# Patient Record
Sex: MALE | Race: WHITE | Employment: UNEMPLOYED | URBAN - METROPOLITAN AREA
[De-identification: names, ages, dates, MRNs, and addresses within clinical notes are randomized per-mention and may not be internally consistent; named-entity substitution may affect disease eponyms.]

---

## 2017-01-11 ENCOUNTER — ANESTHESIA EVENT (OUTPATIENT)
Dept: ENDOSCOPY | Age: 62
End: 2017-01-11
Payer: MEDICARE

## 2017-01-11 ENCOUNTER — HOSPITAL ENCOUNTER (OUTPATIENT)
Age: 62
Setting detail: OUTPATIENT SURGERY
Discharge: HOME | End: 2017-01-11
Attending: INTERNAL MEDICINE | Admitting: INTERNAL MEDICINE
Payer: MEDICARE

## 2017-01-11 ENCOUNTER — ANESTHESIA (OUTPATIENT)
Dept: ENDOSCOPY | Age: 62
End: 2017-01-11
Payer: MEDICARE

## 2017-01-11 VITALS
OXYGEN SATURATION: 97 % | RESPIRATION RATE: 17 BRPM | WEIGHT: 143.56 LBS | HEART RATE: 74 BPM | HEIGHT: 71 IN | BODY MASS INDEX: 20.1 KG/M2 | DIASTOLIC BLOOD PRESSURE: 80 MMHG | TEMPERATURE: 97.6 F | SYSTOLIC BLOOD PRESSURE: 121 MMHG

## 2017-01-11 PROCEDURE — 76060000032 HC ANESTHESIA 0.5 TO 1 HR: Performed by: INTERNAL MEDICINE

## 2017-01-11 PROCEDURE — 88305 TISSUE EXAM BY PATHOLOGIST: CPT | Performed by: INTERNAL MEDICINE

## 2017-01-11 PROCEDURE — 77030019988 HC FCPS ENDOSC DISP BSC -B: Performed by: INTERNAL MEDICINE

## 2017-01-11 PROCEDURE — 74011250636 HC RX REV CODE- 250/636

## 2017-01-11 PROCEDURE — 74011250636 HC RX REV CODE- 250/636: Performed by: INTERNAL MEDICINE

## 2017-01-11 PROCEDURE — 74011000250 HC RX REV CODE- 250

## 2017-01-11 PROCEDURE — 77030013992 HC SNR POLYP ENDOSC BSC -B: Performed by: INTERNAL MEDICINE

## 2017-01-11 PROCEDURE — 76040000007: Performed by: INTERNAL MEDICINE

## 2017-01-11 RX ORDER — SODIUM CHLORIDE 0.9 % (FLUSH) 0.9 %
5-10 SYRINGE (ML) INJECTION AS NEEDED
Status: DISCONTINUED | OUTPATIENT
Start: 2017-01-11 | End: 2017-01-11 | Stop reason: HOSPADM

## 2017-01-11 RX ORDER — DEXTROMETHORPHAN/PSEUDOEPHED 2.5-7.5/.8
1.2 DROPS ORAL
Status: DISCONTINUED | OUTPATIENT
Start: 2017-01-11 | End: 2017-01-11 | Stop reason: HOSPADM

## 2017-01-11 RX ORDER — LIDOCAINE HYDROCHLORIDE 20 MG/ML
INJECTION, SOLUTION EPIDURAL; INFILTRATION; INTRACAUDAL; PERINEURAL AS NEEDED
Status: DISCONTINUED | OUTPATIENT
Start: 2017-01-11 | End: 2017-01-11 | Stop reason: HOSPADM

## 2017-01-11 RX ORDER — SODIUM CHLORIDE 0.9 % (FLUSH) 0.9 %
5-10 SYRINGE (ML) INJECTION EVERY 8 HOURS
Status: DISCONTINUED | OUTPATIENT
Start: 2017-01-11 | End: 2017-01-11 | Stop reason: HOSPADM

## 2017-01-11 RX ORDER — EPINEPHRINE 0.1 MG/ML
1 INJECTION INTRACARDIAC; INTRAVENOUS
Status: DISCONTINUED | OUTPATIENT
Start: 2017-01-11 | End: 2017-01-11 | Stop reason: HOSPADM

## 2017-01-11 RX ORDER — PROPOFOL 10 MG/ML
INJECTION, EMULSION INTRAVENOUS AS NEEDED
Status: DISCONTINUED | OUTPATIENT
Start: 2017-01-11 | End: 2017-01-11 | Stop reason: HOSPADM

## 2017-01-11 RX ORDER — FLUMAZENIL 0.1 MG/ML
0.2 INJECTION INTRAVENOUS
Status: DISCONTINUED | OUTPATIENT
Start: 2017-01-11 | End: 2017-01-11 | Stop reason: HOSPADM

## 2017-01-11 RX ORDER — MIDAZOLAM HYDROCHLORIDE 1 MG/ML
.25-5 INJECTION, SOLUTION INTRAMUSCULAR; INTRAVENOUS
Status: DISCONTINUED | OUTPATIENT
Start: 2017-01-11 | End: 2017-01-11 | Stop reason: HOSPADM

## 2017-01-11 RX ORDER — NALOXONE HYDROCHLORIDE 0.4 MG/ML
0.4 INJECTION, SOLUTION INTRAMUSCULAR; INTRAVENOUS; SUBCUTANEOUS
Status: DISCONTINUED | OUTPATIENT
Start: 2017-01-11 | End: 2017-01-11 | Stop reason: HOSPADM

## 2017-01-11 RX ORDER — SODIUM CHLORIDE 9 MG/ML
100 INJECTION, SOLUTION INTRAVENOUS CONTINUOUS
Status: DISCONTINUED | OUTPATIENT
Start: 2017-01-11 | End: 2017-01-11 | Stop reason: HOSPADM

## 2017-01-11 RX ORDER — GLYCOPYRROLATE 0.2 MG/ML
INJECTION INTRAMUSCULAR; INTRAVENOUS AS NEEDED
Status: DISCONTINUED | OUTPATIENT
Start: 2017-01-11 | End: 2017-01-11 | Stop reason: HOSPADM

## 2017-01-11 RX ORDER — ATROPINE SULFATE 0.1 MG/ML
0.5 INJECTION INTRAVENOUS
Status: DISCONTINUED | OUTPATIENT
Start: 2017-01-11 | End: 2017-01-11 | Stop reason: HOSPADM

## 2017-01-11 RX ADMIN — PROPOFOL 50 MG: 10 INJECTION, EMULSION INTRAVENOUS at 09:02

## 2017-01-11 RX ADMIN — GLYCOPYRROLATE 0.1 MG: 0.2 INJECTION INTRAMUSCULAR; INTRAVENOUS at 08:38

## 2017-01-11 RX ADMIN — SODIUM CHLORIDE 100 ML/HR: 900 INJECTION, SOLUTION INTRAVENOUS at 08:15

## 2017-01-11 RX ADMIN — LIDOCAINE HYDROCHLORIDE 50 MG: 20 INJECTION, SOLUTION EPIDURAL; INFILTRATION; INTRACAUDAL; PERINEURAL at 08:38

## 2017-01-11 RX ADMIN — PROPOFOL 30 MG: 10 INJECTION, EMULSION INTRAVENOUS at 08:50

## 2017-01-11 RX ADMIN — PROPOFOL 50 MG: 10 INJECTION, EMULSION INTRAVENOUS at 08:38

## 2017-01-11 RX ADMIN — PROPOFOL 20 MG: 10 INJECTION, EMULSION INTRAVENOUS at 09:14

## 2017-01-11 RX ADMIN — PROPOFOL 20 MG: 10 INJECTION, EMULSION INTRAVENOUS at 08:47

## 2017-01-11 RX ADMIN — PROPOFOL 50 MG: 10 INJECTION, EMULSION INTRAVENOUS at 08:56

## 2017-01-11 RX ADMIN — SODIUM CHLORIDE 100 ML/HR: 900 INJECTION, SOLUTION INTRAVENOUS at 09:39

## 2017-01-11 RX ADMIN — PROPOFOL 20 MG: 10 INJECTION, EMULSION INTRAVENOUS at 09:08

## 2017-01-11 RX ADMIN — PROPOFOL 50 MG: 10 INJECTION, EMULSION INTRAVENOUS at 08:42

## 2017-01-11 NOTE — IP AVS SNAPSHOT
[address] [facility] 
[phone] Patient: Nidhi Alegria MRN: [id number] You are allergic to the following Allergen Reactions Aleve (Naproxen Sodium) Anaphylaxis Hives, passed out, had seizure Sulfa (Sulfonamide Antibiotics) Hives Recent Documentation Height Weight BMI Smoking Status 1.803 m 65.1 kg 20.02 kg/m2 Current Every Day Smoker Emergency Contacts Name Discharge Info Relation Home Work Mobile Ashwini Males  Spouse [3] [phone] About your hospitalization You were admitted on:  2017 You last received care in the:  [facility] You were discharged on:  2017 Unit phone number:  [phone] Why you were hospitalized Your primary diagnosis was:  Not on File Providers Seen During Your Hospitalizations Provider Role Specialty Primary office phone Kylee Brizuela MD Attending Provider Gastroenterology [phone] Your Primary Care Physician (PCP) Primary Care Physician Office Phone Office Fax [facility] [phone] Follow-up Information Follow up With Details Comments Contact Info Loc Sinclair MD   [address] [place] [address] 
[phone] Current Discharge Medication List  
  
CONTINUE these medications which have NOT CHANGED Dose & Instructions Dispensing Information Comments Morning Noon Evening Bedtime  
 albuterol 90 mcg/actuation inhaler Commonly known as:  Guillermo Saliva Your next dose is: Today, Tomorrow Other:  _________ Dose:  2 Puff Take 2 Puffs by inhalation every six (6) hours as needed. Refills:  0  
     
   
   
   
  
 ANORO ELLIPTA 62.5-25 mcg/actuation inhaler Generic drug:  umeclidinium-vilanterol Your next dose is: Today, Tomorrow Other:  _________ Dose:  1 Puff Take 1 Puff by inhalation daily. Refills:  0  
     
   
   
   
  
 aspirin 81 mg tablet Your next dose is: Today, Tomorrow Other:  _________ Dose:  81 mg Take 81 mg by mouth daily. Refills:  0  
     
   
   
   
  
 diazePAM 10 mg tablet Commonly known as:  VALIUM Your next dose is: Today, Tomorrow Other:  _________ Dose:  10 mg Take 10 mg by mouth nightly. Refills:  0 HYDROcodone-acetaminophen 5-325 mg per tablet Commonly known as:  Mario Songster Your next dose is: Today, Tomorrow Other:  _________ Dose:  1 Tab Take 1 Tab by mouth daily as needed. Indications: PAIN Refills:  0  
     
   
   
   
  
 omeprazole 20 mg capsule Commonly known as:  PRILOSEC Your next dose is: Today, Tomorrow Other:  _________ Dose:  20 mg Take 20 mg by mouth daily. Refills:  0 Discharge Instructions Michael Waggoner MD 
[facility], [address] [place] [address] [address] [facility], [address] [place] [address] 
[phone] 
[email or web address] 
 
Renny Pacheco [id number] 
1955 EGD DISCHARGE INSTRUCTIONS Discomfort: 
Sore throat- throat lozenges or warm salt water gargle 
redness at IV site- apply warm compress to area; if redness or soreness persist- contact your physician Gaseous discomfort- walking, belching will help relieve any discomfort You may not operate a vehicle for 12 hours You may not engage in an occupation involving machinery or appliances for rest of today You may not drink alcoholic beverages for at least 12 hours Avoid making any critical decisions for at least 24 hour DIET You may have anything by mouth. You may eat and drink immediately.  
You may resume your regular diet  however -  remember your colon is empty and a heavy meal will produce gas. Avoid these foods:  vegetables, fried / greasy foods, carbonated drinks ACTIVITY You may resume your normal daily activities Spend the remainder of the day resting -  avoid any strenuous activity. CALL M.D. ANY SIGN OF Increasing pain, nausea, vomiting Abdominal distension (swelling) New increased bleeding (oral or rectal) Fever (chills) Pain in chest area Bloody discharge from nose or mouth Shortness of breath Follow-up Instructions: 
 Call Dr. Clayton Khan for any questions or problems. Telephone # [phone] Dr. Shaina Fulton office will notify you of the biopsy results available  Within 7 to 10 days. We will call you or send a letter Continue same medications. ENDOSCOPY FINDINGS: 
 Your endoscopy showed some inflammation at the surgical anastomosis, biopsied. DISCHARGE SUMMARY from Nurse The following personal items collected during your admission are returned to you:  
Dental Appliance: Dental Appliances: None Vision: Visual Aid: Glasses, With patient Hearing Aid:   
Jewelry:   
Clothing:   
Other Valuables:   
Valuables sent to safe:    
 
 
COLON DISCHARGE INSTRUCTIONS Discomfort: 
Redness at IV site- apply warm compress to area; if redness or soreness persist- contact your physician There may be a slight amount of blood passed from the rectum Gaseous discomfort- walking, belching will help relieve any discomfort You may not operate a vehicle for 12 hours You may not engage in an occupation involving machinery or appliances for rest of today You may not drink alcoholic beverages for at least 12 hours Avoid making any critical decisions for at least 24 hour DIET: 
 Regular diet.  however -  remember your colon is empty and a heavy meal will produce gas. Avoid these foods:  vegetables, fried / greasy foods, carbonated drinks for today ACTIVITY: 
 You may resume your normal daily activities it is recommended that you spend the remainder of the day resting -  avoid any strenuous activity. CALL M.D. ANY SIGN OF: Increasing pain, nausea, vomiting Abdominal distension (swelling) New increased bleeding (oral or rectal) Fever (chills) Pain in chest area Bloody discharge from nose or mouth Shortness of breath COLONOSCOPY FINDINGS: 
Your colonoscopy showed: multiple polyps which we removed. Follow-up Instructions: 
 Call Dr. Maria T Valencia if any questions or problems. Telephone # [phone] Dr. Alfredito Hahn office will notify you of the biopsy results within 7 to 10 days. Should have a repeat colonoscopy in 3 years. [facility] Activation Thank you for requesting access to [facility]. Please follow the instructions below to securely access and download your online medical record. [facility] allows you to send messages to your doctor, view your test results, renew your prescriptions, schedule appointments, and more. How Do I Sign Up? 1. In your internet browser, go to [email or web address] 
2. Click on the First Time User? Click Here link in the Sign In box. You will be redirect to the New Member Sign Up page. 3. Enter your [facility] Access Code exactly as it appears below. You will not need to use this code after youve completed the sign-up process. If you do not sign up before the expiration date, you must request a new code. [facility] Access Code: [email or web address] Expires: 2017  7:36 AM (This is the date your [facility] access code will ) 4. Enter the last four digits of your Social Security Number (xxxx) and Date of Birth (mm/dd/yyyy) as indicated and click Submit. You will be taken to the next sign-up page. 5. Create a [facility] ID. This will be your [facility] login ID and cannot be changed, so think of one that is secure and easy to remember. 6. Create a [facility] password. You can change your password at any time. 7. Enter your Password Reset Question and Answer. This can be used at a later time if you forget your password. 8. Enter your e-mail address. You will receive e-mail notification when new information is available in [address] 9. Click Sign Up. You can now view and download portions of your medical record. 10. Click the Download Summary menu link to download a portable copy of your medical information. Additional Information If you have questions, please visit the Frequently Asked Questions section of the [employer] website at [email or web address] Remember, [facility] is NOT to be used for urgent needs. For medical emergencies, dial 911. Discharge Orders None Introducing [facility] SERVICES! Rabia Mcfadden introduces [employer] patient portal. Now you can access parts of your medical record, email your doctor's office, and request medication refills online. 1. In your internet browser, go to [email or web address] 2. Click on the First Time User? Click Here link in the Sign In box. You will see the New Member Sign Up page. 3. Enter your [facility] Access Code exactly as it appears below. You will not need to use this code after youve completed the sign-up process. If you do not sign up before the expiration date, you must request a new code. · [facility] Access Code: [email or web address] Expires: 4/11/2017  7:36 AM 
 
4. Enter the last four digits of your Social Security Number (xxxx) and Date of Birth (mm/dd/yyyy) as indicated and click Submit. You will be taken to the next sign-up page. 5. Create a [facility] ID. This will be your [facility] login ID and cannot be changed, so think of one that is secure and easy to remember. 6. Create a [employer] password. You can change your password at any time. 7. Enter your Password Reset Question and Answer. This can be used at a later time if you forget your password. 8. Enter your e-mail address. You will receive e-mail notification when new information is available in [address] 9. Click Sign Up. You can now view and download portions of your medical record. 10. Click the Download Summary menu link to download a portable copy of your medical information. If you have questions, please visit the Frequently Asked Questions section of the [employer] website. Remember, [employer] is NOT to be used for urgent needs. For medical emergencies, dial 911. Now available from your iPhone and Android! General Information Please provide this summary of care documentation to your next provider. Patient Signature:  ____________________________________________________________ Date:  ____________________________________________________________  
  
Mallika Hoskins Provider Signature:  ____________________________________________________________ Date:  ____________________________________________________________

## 2017-01-11 NOTE — DISCHARGE INSTRUCTIONS
Tawanna Thomas MD  [facility], [address]  [place], [address] [place]  [phone]  [email or web address]    Kalia Woodward  [id number]  1955    EGD DISCHARGE INSTRUCTIONS    Discomfort:  Sore throat- throat lozenges or warm salt water gargle  redness at IV site- apply warm compress to area; if redness or soreness persist- contact your physician  Gaseous discomfort- walking, belching will help relieve any discomfort  You may not operate a vehicle for 12 hours  You may not engage in an occupation involving machinery or appliances for rest of today  You may not drink alcoholic beverages for at least 12 hours  Avoid making any critical decisions for at least 24 hour  DIET  You may have anything by mouth. You may eat and drink immediately. You may resume your regular diet - however -  remember your colon is empty and a heavy meal will produce gas. Avoid these foods:  vegetables, fried / greasy foods, carbonated drinks      ACTIVITY  You may resume your normal daily activities   Spend the remainder of the day resting -  avoid any strenuous activity. CALL M.D. ANY SIGN OF   Increasing pain, nausea, vomiting  Abdominal distension (swelling)  New increased bleeding (oral or rectal)  Fever (chills)  Pain in chest area  Bloody discharge from nose or mouth  Shortness of breath    Follow-up Instructions:   Call Dr. Tawanna Thomas for any questions or problems. Telephone # [phone]  Dr. Hill Bernal office will notify you of the biopsy results available  Within 7 to 10 days. We will call you or send a letter   Continue same medications. ENDOSCOPY FINDINGS:   Your endoscopy showed some inflammation at the surgical anastomosis, biopsied.       DISCHARGE SUMMARY from Nurse    The following personal items collected during your admission are returned to you:   Dental Appliance: Dental Appliances: None  Vision: Visual Aid: Glasses, With patient  Hearing Aid:    Jewelry:    Clothing:    Other Valuables:    Valuables sent to safe:         COLON DISCHARGE INSTRUCTIONS  Discomfort:  Redness at IV site- apply warm compress to area; if redness or soreness persist- contact your physician  There may be a slight amount of blood passed from the rectum  Gaseous discomfort- walking, belching will help relieve any discomfort  You may not operate a vehicle for 12 hours  You may not engage in an occupation involving machinery or appliances for rest of today  You may not drink alcoholic beverages for at least 12 hours  Avoid making any critical decisions for at least 24 hour  DIET:   Regular diet. - however -  remember your colon is empty and a heavy meal will produce gas. Avoid these foods:  vegetables, fried / greasy foods, carbonated drinks for today      ACTIVITY:  You may resume your normal daily activities it is recommended that you spend the remainder of the day resting -  avoid any strenuous activity. CALL M.D. ANY SIGN OF:   Increasing pain, nausea, vomiting  Abdominal distension (swelling)  New increased bleeding (oral or rectal)  Fever (chills)  Pain in chest area  Bloody discharge from nose or mouth  Shortness of breath     COLONOSCOPY FINDINGS:  Your colonoscopy showed: multiple polyps which we removed. Follow-up Instructions:   Call Dr. Clayton Khan if any questions or problems. Telephone # [phone]  Dr. Shaina Fulton office will notify you of the biopsy results within 7 to 10 days. Should have a repeat colonoscopy in 3 years. [employer] Activation    Thank you for requesting access to [employer]. Please follow the instructions below to securely access and download your online medical record. [employer] allows you to send messages to your doctor, view your test results, renew your prescriptions, schedule appointments, and more. How Do I Sign Up? 1. In your internet browser, go to [email or web address]  2.  Click on the First Time User? Click Here link in the Sign In box. You will be redirect to the New Member Sign Up page. 3. Enter your [facility] Access Code exactly as it appears below. You will not need to use this code after youve completed the sign-up process. If you do not sign up before the expiration date, you must request a new code. [facility] Access Code: [email or web address]  Expires: 2017  7:36 AM (This is the date your [facility] access code will )    4. Enter the last four digits of your Social Security Number (xxxx) and Date of Birth (mm/dd/yyyy) as indicated and click Submit. You will be taken to the next sign-up page. 5. Create a [facility] ID. This will be your [facility] login ID and cannot be changed, so think of one that is secure and easy to remember. 6. Create a [facility] password. You can change your password at any time. 7. Enter your Password Reset Question and Answer. This can be used at a later time if you forget your password. 8. Enter your e-mail address. You will receive e-mail notification when new information is available in [address] 9. Click Sign Up. You can now view and download portions of your medical record. 10. Click the Download Summary menu link to download a portable copy of your medical information. Additional Information    If you have questions, please visit the Frequently Asked Questions section of the [facility] website at [email or web address] Remember, [facility] is NOT to be used for urgent needs. For medical emergencies, dial 911.

## 2017-01-11 NOTE — PROCEDURES
[facility]                  Colonoscopy Operative Report    1/11/2017      Nidhi Alegria  [id number]  1955    Procedure Type:   Colonoscopy --screening     Indications:    Personal history of colon polyps (screening only)     Pre-operative Diagnosis: see indication above    Post-operative Diagnosis:  See findings below    :  Rika Singletary MD    Referring Provider: Loc Sinclair MD      Sedation:  MAC anesthesia Propofol    Pre-Procedural Exam:      Airway: clear,  No airway problems anticipated  Heart: RRR, without gallops or rubs  Lungs: clear bilaterally without wheezes, crackles, or rhonchi  Abdomen: soft, nontender, nondistended, bowel sounds present  Mental Status: awake, alert and oriented to person, place and time     Procedure Details:  After informed consent was obtained with all risks and benefits of procedure explained and preoperative exam completed, the patient was taken to the endoscopy suite and placed in the left lateral decubitus position. Upon sequential sedation as per above, a digital rectal exam was performed . The Olympus videocolonoscope  was inserted in the rectum and carefully advanced to the cecum, which was identified by the ileocecal valve and appendiceal orifice. The cecum was identified by the ileocecal valve and appendiceal orifice. The quality of preparation was adequate. The colonoscope was slowly withdrawn with careful evaluation between folds. Retroflexion in the rectum was completed demonstrating internal hemorrhoids. Findings:   Rectum: Grade 1 internal hemorrhoid(s); Sigmoid: normal  Descending Colon: 5 polyps from 6 to 9 mm, hot snared  Transverse Colon: 8 polyps from 5 to 10 mm, hot snared  Ascending Colon: normal  Cecum: normal  Terminal Ileum: not intubated      Specimen Removed:  1. transverse colonic polyps  2. descending colon polyps    Complications: None. EBL:  None.     Impression:    hemorrhoids internal, Moderate in size  multiple colonic polyps removed    Recommendations: --Await pathology. , -Repeat colonoscopy in 3 years. High fiber diet. Resume normal medication(s). Discharge Disposition:  Home in the company of a  when able to ambulate. Robert Casey MD    1/11/2017     JOZEF Hurt MD  [facility], [address] [place] [address] [address] [place] [address]  [phone]  [email or web address]

## 2017-01-11 NOTE — PROCEDURES
[facility]                   Endoscopic Gastroduodenoscopy Procedure Note      1/11/2017  Elzbieta Pert  1955  [id number]    Procedure: Endoscopic Gastroduodenoscopy with biopsy    Indication:  Dyphagia/odynophagia, s/p resection of distal esophagus for carcinoma, Epstein's     Pre-operative Diagnosis: see indication above    Post-operative Diagnosis: see findings below    : JOZEF Olson MD    Referring Provider:  Halie Phan MD      Anesthesia/Sedation:  MAC anesthesia Propofol    Airway assessment: No airway problems anticipated    Pre-Procedural Exam:      Airway: clear, no airway problems anticipated  Heart: RRR, without gallops or rubs  Lungs: clear bilaterally without wheezes, crackles, or rhonchi  Abdomen: soft, nontender, nondistended, bowel sounds present  Mental Status: awake, alert and oriented to person, place and time       Procedure Details     After infomed consent was obtained for the procedure, with all risks and benefits of procedure explained the patient was taken to the endoscopy suite and placed in the left lateral decubitus position. Following sequential administration of sedation as per above, the endoscope was inserted into the mouth and advanced under direct vision to second portion of the duodenum. A careful inspection was made as the gastroscope was withdrawn, including a retroflexed view of the proximal stomach; findings and interventions are described below. Findings:   Esophagus:  Focal esophagitis at the esophagogastric surgical anastomosis which was biopsied. No stricture seen. Stomach: normal . Pyloroplasty  Duodenum: normal    Therapies:  biopsy of esophagogastric anastomosis    Specimens:  biopsy of esophagogastric anastomosis           Complications:   None; patient tolerated the procedure well. EBL:  None.             Impression:      -Focal esophagitis at the esophagogastric surgical anastomosis Recommendations:  -Await pathology.     Josseline Boothe MD1/11/2017

## 2017-01-11 NOTE — ANESTHESIA PREPROCEDURE EVALUATION
Anesthetic History   No history of anesthetic complications            Review of Systems / Medical History  Patient summary reviewed, nursing notes reviewed and pertinent labs reviewed    Pulmonary    COPD: moderate    Sleep apnea (resolved with weight loss)  Smoker (40 pk yrs)  Asthma        Neuro/Psych     seizures         Cardiovascular    Hypertension      CHF        Exercise tolerance: >4 METS  Comments: EF normal in 2012   GI/Hepatic/Renal     GERD           Endo/Other      Hypothyroidism  Arthritis and cancer ( esophagus)     Other Findings              Physical Exam    Airway  Mallampati: II  TM Distance: 4 - 6 cm  Neck ROM: normal range of motion   Mouth opening: Normal     Cardiovascular  Regular rate and rhythm,  S1 and S2 normal,  no murmur, click, rub, or gallop             Dental    Dentition: Poor dentition     Pulmonary  Breath sounds clear to auscultation               Abdominal  GI exam deferred       Other Findings            Anesthetic Plan    ASA: 3  Anesthesia type: MAC          Induction: Intravenous  Anesthetic plan and risks discussed with: Patient

## 2017-01-11 NOTE — ROUTINE PROCESS
Glenn Flores from Dr Brigitte Khan office in to give patient a specimen cup for stool specimen to R/Q c diff.  Patient instructed to call dr. Brigitte Khan office for any questions

## 2017-01-11 NOTE — ROUTINE PROCESS
Kory Uniopolis  1955  [id number]    Situation:  Verbal report received from: Rafael Jean RN  Procedure: Procedure(s):  COLONOSCOPY  ESOPHAGOGASTRODUODENOSCOPY (EGD)  ESOPHAGOGASTRODUODENAL (EGD) BIOPSY  ENDOSCOPIC POLYPECTOMY    Background:    Preoperative diagnosis: DYSPHAGIA,DIARRHEA, HX ESOPHAGEAL CA, HX COLONIC POLYPS, FAM HX COLON CANCER  Postoperative diagnosis: EGD-hx of esophageal CA  COLON-colon polyps    :  Dr. Mao Plater  Assistant(s): Endoscopy Technician-1: Alex Cervantes  Endoscopy RN-1: Elena Jim RN    Specimens:   ID Type Source Tests Collected by Time Destination   1 : esophagogastric anastamosis bx Preservative   Pineda Kc MD 1/11/2017 0845 Pathology   2 : Transverse colon polyps Preservative   Pineda Kc MD 1/11/2017 9973 Pathology   3 : descending colon polyps Preservative Colon, Descending  Pineda Kc MD 1/11/2017 4099 Pathology     H. Pylori  no    Assessment:  Intra-procedure medications     Anesthesia gave intra-procedure sedation and medications, see anesthesia flow sheet     Intravenous fluids: NS@ KVO     Vital signs stable     Abdominal assessment: round and soft     Recommendation:  Discharge patient per MD order  Family or Friend   Permission to share finding with family or friend yes

## 2017-01-11 NOTE — PROGRESS NOTES
Anesthesia reports 290 mg Propofol, 50 mg Lidocaine, 0.1 mg Robinul and 750 ml of Normal Saline were given during procedure. Received report from anesthesia staff on vital signs and status of patient.

## 2017-01-11 NOTE — H&P
Sawyer Golden MD  [facility], [address]  [address]  [phone]  [email or web address]       See office H and P. No interval change. Date of Surgery Update:  Mickey Singer was seen and examined. History and physical has been reviewed. The patient has been examined.  There have been no significant clinical changes since the completion of the originally dated History and Physical.    Signed By: Geoffrey Le MD     January 11, 2017 8:36 AM

## 2017-01-11 NOTE — ANESTHESIA POSTPROCEDURE EVALUATION
Post-Anesthesia Evaluation and Assessment    Patient: Mayra Benavidez MRN: [id number]  SSN: [id number]    YOB: 1955  Age: 63 y.o. Sex: male       Cardiovascular Function/Vital Signs  Visit Vitals    /80    Pulse 74    Temp 36.4 °C (97.6 °F)    Resp 17    Ht 5' 11\" (1.803 m)    Wt 65.1 kg (143 lb 9 oz)    SpO2 97%    BMI 20.02 kg/m2       Patient is status post MAC anesthesia for Procedure(s):  COLONOSCOPY  ESOPHAGOGASTRODUODENOSCOPY (EGD)  ESOPHAGOGASTRODUODENAL (EGD) BIOPSY  ENDOSCOPIC POLYPECTOMY. Nausea/Vomiting: None    Postoperative hydration reviewed and adequate. Pain:  Pain Scale 1: Numeric (0 - 10) (01/11/17 0947)  Pain Intensity 1: 0 (01/11/17 0947)   Managed    Neurological Status: At baseline    Mental Status and Level of Consciousness: Arousable    Pulmonary Status:   O2 Device: Room air (01/11/17 0947)   Adequate oxygenation and airway patent    Complications related to anesthesia: None    Post-anesthesia assessment completed.  No concerns    Signed By: Sydnee Roe MD     January 11, 2017

## 2017-01-13 ENCOUNTER — ANESTHESIA (OUTPATIENT)
Dept: ENDOSCOPY | Age: 62
DRG: 920 | End: 2017-01-13
Payer: MEDICARE

## 2017-01-13 ENCOUNTER — HOSPITAL ENCOUNTER (INPATIENT)
Age: 62
LOS: 1 days | Discharge: HOME | DRG: 920 | End: 2017-01-14
Attending: EMERGENCY MEDICINE | Admitting: INTERNAL MEDICINE
Payer: MEDICARE

## 2017-01-13 ENCOUNTER — ANESTHESIA EVENT (OUTPATIENT)
Dept: ENDOSCOPY | Age: 62
DRG: 920 | End: 2017-01-13
Payer: MEDICARE

## 2017-01-13 DIAGNOSIS — K92.2 LOWER GI BLEED: Primary | ICD-10-CM

## 2017-01-13 PROBLEM — I10 HTN (HYPERTENSION): Status: ACTIVE | Noted: 2017-01-13

## 2017-01-13 PROBLEM — J44.9 COPD (CHRONIC OBSTRUCTIVE PULMONARY DISEASE) (HCC): Status: ACTIVE | Noted: 2017-01-13

## 2017-01-13 LAB
ABO + RH BLD: NORMAL
ALBUMIN SERPL BCP-MCNC: 3.3 G/DL (ref 3.5–5)
ALBUMIN/GLOB SERPL: 1.1 {RATIO} (ref 1.1–2.2)
ALP SERPL-CCNC: 62 U/L (ref 45–117)
ALT SERPL-CCNC: 23 U/L (ref 12–78)
ANION GAP BLD CALC-SCNC: 5 MMOL/L (ref 5–15)
AST SERPL W P-5'-P-CCNC: 15 U/L (ref 15–37)
BASOPHILS # BLD AUTO: 0.1 K/UL (ref 0–0.1)
BASOPHILS # BLD: 1 % (ref 0–1)
BILIRUB SERPL-MCNC: 0.3 MG/DL (ref 0.2–1)
BLOOD GROUP ANTIBODIES SERPL: NORMAL
BUN SERPL-MCNC: 15 MG/DL (ref 6–20)
BUN/CREAT SERPL: 21 (ref 12–20)
CALCIUM SERPL-MCNC: 8.1 MG/DL (ref 8.5–10.1)
CHLORIDE SERPL-SCNC: 106 MMOL/L (ref 97–108)
CO2 SERPL-SCNC: 32 MMOL/L (ref 21–32)
CREAT SERPL-MCNC: 0.73 MG/DL (ref 0.7–1.3)
EOSINOPHIL # BLD: 0.3 K/UL (ref 0–0.4)
EOSINOPHIL NFR BLD: 5 % (ref 0–7)
ERYTHROCYTE [DISTWIDTH] IN BLOOD BY AUTOMATED COUNT: 14.1 % (ref 11.5–14.5)
GLOBULIN SER CALC-MCNC: 2.9 G/DL (ref 2–4)
GLUCOSE SERPL-MCNC: 90 MG/DL (ref 65–100)
HCT VFR BLD AUTO: 35.1 % (ref 36.6–50.3)
HCT VFR BLD AUTO: 38 % (ref 36.6–50.3)
HGB BLD-MCNC: 11.6 G/DL (ref 12.1–17)
HGB BLD-MCNC: 12.7 G/DL (ref 12.1–17)
LYMPHOCYTES # BLD AUTO: 44 % (ref 12–49)
LYMPHOCYTES # BLD: 2.8 K/UL (ref 0.8–3.5)
MCH RBC QN AUTO: 31.4 PG (ref 26–34)
MCHC RBC AUTO-ENTMCNC: 33.4 G/DL (ref 30–36.5)
MCV RBC AUTO: 94.1 FL (ref 80–99)
MONOCYTES # BLD: 0.4 K/UL (ref 0–1)
MONOCYTES NFR BLD AUTO: 7 % (ref 5–13)
NEUTS SEG # BLD: 2.7 K/UL (ref 1.8–8)
NEUTS SEG NFR BLD AUTO: 43 % (ref 32–75)
PLATELET # BLD AUTO: 178 K/UL (ref 150–400)
POTASSIUM SERPL-SCNC: 3.7 MMOL/L (ref 3.5–5.1)
PROT SERPL-MCNC: 6.2 G/DL (ref 6.4–8.2)
RBC # BLD AUTO: 4.04 M/UL (ref 4.1–5.7)
SODIUM SERPL-SCNC: 143 MMOL/L (ref 136–145)
SPECIMEN EXP DATE BLD: NORMAL
WBC # BLD AUTO: 6.3 K/UL (ref 4.1–11.1)

## 2017-01-13 PROCEDURE — 74011250637 HC RX REV CODE- 250/637: Performed by: INTERNAL MEDICINE

## 2017-01-13 PROCEDURE — 86900 BLOOD TYPING SEROLOGIC ABO: CPT | Performed by: EMERGENCY MEDICINE

## 2017-01-13 PROCEDURE — 77010033678 HC OXYGEN DAILY

## 2017-01-13 PROCEDURE — 99284 EMERGENCY DEPT VISIT MOD MDM: CPT

## 2017-01-13 PROCEDURE — C9113 INJ PANTOPRAZOLE SODIUM, VIA: HCPCS | Performed by: INTERNAL MEDICINE

## 2017-01-13 PROCEDURE — 76060000032 HC ANESTHESIA 0.5 TO 1 HR: Performed by: INTERNAL MEDICINE

## 2017-01-13 PROCEDURE — 65270000029 HC RM PRIVATE

## 2017-01-13 PROCEDURE — 74011250636 HC RX REV CODE- 250/636: Performed by: EMERGENCY MEDICINE

## 2017-01-13 PROCEDURE — 74011250636 HC RX REV CODE- 250/636: Performed by: INTERNAL MEDICINE

## 2017-01-13 PROCEDURE — 80053 COMPREHEN METABOLIC PANEL: CPT | Performed by: EMERGENCY MEDICINE

## 2017-01-13 PROCEDURE — 77030014179 HC APPL CLP RESOL BSC -C: Performed by: INTERNAL MEDICINE

## 2017-01-13 PROCEDURE — 0W3P8ZZ CONTROL BLEEDING IN GASTROINTESTINAL TRACT, VIA NATURAL OR ARTIFICIAL OPENING ENDOSCOPIC: ICD-10-PCS | Performed by: INTERNAL MEDICINE

## 2017-01-13 PROCEDURE — 74011250637 HC RX REV CODE- 250/637

## 2017-01-13 PROCEDURE — 74011000250 HC RX REV CODE- 250

## 2017-01-13 PROCEDURE — 74011250636 HC RX REV CODE- 250/636

## 2017-01-13 PROCEDURE — 36415 COLL VENOUS BLD VENIPUNCTURE: CPT | Performed by: EMERGENCY MEDICINE

## 2017-01-13 PROCEDURE — 85018 HEMOGLOBIN: CPT | Performed by: INTERNAL MEDICINE

## 2017-01-13 PROCEDURE — 76040000007: Performed by: INTERNAL MEDICINE

## 2017-01-13 PROCEDURE — 74011000250 HC RX REV CODE- 250: Performed by: INTERNAL MEDICINE

## 2017-01-13 PROCEDURE — 77030010936 HC CLP LIG BSC -C: Performed by: INTERNAL MEDICINE

## 2017-01-13 PROCEDURE — 85025 COMPLETE CBC W/AUTO DIFF WBC: CPT | Performed by: EMERGENCY MEDICINE

## 2017-01-13 RX ORDER — ONDANSETRON 2 MG/ML
4 INJECTION INTRAMUSCULAR; INTRAVENOUS
Status: DISCONTINUED | OUTPATIENT
Start: 2017-01-13 | End: 2017-01-14 | Stop reason: HOSPADM

## 2017-01-13 RX ORDER — IPRATROPIUM BROMIDE AND ALBUTEROL SULFATE 2.5; .5 MG/3ML; MG/3ML
3 SOLUTION RESPIRATORY (INHALATION)
Status: DISCONTINUED | OUTPATIENT
Start: 2017-01-13 | End: 2017-01-14 | Stop reason: HOSPADM

## 2017-01-13 RX ORDER — HYDROCODONE BITARTRATE AND ACETAMINOPHEN 5; 325 MG/1; MG/1
1 TABLET ORAL
Status: DISCONTINUED | OUTPATIENT
Start: 2017-01-13 | End: 2017-01-14 | Stop reason: HOSPADM

## 2017-01-13 RX ORDER — PHENYLEPHRINE HCL IN 0.9% NACL 0.4MG/10ML
SYRINGE (ML) INTRAVENOUS AS NEEDED
Status: DISCONTINUED | OUTPATIENT
Start: 2017-01-13 | End: 2017-01-13 | Stop reason: HOSPADM

## 2017-01-13 RX ORDER — SODIUM CHLORIDE 0.9 % (FLUSH) 0.9 %
5-10 SYRINGE (ML) INJECTION EVERY 8 HOURS
Status: ACTIVE | OUTPATIENT
Start: 2017-01-13 | End: 2017-01-13

## 2017-01-13 RX ORDER — DEXTROMETHORPHAN/PSEUDOEPHED 2.5-7.5/.8
DROPS ORAL
Status: DISPENSED
Start: 2017-01-13 | End: 2017-01-14

## 2017-01-13 RX ORDER — DIAZEPAM 5 MG/1
10 TABLET ORAL
Status: DISCONTINUED | OUTPATIENT
Start: 2017-01-13 | End: 2017-01-14 | Stop reason: HOSPADM

## 2017-01-13 RX ORDER — MORPHINE SULFATE 2 MG/ML
2 INJECTION, SOLUTION INTRAMUSCULAR; INTRAVENOUS
Status: DISCONTINUED | OUTPATIENT
Start: 2017-01-13 | End: 2017-01-14 | Stop reason: HOSPADM

## 2017-01-13 RX ORDER — SODIUM CHLORIDE 0.9 % (FLUSH) 0.9 %
5-10 SYRINGE (ML) INJECTION AS NEEDED
Status: ACTIVE | OUTPATIENT
Start: 2017-01-13 | End: 2017-01-13

## 2017-01-13 RX ORDER — MAGNESIUM CITRATE
296 SOLUTION, ORAL ORAL
Status: COMPLETED | OUTPATIENT
Start: 2017-01-13 | End: 2017-01-13

## 2017-01-13 RX ORDER — SODIUM CHLORIDE 9 MG/ML
1000 INJECTION, SOLUTION INTRAVENOUS ONCE
Status: COMPLETED | OUTPATIENT
Start: 2017-01-13 | End: 2017-01-13

## 2017-01-13 RX ORDER — DEXTROMETHORPHAN/PSEUDOEPHED 2.5-7.5/.8
20 DROPS ORAL AS NEEDED
Status: DISCONTINUED | OUTPATIENT
Start: 2017-01-13 | End: 2017-01-14 | Stop reason: HOSPADM

## 2017-01-13 RX ORDER — ACETAMINOPHEN 325 MG/1
650 TABLET ORAL
Status: DISCONTINUED | OUTPATIENT
Start: 2017-01-13 | End: 2017-01-14 | Stop reason: HOSPADM

## 2017-01-13 RX ORDER — PROPOFOL 10 MG/ML
INJECTION, EMULSION INTRAVENOUS AS NEEDED
Status: DISCONTINUED | OUTPATIENT
Start: 2017-01-13 | End: 2017-01-13 | Stop reason: HOSPADM

## 2017-01-13 RX ORDER — LIDOCAINE HYDROCHLORIDE 20 MG/ML
INJECTION, SOLUTION EPIDURAL; INFILTRATION; INTRACAUDAL; PERINEURAL AS NEEDED
Status: DISCONTINUED | OUTPATIENT
Start: 2017-01-13 | End: 2017-01-13 | Stop reason: HOSPADM

## 2017-01-13 RX ORDER — HYDRALAZINE HYDROCHLORIDE 20 MG/ML
10 INJECTION INTRAMUSCULAR; INTRAVENOUS
Status: DISCONTINUED | OUTPATIENT
Start: 2017-01-13 | End: 2017-01-14 | Stop reason: HOSPADM

## 2017-01-13 RX ORDER — SODIUM CHLORIDE 9 MG/ML
75 INJECTION, SOLUTION INTRAVENOUS CONTINUOUS
Status: DISCONTINUED | OUTPATIENT
Start: 2017-01-13 | End: 2017-01-14

## 2017-01-13 RX ORDER — MAGNESIUM CITRATE
SOLUTION, ORAL ORAL
Status: COMPLETED
Start: 2017-01-13 | End: 2017-01-13

## 2017-01-13 RX ADMIN — PROPOFOL 50 MG: 10 INJECTION, EMULSION INTRAVENOUS at 12:08

## 2017-01-13 RX ADMIN — SODIUM CHLORIDE 40 MG: 9 INJECTION INTRAMUSCULAR; INTRAVENOUS; SUBCUTANEOUS at 20:19

## 2017-01-13 RX ADMIN — SODIUM CHLORIDE 75 ML/HR: 900 INJECTION, SOLUTION INTRAVENOUS at 11:43

## 2017-01-13 RX ADMIN — CITROMA MAGNESIUM CITRATE: 1.75 LIQUID ORAL at 08:45

## 2017-01-13 RX ADMIN — SODIUM CHLORIDE 1000 ML: 900 INJECTION, SOLUTION INTRAVENOUS at 07:29

## 2017-01-13 RX ADMIN — PROPOFOL 30 MG: 10 INJECTION, EMULSION INTRAVENOUS at 12:21

## 2017-01-13 RX ADMIN — Medication 80 MCG: at 12:18

## 2017-01-13 RX ADMIN — DIAZEPAM 10 MG: 5 TABLET ORAL at 23:53

## 2017-01-13 RX ADMIN — SODIUM CHLORIDE 75 ML/HR: 900 INJECTION, SOLUTION INTRAVENOUS at 07:57

## 2017-01-13 RX ADMIN — Medication 80 MCG: at 12:11

## 2017-01-13 RX ADMIN — PROPOFOL 150 MG: 10 INJECTION, EMULSION INTRAVENOUS at 12:04

## 2017-01-13 RX ADMIN — MAGESIUM CITRATE 296 ML: 1.75 LIQUID ORAL at 08:41

## 2017-01-13 RX ADMIN — SODIUM CHLORIDE 40 MG: 9 INJECTION INTRAMUSCULAR; INTRAVENOUS; SUBCUTANEOUS at 09:00

## 2017-01-13 RX ADMIN — LIDOCAINE HYDROCHLORIDE 40 MG: 20 INJECTION, SOLUTION EPIDURAL; INFILTRATION; INTRACAUDAL; PERINEURAL at 12:04

## 2017-01-13 RX ADMIN — Medication 80 MCG: at 12:15

## 2017-01-13 RX ADMIN — SIMETHICONE 20 MG: 20 SUSPENSION/ DROPS ORAL at 12:15

## 2017-01-13 NOTE — ANESTHESIA PREPROCEDURE EVALUATION
Anesthetic History   No history of anesthetic complications            Review of Systems / Medical History  Patient summary reviewed, nursing notes reviewed and pertinent labs reviewed    Pulmonary    COPD      Smoker (40 pk yrs)  Asthma   Pertinent negatives: No sleep apnea (resolved post weight loss)     Neuro/Psych     seizures    Psychiatric history     Cardiovascular    Hypertension              Exercise tolerance: <4 METS     GI/Hepatic/Renal  Within defined limits              Endo/Other      Hypothyroidism  Arthritis and cancer (esophagus)     Other Findings   Comments: Post polypectomy bleed    Chronic back pain         Physical Exam    Airway  Mallampati: II  TM Distance: 4 - 6 cm  Neck ROM: normal range of motion   Mouth opening: Normal     Cardiovascular  Regular rate and rhythm,  S1 and S2 normal,  no murmur, click, rub, or gallop             Dental  No notable dental hx       Pulmonary  Breath sounds clear to auscultation               Abdominal  GI exam deferred       Other Findings            Anesthetic Plan    ASA: 3  Anesthesia type: MAC                Pt with some hypotension, caution with sedation

## 2017-01-13 NOTE — IP AVS SNAPSHOT
Current Discharge Medication List  
  
Take these medications at their scheduled times Dose & Instructions Dispensing Information Comments Morning Noon Evening Bedtime ANORO ELLIPTA 62.5-25 mcg/actuation inhaler Generic drug:  umeclidinium-vilanterol Your next dose is: Today, Tomorrow Other:  ____________ Dose:  1 Puff Take 1 Puff by inhalation daily. Refills:  0  
     
   
   
   
  
 aspirin 81 mg tablet Your next dose is: Today, Tomorrow Other:  ____________ Dose:  81 mg Take 81 mg by mouth daily. Refills:  0  
     
   
   
   
  
 diazePAM 10 mg tablet Commonly known as:  VALIUM Your next dose is: Today, Tomorrow Other:  ____________ Dose:  10 mg Take 10 mg by mouth nightly. Refills:  0  
     
   
   
   
  
 omeprazole 20 mg capsule Commonly known as:  PRILOSEC Your next dose is: Today, Tomorrow Other:  ____________ Dose:  20 mg Take 20 mg by mouth daily. Refills:  0 Take these medications as needed Dose & Instructions Dispensing Information Comments Morning Noon Evening Bedtime  
 albuterol 90 mcg/actuation inhaler Commonly known as:  Marine Bachelor Your next dose is: Today, Tomorrow Other:  ____________ Dose:  2 Puff Take 2 Puffs by inhalation every six (6) hours as needed. Refills:  0 HYDROcodone-acetaminophen 5-325 mg per tablet Commonly known as:  Wayna Glaze Your next dose is: Today, Tomorrow Other:  ____________ Dose:  1 Tab Take 1 Tab by mouth daily as needed. Indications: PAIN Refills:  0

## 2017-01-13 NOTE — CONSULTS
GASTROENTEROLOGY CONSULTATION NOTE                            JOZEF Neely MD  [facility], [address], [address]  [phone]  [email or web address]        NAME:  Elpidio Davis   :   1955   MRN:   [id number]   PCP: Paco Bell MD  Date/Time:  2017 9:18 AM    Referring Physician: Skyla Magallon MD    Consult Date: 2017 9:18 AM    Reason for consult: rectal bleeding                   Assessment:   1. LGI bleed, acute---post colonoscopic polypectomy 2 days ago  2. S/p colonoscopic polypectomy 2 days ago---removed 13 polyps  3. Hx of esophageal ca, resected       Active Problems:    Esophageal cancer ([address] (2012)      GI bleed (2017)      COPD (chronic obstructive pulmonary disease) ([address] (2017)      HTN (hypertension) (2017)        Plan:   Being admitted to hospital for observation by Dr. Nadia Flores! Will give mag citrate stat  Colonoscopy today at 12 noon to look for bleeding polypectomy site         History of Present Illness:  Patient is a 63 y.o. who is seen in consultation at the request of Dr. Porsha Crawford for LGI bleed that began around 11 pm last night. Has passed multiple dark red, burgundy stools with clots since then. Underwent colonoscopy 2 days ago with removal of 13 polyps from the transverse and descending colon. Path showed all benign adenomatous polyps. He resumed taking low dose aspirin, but no other blood thinners. Has some crampy abd pain before passing blood. No fever, chills, nausea, vomiting.       PMH:  Past Medical History   Diagnosis Date    Arthritis      osteoarthritis    Asthma     Cancer ([address]      esophagus    Cancer of esophagus ([address] 2012     adenocarcinoma; Dr. Layla Dudley Chronic pain      lower back, has crushed vertebrae    COPD     Essential hypertension     GERD (gastroesophageal reflux disease)     Hypertension      not on medication    Nonischemic cardiomyopathy ([facility]      2003 EF 35-50%; negative cath, 2/2012 EF 55%; Dr. Dank Bowden Other ill-defined conditions(799.89)      Epstein's esophagus    Other ill-defined conditions(799.89)      esophageal nodule - pre-cancerous    Other ill-defined conditions(799.89)      pneumonia multiple times    Psychiatric disorder      depression    Seizures ([facility]      caused with allergic reaction from Aleve    Thyroid disease     Unspecified sleep apnea      c-pap-doesnt wear now/apnea resolved after surgery/lost weight and no sleep apnea       PSH:  Past Surgical History   Procedure Laterality Date    Hx cholecystectomy      Pr egd transoral biopsy single/multiple  12/30/2011          Pr colsc flx w/rmvl of tumor polyp lesion snare tq  12/30/2011     colon x 3     Hx heart catheterization      Hx gi  4/2/12     ESOPHAGOGASTRECTOMY    Pr egd balloon dilation esophagus <30 mm diam  6/20/2012          Pr egd balloon dilation esophagus <30 mm diam  7/5/2012          Pr egd balloon dilation esophagus <30 mm diam  7/24/2012          Pr egd balloon dilation esophagus <30 mm diam  8/28/2012          Pr egd transoral biopsy single/multiple  8/28/2012          Pr egd balloon dilation esophagus <30 mm diam  9/24/2012          Pr egd transoral biopsy single/multiple  11/6/2012          Pr egd balloon dilation esophagus <30 mm diam  11/6/2012          Pr egd transoral biopsy single/multiple  3/4/2013          Pr egd balloon dilation esophagus <30 mm diam  3/4/2013          Pr upper gi endoscopy,stent placement  4/23/2013          Pr egd balloon dilation esophagus <30 mm diam  4/26/2013          Pr egd transoral biopsy single/multiple  4/30/2013          Pr egd balloon dilation esophagus <30 mm diam  7/31/2013          Pr egd balloon dilation esophagus <30 mm diam  9/25/2013          Upper gi endoscopy,biopsy  5/29/2015          Upper gi endoscopy,ball dil,30mm  5/29/2015          Hx other surgical       surgery to remove nodule and barrets esophagus    Upper gi endoscopy,biopsy  1/11/2017          Colorectal scrn; hi risk ind  1/11/2017          Colonoscopy,remv lesn,snare  1/11/2017          Colonoscopy N/A 1/11/2017     COLONOSCOPY performed by Ling Foster MD at [facility] ENDOSCOPY       Allergies: Allergies   Allergen Reactions    Aleve [Naproxen Sodium] Anaphylaxis     Hives, passed out, had seizure    Sulfa (Sulfonamide Antibiotics) Hives         Hospital Medications:  Current Facility-Administered Medications   Medication Dose Route Frequency    diazePAM (VALIUM) tablet 10 mg  10 mg Oral QHS    HYDROcodone-acetaminophen (NORCO) 5-325 mg per tablet 1 Tab  1 Tab Oral Q4H PRN    . PHARMACY TO SUBSTITUTE PER PROTOCOL    Per Protocol    0.9% sodium chloride infusion  75 mL/hr IntraVENous CONTINUOUS    pantoprazole (PROTONIX) 40 mg in sodium chloride 0.9 % 10 mL injection  40 mg IntraVENous Q12H    acetaminophen (TYLENOL) tablet 650 mg  650 mg Oral Q4H PRN    ondansetron (ZOFRAN) injection 4 mg  4 mg IntraVENous Q6H PRN    morphine injection 2 mg  2 mg IntraVENous Q4H PRN    hydrALAZINE (APRESOLINE) 20 mg/mL injection 10 mg  10 mg IntraVENous Q6H PRN    albuterol-ipratropium (DUO-NEB) 2.5 MG-0.5 MG/3 ML  3 mL Nebulization Q6H PRN    magnesium citrate solution         Current Outpatient Prescriptions   Medication Sig    umeclidinium-vilanterol (ANORO ELLIPTA) 62.5-25 mcg/actuation inhaler Take 1 Puff by inhalation daily.  omeprazole (PRILOSEC) 20 mg capsule Take 20 mg by mouth daily.  aspirin 81 mg tablet Take 81 mg by mouth daily.  HYDROcodone-acetaminophen (NORCO) 5-325 mg per tablet Take 1 Tab by mouth daily as needed. Indications: PAIN    albuterol (PROVENTIL, VENTOLIN) 90 mcg/Actuation inhaler Take 2 Puffs by inhalation every six (6) hours as needed.  diazepam (VALIUM) 10 mg tablet Take 10 mg by mouth nightly.        Home Medications:  Prior to Admission Medications   Prescriptions Last Dose Informant Patient Reported? Taking? HYDROcodone-acetaminophen (NORCO) 5-325 mg per tablet Unknown at Unknown time  Yes No   Sig: Take 1 Tab by mouth daily as needed. Indications: PAIN   albuterol (PROVENTIL, VENTOLIN) 90 mcg/Actuation inhaler Unknown at Unknown time  Yes No   Sig: Take 2 Puffs by inhalation every six (6) hours as needed. aspirin 81 mg tablet Unknown at Unknown time  Yes No   Sig: Take 81 mg by mouth daily. diazepam (VALIUM) 10 mg tablet Unknown at Unknown time  Yes No   Sig: Take 10 mg by mouth nightly. omeprazole (PRILOSEC) 20 mg capsule Unknown at Unknown time  Yes No   Sig: Take 20 mg by mouth daily. umeclidinium-vilanterol (ANORO ELLIPTA) 62.5-25 mcg/actuation inhaler Unknown at Unknown time  Yes No   Sig: Take 1 Puff by inhalation daily.       Facility-Administered Medications: None       Social History:  Social History   Substance Use Topics    Smoking status: Current Every Day Smoker     Packs/day: 1.00     Years: 40.00     Types: Cigarettes    Smokeless tobacco: Never Used      Comment: cigarettes 2-3 daily and using e cig    Alcohol use No       Family History:  Family History   Problem Relation Age of Onset    COPD Mother     Asthma Mother     Colon Cancer Mother     Other Mother      food allergies/Steffany's disease/Epstein's esophagus    Heart Attack Mother     Heart Disease Mother     Cancer Mother      colon    Other Father      abestos/food allergies/degernative disc disease    Colon Cancer Maternal Grandmother          Objective:   Patient Vitals for the past 8 hrs:   BP SpO2   01/13/17 0700 94/61 92 %   01/13/17 0500 94/59 90 %   01/13/17 0415 104/60 93 %   01/13/17 0400 113/80 93 %   01/13/17 0345 101/62 93 %   01/13/17 0330 112/68 -             EXAM:     NEURO-a&o   HEENT-wnl   LUNGS-clear    COR-regular rate and rhythym     ABD-soft , no tenderness, no rebound, good bs     EXT-no edema     Data Review     Recent Labs      01/13/17   0327   WBC 6. 3   HGB  12.7   HCT  38.0   PLT  178     Recent Labs      01/13/17   0327   NA  143   K  3.7   CL  106   CO2  32   BUN  15   CREA  0.73   GLU  90   CA  8.1*     Recent Labs      01/13/17   0327   SGOT  15   AP  62   TP  6.2*   ALB  3.3*   GLOB  2.9     No results for input(s): INR, PTP, APTT in the last 72 hours.     No lab exists for component: INREXT     IMAGING RESULTS:   []      I have personally reviewed the actual   []    CXR  []    CT  []     [address] discussed with:    [x]    Patient   []    Family   [x]    Nursing   [x]    Attending    Desirae Velasco., MD

## 2017-01-13 NOTE — ROUTINE PROCESS
TRANSFER - OUT REPORT:    Verbal report given to JAYLENE Perez(name) on Rebeca Blind  being transferred to Room 3240 (unit) for routine progression of care       Report consisted of patients Situation, Background, Assessment and   Recommendations(SBAR). Information from the following report(s) SBAR, Kardex, [facility] and Recent Results was reviewed with the receiving nurse. Lines:   Peripheral IV 01/13/17 Left Arm (Active)   Site Assessment Clean, dry, & intact 1/13/2017  9:45 AM   Phlebitis Assessment 0 1/13/2017  9:45 AM   Infiltration Assessment 0 1/13/2017  9:45 AM   Dressing Status Clean, dry, & intact 1/13/2017  9:45 AM   Dressing Type Tape;Transparent 1/13/2017  9:45 AM   Hub Color/Line Status Pink; Infusing 1/13/2017  9:45 AM

## 2017-01-13 NOTE — ROUTINE PROCESS
Rodrick Abreu  1955  [id number]    Situation:  Verbal report received from: Lien MARIEE  Procedure: Procedure(s):  COLONOSCOPY er 26  RESOLUTION CLIP    Background:    Preoperative diagnosis: post polyp bleed  Postoperative diagnosis: post polyectomy bleeding    :  Dr. Tristan Buerger  Assistant(s): Endoscopy Technician-1: Jacklyn Castleman  Endoscopy RN-1: Sanaz Munguia RN    Specimens: * No specimens in log *  H. Pylori  no    Assessment:  Intra-procedure medications       Anesthesia gave intra-procedure sedation and medications, see anesthesia flow sheet yes    Intravenous fluids: NS@ KVO     Vital signs stable       Abdominal assessment: round and soft       Recommendation:  Discharge patient per MD order  .   Return to floor yes  Family or Friend  Wife  Ivania So  is at home, and Dr. Tristan Buerger spoke with her  Permission to share finding with family or friend yes

## 2017-01-13 NOTE — PROGRESS NOTES
Bedside and Verbal shift change report given to Mortimer Barton, RN (oncoming nurse) by Gloria Benson RN (offgoing nurse). Report included the following information SBAR, Kardex, ED Summary, OR Summary, Procedure Summary, Intake/Output, MAR, Accordion, Recent Results and Med Rec Status.

## 2017-01-13 NOTE — ED PROVIDER NOTES
HPI Comments: Clara Schuster is a 63 y.o. male with PMhx significant for COPD, GERD, and HTN who presents ambulatory to the ED with cc of acute rectal bleeding tonight. Pt states that he recently underwent a colonoscopy under Dr. Isha Elise on 1/11/17 at which time 15 polpys were removed. He states that yesterday night he began experiencing abdominal cramping for which he attempted to pass a bowel movement. He states that he initially thought he had diarrhea, however, he states that he realized he was actually passing bright red blood with small clots, noting he passed very little stool. He states that afterwards he experienced 3 additional episodes of rectal bleeding. He called the on call GI for Dr. Meng Miles office (Dr. Concepción Hicks) who referred him to the ED for evaluation. He is otherwise without complaint and denies any lightheadedness, dizziness, SOB, nausea, vomiting. Pt specifically denies need for pain medication at time of exam.     PCP: Norleen Mortimer, MD  GI: Dr. Isha Elsie    There are no other complaints, changes or physical findings at this time. The history is provided by the patient. No  was used.         Past Medical History:   Diagnosis Date    Arthritis      osteoarthritis    Asthma     Cancer ([address]      esophagus    Cancer of esophagus ([address] 4/2012     adenocarcinoma; Dr. Kelle Galindo Chronic pain      lower back, has crushed vertebrae    COPD     Essential hypertension     GERD (gastroesophageal reflux disease)     Hypertension      not on medication    Nonischemic cardiomyopathy ([address]      2003 EF 35-50%; negative cath, 2/2012 EF 55%; Dr. Param Holder Other ill-defined conditions(799.89)      Epstein's esophagus    Other ill-defined conditions(799.89)      esophageal nodule - pre-cancerous    Other ill-defined conditions(799.89)      pneumonia multiple times    Psychiatric disorder      depression    Seizures ([address]      caused with allergic reaction from Aleve    Thyroid disease     Unspecified sleep apnea      c-pap-doesnt wear now/apnea resolved after surgery/lost weight and no sleep apnea       Past Surgical History:   Procedure Laterality Date    Hx cholecystectomy      Pr egd transoral biopsy single/multiple  12/30/2011          Pr colsc flx w/rmvl of tumor polyp lesion snare tq  12/30/2011     colon x 3     Hx heart catheterization      Hx gi  4/2/12     ESOPHAGOGASTRECTOMY    Pr egd balloon dilation esophagus <30 mm diam  6/20/2012          Pr egd balloon dilation esophagus <30 mm diam  7/5/2012          Pr egd balloon dilation esophagus <30 mm diam  7/24/2012          Pr egd balloon dilation esophagus <30 mm diam  8/28/2012          Pr egd transoral biopsy single/multiple  8/28/2012          Pr egd balloon dilation esophagus <30 mm diam  9/24/2012          Pr egd transoral biopsy single/multiple  11/6/2012          Pr egd balloon dilation esophagus <30 mm diam  11/6/2012          Pr egd transoral biopsy single/multiple  3/4/2013          Pr egd balloon dilation esophagus <30 mm diam  3/4/2013          Pr upper gi endoscopy,stent placement  4/23/2013          Pr egd balloon dilation esophagus <30 mm diam  4/26/2013          Pr egd transoral biopsy single/multiple  4/30/2013          Pr egd balloon dilation esophagus <30 mm diam  7/31/2013          Pr egd balloon dilation esophagus <30 mm diam  9/25/2013          Upper gi endoscopy,biopsy  5/29/2015          Upper gi endoscopy,ball dil,30mm  5/29/2015          Hx other surgical       surgery to remove nodule and barrets esophagus    Upper gi endoscopy,biopsy  1/11/2017          Colorectal scrn; hi risk ind  1/11/2017          Colonoscopy,remv lesn,snare  1/11/2017          Colonoscopy N/A 1/11/2017     COLONOSCOPY performed by Mary Beth Rivera MD at [facility] ENDOSCOPY         Family History:   Problem Relation Age of Onset    COPD Mother     Asthma Mother     Colon Cancer Mother     Other Mother      food allergies/Steffany's disease/Epstein's esophagus    Heart Attack Mother     Heart Disease Mother     Cancer Mother      colon    Other Father      abestos/food allergies/degernative disc disease    Colon Cancer Maternal Grandmother        Social History     Social History    Marital status:      Spouse name: N/A    Number of children: N/A    Years of education: N/A     Occupational History    Not on file. Social History Main Topics    Smoking status: Current Every Day Smoker     Packs/day: 1.00     Years: 40.00     Types: Cigarettes    Smokeless tobacco: Never Used      Comment: cigarettes 2-3 daily and using e cig    Alcohol use No    Drug use: No    Sexual activity: Not on file     Other Topics Concern    Not on file     Social History Narrative         ALLERGIES: Aleve [naproxen sodium] and Sulfa (sulfonamide antibiotics)    Review of Systems   Constitutional: Negative for chills and fever. HENT: Negative. Negative for congestion, rhinorrhea, sneezing and sore throat. Eyes: Negative. Negative for redness and visual disturbance. Respiratory: Negative. Negative for cough, shortness of breath and wheezing. Cardiovascular: Negative. Negative for chest pain and leg swelling. Gastrointestinal: Positive for abdominal pain, anal bleeding and blood in stool. Negative for diarrhea, nausea and vomiting. Genitourinary: Negative. Negative for difficulty urinating, discharge and frequency. Musculoskeletal: Negative. Negative for arthralgias, back pain, myalgias and neck stiffness. Skin: Negative. Negative for color change and rash. Neurological: Negative. Negative for dizziness, syncope, weakness, light-headedness, numbness and headaches. Hematological: Negative for adenopathy. Psychiatric/Behavioral: Negative. All other systems reviewed and are negative.       Patient Vitals for the past 12 hrs:   Temp Pulse Resp BP SpO2 01/13/17 0700 - - - 94/61 92 %   01/13/17 0500 - - - 94/59 90 %   01/13/17 0415 - - - 104/60 93 %   01/13/17 0400 - - - 113/80 93 %   01/13/17 0345 - - - 101/62 93 %   01/13/17 0330 - - - 112/68 -   01/13/17 0108 97.6 °F (36.4 °C) 69 18 139/75 94 %            Physical Exam   Constitutional: He is oriented to person, place, and time. HENT:   Head: Atraumatic. Eyes: EOM are normal.   Cardiovascular: Normal rate, regular rhythm, normal heart sounds and intact distal pulses. Exam reveals no gallop and no friction rub. No murmur heard. Pulmonary/Chest: Effort normal and breath sounds normal. No respiratory distress. He has no wheezes. He has no rales. He exhibits no tenderness. Abdominal: Soft. Bowel sounds are normal. He exhibits no distension and no mass. There is no tenderness. There is no rebound and no guarding. Genitourinary:   Genitourinary Comments: Pt had an episode of dark, bloody stool during ED visit; as such, rectal and guaiac exam deferred. Musculoskeletal: Normal range of motion. He exhibits no edema or tenderness. Neurological: He is alert and oriented to person, place, and time. Psychiatric: He has a normal mood and affect. Nursing note and vitals reviewed. MDM  Number of Diagnoses or Management Options  Lower GI bleed:   Diagnosis management comments: Pt s/p colonoscopy 2 days ago now presenting with acute GI bleed with concern for acute blood loss anemia. Will consult with GI as needed.         Amount and/or Complexity of Data Reviewed  Clinical lab tests: reviewed and ordered  Review and summarize past medical records: yes  Discuss the patient with other providers: yes (GI, hospitalist)    Patient Progress  Patient progress: stable    CRITICAL CARE NOTE :    4:25 AM    IMPENDING DETERIORATION -Cardiovascular    ASSOCIATED RISK FACTORS - Hypotension, bleeding    MANAGEMENT- Bedside Assessment and Supervision of Care    INTERPRETATION -  ECG and Blood Pressure    INTERVENTIONS - hemodynamic mngmt and vascular control    CASE REVIEW - Hospitalist, Medical Sub-Specialist and Nursing    TREATMENT RESPONSE -Stable    PERFORMED BY - Self        NOTES   :      I have spent >105 minutes of critical care time involved in lab review, consultations with specialist, family decision- making, bedside attention and documentation. During this entire length of time I was immediately available to the patient . Daniel Yanez MD    Procedures    PROGRESS NOTE:  3:06 AM  Discussed plan with pt; will obtain labwork and consult with GI as needed. PROGRESS NOTE:  5:02 AM  Pt passed a bowel movement in the ED; stool is dark and tarry in appearance. No BRB visible. CONSULT NOTE:  6:44 AM  Daniel Yanez MD spoke with Dr. Kimmy Banegas,  Specialty: GI  Discussed pt's hx, disposition, and available diagnostic and imaging results. Reviewed care plans. Consultant will have Dr. Rodriguez Ahr calls us back, noting Dr. Rodriguez Ahr should be on the way to the hospital at this time; pt will likely require another colonoscopy. CONSULT NOTE:  7:07 AM  Daniel Yanez MD spoke with Dr. Rodriguez Ahr,  Specialty: GI  Discussed pt's hx, disposition, and available diagnostic and imaging results. Reviewed care plans. Consultant recommends hospitalist admission for observation as he is not sure when he will be able to scope the pt. CONSULT NOTE:   7:10 AM  Daniel Yanez MD spoke with Dr. Froylan Covington,   Specialty: Hospitalist  Discussed pt's hx, disposition, and available diagnostic and imaging results. Reviewed care plans. Consultant will evaluate pt for admission.     LABORATORY TESTS:  Recent Results (from the past 12 hour(s))   CBC WITH AUTOMATED DIFF    Collection Time: 01/13/17  3:27 AM   Result Value Ref Range    WBC 6.3 4.1 - 11.1 K/uL    RBC 4.04 (L) 4.10 - 5.70 M/uL    HGB 12.7 12.1 - 17.0 g/dL    HCT 38.0 36.6 - 50.3 %    MCV 94.1 80.0 - 99.0 FL    MCH 31.4 26.0 - 34.0 PG    MCHC 33.4 30.0 - 36.5 g/dL RDW 14.1 11.5 - 14.5 %    PLATELET 989 304 - 032 K/uL    NEUTROPHILS 43 32 - 75 %    LYMPHOCYTES 44 12 - 49 %    MONOCYTES 7 5 - 13 %    EOSINOPHILS 5 0 - 7 %    BASOPHILS 1 0 - 1 %    ABS. NEUTROPHILS 2.7 1.8 - 8.0 K/UL    ABS. LYMPHOCYTES 2.8 0.8 - 3.5 K/UL    ABS. MONOCYTES 0.4 0.0 - 1.0 K/UL    ABS. EOSINOPHILS 0.3 0.0 - 0.4 K/UL    ABS. BASOPHILS 0.1 0.0 - 0.1 K/UL   METABOLIC PANEL, COMPREHENSIVE    Collection Time: 01/13/17  3:27 AM   Result Value Ref Range    Sodium 143 136 - 145 mmol/L    Potassium 3.7 3.5 - 5.1 mmol/L    Chloride 106 97 - 108 mmol/L    CO2 32 21 - 32 mmol/L    Anion gap 5 5 - 15 mmol/L    Glucose 90 65 - 100 mg/dL    BUN 15 6 - 20 MG/DL    Creatinine 0.73 0.70 - 1.30 MG/DL    BUN/Creatinine ratio 21 (H) 12 - 20      GFR est AA >60 >60 ml/min/1.73m2    GFR est non-AA >60 >60 ml/min/1.73m2    Calcium 8.1 (L) 8.5 - 10.1 MG/DL    Bilirubin, total 0.3 0.2 - 1.0 MG/DL    ALT 23 12 - 78 U/L    AST 15 15 - 37 U/L    Alk. phosphatase 62 45 - 117 U/L    Protein, total 6.2 (L) 6.4 - 8.2 g/dL    Albumin 3.3 (L) 3.5 - 5.0 g/dL    Globulin 2.9 2.0 - 4.0 g/dL    A-G Ratio 1.1 1.1 - 2.2     TYPE & SCREEN    Collection Time: 01/13/17  3:27 AM   Result Value Ref Range    Crossmatch Expiration 01/16/2017     ABO/Rh(D) A NEGATIVE     Antibody screen NEG        IMPRESSION:  1. Lower GI bleed        PLAN:  1. Admit to Hospitalist    Admit Note:  7:10 AM  Patient is being admitted to the hospital by Dr. Daniel Aviles. The results of their tests and reasons for their admission have been discussed with them and/or available family. They convey agreement and understanding for the need to be admitted and for their admission diagnosis. Consultation has been made with the inpatient physician specialist for hospitalization. Attestation:   This note is prepared by Emeka Kwon, acting as Scribe for Clark Wilkerson MD.    Clark Wilkerson MD: The scribe's documentation has been prepared under my direction and personally reviewed by me in its entirety. I confirm that the note above accurately reflects all work, treatment, procedures, and medical decision making performed by me.

## 2017-01-13 NOTE — PROCEDURES
[facility]                  Colonoscopy Operative Report    1/13/2017      Erich Chaidez  [id number]  1955    Procedure Type:   Colonoscopy with control of bleeding     Indications:  LGI Bleed following colon polypectomy     Pre-operative Diagnosis: see indication above    Post-operative Diagnosis:  See findings below    :  Anatoliy Bailon MD    Referring Provider: Marilyn Nava MD      Sedation:  MAC anesthesia Propofol    Pre-Procedural Exam:      Airway: clear,  No airway problems anticipated  Heart: RRR, without gallops or rubs  Lungs: clear bilaterally without wheezes, crackles, or rhonchi  Abdomen: soft, nontender, nondistended, bowel sounds present  Mental Status: awake, alert and oriented to person, place and time     Procedure Details:  After informed consent was obtained with all risks and benefits of procedure explained and preoperative exam completed, the patient was taken to the endoscopy suite and placed in the left lateral decubitus position. Upon sequential sedation as per above, a digital rectal exam was performed . The Olympus videocolonoscope  was inserted in the rectum and carefully advanced to the cecum, which was identified by the ileocecal valve and appendiceal orifice. The cecum was identified by the ileocecal valve and appendiceal orifice. The quality of preparation was unprepped. Findings:   Rectum: blood, old, throughout entire colon  Sigmoid: old blood  Descending Colon: polypectomy site found with fresh clot. Clipped with 2 clips. Transverse Colon: three polypectomy sites clipped, but none had clots  Ascending Colon: old blood  Cecum: old blood      Specimen Removed:  none    Complications: None. EBL:  None. Impression:    LGI bleed from colon polypectomy site in descending---clipped    Recommendations: --Follow clinical symptoms and laboratory studies for evidence of rebleeding. , -clear liquids.  Serial H and H Robert Casey MD    1/13/2017     JOZEF Hurt MD  [facility], [address]  [phone]  [email or web address]

## 2017-01-13 NOTE — ED NOTES
Pt resting comfortably on the stretcher in a position of comfort.  Pt in no acute distress at this time.  Call bell within reach.  Side rails x 2.  Cardiac monitor x 3.  Stretcher locked in the lowest position. Will continue to monitor.

## 2017-01-13 NOTE — ANESTHESIA POSTPROCEDURE EVALUATION
Post-Anesthesia Evaluation and Assessment    Patient: Kvng Flynn MRN: [id number]  SSN: [id number]    YOB: 1955  Age: 63 y.o. Sex: male       Cardiovascular Function/Vital Signs  Visit Vitals    BP 94/52    Pulse 61    Temp 36.8 °C (98.2 °F)    Resp 16    Ht 5' 11\" (1.803 m)    Wt 65.8 kg (145 lb 1 oz)    SpO2 99%    BMI 20.23 kg/m2       Patient is status post MAC anesthesia for Procedure(s):  COLONOSCOPY er 26  RESOLUTION CLIP. Nausea/Vomiting: None    Postoperative hydration reviewed and adequate. Pain:  Pain Scale 1: Visual (01/13/17 1259)  Pain Intensity 1: 0 (01/13/17 1259)   Managed    Neurological Status: At baseline    Mental Status and Level of Consciousness: Arousable    Pulmonary Status:   O2 Device: CO2 nasal cannula (01/13/17 1259)   Adequate oxygenation and airway patent    Complications related to anesthesia: None    Post-anesthesia assessment completed.  No concerns    Signed By: Melody Perkins MD     January 13, 2017

## 2017-01-13 NOTE — IP AVS SNAPSHOT
[address] [facility] 
[phone] Patient: Rush Suazo MRN: [id number] CHK:[id number] You are allergic to the following Allergen Reactions Aleve (Naproxen Sodium) Anaphylaxis Hives, passed out, had seizure Sulfa (Sulfonamide Antibiotics) Hives Recent Documentation Height Weight BMI Smoking Status 1.803 m 65.8 kg 20.23 kg/m2 Current Every Day Smoker Emergency Contacts Name Discharge Info Relation Home Work [facility] [place] DISCHARGE CAREGIVER [3] Spouse [3] [phone] About your hospitalization You were admitted on:  January 13, 2017 You last received care in the:  [facility] ORTHOPEDICS You were discharged on:  January 14, 2017 Unit phone number:  [phone] Why you were hospitalized Your primary diagnosis was:  Not on File Your diagnoses also included:  Gi Bleed, Esophageal Cancer (Hcc), Copd (Chronic Obstructive Pulmonary Disease) (Hcc), Htn (Hypertension) Providers Seen During Your Hospitalizations Provider Role Specialty Primary office phone Kris Martínez MD Attending Provider Emergency Medicine [phone] Jaclyn Hillman MD Attending Provider Internal Medicine [phone] Your Primary Care Physician (PCP) Primary Care Physician Office Phone Office Fax [facility] [phone] Follow-up Information Follow up With Details Comments Contact Info Shaylee Lemus MD In 2 weeks for followup Hb check [address] [place] [address] 
[phone] Current Discharge Medication List  
  
CONTINUE these medications which have NOT CHANGED Dose & Instructions Dispensing Information Comments Morning Noon Evening Bedtime  
 albuterol 90 mcg/actuation inhaler Commonly known as:  Dung Covarrubias  
   
 Your next dose is: Today, Tomorrow Other:  _________ Dose:  2 Puff Take 2 Puffs by inhalation every six (6) hours as needed. Refills:  0  
     
   
   
   
  
 ANORO ELLIPTA 62.5-25 mcg/actuation inhaler Generic drug:  umeclidinium-vilanterol Your next dose is: Today, Tomorrow Other:  _________ Dose:  1 Puff Take 1 Puff by inhalation daily. Refills:  0  
     
   
   
   
  
 aspirin 81 mg tablet Your next dose is: Today, Tomorrow Other:  _________ Dose:  81 mg Take 81 mg by mouth daily. Refills:  0  
     
   
   
   
  
 diazePAM 10 mg tablet Commonly known as:  VALIUM Your next dose is: Today, Tomorrow Other:  _________ Dose:  10 mg Take 10 mg by mouth nightly. Refills:  0 HYDROcodone-acetaminophen 5-325 mg per tablet Commonly known as:  Vernon Pantoja Your next dose is: Today, Tomorrow Other:  _________ Dose:  1 Tab Take 1 Tab by mouth daily as needed. Indications: PAIN Refills:  0  
     
   
   
   
  
 omeprazole 20 mg capsule Commonly known as:  PRILOSEC Your next dose is: Today, Tomorrow Other:  _________ Dose:  20 mg Take 20 mg by mouth daily. Refills:  0 Discharge Instructions HOSPITALIST DISCHARGE INSTRUCTIONS 
 
NAME: Isabel Johnson :  1955 MRN:  [id number] Date/Time:  2017 2:28 PM 
 
ADMIT DATE: 2017 DISCHARGE DATE: 2017 DISCHARGE DIAGNOSIS: 
Acute lower GI Bleeding POA- now resolved s/p hemostatic clip during colonoscopy this admission Due to Polypectomy site bleeding POA - now resolved Hypotension POA - stable & close to baseline COPD 
H/O Esophageal Cancer Active Problems: 
  Esophageal cancer ([facility] (2012) GI bleed (2017) COPD (chronic obstructive pulmonary disease) ([facility] (2017) HTN (hypertension) (1/13/2017) MEDICATIONS: 
As per medication reconciliation  list 
· It is important that you take the medication exactly as they are prescribed. · Keep your medication in the bottles provided by the pharmacist and keep a list of the medication names, dosages, and times to be taken in your wallet. · Do not take other medications without consulting your doctor. Pain Management: per above medications What to do at [facility] Recommended diet:  Regular Diet Recommended activity: Activity as tolerated If you have questions regarding the hospital related prescriptions or hospital related issues please call Danilo Suarez at . If you experience any of the following symptoms then please call your primary care physician or return to the emergency room if you cannot get hold of your doctor: 
Fever, chills, nausea, vomiting, diarrhea, change in mentation, falling, bleeding, shortness of breath, Follow Up: 
Dr. Linnea Andino MD  you are to call and set up an appointment to see them in 7-10 days for repeat CBC for Hb check as discussed Dr Mikhail Jason (GI) as needed if bleeding recurs Information obtained by : 
I understand that if any problems occur once I am at home I am to contact my physician. I understand and acknowledge receipt of the instructions indicated above. Physician's or R.N.'s Signature                                                                  Date/Time Patient or Representative Signature                                                          Date/Time Discharge Orders None MyChart Announcement We are excited to announce that we are making your provider's discharge notes available to you in iDiDiD. You will see these notes when they are completed and signed by the physician that discharged you from your recent hospital stay. If you have any questions or concerns about any information you see in iDiDiD, please call the Health Information Department where you were seen or reach out to your Primary Care Provider for more information about your plan of care. Introducing [facility]! Burt Robert introduces iDiDiD patient portal. Now you can access parts of your medical record, email your doctor's office, and request medication refills online. 1. In your internet browser, go to [email or web address] 2. Click on the First Time User? Click Here link in the Sign In box. You will see the New Member Sign Up page. 3. Enter your iDiDiD Access Code exactly as it appears below. You will not need to use this code after youve completed the sign-up process. If you do not sign up before the expiration date, you must request a new code. · iDiDiD Access Code: [email or web address] Expires: 4/11/2017  7:36 AM 
 
4. Enter the last four digits of your Social Security Number (xxxx) and Date of Birth (mm/dd/yyyy) as indicated and click Submit. You will be taken to the next sign-up page. 5. Create a iDiDiD ID. This will be your iDiDiD login ID and cannot be changed, so think of one that is secure and easy to remember. 6. Create a iDiDiD password. You can change your password at any time. 7. Enter your Password Reset Question and Answer. This can be used at a later time if you forget your password. 8. Enter your e-mail address. You will receive e-mail notification when new information is available in [address] 9. Click Sign Up. You can now view and download portions of your medical record.  
10. Click the Download Summary menu link to download a portable copy of your medical information. If you have questions, please visit the Frequently Asked Questions section of the Oneflare[employer] website. Remember, MyChart is NOT to be used for urgent needs. For medical emergencies, dial 911. Now available from your iPhone and Android! General Information Please provide this summary of care documentation to your next provider. Patient Signature:  ____________________________________________________________ Date:  ____________________________________________________________  
  
Camila  Provider Signature:  ____________________________________________________________ Date:  ____________________________________________________________

## 2017-01-13 NOTE — PROGRESS NOTES
TRANSFER - IN REPORT:    Verbal report received from Britt(name) on Praveen Hare  being received from er 26(unit) for ordered procedure      Report consisted of patients Situation, Background, Assessment and   Recommendations(SBAR). Information from the following report(s) SBAR, MAR and Recent Results was reviewed with the receiving nurse. Opportunity for questions and clarification was provided. Assessment completed upon patients arrival to unit and care assumed.

## 2017-01-13 NOTE — PERIOP NOTES
TRANSFER - OUT REPORT:    Verbal report given to [facility] RN(name) on Supriya Castillo  being transferred to [facility](unit) for routine post - op       Report consisted of patients Situation, Background, Assessment and   Recommendations(SBAR). Information from the following report(s) SBAR was reviewed with the receiving nurse. Lines:   Peripheral IV 01/13/17 Left Arm (Active)   Site Assessment Clean, dry, & intact 1/13/2017  1:15 PM   Phlebitis Assessment 0 1/13/2017  1:15 PM   Infiltration Assessment 0 1/13/2017  1:15 PM   Dressing Status Clean, dry, & intact 1/13/2017  1:15 PM   Dressing Type Transparent 1/13/2017  1:15 PM   Hub Color/Line Status Infusing 1/13/2017  1:15 PM        Opportunity for questions and clarification was provided.       Patient transported with:

## 2017-01-13 NOTE — ED NOTES
Pharmacist advises this hospital does not stock Anoro Ellipta inhaler and requested patient have someone bring his inhaler in from home, patient advised someone will bring the inhaler later today.

## 2017-01-13 NOTE — H&P
Hospitalist Admission Note    NAME: Shahzad Nick   :  1955   MRN:  [id number]     Date/Time:  2017 7:58 AM    Patient PCP: Estrella Ivy MD  ________________________________________________________________________    My assessment of this patient's clinical condition and my plan of care is as follows. Assessment / Plan:  Acute GI Bleeding: most likely lower in nature after removal of 13 polyps from transverse and descending colon (Tubular Adenoma), start PPI, D/w Dr. Primitivo Eason for Scope later today, monitor [facility], transfuse PRN. Hypotension: c/w IVF and monitor. COPD: C/W Anoro Ellipta, use bronchodilators prn.  H/O Esophageal Cancer: EGD shows esophagitis on 17  Code Status: Full Code  Surrogate Decision Maker: wife Jennie Jolly [phone]  DVT Prophylaxis: SCD  GI Prophylaxis: on PPI  Baseline: Independent full ADL        Subjective:   CHIEF COMPLAINT: \"I've been passing blood per rectum\"    HISTORY OF PRESENT ILLNESS:     Nell Strange is a 63 y.o.  male  with PMhx significant for COPD, GERD, and HTN who presents ambulatory to the ED with cc of acute rectal bleeding tonight. Pt states that he recently underwent a colonoscopy under Dr. Waddell Party on 17 at which time 15 polpys were removed. He states that yesterday night he began experiencing abdominal cramping for which he attempted to pass a bowel movement. He states that he initially thought he had diarrhea, however, he states that he realized he was actually passing bright red blood with small clots, noting he passed very little stool. He states that afterwards he experienced 3 additional episodes of rectal bleeding. He called the on call GI for Dr. Norman Line office (Dr. Naga Head) who referred him to the ED for evaluation. He is otherwise without complaint and denies any lightheadedness, dizziness, SOB, nausea, vomiting.   At this time patient lying in bed c/o some abdominal tenderness, and is passing red blood per rectum, some dark stools as well, denies chest pain, no SOB, no fever, no cough, no N/V, no diarrhea, no urinary symptoms, no other associated symptoms. We were asked to admit for work up and evaluation of the above problems.      Past Medical History   Diagnosis Date    Arthritis      osteoarthritis    Asthma     Cancer ([facility]      esophagus    Cancer of esophagus ([facility] 4/2012     adenocarcinoma; Dr. Honorio Moran Chronic pain      lower back, has crushed vertebrae    COPD     Essential hypertension     GERD (gastroesophageal reflux disease)     Hypertension      not on medication    Nonischemic cardiomyopathy ([facility]      2003 EF 35-50%; negative cath, 2/2012 EF 55%; Dr. Lucien Weldon Other ill-defined conditions(799.89)      Epstein's esophagus    Other ill-defined conditions(799.89)      esophageal nodule - pre-cancerous    Other ill-defined conditions(799.89)      pneumonia multiple times    Psychiatric disorder      depression    Seizures ([facility]      caused with allergic reaction from Aleve    Thyroid disease     Unspecified sleep apnea      c-pap-doesnt wear now/apnea resolved after surgery/lost weight and no sleep apnea        Past Surgical History   Procedure Laterality Date    Hx cholecystectomy      Pr egd transoral biopsy single/multiple  12/30/2011          Pr colsc flx w/rmvl of tumor polyp lesion snare tq  12/30/2011     colon x 3     Hx heart catheterization      Hx gi  4/2/12     ESOPHAGOGASTRECTOMY    Pr egd balloon dilation esophagus <30 mm diam  6/20/2012          Pr egd balloon dilation esophagus <30 mm diam  7/5/2012          Pr egd balloon dilation esophagus <30 mm diam  7/24/2012          Pr egd balloon dilation esophagus <30 mm diam  8/28/2012          Pr egd transoral biopsy single/multiple  8/28/2012          Pr egd balloon dilation esophagus <30 mm diam  9/24/2012          Pr egd transoral biopsy single/multiple  11/6/2012          Pr egd balloon dilation esophagus <30 mm diam  11/6/2012          Pr egd transoral biopsy single/multiple  3/4/2013          Pr egd balloon dilation esophagus <30 mm diam  3/4/2013          Pr upper gi endoscopy,stent placement  4/23/2013          Pr egd balloon dilation esophagus <30 mm diam  4/26/2013          Pr egd transoral biopsy single/multiple  4/30/2013          Pr egd balloon dilation esophagus <30 mm diam  7/31/2013          Pr egd balloon dilation esophagus <30 mm diam  9/25/2013          Upper gi endoscopy,biopsy  5/29/2015          Upper gi endoscopy,ball dil,30mm  5/29/2015          Hx other surgical       surgery to remove nodule and barrets esophagus    Upper gi endoscopy,biopsy  1/11/2017          Colorectal scrn; hi risk ind  1/11/2017          Colonoscopy,remv lesn,snare  1/11/2017          Colonoscopy N/A 1/11/2017     COLONOSCOPY performed by Ling Foster MD at \[facility]\"" ENDOSCOPY       Social History   Substance Use Topics    Smoking status: Current Every Day Smoker     Packs/day: 1.00     Years: 40.00     Types: Cigarettes    Smokeless tobacco: Never Used      Comment: cigarettes 2-3 daily and using e cig    Alcohol use No        Family History   Problem Relation Age of Onset    COPD Mother     Asthma Mother     Colon Cancer Mother     Other Mother      food allergies/Steffany's disease/Epstein's esophagus    Heart Attack Mother     Heart Disease Mother     Cancer Mother      colon    Other Father      abestos/food allergies/degernative disc disease    Colon Cancer Maternal Grandmother      Allergies   Allergen Reactions    Aleve [Naproxen Sodium] Anaphylaxis     Hives, passed out, had seizure    Sulfa (Sulfonamide Antibiotics) Hives        Prior to Admission medications    Medication Sig Start Date End Date Taking? Authorizing Provider   umeclidinium-vilanterol (ANORO ELLIPTA) 62.5-25 mcg/actuation inhaler Take 1 Puff by inhalation daily.     Historical Provider   omeprazole (PRILOSEC) 20 mg capsule Take 20 mg by mouth daily. Historical Provider   aspirin 81 mg tablet Take 81 mg by mouth daily. Historical Provider   HYDROcodone-acetaminophen (NORCO) 5-325 mg per tablet Take 1 Tab by mouth daily as needed. Indications: PAIN    Historical Provider   albuterol (PROVENTIL, VENTOLIN) 90 mcg/Actuation inhaler Take 2 Puffs by inhalation every six (6) hours as needed. Historical Provider   diazepam (VALIUM) 10 mg tablet Take 10 mg by mouth nightly. Historical Provider       REVIEW OF SYSTEMS:     I am not able to complete the review of systems because:    The patient is intubated and sedated    The patient has altered mental status due to his acute medical problems    The patient has baseline aphasia from prior stroke(s)    The patient has baseline dementia and is not reliable historian    The patient is in acute medical distress and unable to provide information           Total of 12 systems reviewed as follows:       POSITIVE= underlined text  Negative = text not underlined  General:  fever, chills, sweats, generalized weakness, weight loss/gain,      loss of appetite   Eyes:    blurred vision, eye pain, loss of vision, double vision  ENT:    rhinorrhea, pharyngitis   Respiratory:   cough, sputum production, SOB, SEXTON, wheezing, pleuritic pain   Cardiology:   chest pain, palpitations, orthopnea, PND, edema, syncope   Gastrointestinal:  abdominal pain , N/V, diarrhea, dysphagia, constipation, bleeding   Genitourinary:  frequency, urgency, dysuria, hematuria, incontinence   Muskuloskeletal :  arthralgia, myalgia, back pain  Hematology:  easy bruising, nose or gum bleeding, lymphadenopathy   Dermatological: rash, ulceration, pruritis, color change / jaundice  Endocrine:   hot flashes or polydipsia   Neurological:  headache, dizziness, confusion, focal weakness, paresthesia,     Speech difficulties, memory loss, gait difficulty  Psychological: Feelings of anxiety, depression, agitation    Objective:   VITALS:    Visit Vitals    BP 94/61    Pulse 69    Temp 97.6 °F (36.4 °C)    Resp 18    Ht 5' 11\" (1.803 m)    Wt 65.8 kg (145 lb 1 oz)    SpO2 92%    BMI 20.23 kg/m2       PHYSICAL EXAM:    General:    Alert, cooperative, no distress, appears stated age. HEENT: Atraumatic, anicteric sclerae, pink conjunctivae     No oral ulcers, mucosa moist, throat clear, dentition poor  Neck:  Supple, symmetrical,  thyroid: non tender  Lungs:   Coarse BS, rhonchi  Chest wall:  No tenderness  No Accessory muscle use. Heart:   Regular  rhythm,  No  murmur   No edema  Abdomen:   Soft, non-tender. Not distended. Bowel sounds normal  Extremities: No cyanosis. No clubbing      Capillary refill normal,  Radial pulse 2+  Skin:     Not pale. Not Jaundiced  No rashes   Psych:  Good insight. Not depressed. Not anxious or agitated. Neurologic: EOMs intact. No facial asymmetry. No aphasia or slurred speech. Symmetrical strength, Sensation grossly intact.  Alert and oriented X 4.     _______________________________________________________________________  Care Plan discussed with:    Comments   Patient y    Family      RN y    Care Manager                    Consultant:  shayy ALTMAN   _______________________________________________________________________  Expected  Disposition:   Home with Family y   HH/PT/OT/RN    SNF/LTC    ESSENCE    ________________________________________________________________________  TOTAL TIME:  61 Minutes    Critical Care Provided     Minutes non procedure based      Comments    y Reviewed previous records   >50% of visit spent in counseling and coordination of care  Discussion with patient and/or family and questions answered       ________________________________________________________________________  Signed: Dorinda Israel MD    Procedures: see electronic medical records for all procedures/Xrays and details which were not copied into this note but were reviewed prior to creation of Plan. LAB DATA REVIEWED:    Recent Results (from the past 24 hour(s))   CBC WITH AUTOMATED DIFF    Collection Time: 01/13/17  3:27 AM   Result Value Ref Range    WBC 6.3 4.1 - 11.1 K/uL    RBC 4.04 (L) 4.10 - 5.70 M/uL    HGB 12.7 12.1 - 17.0 g/dL    HCT 38.0 36.6 - 50.3 %    MCV 94.1 80.0 - 99.0 FL    MCH 31.4 26.0 - 34.0 PG    MCHC 33.4 30.0 - 36.5 g/dL    RDW 14.1 11.5 - 14.5 %    PLATELET 298 061 - 910 K/uL    NEUTROPHILS 43 32 - 75 %    LYMPHOCYTES 44 12 - 49 %    MONOCYTES 7 5 - 13 %    EOSINOPHILS 5 0 - 7 %    BASOPHILS 1 0 - 1 %    ABS. NEUTROPHILS 2.7 1.8 - 8.0 K/UL    ABS. LYMPHOCYTES 2.8 0.8 - 3.5 K/UL    ABS. MONOCYTES 0.4 0.0 - 1.0 K/UL    ABS. EOSINOPHILS 0.3 0.0 - 0.4 K/UL    ABS. BASOPHILS 0.1 0.0 - 0.1 K/UL   METABOLIC PANEL, COMPREHENSIVE    Collection Time: 01/13/17  3:27 AM   Result Value Ref Range    Sodium 143 136 - 145 mmol/L    Potassium 3.7 3.5 - 5.1 mmol/L    Chloride 106 97 - 108 mmol/L    CO2 32 21 - 32 mmol/L    Anion gap 5 5 - 15 mmol/L    Glucose 90 65 - 100 mg/dL    BUN 15 6 - 20 MG/DL    Creatinine 0.73 0.70 - 1.30 MG/DL    BUN/Creatinine ratio 21 (H) 12 - 20      GFR est AA >60 >60 ml/min/1.73m2    GFR est non-AA >60 >60 ml/min/1.73m2    Calcium 8.1 (L) 8.5 - 10.1 MG/DL    Bilirubin, total 0.3 0.2 - 1.0 MG/DL    ALT 23 12 - 78 U/L    AST 15 15 - 37 U/L    Alk.  phosphatase 62 45 - 117 U/L    Protein, total 6.2 (L) 6.4 - 8.2 g/dL    Albumin 3.3 (L) 3.5 - 5.0 g/dL    Globulin 2.9 2.0 - 4.0 g/dL    A-G Ratio 1.1 1.1 - 2.2     TYPE & SCREEN    Collection Time: 01/13/17  3:27 AM   Result Value Ref Range    Crossmatch Expiration 01/16/2017     ABO/Rh(D) A NEGATIVE     Antibody screen NEG

## 2017-01-13 NOTE — PERIOP NOTES
Anesthesia reports 230mg Propofol, 40mg Lidocaine 240 mcg Neosynephrine 1100mL NS given during procedure. Received report from anesthesia staff on vital signs and status of patient.

## 2017-01-13 NOTE — ED NOTES
Patient is here with complaints  of rectal bleeding tonight. Pt states that he recently underwent a colonoscopy under Dr. Daylin Ac on 1/11/16 at which time 15 polpys were removed.

## 2017-01-14 VITALS
TEMPERATURE: 97.7 F | SYSTOLIC BLOOD PRESSURE: 91 MMHG | BODY MASS INDEX: 20.31 KG/M2 | WEIGHT: 145.06 LBS | HEART RATE: 65 BPM | DIASTOLIC BLOOD PRESSURE: 55 MMHG | HEIGHT: 71 IN | OXYGEN SATURATION: 94 % | RESPIRATION RATE: 18 BRPM

## 2017-01-14 LAB
ALBUMIN SERPL BCP-MCNC: 2.7 G/DL (ref 3.5–5)
ALBUMIN/GLOB SERPL: 1.1 {RATIO} (ref 1.1–2.2)
ALP SERPL-CCNC: 56 U/L (ref 45–117)
ALT SERPL-CCNC: 23 U/L (ref 12–78)
ANION GAP BLD CALC-SCNC: 6 MMOL/L (ref 5–15)
AST SERPL W P-5'-P-CCNC: 19 U/L (ref 15–37)
BASOPHILS # BLD AUTO: 0 K/UL (ref 0–0.1)
BASOPHILS # BLD: 1 % (ref 0–1)
BILIRUB SERPL-MCNC: 0.6 MG/DL (ref 0.2–1)
BUN SERPL-MCNC: 8 MG/DL (ref 6–20)
BUN/CREAT SERPL: 11 (ref 12–20)
CALCIUM SERPL-MCNC: 7.5 MG/DL (ref 8.5–10.1)
CHLORIDE SERPL-SCNC: 108 MMOL/L (ref 97–108)
CO2 SERPL-SCNC: 30 MMOL/L (ref 21–32)
CREAT SERPL-MCNC: 0.74 MG/DL (ref 0.7–1.3)
EOSINOPHIL # BLD: 0.4 K/UL (ref 0–0.4)
EOSINOPHIL NFR BLD: 6 % (ref 0–7)
ERYTHROCYTE [DISTWIDTH] IN BLOOD BY AUTOMATED COUNT: 13.9 % (ref 11.5–14.5)
GLOBULIN SER CALC-MCNC: 2.4 G/DL (ref 2–4)
GLUCOSE SERPL-MCNC: 83 MG/DL (ref 65–100)
HCT VFR BLD AUTO: 30.9 % (ref 36.6–50.3)
HCT VFR BLD AUTO: 31.6 % (ref 36.6–50.3)
HCT VFR BLD AUTO: 32.2 % (ref 36.6–50.3)
HGB BLD-MCNC: 10.2 G/DL (ref 12.1–17)
HGB BLD-MCNC: 10.4 G/DL (ref 12.1–17)
HGB BLD-MCNC: 10.8 G/DL (ref 12.1–17)
LYMPHOCYTES # BLD AUTO: 34 % (ref 12–49)
LYMPHOCYTES # BLD: 2.2 K/UL (ref 0.8–3.5)
MAGNESIUM SERPL-MCNC: 2.4 MG/DL (ref 1.6–2.4)
MCH RBC QN AUTO: 30.9 PG (ref 26–34)
MCHC RBC AUTO-ENTMCNC: 33 G/DL (ref 30–36.5)
MCV RBC AUTO: 93.6 FL (ref 80–99)
MONOCYTES # BLD: 0.4 K/UL (ref 0–1)
MONOCYTES NFR BLD AUTO: 6 % (ref 5–13)
NEUTS SEG # BLD: 3.4 K/UL (ref 1.8–8)
NEUTS SEG NFR BLD AUTO: 53 % (ref 32–75)
PLATELET # BLD AUTO: 149 K/UL (ref 150–400)
POTASSIUM SERPL-SCNC: 3.9 MMOL/L (ref 3.5–5.1)
PROT SERPL-MCNC: 5.1 G/DL (ref 6.4–8.2)
RBC # BLD AUTO: 3.3 M/UL (ref 4.1–5.7)
SODIUM SERPL-SCNC: 144 MMOL/L (ref 136–145)
WBC # BLD AUTO: 6.3 K/UL (ref 4.1–11.1)

## 2017-01-14 PROCEDURE — 80053 COMPREHEN METABOLIC PANEL: CPT | Performed by: INTERNAL MEDICINE

## 2017-01-14 PROCEDURE — 36415 COLL VENOUS BLD VENIPUNCTURE: CPT | Performed by: INTERNAL MEDICINE

## 2017-01-14 PROCEDURE — 85018 HEMOGLOBIN: CPT | Performed by: INTERNAL MEDICINE

## 2017-01-14 PROCEDURE — 83735 ASSAY OF MAGNESIUM: CPT | Performed by: INTERNAL MEDICINE

## 2017-01-14 PROCEDURE — 85025 COMPLETE CBC W/AUTO DIFF WBC: CPT | Performed by: INTERNAL MEDICINE

## 2017-01-14 RX ORDER — PANTOPRAZOLE SODIUM 40 MG/1
40 TABLET, DELAYED RELEASE ORAL
Status: DISCONTINUED | OUTPATIENT
Start: 2017-01-15 | End: 2017-01-14 | Stop reason: HOSPADM

## 2017-01-14 NOTE — PROGRESS NOTES
Paged hospitalist to report hemoglobin results of 10.2. Dropped from 11.8. Dr Cassia Olivares called back and re-ordered H&H Q-8hrs.

## 2017-01-14 NOTE — DISCHARGE SUMMARY
Hospitalist Discharge Summary     Patient ID:  Elpidio Davis  [id number]  63 y.o.  1955    PCP on record: Paco Bell MD    Admit date: 1/13/2017  Discharge date and time: 1/14/2017      DISCHARGE DIAGNOSIS:    Acute lower GI Bleeding POA- now resolved s/p hemostatic clip during colonoscopy this admission  Due to Polypectomy site bleeding POA - now resolved  Hypotension POA - stable & close to baseline  COPD  H/O Esophageal Cancer        CONSULTATIONS:  IP CONSULT TO GASTROENTEROLOGY    Excerpted HPI from H&P of Chen Rojas MD:  Constance.Laser y.o.  male  with PMhx significant for COPD, GERD, and HTN who presents ambulatory to the ED with cc of acute rectal bleeding tonight. Pt states that he recently underwent a colonoscopy under Dr. Orly Tom on 1/11/17 at which time 15 polpys were removed. He states that yesterday night he began experiencing abdominal cramping for which he attempted to pass a bowel movement. He states that he initially thought he had diarrhea, however, he states that he realized he was actually passing bright red blood with small clots, noting he passed very little stool. He states that afterwards he experienced 3 additional episodes of rectal bleeding. He called the on call GI for Dr. Samantha Weaver office (Dr. Mahnaz Chambers) who referred him to the ED for evaluation. He is otherwise without complaint and denies any lightheadedness, dizziness, SOB, nausea, vomiting. At this time patient lying in bed c/o some abdominal tenderness, and is passing red blood per rectum, some dark stools as well, denies chest pain, no SOB, no fever, no cough, no N/V, no diarrhea, no urinary symptoms, no other associated symptoms. We were asked to admit for work up and evaluation of the above problems. \"    ______________________________________________________________________  DISCHARGE SUMMARY/HOSPITAL COURSE:  for full details see H&P, daily progress notes, labs, consult notes.      Acute GI Bleeding: most likely lower in nature after removal of 13 polyps from transverse and descending colon (Tubular Adenoma), start PPI, D/w Dr. Chintan Thomas for Scope later today, monitor [facility], transfuse PRN. Hypotension: c/w IVF and monitor. COPD: C/W Anoro Ellipta, use bronchodilators prn.  H/O Esophageal Cancer: EGD shows esophagitis on 01/11/17  Code Status: Full Code  Surrogate Decision Maker: wife Jinnie Shone [phone]        _______________________________________________________________________  Patient seen and examined by me on discharge day. Pertinent Findings:  Gen:    Not in distress  Chest: Clear lungs  CVS:   Regular rhythm. No edema  Abd:  Soft, not distended, not tender  Neuro:  Alert, oriented x3  _______________________________________________________________________  DISCHARGE MEDICATIONS:   Current Discharge Medication List      CONTINUE these medications which have NOT CHANGED    Details   umeclidinium-vilanterol (ANORO ELLIPTA) 62.5-25 mcg/actuation inhaler Take 1 Puff by inhalation daily. omeprazole (PRILOSEC) 20 mg capsule Take 20 mg by mouth daily. aspirin 81 mg tablet Take 81 mg by mouth daily. HYDROcodone-acetaminophen (NORCO) 5-325 mg per tablet Take 1 Tab by mouth daily as needed. Indications: PAIN      albuterol (PROVENTIL, VENTOLIN) 90 mcg/Actuation inhaler Take 2 Puffs by inhalation every six (6) hours as needed. diazepam (VALIUM) 10 mg tablet Take 10 mg by mouth nightly. My Recommended Diet, Activity, Wound Care, and follow-up labs are listed in the patient's Discharge Insturctions which I have personally completed and reviewed.     _______________________________________________________________________  DISPOSITION:    Home with Family: x   Home with HH/PT/OT/RN:    SNF/LTC:    ESSENCE:    OTHER:        Condition at Discharge:  Stable  _______________________________________________________________________  Follow up with:   PCP : Halie Phan, MD  Follow-up Information     Follow up With Details Comments Jayjay Cueva MD   [address] [place] [address]  [phone]                Total time in minutes spent coordinating this discharge (includes going over instructions, follow-up, prescriptions, and preparing report for sign off to her PCP) :  35 minutes    Signed:  Karissa Mayo MD

## 2017-01-14 NOTE — PROGRESS NOTES
Discharge instructions given to patient. Patient A&Ox4. No prescriptions noted. Follow up appointment with PCP already scheduled. Hard copy of discharge instructions signed and placed on chart.

## 2017-01-14 NOTE — DISCHARGE INSTRUCTIONS
HOSPITALIST DISCHARGE INSTRUCTIONS    NAME: Tomas Choudhary   :  1955   MRN:  [id number]     Date/Time:  2017 2:28 PM    ADMIT DATE: 2017     DISCHARGE DATE: 2017     DISCHARGE DIAGNOSIS:  Acute lower GI Bleeding POA- now resolved s/p hemostatic clip during colonoscopy this admission  Due to Polypectomy site bleeding POA - now resolved  Hypotension POA - stable & close to baseline  COPD  H/O Esophageal Cancer    Active Problems:    Esophageal cancer ([facility] (2012)      GI bleed (2017)      COPD (chronic obstructive pulmonary disease) ([facility] (2017)      HTN (hypertension) (2017)         MEDICATIONS:  As per medication reconciliation  list  · It is important that you take the medication exactly as they are prescribed. · Keep your medication in the bottles provided by the pharmacist and keep a list of the medication names, dosages, and times to be taken in your wallet. · Do not take other medications without consulting your doctor. Pain Management: per above medications    What to do at Home    Recommended diet:  Regular Diet    Recommended activity: Activity as tolerated    If you have questions regarding the hospital related prescriptions or hospital related issues please call [facility] leo Maldonado at [phone]. If you experience any of the following symptoms then please call your primary care physician or return to the emergency room if you cannot get hold of your doctor:  Fever, chills, nausea, vomiting, diarrhea, change in mentation, falling, bleeding, shortness of breath,     Follow Up:  Dr. Clint Glover MD  you are to call and set up an appointment to see them in 7-10 days for repeat CBC for Hb check as discussed  Dr Liane Lorenzo (GI) as needed if bleeding recurs    Information obtained by :  I understand that if any problems occur once I am at home I am to contact my physician.     I understand and acknowledge receipt of the instructions indicated above.                                                                                                                                            Physician's or R.N.'s Signature                                                                  Date/Time                                                                                                                                              Patient or Representative Signature                                                          Date/Time

## 2017-01-14 NOTE — PROGRESS NOTES
Bedside and Verbal shift change report given to Efrem Kim RN (oncoming nurse) by Karime Cervantes RN (offgoing nurse). Report included the following information SBAR, ED Summary, MAR and Recent Results.

## 2017-01-14 NOTE — PROGRESS NOTES
Татьяна Maier M.D.  [phone]               GASTROENTEROLOGY PROGRESS NOTE        NAME: Kirk Whitaker   :  1955   MRN:  [id number]       Subjective:   Pt reports no additional bleeding and no pain. Colonoscopy yesterday successful for identifying bleeding polypectomy site - treated with mechanical clips for successful hemostasis. Remains on clear liquids. Wants to go home. Objective:   VITALS:   Last 24hrs VS reviewed. Most recent are:  Visit Vitals    BP (!) 89/51 (BP Patient Position: Sitting)    Pulse 76    Temp 97.9 °F (36.6 °C)    Resp 18    Ht 5' 11\" (1.803 m)    Wt 65.8 kg (145 lb 1 oz)    SpO2 94%    BMI 20.23 kg/m2       Intake/Output Summary (Last 24 hours) at 17 0956  Last data filed at 17 1351   Gross per 24 hour   Intake             1200 ml   Output              200 ml   Net             1000 ml       PHYSICAL EXAM:  General: Alert, in no acute distress    HEENT: Anicteric conjunctiva. Lungs:            CTA Bilaterally  Heart:  Normal S1, S2    Abdomen: Soft, Non distended, Non tender. Normoactive bowel sounds, no HSM,   no rebound/guarding  MSK:   Normal muscle tone  Skin:   Warm to touch  Extremities: Negative bilateral pedal edema   Psych:   Good insight. Not anxious nor agitated. Lab Data Reviewed:   Recent Labs      17   0001   17   032   WBC  6.3   --    --   6.3   HGB  10.2*  10.8*   < >  12.7   HCT  30.9*  32.2*   < >  38.0   PLT  149*   --    --   178    < > = values in this interval not displayed.      Recent Labs      1739  17   NA  144  143   K  3.9  3.7   CL  108  106   CO2  30  32   BUN  8  15   CREA  0.74  0.73   GLU  83  90   CA  7.5*  8.1*   MG  2.4   --      Recent Labs      17   0327   SGOT  19  15   AP  56  62   TP  5.1*  6.2*   ALB  2.7*  3.3*   GLOB  2.4  2.9     No results for input(s): INR, PTP, APTT in the last 72 hours.    No lab exists for component: INREXT   No results for input(s): FE, TIBC, PSAT, FERR in the last 72 hours. No results for input(s): CPK, CKMB in the last 72 hours. No lab exists for component: TASHA    See Electronic Medical Record for all procedure/radiology reports and details which were not copied into this note but were reviewed prior to the creation of the Plan. Assessment:   1. Post polypectomy bleeding  2. Post colonoscopy with successful hemostasis. 3.  Clinical with out active bleeding  4.   Hemoglobin trended down - likely due to IVF and hemo-dilution     Patient Active Problem List   Diagnosis Code    Epstein's esophagus K22.70    Esophageal cancer ([facility] C15.9    Dysphagia R13.10    Esophageal obstruction K22.2    Weight loss R63.4    GI bleed K92.2    COPD (chronic obstructive pulmonary disease) ([facility] J44.9    HTN (hypertension) I10       Plan:   · Will check Hemoglobin again this afternoon  · If labs stable ok to discharge home from a GI standpoint  · Advance diet as tolerated       Signed by: Mei Atkins MD         1/14/2017  9:56 AM

## 2018-04-06 ENCOUNTER — HOSPITAL ENCOUNTER (OUTPATIENT)
Dept: GENERAL RADIOLOGY | Age: 63
Discharge: HOME | End: 2018-04-06
Payer: MEDICARE

## 2018-04-06 DIAGNOSIS — J44.1 COPD EXACERBATION (HCC): ICD-10-CM

## 2018-04-06 DIAGNOSIS — Z23 ENCOUNTER FOR IMMUNIZATION: ICD-10-CM

## 2018-04-06 PROCEDURE — 71046 X-RAY EXAM CHEST 2 VIEWS: CPT

## 2018-05-06 ENCOUNTER — APPOINTMENT (OUTPATIENT)
Dept: GENERAL RADIOLOGY | Age: 63
DRG: 871 | End: 2018-05-06
Attending: PHYSICIAN ASSISTANT
Payer: MEDICARE

## 2018-05-06 ENCOUNTER — HOSPITAL ENCOUNTER (INPATIENT)
Age: 63
LOS: 5 days | Discharge: HOME | DRG: 871 | End: 2018-05-11
Attending: EMERGENCY MEDICINE | Admitting: INTERNAL MEDICINE
Payer: MEDICARE

## 2018-05-06 DIAGNOSIS — F41.9 ANXIETY: ICD-10-CM

## 2018-05-06 DIAGNOSIS — J18.9 COMMUNITY ACQUIRED PNEUMONIA, UNSPECIFIED LATERALITY: Primary | ICD-10-CM

## 2018-05-06 DIAGNOSIS — J96.01 ACUTE RESPIRATORY FAILURE WITH HYPOXIA (HCC): ICD-10-CM

## 2018-05-06 PROBLEM — J96.90 RESPIRATORY FAILURE (HCC): Status: ACTIVE | Noted: 2018-05-06

## 2018-05-06 LAB
ALBUMIN SERPL-MCNC: 3.2 G/DL (ref 3.5–5)
ALBUMIN/GLOB SERPL: 0.8 {RATIO} (ref 1.1–2.2)
ALP SERPL-CCNC: 91 U/L (ref 45–117)
ALT SERPL-CCNC: 21 U/L (ref 12–78)
ANION GAP SERPL CALC-SCNC: 4 MMOL/L (ref 5–15)
AST SERPL-CCNC: 16 U/L (ref 15–37)
BASOPHILS # BLD: 0 K/UL (ref 0–0.1)
BASOPHILS NFR BLD: 0 % (ref 0–1)
BILIRUB SERPL-MCNC: 0.4 MG/DL (ref 0.2–1)
BNP SERPL-MCNC: 170 PG/ML (ref 0–125)
BUN SERPL-MCNC: 14 MG/DL (ref 6–20)
BUN/CREAT SERPL: 15 (ref 12–20)
CALCIUM SERPL-MCNC: 8.5 MG/DL (ref 8.5–10.1)
CHLORIDE SERPL-SCNC: 98 MMOL/L (ref 97–108)
CK SERPL-CCNC: 105 U/L (ref 39–308)
CO2 SERPL-SCNC: 34 MMOL/L (ref 21–32)
CREAT SERPL-MCNC: 0.93 MG/DL (ref 0.7–1.3)
DIFFERENTIAL METHOD BLD: NORMAL
EOSINOPHIL # BLD: 0.2 K/UL (ref 0–0.4)
EOSINOPHIL NFR BLD: 2 % (ref 0–7)
ERYTHROCYTE [DISTWIDTH] IN BLOOD BY AUTOMATED COUNT: 14.5 % (ref 11.5–14.5)
GLOBULIN SER CALC-MCNC: 4.2 G/DL (ref 2–4)
GLUCOSE SERPL-MCNC: 156 MG/DL (ref 65–100)
HCT VFR BLD AUTO: 40 % (ref 36.6–50.3)
HGB BLD-MCNC: 13.1 G/DL (ref 12.1–17)
IMM GRANULOCYTES # BLD: 0 K/UL (ref 0–0.04)
IMM GRANULOCYTES NFR BLD AUTO: 0 % (ref 0–0.5)
LACTATE SERPL-SCNC: 2.3 MMOL/L (ref 0.4–2)
LYMPHOCYTES # BLD: 1 K/UL (ref 0.8–3.5)
LYMPHOCYTES NFR BLD: 13 % (ref 12–49)
MCH RBC QN AUTO: 30.2 PG (ref 26–34)
MCHC RBC AUTO-ENTMCNC: 32.8 G/DL (ref 30–36.5)
MCV RBC AUTO: 92.2 FL (ref 80–99)
MONOCYTES # BLD: 0.5 K/UL (ref 0–1)
MONOCYTES NFR BLD: 6 % (ref 5–13)
NEUTS BAND NFR BLD MANUAL: 13 %
NEUTS SEG # BLD: 6.2 K/UL (ref 1.8–8)
NEUTS SEG NFR BLD: 66 % (ref 32–75)
NRBC # BLD: 0 K/UL (ref 0–0.01)
NRBC BLD-RTO: 0 PER 100 WBC
PLATELET # BLD AUTO: 285 K/UL (ref 150–400)
PMV BLD AUTO: 9.3 FL (ref 8.9–12.9)
POTASSIUM SERPL-SCNC: 3.7 MMOL/L (ref 3.5–5.1)
PROT SERPL-MCNC: 7.4 G/DL (ref 6.4–8.2)
RBC # BLD AUTO: 4.34 M/UL (ref 4.1–5.7)
RBC MORPH BLD: NORMAL
SODIUM SERPL-SCNC: 136 MMOL/L (ref 136–145)
TROPONIN I SERPL-MCNC: <0.04 NG/ML
WBC # BLD AUTO: 7.9 K/UL (ref 4.1–11.1)

## 2018-05-06 PROCEDURE — 65660000000 HC RM CCU STEPDOWN

## 2018-05-06 PROCEDURE — 96367 TX/PROPH/DG ADDL SEQ IV INF: CPT

## 2018-05-06 PROCEDURE — 94761 N-INVAS EAR/PLS OXIMETRY MLT: CPT

## 2018-05-06 PROCEDURE — 74011250636 HC RX REV CODE- 250/636: Performed by: EMERGENCY MEDICINE

## 2018-05-06 PROCEDURE — 77030029684 HC NEB SM VOL KT MONA -A

## 2018-05-06 PROCEDURE — 74011000258 HC RX REV CODE- 258: Performed by: EMERGENCY MEDICINE

## 2018-05-06 PROCEDURE — 36415 COLL VENOUS BLD VENIPUNCTURE: CPT

## 2018-05-06 PROCEDURE — 83880 ASSAY OF NATRIURETIC PEPTIDE: CPT

## 2018-05-06 PROCEDURE — 83605 ASSAY OF LACTIC ACID: CPT

## 2018-05-06 PROCEDURE — 74011250637 HC RX REV CODE- 250/637: Performed by: EMERGENCY MEDICINE

## 2018-05-06 PROCEDURE — 93005 ELECTROCARDIOGRAM TRACING: CPT

## 2018-05-06 PROCEDURE — 96365 THER/PROPH/DIAG IV INF INIT: CPT

## 2018-05-06 PROCEDURE — 96375 TX/PRO/DX INJ NEW DRUG ADDON: CPT

## 2018-05-06 PROCEDURE — 74011000250 HC RX REV CODE- 250: Performed by: EMERGENCY MEDICINE

## 2018-05-06 PROCEDURE — 94640 AIRWAY INHALATION TREATMENT: CPT

## 2018-05-06 PROCEDURE — 74011000250 HC RX REV CODE- 250: Performed by: INTERNAL MEDICINE

## 2018-05-06 PROCEDURE — 80053 COMPREHEN METABOLIC PANEL: CPT

## 2018-05-06 PROCEDURE — 99284 EMERGENCY DEPT VISIT MOD MDM: CPT

## 2018-05-06 PROCEDURE — 82550 ASSAY OF CK (CPK): CPT

## 2018-05-06 PROCEDURE — 74011250636 HC RX REV CODE- 250/636: Performed by: INTERNAL MEDICINE

## 2018-05-06 PROCEDURE — 85025 COMPLETE CBC W/AUTO DIFF WBC: CPT

## 2018-05-06 PROCEDURE — 84484 ASSAY OF TROPONIN QUANT: CPT

## 2018-05-06 PROCEDURE — 71046 X-RAY EXAM CHEST 2 VIEWS: CPT

## 2018-05-06 PROCEDURE — 87040 BLOOD CULTURE FOR BACTERIA: CPT

## 2018-05-06 PROCEDURE — 74011250637 HC RX REV CODE- 250/637: Performed by: INTERNAL MEDICINE

## 2018-05-06 RX ORDER — IPRATROPIUM BROMIDE AND ALBUTEROL SULFATE 2.5; .5 MG/3ML; MG/3ML
3 SOLUTION RESPIRATORY (INHALATION) ONCE
Status: COMPLETED | OUTPATIENT
Start: 2018-05-06 | End: 2018-05-06

## 2018-05-06 RX ORDER — IPRATROPIUM BROMIDE AND ALBUTEROL SULFATE 2.5; .5 MG/3ML; MG/3ML
3 SOLUTION RESPIRATORY (INHALATION)
Status: DISCONTINUED | OUTPATIENT
Start: 2018-05-06 | End: 2018-05-11 | Stop reason: HOSPADM

## 2018-05-06 RX ORDER — IBUPROFEN 200 MG
1 TABLET ORAL DAILY
Status: DISCONTINUED | OUTPATIENT
Start: 2018-05-06 | End: 2018-05-11 | Stop reason: HOSPADM

## 2018-05-06 RX ORDER — SODIUM CHLORIDE 0.9 % (FLUSH) 0.9 %
5-10 SYRINGE (ML) INJECTION EVERY 8 HOURS
Status: DISCONTINUED | OUTPATIENT
Start: 2018-05-06 | End: 2018-05-11 | Stop reason: HOSPADM

## 2018-05-06 RX ORDER — IBUPROFEN 200 MG
1 TABLET ORAL DAILY
Status: DISCONTINUED | OUTPATIENT
Start: 2018-05-07 | End: 2018-05-06

## 2018-05-06 RX ORDER — HYDRALAZINE HYDROCHLORIDE 20 MG/ML
10 INJECTION INTRAMUSCULAR; INTRAVENOUS
Status: DISCONTINUED | OUTPATIENT
Start: 2018-05-06 | End: 2018-05-11 | Stop reason: HOSPADM

## 2018-05-06 RX ORDER — SODIUM CHLORIDE 0.9 % (FLUSH) 0.9 %
5-10 SYRINGE (ML) INJECTION AS NEEDED
Status: DISCONTINUED | OUTPATIENT
Start: 2018-05-06 | End: 2018-05-11 | Stop reason: HOSPADM

## 2018-05-06 RX ORDER — HYDROCODONE BITARTRATE AND ACETAMINOPHEN 5; 325 MG/1; MG/1
2 TABLET ORAL
Status: COMPLETED | OUTPATIENT
Start: 2018-05-06 | End: 2018-05-06

## 2018-05-06 RX ORDER — ONDANSETRON 2 MG/ML
4 INJECTION INTRAMUSCULAR; INTRAVENOUS
Status: DISCONTINUED | OUTPATIENT
Start: 2018-05-06 | End: 2018-05-11 | Stop reason: HOSPADM

## 2018-05-06 RX ORDER — ENOXAPARIN SODIUM 100 MG/ML
40 INJECTION SUBCUTANEOUS EVERY 24 HOURS
Status: DISCONTINUED | OUTPATIENT
Start: 2018-05-06 | End: 2018-05-10

## 2018-05-06 RX ORDER — SODIUM CHLORIDE 9 MG/ML
126 INJECTION, SOLUTION INTRAVENOUS CONTINUOUS
Status: DISPENSED | OUTPATIENT
Start: 2018-05-06 | End: 2018-05-07

## 2018-05-06 RX ORDER — BUDESONIDE AND FORMOTEROL FUMARATE DIHYDRATE 160; 4.5 UG/1; UG/1
2 AEROSOL RESPIRATORY (INHALATION) 2 TIMES DAILY
COMMUNITY

## 2018-05-06 RX ORDER — GUAIFENESIN 600 MG/1
600 TABLET, EXTENDED RELEASE ORAL EVERY 12 HOURS
Status: DISCONTINUED | OUTPATIENT
Start: 2018-05-06 | End: 2018-05-11 | Stop reason: HOSPADM

## 2018-05-06 RX ORDER — ACETAMINOPHEN 325 MG/1
650 TABLET ORAL
Status: DISCONTINUED | OUTPATIENT
Start: 2018-05-06 | End: 2018-05-11 | Stop reason: HOSPADM

## 2018-05-06 RX ORDER — IPRATROPIUM BROMIDE AND ALBUTEROL SULFATE 2.5; .5 MG/3ML; MG/3ML
3 SOLUTION RESPIRATORY (INHALATION)
Status: DISCONTINUED | OUTPATIENT
Start: 2018-05-06 | End: 2018-05-08

## 2018-05-06 RX ORDER — SODIUM CHLORIDE 9 MG/ML
75 INJECTION, SOLUTION INTRAVENOUS CONTINUOUS
Status: DISCONTINUED | OUTPATIENT
Start: 2018-05-06 | End: 2018-05-06

## 2018-05-06 RX ADMIN — CEFTRIAXONE SODIUM 2 G: 2 INJECTION, POWDER, FOR SOLUTION INTRAMUSCULAR; INTRAVENOUS at 19:53

## 2018-05-06 RX ADMIN — HYDROCODONE BITARTRATE AND ACETAMINOPHEN 2 TABLET: 5; 325 TABLET ORAL at 19:50

## 2018-05-06 RX ADMIN — AZITHROMYCIN MONOHYDRATE 500 MG: 500 INJECTION, POWDER, LYOPHILIZED, FOR SOLUTION INTRAVENOUS at 20:46

## 2018-05-06 RX ADMIN — IPRATROPIUM BROMIDE AND ALBUTEROL SULFATE 3 ML: .5; 3 SOLUTION RESPIRATORY (INHALATION) at 18:56

## 2018-05-06 RX ADMIN — Medication 10 ML: at 19:01

## 2018-05-06 RX ADMIN — IPRATROPIUM BROMIDE AND ALBUTEROL SULFATE 3 ML: .5; 3 SOLUTION RESPIRATORY (INHALATION) at 23:32

## 2018-05-06 RX ADMIN — ENOXAPARIN SODIUM 40 MG: 40 INJECTION SUBCUTANEOUS at 23:31

## 2018-05-06 RX ADMIN — GUAIFENESIN 600 MG: 600 TABLET, EXTENDED RELEASE ORAL at 23:32

## 2018-05-06 RX ADMIN — METHYLPREDNISOLONE SODIUM SUCCINATE 125 MG: 125 INJECTION, POWDER, FOR SOLUTION INTRAMUSCULAR; INTRAVENOUS at 18:56

## 2018-05-06 RX ADMIN — METHYLPREDNISOLONE SODIUM SUCCINATE 40 MG: 40 INJECTION, POWDER, FOR SOLUTION INTRAMUSCULAR; INTRAVENOUS at 23:32

## 2018-05-06 NOTE — ED PROVIDER NOTES
EMERGENCY DEPARTMENT HISTORY AND PHYSICAL EXAM      Date: 5/6/2018  Patient Name: Ana High    PROVIDER IN TRIAGE NOTE:  5:43 PM  Allegra Pereira PA-C has evaluated the patient as the Provider in Triage (PIT). They have reviewed the vital signs and the triage nurse assessment. They have talked with the patient and any available family and advised that the appropriate studies have been ordered to initiate the work up based on the clinical presentation during the assessment. The pt has been advised that they will be accommodated in the Main ED as soon as possible. The pt has been requested to contact the triage nurse or PIT immediately if they experiences any changes in their condition during this brief waiting period. History of Presenting Illness     Chief Complaint   Patient presents with    Shortness of Breath     x 3 weeks after treatment for Flu. Continued cough as well with yelow sputum. History Provided By: Patient    HPI: Ana High, 63 y.o. male with PMHx significant for Asthma, COPD, Arthritis, thyroid disease, cancer, presents ambulatory to the ED with cc of new onset SOB x 3 weeks. Pt reports his symptoms started just prior to being diagnosed flu positive. After treatment of his flu the pt still reports SOB and a continued cough productive of green sputum. He was seen by his PCP 2 days ago for the same complaints. While in his office pt was noted to be hypoxic. Pt was placed on oxygen and ambulated. His O2 sats were around 90% and he was advised to monitor his O2 and to come into the ED if her felt more SOB. Today pt did feel worse and his O2 sat in the ED was 81% on room air. His O2 sat improved to 93% on 4 liters O2 nasal canula. He does have a hx of COPd but does not use home oxygen at baseline. Pt denies any abdominal pain, nausea, vomiting, diarrhea, fever, chills.     Social Hx: + Tobacco (1 ppd), - EtOH (-), + illicit drug use (marijuana)    There are no other complaints, changes, or physical findings at this time. PCP: Leandro Sorto MD    Current Facility-Administered Medications   Medication Dose Route Frequency Provider Last Rate Last Dose    sodium chloride (NS) flush 5-10 mL  5-10 mL IntraVENous PRN Teodora Leger PA-C   10 mL at 05/06/18 1901    cefTRIAXone (ROCEPHIN) 2 g in 0.9% sodium chloride (MBP/ADV) 50 mL  2 g IntraVENous Q24H Sultana Page  mL/hr at 05/06/18 1953 2 g at 05/06/18 1953    azithromycin (ZITHROMAX) 500 mg in 0.9% sodium chloride (MBP/ADV) 250 mL  500 mg IntraVENous Q24H Sultana Page  mL/hr at 05/06/18 2046 500 mg at 05/06/18 2046    guaiFENesin ER (MUCINEX) tablet 600 mg  600 mg Oral Q12H Malena Durham MD        albuterol-ipratropium (DUO-NEB) 2.5 MG-0.5 MG/3 ML  3 mL Nebulization Q4H PRN Malena Durham MD        albuterol-ipratropium (DUO-NEB) 2.5 MG-0.5 MG/3 ML  3 mL Nebulization Q4H RT Malena Durham MD        methylPREDNISolone (PF) (SOLU-MEDROL) injection 40 mg  40 mg IntraVENous Q12H Malena Durham MD        0.9% sodium chloride infusion  75 mL/hr IntraVENous CONTINUOUS Malena Durham MD        sodium chloride (NS) flush 5-10 mL  5-10 mL IntraVENous Q8H Malena Durham MD        sodium chloride (NS) flush 5-10 mL  5-10 mL IntraVENous PRN Malena Durham MD        acetaminophen (TYLENOL) tablet 650 mg  650 mg Oral Q4H PRN Malena Durham MD        ondansetron [facility]) injection 4 mg  4 mg IntraVENous Q4H PRN Malena Durham MD        enoxaparin (LOVENOX) injection 40 mg  40 mg SubCUTAneous Q24H Malena Durham MD         Current Outpatient Prescriptions   Medication Sig Dispense Refill    tiotropium (SPIRIVA WITH HANDIHALER) 18 mcg inhalation capsule Take 1 Cap by inhalation daily.  budesonide-formoterol (SYMBICORT) 160-4.5 mcg/actuation HFAA Take 2 Puffs by inhalation two (2) times a day.  omeprazole (PRILOSEC) 20 mg capsule Take 20 mg by mouth daily.  aspirin 81 mg tablet Take 81 mg by mouth daily.         albuterol (PROVENTIL, VENTOLIN) 90 mcg/Actuation inhaler Take 2 Puffs by inhalation every six (6) hours as needed.  diazepam (VALIUM) 10 mg tablet Take 10 mg by mouth nightly.  umeclidinium-vilanterol (ANORO ELLIPTA) 62.5-25 mcg/actuation inhaler Take 1 Puff by inhalation daily.  HYDROcodone-acetaminophen (NORCO) 5-325 mg per tablet Take 1 Tab by mouth daily as needed.  Indications: PAIN         Past History     Past Medical History:  Past Medical History:   Diagnosis Date    Arthritis     osteoarthritis    Asthma     Cancer [facility]     esophagus    [facility] 4/2012    adenocarcinoma; Dr. Carrera Fresh Chronic pain     lower back, has crushed vertebrae    COPD     Essential hypertension     GERD (gastroesophageal reflux disease)     Hypertension     not on medication    Nonischemic cardiomyopathy ([facility]     2003 EF 35-50%; negative cath, 2/2012 EF 55%; Dr. Allyson Cheatham Other ill-defined conditions(799.89)     Epstein's esophagus    Other ill-defined conditions(799.89)     esophageal nodule - pre-cancerous    Other ill-defined conditions(799.89)     pneumonia multiple times    Psychiatric disorder     depression    Seizures ([facility]     caused with allergic reaction from Aleve    Thyroid disease     Unspecified sleep apnea     c-pap-doesnt wear now/apnea resolved after surgery/lost weight and no sleep apnea       Past Surgical History:  Past Surgical History:   Procedure Laterality Date    COLONOSCOPY N/A 1/11/2017    COLONOSCOPY performed by Riley Kan [facility] END[facility]COPY    COLONOSCOPY N/A 1/13/2017    COLONOSCOPY er 26 performed by Riley Kan MD at [facility] ENDOSCOPY    COLONOSCOPY,FLEX,W/CONTROL, BLEEDING  1/13/2017         COLONOSCOPY,REMV LESN,SNARE  1/11/2017         COLORECTAL SCRN; HI RISK IND  1/11/2017         HX CHOLECYSTECTOMY      HX GI  4/2/12    ESOPHAGOGASTRECTOMY    HX HEART CATHETERIZATION      HX OTHER SURGICAL      surgery to remove nodule and barrets esophagus    CA COLSC FLX W/RMVL OF TUMOR POLYP LESION SNARE TQ  12/30/2011    colon x 3     CA EGD BALLOON DILATION ESOPHAGUS <30 MM DIAM  6/20/2012         CA EGD BALLOON DILATION ESOPHAGUS <30 MM DIAM  7/5/2012         CA EGD BALLOON DILATION ESOPHAGUS <30 MM DIAM  7/24/2012         CA EGD BALLOON DILATION ESOPHAGUS <30 MM DIAM  8/28/2012         CA EGD BALLOON DILATION ESOPHAGUS <30 MM DIAM  9/24/2012         CA EGD BALLOON DILATION ESOPHAGUS <30 MM DIAM  11/6/2012         CA EGD BALLOON DILATION ESOPHAGUS <30 MM DIAM  3/4/2013         CA EGD BALLOON DILATION ESOPHAGUS <30 MM DIAM  4/26/2013         CA EGD BALLOON DILATION ESOPHAGUS <30 MM DIAM  7/31/2013         CA EGD BALLOON DILATION ESOPHAGUS <30 MM DIAM  9/25/2013         CA EGD TRANSORAL BIOPSY SINGLE/MULTIPLE  12/30/2011         CA EGD TRANSORAL BIOPSY SINGLE/MULTIPLE  8/28/2012         CA EGD TRANSORAL BIOPSY SINGLE/MULTIPLE  11/6/2012         CA EGD TRANSORAL BIOPSY SINGLE/MULTIPLE  3/4/2013         CA EGD TRANSORAL BIOPSY SINGLE/MULTIPLE  4/30/2013         UPPER GI ENDOSCOPY,BALL DIL,30MM  5/29/2015         UPPER GI ENDOSCOPY,BIOPSY  5/29/2015         UPPER GI ENDOSCOPY,BIOPSY  1/11/2017         UPPER GI ENDOSCOPY,STENT PLACEMENT  4/23/2013            Family History:  Family History   Problem Relation Age of Onset    COPD Mother    [facility] Asthma Mother     Colon Cancer Mother     Other Mother      food allergies/Steffany's disease/Epstein's esophagus    Heart Attack Mother     Heart Disease Mother     Cancer Mother      colon    Other Father      abestos/food allergies/degernative disc disease    Colon Cancer Maternal Grandmother        Social History:  Social History   Substance Use Topics    Smoking status: Current Every Day Smoker     Packs/day: 1.00     Years: 40.00     Types: Cigarettes    Smokeless tobacco: Never Used      Comment: cigarettes 2-3 daily and using e cig    Alcohol use No Allergies: Allergies   Allergen Reactions    Aleve [Naproxen Sodium] Anaphylaxis     Hives, passed out, had seizure    Sulfa (Sulfonamide Antibiotics) Hives         Review of Systems   Review of Systems   Constitutional: Negative for activity change, chills and fever. HENT: Negative for congestion and sore throat. Eyes: Negative for pain and redness. Respiratory: Positive for cough and shortness of breath. Negative for chest tightness. Cardiovascular: Positive for chest pain. Negative for palpitations. Gastrointestinal: Negative for abdominal pain, diarrhea, nausea and vomiting. Genitourinary: Negative for dysuria, frequency and urgency. Musculoskeletal: Negative for back pain and neck pain. Skin: Negative for rash. Neurological: Negative for syncope, light-headedness and headaches. Psychiatric/Behavioral: Negative for confusion. All other systems reviewed and are negative. Physical Exam   Physical Exam   Constitutional: He is oriented to person, place, and time. He appears well-developed and well-nourished. No distress. HENT:   Head: Normocephalic. Nose: Nose normal.   Mouth/Throat: Oropharynx is clear and moist. No oropharyngeal exudate. Eyes: Conjunctivae are normal. Pupils are equal, round, and reactive to light. No scleral icterus. Neck: Normal range of motion. Neck supple. No JVD present. No tracheal deviation present. No thyromegaly present. Cardiovascular: Regular rhythm and intact distal pulses. Tachycardia present. Exam reveals no gallop and no friction rub. No murmur heard. Pulmonary/Chest: Effort normal. No stridor. No respiratory distress. He has wheezes (bilateral expiratory). He has no rales. Good air movement. Abdominal: Soft. Bowel sounds are normal. He exhibits no distension. There is no tenderness. There is no rebound and no guarding. Musculoskeletal: Normal range of motion. He exhibits no edema.    Lymphadenopathy:     He has no cervical adenopathy. Neurological: He is alert and oriented to person, place, and time. No cranial nerve deficit. He exhibits normal muscle tone. Coordination normal.   Skin: Skin is warm and dry. No rash noted. He is not diaphoretic. No erythema. Psychiatric: He has a normal mood and affect. His behavior is normal.   Nursing note and vitals reviewed. Diagnostic Study Results     Labs -     Recent Results (from the past 12 hour(s))   EKG, 12 LEAD, INITIAL    Collection Time: 05/06/18  5:53 PM   Result Value Ref Range    Ventricular Rate 106 BPM    Atrial Rate 106 BPM    P-R Interval 126 ms    QRS Duration 120 ms    Q-T Interval 344 ms    QTC Calculation (Bezet) 456 ms    Calculated P Axis 70 degrees    Calculated R Axis -75 degrees    Calculated T Axis 36 degrees    Diagnosis       Sinus tachycardia  Left axis deviation  Right bundle branch block  Abnormal ECG  When compared with ECG of 25-APR-2013 16:46,  Vent. rate has increased BY  55 BPM  QRS axis shifted left     CBC WITH AUTOMATED DIFF    Collection Time: 05/06/18  6:00 PM   Result Value Ref Range    WBC 7.9 4.1 - 11.1 K/uL    RBC 4.34 4.10 - 5.70 M/uL    HGB 13.1 12.1 - 17.0 g/dL    HCT 40.0 36.6 - 50.3 %    MCV 92.2 80.0 - 99.0 FL    MCH 30.2 26.0 - 34.0 PG    MCHC 32.8 30.0 - 36.5 g/dL    RDW 14.5 11.5 - 14.5 %    PLATELET 758 257 - 323 K/uL    MPV 9.3 8.9 - 12.9 FL    NRBC 0.0 0  WBC    ABSOLUTE NRBC 0.00 0.00 - 0.01 K/uL    NEUTROPHILS 66 32 - 75 %    BAND NEUTROPHILS 13 %    LYMPHOCYTES 13 12 - 49 %    MONOCYTES 6 5 - 13 %    EOSINOPHILS 2 0 - 7 %    BASOPHILS 0 0 - 1 %    IMMATURE GRANULOCYTES 0 0.0 - 0.5 %    ABS. NEUTROPHILS 6.2 1.8 - 8.0 K/UL    ABS. LYMPHOCYTES 1.0 0.8 - 3.5 K/UL    ABS. MONOCYTES 0.5 0.0 - 1.0 K/UL    ABS. EOSINOPHILS 0.2 0.0 - 0.4 K/UL    ABS. BASOPHILS 0.0 0.0 - 0.1 K/UL    ABS. IMM.  GRANS. 0.0 0.00 - 0.04 K/UL    DF MANUAL      RBC COMMENTS SCHISTOCYTES  PRESENT       METABOLIC PANEL, COMPREHENSIVE    Collection Time: 05/06/18  6:00 PM   Result Value Ref Range    Sodium 136 136 - 145 mmol/L    Potassium 3.7 3.5 - 5.1 mmol/L    Chloride 98 97 - 108 mmol/L    CO2 34 (H) 21 - 32 mmol/L    Anion gap 4 (L) 5 - 15 mmol/L    Glucose 156 (H) 65 - 100 mg/dL    BUN 14 6 - 20 MG/DL    Creatinine 0.93 0.70 - 1.30 MG/DL    BUN/Creatinine ratio 15 12 - 20      GFR est AA >60 >60 ml/min/1.73m2    GFR est non-AA >60 >60 ml/min/1.73m2    Calcium 8.5 8.5 - 10.1 MG/DL    Bilirubin, total 0.4 0.2 - 1.0 MG/DL    ALT (SGPT) 21 12 - 78 U/L    AST (SGOT) 16 15 - 37 U/L    Alk. phosphatase 91 45 - 117 U/L    Protein, total 7.4 6.4 - 8.2 g/dL    Albumin 3.2 (L) 3.5 - 5.0 g/dL    Globulin 4.2 (H) 2.0 - 4.0 g/dL    A-G Ratio 0.8 (L) 1.1 - 2.2     NT-PRO BNP    Collection Time: 05/06/18  6:00 PM   Result Value Ref Range    NT pro- (H) 0 - 125 PG/ML   TROPONIN I    Collection Time: 05/06/18  6:00 PM   Result Value Ref Range    Troponin-I, Qt. <0.04 <0.05 ng/mL   CK W/ REFLX CKMB    Collection Time: 05/06/18  6:00 PM   Result Value Ref Range     39 - 308 U/L   LACTIC ACID    Collection Time: 05/06/18  6:00 PM   Result Value Ref Range    Lactic acid 2.3 (HH) 0.4 - 2.0 MMOL/L       Radiologic Studies -   CXR Results  (Last 48 hours)               05/06/18 1831  XR CHEST PA LAT Final result    Impression:  IMPRESSION: There are changes of emphysema. There is also scattered vagueareas   of airspace disease in the perihilar regions right greater than left and lung   bases suspicious for pneumonia               Narrative:  EXAM:  XR CHEST PA LAT       INDICATION:   SOB, cough       COMPARISON: 4/6/2018. FINDINGS: PA and lateral radiographs of the chest demonstrate hyperexpanded   lungs with scattered areas of vague airspace disease in the perihilar regions   and lung bases. Eura Ruben Heart size is normal..  Degenerative changes with compression   deformities mid lower thoracic spine is unchanged. .                    Medical Decision Making   I am the first provider for this patient. I reviewed the vital signs, available nursing notes, past medical history, past surgical history, family history and social history. Vital Signs-Reviewed the patient's vital signs. Patient Vitals for the past 12 hrs:   Temp Pulse Resp BP SpO2   05/06/18 1915 - - - 126/73 95 %   05/06/18 1744 100.3 °F (37.9 °C) (!) 112 18 136/67 (!) 81 %     Records Reviewed: Nursing Notes, Old Medical Records, Previous Radiology Studies and Previous Laboratory Studies    Provider Notes (Medical Decision Making):   DDx: PNA, COPD, ACS. ED EKG interpretation: 17:53  Rhythm: sinus tachycardia; and regular . Rate (approx.): 106; Axis: left axis deviation; [place] Interval: normal; QRS interval: RBBB; ST/T wave: non-specific changes; This EKG was interpreted by Blaise Metz MD,ED Provider. ED Course:   Initial assessment performed. The patients presenting problems have been discussed, and they are in agreement with the care plan formulated and outlined with them. I have encouraged them to ask questions as they arise throughout their visit. CONSULT NOTE:   8:03 PM  Blaise Metz MD spoke with Dr. Natalee Barrett,   Specialty: Hospitalist  Discussed pt's hx, disposition, and available diagnostic and imaging results. Reviewed care plans. Consultant will evaluate pt for admission. Written by Carina Scruggs ED Scribe, as dictated by Blaise Metz MD.    Critical Care Time:   None. Disposition:  ADMIT NOTE:  8:03 PM  Patient is being admitted to the hospital by Dr. Natalee Barrett. The results of their tests and reasons for their admission have been discussed with them and/or available family. They convey agreement and understanding for the need to be admitted and for their admission diagnosis. Consultation has been made with the inpatient physician specialist for hospitalization.       Will admit to hospitalist for CAP with new O2 requirement; Blaise Metz MD      PLAN: Admit the pt to the hospital. Diagnosis     Clinical Impression:   1. Community acquired pneumonia, unspecified laterality    2. Acute respiratory failure with hypoxia (HCC)        Attestations:    ADMIT NOTE:  8:47 PM  Patient is being admitted to the hospital by Dr. Mary Beth Hagen. The results of their tests and reasons for their admission have been discussed with them and/or available family. They convey agreement and understanding for the need to be admitted and for their admission diagnosis. Consultation has been made with the inpatient physician specialist for hospitalization.

## 2018-05-06 NOTE — IP AVS SNAPSHOT
[address] [facility] 
[phone] Patient: Zoraida Baca MRN: [id number] EVF:[id number] About your hospitalization You were admitted on:  May 6, 2018 You last received care in the:  [facility] GENERAL SURGERY You were discharged on:  May 11, 2018 Why you were hospitalized Your primary diagnosis was:  Not on File Your diagnoses also included:  Respiratory Failure (Hcc) Follow-up Information Follow up With Details Comments Contact Info Conrad Perez MD Go on 5/14/2018 Hospital follow-up scheduled at 3:15pm (If you have questions or need to reschedule please call [address] [facility] 
[phone] Any Irene MD Go on 5/29/2018 Hospital follow-up scheduled at 8:30am  (If you have questions or need to reschedule please call [phone] [address] [facility] [address] [facility] [address] [place] [address] 
[phone] [address] [place] [address]  If you do not hear from them within 24-48 hours, please contact them directly [address] [place] [address] Rd. 
1st Floor [place] [address] 
[phone] Discharge Orders None A check loyd indicates which time of day the medication should be taken. My Medications START taking these medications Instructions Each Dose to Equal  
 Morning Noon Evening Bedtime  
 cefdinir 300 mg capsule Commonly known as:  OMNICEF Your last dose was: Your next dose is: Take 1 Cap by mouth two (2) times a day for 2 days. 300 mg  
    
   
   
   
  
 furosemide 20 mg tablet Commonly known as:  LASIX Your last dose was: Your next dose is: Take 1 Tab by mouth daily as needed. For swelling  
 20 mg  
    
   
   
   
  
 guaiFENesin  mg ER tablet Commonly known as:  Manny & Manny Your last dose was: Your next dose is: Take 1 Tab by mouth every twelve (12) hours for 10 days. 600 mg  
    
   
   
   
  
 L. acidoph & paracasei- S therm- Bifido 8 billion cell Cap cap Commonly known as:  FAINA-Q/RISAQUAD Your last dose was: Your next dose is: Take 1 Cap by mouth daily for 14 days. An over the counter probiotic may be substituted in place of this Rx.  
 1 Cap  
    
   
   
   
  
 montelukast 10 mg tablet Commonly known as:  SINGULAIR Your last dose was: Your next dose is: Take 1 Tab by mouth nightly. 10 mg  
    
   
   
   
  
 nicotine 21 mg/24 hr  
Commonly known as:  Mele Line Your last dose was: Your next dose is:    
   
   
 1 Patch by TransDERmal route daily for 30 days. 1 Patch  
    
   
   
   
  
 predniSONE 10 mg tablet Commonly known as:  Kendall Carvalho Your last dose was: Your next dose is: Take 6 Tabs by mouth daily (with breakfast). Do this for 1 day, then decrease to 40 mg every day x3d, then decrease to 20 mg every day x3d, then decrease to 10 mg daily x3d, then stop 60 mg  
    
   
   
   
  
 umeclidinium-vilanterol 62.5-25 mcg/actuation inhaler Commonly known as:  Abner Valencia Your last dose was: Your next dose is: Take 1 Puff by inhalation daily. 1 Puff CHANGE how you take these medications Instructions Each Dose to Equal  
 Morning Noon Evening Bedtime  
 diazePAM 5 mg tablet Commonly known as:  VALIUM What changed:   
- medication strength 
- how much to take - when to take this 
- reasons to take this Your last dose was: Your next dose is: Take 1 Tab by mouth nightly as needed for Anxiety for up to 7 days. Max Daily Amount: 5 mg.  
 5 mg CONTINUE taking these medications Instructions Each Dose to Equal  
 Morning Noon Evening Bedtime albuterol 90 mcg/actuation inhaler Commonly known as:  Candmaribeth Schwalbe Your last dose was: Your next dose is: Take 2 Puffs by inhalation every six (6) hours as needed. 2 Puff  
    
   
   
   
  
 aspirin 81 mg tablet Your last dose was: Your next dose is: Take 81 mg by mouth daily. 81 mg  
    
   
   
   
  
 omeprazole 20 mg capsule Commonly known as:  PRILOSEC Your last dose was: Your next dose is: Take 20 mg by mouth daily. 20 mg  
    
   
   
   
  
 SYMBICORT 160-4.5 mcg/actuation Hfaa Generic drug:  budesonide-formoterol Your last dose was: Your next dose is: Take 2 Puffs by inhalation two (2) times a day. 2 Puff STOP taking these medications HYDROcodone-acetaminophen 5-325 mg per tablet Commonly known as:  NORCO  
   
  
 SPIRIVA WITH HANDIHALER 18 mcg inhalation capsule Generic drug:  tiotropium Where to Get Your Medications Information on where to get these meds will be given to you by the nurse or doctor. ! Ask your nurse or doctor about these medications  
  cefdinir 300 mg capsule  
 diazePAM 5 mg tablet  
 furosemide 20 mg tablet  
 guaiFENesin  mg ER tablet L. acidoph & paracasei- S therm- Bifido 8 billion cell Cap cap  
 montelukast 10 mg tablet  
 nicotine 21 mg/24 hr  
 predniSONE 10 mg tablet  
 umeclidinium-vilanterol 62.5-25 mcg/actuation inhaler Discharge Instructions HOSPITALIST DISCHARGE INSTRUCTIONS 
 
NAME: Lissette Trujillo :  1955 MRN:  [id number] Date/Time:  2018 7:48 AM 
 
ADMIT DATE: 2018 DISCHARGE DATE: 2018 DISCHARGE DIAGNOSIS: 
Acute hypoxic respiratory failure Pneumonia Acute COPD exacerbation Tobacco Abuse: 
Elevated Lactic acid: resolved Hx nonischemic cardiomyopathy Anxiety Hx of esophageal ca 
  MEDICATIONS: 
 As per medication reconciliation  list 
· It is important that you take the medication exactly as they are prescribed. · Keep your medication in the bottles provided by the pharmacist and keep a list of the medication names, dosages, and times to be taken in your wallet. · Do not take other medications without consulting your doctor. Pain Management: per above medications What to do at [facility] Recommended diet:  Regular Diet Recommended activity: Activity as tolerated If you experience any of the following symptoms then please call your primary care physician or return to the emergency room if you cannot get hold of your doctor: 
Fever, chills, nausea, vomiting, diarrhea, change in mentation, falling, bleeding, shortness of breath or any other concerning symptoms. Follow Up: With Dr. Sarah Holcomb in 2 weeks With your PCP, Dr. Isidro Singh MD within 1 week for hospital follow-up Information obtained by : 
I understand that if any problems occur once I am at home I am to contact my physician. I understand and acknowledge receipt of the instructions indicated above. Physician's or R.N.'s Signature                                                                  Date/Time Patient or Representative Signature                                                          Date/Time [place] Announcement We are excited to announce that we are making your provider's discharge notes available to you in [employer]. You will see these notes when they are completed and signed by the physician that discharged you from your recent hospital stay.   If you have any questions or concerns about any information you see in [employer], please call the Health Information Department where you were seen or reach out to your Primary Care Provider for more information about your plan of care. Introducing [address] introduces [employer] patient portal. Now you can access parts of your medical record, email your doctor's office, and request medication refills online. 1. In your internet browser, go to [email or web address] 2. Click on the First Time User? Click Here link in the Sign In box. You will see the New Member Sign Up page. 3. Enter your [employer] Access Code exactly as it appears below. You will not need to use this code after youve completed the sign-up process. If you do not sign up before the expiration date, you must request a new code. · [employer] Access Code: [email or web address] Expires: 7/5/2018 11:57 AM 
 
4. Enter the last four digits of your Social Security Number (xxxx) and Date of Birth (mm/dd/yyyy) as indicated and click Submit. You will be taken to the next sign-up page. 5. Create a [employer] ID. This will be your [employer] login ID and cannot be changed, so think of one that is secure and easy to remember. 6. Create a [employer] password. You can change your password at any time. 7. Enter your Password Reset Question and Answer. This can be used at a later time if you forget your password. 8. Enter your e-mail address. You will receive e-mail notification when new information is available in [address] 9. Click Sign Up. You can now view and download portions of your medical record. 10. Click the Download Summary menu link to download a portable copy of your medical information. If you have questions, please visit the Frequently Asked Questions section of the [employer] website. Remember, [employer] is NOT to be used for urgent needs. For medical emergencies, dial 911. Now available from your iPhone and Android! Introducing [employer] As a [address] [place] [address] patient, I wanted to make you aware of our electronic visit tool called [facility] [employer]. [facility] [address] 24/7 allows you to connect within minutes with a medical provider 24 hours a day, seven days a week via a mobile device or tablet or logging into a secure website from your computer. You can access [employer] from anywhere in the [place]. A virtual visit might be right for you when you have a simple condition and feel like you just dont want to get out of bed, or cant get away from work for an appointment, when your regular [address] [place] [address] provider is not available (evenings, weekends or holidays), or when youre out of town and need minor care. Electronic visits cost only $49 and if the [address] [place] [address] 24/7 provider determines a prescription is needed to treat your condition, one can be electronically transmitted to a nearby pharmacy*. Please take a moment to enroll today if you have not already done so. The enrollment process is free and takes just a few minutes. To enroll, please download the [employer] 24/7 lauren to your tablet or phone, or visit [email or web address] to enroll on your computer. And, as an [address] [place] [address] patient with a [employer] account, the results of your visits will be scanned into your electronic medical record and your primary care provider will be able to view the scanned results. We urge you to continue to see your regular [address] [place] [address] provider for your ongoing medical care. And while your primary care provider may not be the one available when you seek a [employer] virtual visit, the peace of mind you get from getting a real diagnosis real time can be priceless. For more information on [employer], view our Frequently Asked Questions (FAQs) at [email or web address]. Sincerely, 
 
Janny Bolaños MD 
Chief Medical Officer [address] *:  certain medications cannot be prescribed via [employer] Unresulted Labs-Please follow up with your PCP about these lab tests Order Current Status CULTURE, BLOOD Preliminary result CULTURE, BLOOD Preliminary result Providers Seen During Your Hospitalization Provider Specialty Primary office phone Madeline Monroy MD Emergency Medicine [phone] Christofer Bingham MD Emergency Medicine [phone] Kirk Weber MD Hospitalist [phone] Cynthia Rivera MD Internal Medicine [phone] Your Primary Care Physician (PCP) Primary Care Physician Office Phone Office Fax Ernesto Pantoja [phone] You are allergic to the following Allergen Reactions Aleve (Naproxen Sodium) Anaphylaxis Hives, passed out, had seizure Sulfa (Sulfonamide Antibiotics) Hives Recent Documentation Height Weight BMI Smoking Status 1.803 m 61.8 kg 19 kg/m2 Current Every Day Smoker Emergency Contacts Name Discharge Info Relation Home Work Mobile [place] [facility] DISCHARGE CAREGIVER [3] Spouse [3] [phone] Patient Belongings The following personal items are in your possession at time of discharge: 
  Dental Appliances: None  Visual Aid: Glasses, With patient, At bedside      Home Medications: None   Jewelry: None  Clothing: At bedside    Other Valuables: Cell Phone Please provide this summary of care documentation to your next provider. Signatures-by signing, you are acknowledging that this After Visit Summary has been reviewed with you and you have received a copy. Patient Signature:  ____________________________________________________________ Date:  ____________________________________________________________  
  
Cornelio Snipe Provider Signature:  ____________________________________________________________ Date:  ____________________________________________________________

## 2018-05-06 NOTE — ED NOTES
Bedside and Verbal shift change received from [facility]. Report included the following information: SBAR, ED Summary, MAR and Recent Results. Patient resting with nebulizer running.  Patient complaining of headache, will notify MD.

## 2018-05-06 NOTE — ED TRIAGE NOTES
Pt was diagnosed with the flu 3-4 weeks prior and never got better. Pt went to PCP office on Friday and was advised to go home and come to ER if it got worse. Increased weakness today and decreased pulse ox.

## 2018-05-07 LAB
ANION GAP SERPL CALC-SCNC: 5 MMOL/L (ref 5–15)
APPEARANCE UR: CLEAR
ATRIAL RATE: 106 BPM
BACTERIA URNS QL MICRO: NEGATIVE /HPF
BASOPHILS # BLD: 0 K/UL (ref 0–0.1)
BASOPHILS NFR BLD: 0 % (ref 0–1)
BILIRUB UR QL: NEGATIVE
BUN SERPL-MCNC: 15 MG/DL (ref 6–20)
BUN/CREAT SERPL: 21 (ref 12–20)
CALCIUM SERPL-MCNC: 7.9 MG/DL (ref 8.5–10.1)
CALCULATED P AXIS, ECG09: 70 DEGREES
CALCULATED R AXIS, ECG10: -75 DEGREES
CALCULATED T AXIS, ECG11: 36 DEGREES
CHLORIDE SERPL-SCNC: 105 MMOL/L (ref 97–108)
CO2 SERPL-SCNC: 28 MMOL/L (ref 21–32)
COLOR UR: ABNORMAL
CREAT SERPL-MCNC: 0.73 MG/DL (ref 0.7–1.3)
DIAGNOSIS, 93000: NORMAL
DIFFERENTIAL METHOD BLD: ABNORMAL
EOSINOPHIL # BLD: 0 K/UL (ref 0–0.4)
EOSINOPHIL NFR BLD: 0 % (ref 0–7)
EPITH CASTS URNS QL MICRO: ABNORMAL /LPF
ERYTHROCYTE [DISTWIDTH] IN BLOOD BY AUTOMATED COUNT: 14.5 % (ref 11.5–14.5)
GLUCOSE SERPL-MCNC: 151 MG/DL (ref 65–100)
GLUCOSE UR STRIP.AUTO-MCNC: 250 MG/DL
HCT VFR BLD AUTO: 35.5 % (ref 36.6–50.3)
HGB BLD-MCNC: 11.4 G/DL (ref 12.1–17)
HGB UR QL STRIP: NEGATIVE
HYALINE CASTS URNS QL MICRO: ABNORMAL /LPF (ref 0–5)
IMM GRANULOCYTES # BLD: 0 K/UL (ref 0–0.04)
IMM GRANULOCYTES NFR BLD AUTO: 0 % (ref 0–0.5)
KETONES UR QL STRIP.AUTO: NEGATIVE MG/DL
LACTATE SERPL-SCNC: 1.9 MMOL/L (ref 0.4–2)
LACTATE SERPL-SCNC: 2.3 MMOL/L (ref 0.4–2)
LEUKOCYTE ESTERASE UR QL STRIP.AUTO: NEGATIVE
LYMPHOCYTES # BLD: 0.5 K/UL (ref 0.8–3.5)
LYMPHOCYTES NFR BLD: 9 % (ref 12–49)
MCH RBC QN AUTO: 29.8 PG (ref 26–34)
MCHC RBC AUTO-ENTMCNC: 32.1 G/DL (ref 30–36.5)
MCV RBC AUTO: 92.7 FL (ref 80–99)
MONOCYTES # BLD: 0.3 K/UL (ref 0–1)
MONOCYTES NFR BLD: 6 % (ref 5–13)
NEUTS BAND NFR BLD MANUAL: 3 %
NEUTS SEG # BLD: 4.4 K/UL (ref 1.8–8)
NEUTS SEG NFR BLD: 82 % (ref 32–75)
NITRITE UR QL STRIP.AUTO: NEGATIVE
NRBC # BLD: 0 K/UL (ref 0–0.01)
NRBC BLD-RTO: 0 PER 100 WBC
P-R INTERVAL, ECG05: 126 MS
PH UR STRIP: 5.5 [PH] (ref 5–8)
PLATELET # BLD AUTO: 254 K/UL (ref 150–400)
PMV BLD AUTO: 9.1 FL (ref 8.9–12.9)
POTASSIUM SERPL-SCNC: 4.1 MMOL/L (ref 3.5–5.1)
PROT UR STRIP-MCNC: ABNORMAL MG/DL
Q-T INTERVAL, ECG07: 344 MS
QRS DURATION, ECG06: 120 MS
QTC CALCULATION (BEZET), ECG08: 456 MS
RBC # BLD AUTO: 3.83 M/UL (ref 4.1–5.7)
RBC #/AREA URNS HPF: ABNORMAL /HPF (ref 0–5)
RBC MORPH BLD: ABNORMAL
SODIUM SERPL-SCNC: 138 MMOL/L (ref 136–145)
SP GR UR REFRACTOMETRY: 1.02 (ref 1–1.03)
UA: UC IF INDICATED,UAUC: ABNORMAL
UROBILINOGEN UR QL STRIP.AUTO: 1 EU/DL (ref 0.2–1)
VENTRICULAR RATE, ECG03: 106 BPM
WBC # BLD AUTO: 5.2 K/UL (ref 4.1–11.1)
WBC URNS QL MICRO: ABNORMAL /HPF (ref 0–4)

## 2018-05-07 PROCEDURE — 36415 COLL VENOUS BLD VENIPUNCTURE: CPT | Performed by: INTERNAL MEDICINE

## 2018-05-07 PROCEDURE — 81001 URINALYSIS AUTO W/SCOPE: CPT

## 2018-05-07 PROCEDURE — 74011250637 HC RX REV CODE- 250/637: Performed by: INTERNAL MEDICINE

## 2018-05-07 PROCEDURE — 77030029684 HC NEB SM VOL KT MONA -A

## 2018-05-07 PROCEDURE — 74011250636 HC RX REV CODE- 250/636: Performed by: INTERNAL MEDICINE

## 2018-05-07 PROCEDURE — 83605 ASSAY OF LACTIC ACID: CPT | Performed by: INTERNAL MEDICINE

## 2018-05-07 PROCEDURE — 65660000000 HC RM CCU STEPDOWN

## 2018-05-07 PROCEDURE — 77030009834 HC MSK O2 TRACH VYRM -A

## 2018-05-07 PROCEDURE — 74011000258 HC RX REV CODE- 258: Performed by: EMERGENCY MEDICINE

## 2018-05-07 PROCEDURE — 87070 CULTURE OTHR SPECIMN AEROBIC: CPT | Performed by: INTERNAL MEDICINE

## 2018-05-07 PROCEDURE — 74011000250 HC RX REV CODE- 250: Performed by: INTERNAL MEDICINE

## 2018-05-07 PROCEDURE — 85025 COMPLETE CBC W/AUTO DIFF WBC: CPT | Performed by: INTERNAL MEDICINE

## 2018-05-07 PROCEDURE — 80048 BASIC METABOLIC PNL TOTAL CA: CPT | Performed by: INTERNAL MEDICINE

## 2018-05-07 PROCEDURE — 74011250636 HC RX REV CODE- 250/636: Performed by: EMERGENCY MEDICINE

## 2018-05-07 PROCEDURE — 94640 AIRWAY INHALATION TREATMENT: CPT

## 2018-05-07 RX ORDER — PANTOPRAZOLE SODIUM 40 MG/1
40 TABLET, DELAYED RELEASE ORAL
Status: DISCONTINUED | OUTPATIENT
Start: 2018-05-07 | End: 2018-05-11 | Stop reason: HOSPADM

## 2018-05-07 RX ORDER — BUDESONIDE AND FORMOTEROL FUMARATE DIHYDRATE 160; 4.5 UG/1; UG/1
2 AEROSOL RESPIRATORY (INHALATION) 2 TIMES DAILY
Status: DISCONTINUED | OUTPATIENT
Start: 2018-05-07 | End: 2018-05-11 | Stop reason: HOSPADM

## 2018-05-07 RX ORDER — GUAIFENESIN 100 MG/5ML
81 LIQUID (ML) ORAL DAILY
Status: DISCONTINUED | OUTPATIENT
Start: 2018-05-07 | End: 2018-05-11 | Stop reason: HOSPADM

## 2018-05-07 RX ORDER — FUROSEMIDE 20 MG/1
20 TABLET ORAL DAILY
Status: DISCONTINUED | OUTPATIENT
Start: 2018-05-07 | End: 2018-05-11 | Stop reason: HOSPADM

## 2018-05-07 RX ORDER — HYDROCODONE BITARTRATE AND ACETAMINOPHEN 5; 325 MG/1; MG/1
1 TABLET ORAL
Status: DISCONTINUED | OUTPATIENT
Start: 2018-05-07 | End: 2018-05-11 | Stop reason: HOSPADM

## 2018-05-07 RX ORDER — DIAZEPAM 5 MG/1
2.5 TABLET ORAL
Status: DISCONTINUED | OUTPATIENT
Start: 2018-05-07 | End: 2018-05-11 | Stop reason: HOSPADM

## 2018-05-07 RX ORDER — FLUTICASONE FUROATE AND VILANTEROL 100; 25 UG/1; UG/1
1 POWDER RESPIRATORY (INHALATION) DAILY
Status: DISCONTINUED | OUTPATIENT
Start: 2018-05-07 | End: 2018-05-07 | Stop reason: ALTCHOICE

## 2018-05-07 RX ADMIN — METHYLPREDNISOLONE SODIUM SUCCINATE 40 MG: 40 INJECTION, POWDER, FOR SOLUTION INTRAMUSCULAR; INTRAVENOUS at 20:43

## 2018-05-07 RX ADMIN — IPRATROPIUM BROMIDE AND ALBUTEROL SULFATE 3 ML: .5; 3 SOLUTION RESPIRATORY (INHALATION) at 03:38

## 2018-05-07 RX ADMIN — Medication 10 ML: at 13:43

## 2018-05-07 RX ADMIN — CEFTRIAXONE SODIUM 2 G: 2 INJECTION, POWDER, FOR SOLUTION INTRAMUSCULAR; INTRAVENOUS at 19:57

## 2018-05-07 RX ADMIN — METHYLPREDNISOLONE SODIUM SUCCINATE 40 MG: 40 INJECTION, POWDER, FOR SOLUTION INTRAMUSCULAR; INTRAVENOUS at 08:38

## 2018-05-07 RX ADMIN — BUDESONIDE AND FORMOTEROL FUMARATE DIHYDRATE 2 PUFF: 160; 4.5 AEROSOL RESPIRATORY (INHALATION) at 20:43

## 2018-05-07 RX ADMIN — FLUTICASONE FUROATE AND VILANTEROL TRIFENATATE 1 PUFF: 100; 25 POWDER RESPIRATORY (INHALATION) at 08:40

## 2018-05-07 RX ADMIN — SODIUM CHLORIDE 126 ML/HR: 900 INJECTION, SOLUTION INTRAVENOUS at 07:17

## 2018-05-07 RX ADMIN — IPRATROPIUM BROMIDE AND ALBUTEROL SULFATE 3 ML: .5; 3 SOLUTION RESPIRATORY (INHALATION) at 11:44

## 2018-05-07 RX ADMIN — ENOXAPARIN SODIUM 40 MG: 40 INJECTION SUBCUTANEOUS at 20:04

## 2018-05-07 RX ADMIN — IPRATROPIUM BROMIDE AND ALBUTEROL SULFATE 3 ML: .5; 3 SOLUTION RESPIRATORY (INHALATION) at 07:24

## 2018-05-07 RX ADMIN — SODIUM CHLORIDE 1000 ML: 900 INJECTION, SOLUTION INTRAVENOUS at 00:50

## 2018-05-07 RX ADMIN — FUROSEMIDE 20 MG: 20 TABLET ORAL at 16:20

## 2018-05-07 RX ADMIN — IPRATROPIUM BROMIDE AND ALBUTEROL SULFATE 3 ML: .5; 3 SOLUTION RESPIRATORY (INHALATION) at 15:30

## 2018-05-07 RX ADMIN — GUAIFENESIN 600 MG: 600 TABLET, EXTENDED RELEASE ORAL at 20:06

## 2018-05-07 RX ADMIN — PANTOPRAZOLE SODIUM 40 MG: 40 TABLET, DELAYED RELEASE ORAL at 08:38

## 2018-05-07 RX ADMIN — GUAIFENESIN 600 MG: 600 TABLET, EXTENDED RELEASE ORAL at 08:38

## 2018-05-07 RX ADMIN — Medication 10 ML: at 20:04

## 2018-05-07 RX ADMIN — ASPIRIN 81 MG 81 MG: 81 TABLET ORAL at 08:38

## 2018-05-07 RX ADMIN — AZITHROMYCIN MONOHYDRATE 500 MG: 500 INJECTION, POWDER, LYOPHILIZED, FOR SOLUTION INTRAVENOUS at 20:43

## 2018-05-07 RX ADMIN — SODIUM CHLORIDE 500 ML: 900 INJECTION, SOLUTION INTRAVENOUS at 01:58

## 2018-05-07 RX ADMIN — SODIUM CHLORIDE 1000 ML: 900 INJECTION, SOLUTION INTRAVENOUS at 03:15

## 2018-05-07 RX ADMIN — SODIUM CHLORIDE 126 ML/HR: 900 INJECTION, SOLUTION INTRAVENOUS at 02:01

## 2018-05-07 RX ADMIN — IPRATROPIUM BROMIDE AND ALBUTEROL SULFATE 3 ML: .5; 3 SOLUTION RESPIRATORY (INHALATION) at 22:27

## 2018-05-07 NOTE — ED NOTES
Patient unable to tolerate nasal cannula due to previous nasal injury. Placed patient on 3L/min NC, patient's O2 did not rise above 89%. Patient placed on 6L/min with aerosol mask and sats increased to low-mid 90s.

## 2018-05-07 NOTE — PROGRESS NOTES
Nutrition Assessment:    RECOMMENDATIONS:   Continue Cardiac diet  RD to add snacks and Magic Cup BID   Consider adding probiotic per pt request     DIETITIANS INTERVENTIONS/PLAN:   Continue diet as tolerated  Add PO supplements and snacks  Monitor appetite/PO intake    ASSESSMENT:     Meets Criteria for Acute Malnutrition   [x] Severe Malnutrition, as evidenced by:   [] Moderate muscle wasting, loss of subcutaneous fat   [x] Nutritional intake of <50% of recommended intake for >5 days   [x] Weight loss of >1-2% in 1 week, >5% in 1 month, >7.5% in 3 months, or >10% in 6 months   [] Moderate-severe edema   []Moderate Malnutrition, as evidenced by:   [] Mild muscle wasting, loss of subcutaneous fat   [] Nutritional intake <75% of recommended intake for >1 week   [] Weight loss of 1-2% in 1 week, 5% in 1 month, 7.5% in 3 months, or 10% in 6 months   [] Mild edema      Pt admitted with respiratory failure 2' COPD exacerbation. PMH: COPD, esophageal CA, HTN, GERD. Chart reviewed for MST. Pt reports poor appetite 1.5 months and 11lb wt loss. He states his appetite is better now that he is on steroids. He experiences diarrhea and what sounds like dumping syndrome with rich PO supplements like Ensure and Boost.  Pt open to trying Magic Cup instead. He states he tends to get hypoglycemic not long after having high calorie foods, will add snacks to help combat this and let him know the colleen also has alyssa crackers and juice should be need them (just let nursing know). Pt asking about probiotics as well, stating they helped him last time he had diarrhea on antibiotics. SUBJECTIVE/OBJECTIVE:   \"Ensure gives me diarrhea\"   Diet Order: Cardiac  % Eaten:  Patient Vitals for the past 72 hrs:   % Diet Eaten   05/07/18 0826 90 %     Pertinent Medications:zithromax, rocephin, lasix, solumedrol, protonix.     Chemistries:  Lab Results   Component Value Date/Time    Sodium 138 05/07/2018 06:16 AM    Potassium 4.1 05/07/2018 06:16 AM    Chloride 105 05/07/2018 06:16 AM    CO2 28 05/07/2018 06:16 AM    Anion gap 5 05/07/2018 06:16 AM    Glucose 151 (H) 05/07/2018 06:16 AM    BUN 15 05/07/2018 06:16 AM    Creatinine 0.73 05/07/2018 06:16 AM    BUN/Creatinine ratio 21 (H) 05/07/2018 06:16 AM    GFR est AA >60 05/07/2018 06:16 AM    GFR est non-AA >60 05/07/2018 06:16 AM    Calcium 7.9 (L) 05/07/2018 06:16 AM    AST (SGOT) 16 05/06/2018 06:00 PM    Alk. phosphatase 91 05/06/2018 06:00 PM    Protein, total 7.4 05/06/2018 06:00 PM    Albumin 3.2 (L) 05/06/2018 06:00 PM    Globulin 4.2 (H) 05/06/2018 06:00 PM    A-G Ratio 0.8 (L) 05/06/2018 06:00 PM    ALT (SGPT) 21 05/06/2018 06:00 PM      Anthropometrics: Height: 5' 11\" (180.3 cm) Weight: 59.7 kg (131 lb 9.8 oz)  [x]bed scale/standing (5/6)   []stated   []unknown    IBW (%IBW):   ( ) UBW (%UBW): 64.4 kg (142 lb) (92.69 %)    BMI: Body mass index is 18.36 kg/(m^2). This BMI is indicative of:  [x]Underweight   []Normal   []Overweight   [] Obesity   [] Extreme Obesity (BMI>40)  Estimated Nutrition Needs (Based on): 1858 Kcals/day (MSJ 1429 x 1.3) , 72 g (1.2gPro/kg) Protein  Carbohydrate: At Least 130 g/day  Fluids: 1900 mL/day    Last BM: PTA   []Active     []Hyperactive  []Hypoactive       [] Absent   BS  Skin:    [x] Intact   [] Incision  [] Breakdown   [] DTI   [] Tears/Excoriation/Abrasion  []Edema [] Other:    Wt Readings from Last 30 Encounters:   05/07/18 59.7 kg (131 lb 9.8 oz)   01/13/17 65.8 kg (145 lb 1 oz)   01/11/17 65.1 kg (143 lb 9 oz)   05/29/15 63.6 kg (140 lb 5 oz)   09/25/13 63 kg (139 lb)   07/31/13 64.5 kg (142 lb 4 oz)   04/30/13 65.4 kg (144 lb 4 oz)   04/26/13 65.7 kg (144 lb 14.4 oz)   04/23/13 68.6 kg (151 lb 5 oz)   03/04/13 72.2 kg (159 lb 4 oz)   11/06/12 84.1 kg (185 lb 8 oz)   09/24/12 89.1 kg (196 lb 8 oz)   08/28/12 87.6 kg (193 lb 2 oz)   07/24/12 86.2 kg (190 lb)   07/05/12 91.4 kg (201 lb 7 oz)   06/20/12 90.3 kg (199 lb 1 oz)   05/30/12 98.9 kg (218 lb)   05/07/12 101.2 kg (223 lb)   04/10/12 116.6 kg (257 lb 1.6 oz)   03/23/12 115.2 kg (254 lb 1 oz)   02/10/12 117.9 kg (260 lb)   02/08/12 117.9 kg (260 lb)   01/23/12 117.9 kg (260 lb)   12/30/11 121.3 kg (267 lb 5 oz)      NUTRITION DIAGNOSES:   Problem:  Inadequate protein-energy intake      Etiology: related to poor appetite 2' illness     Signs/Symptoms: as evidenced by recent wt loss of 11lb. NUTRITION INTERVENTIONS:  Meals/Snacks: General/healthful diet   Supplements: Commercial supplement              GOAL:   Pt will consume >75% of meals/supplements in 2-4 days.      Cultural, Alevism, or Ethnic Dietary Needs: None     LEARNING NEEDS (Diet, Food/Nutrient-Drug Interaction):    [x] None Identified   [] Identified and Education Provided/Documented   [] Identified and Pt declined/was not appropriate      [x] Interdisciplinary Care Plan Reviewed/Documented    [x] Participated in Discharge Planning: High calorie, high protein diet    [] Interdisciplinary Rounds     NUTRITION RISK:    [] High              [x] Moderate           []  Low  []  Minimal/Uncompromised    PT SEEN FOR:    []  MD Consult: []Calorie Count      []Diabetic Diet Education        []Diet Education     []Electrolyte Management     []General Nutrition Management and Supplements     []Management of Tube Feeding     []TPN Recommendations    [x]  RN Referral:  [x]MST score >=2     []Enteral/Parenteral Nutrition PTA     []Pregnant: Gestational DM or Multigestation   []  Low BMI  []  Re-Screen   []  LOS   []  NPO/clears x 5 days   []  New TF/TPN    Yefri Araya RD, [address]    Pager [phone]  Weekend Pager [phone]

## 2018-05-07 NOTE — PROGRESS NOTES
ADULT PROTOCOL: JET AEROSOL ASSESSMENT    Patient  Giselle Thomas     63 y.o.   male     5/7/2018  5:18 AM    Breath Sounds Pre Procedure:                                 Left Breath Sounds: Diminished    Breath Sounds Post Procedure: Right Breath Sounds: Expiratory wheezing                                 Left Breath Sounds: Expiratory wheezing    Heart Rate: Pre procedure Pulse: 80           Post procedure Pulse: 67    Resp Rate: Pre procedure Respirations: 20           Post procedure Respirations: 20      Cough: Pre procedure Cough: Non-productive, Congested             Suctioned: NO            Oxygen: O2 Device: Ventimask   FiO2 (%) 31 VM     Changed: NO    SpO2: Pre procedure SpO2: 93 %   with oxygen              Post procedure SpO2: 93 %  with oxygen    Nebulizer Therapy: Current medications Aerosolized Medications: DuoNeb      Changed: NO    Smoking Historyx[de-identified] Current Smoker    Problem List:   Patient Active Problem List   Diagnosis Code    Epstein's esophagus K22.70    Esophageal cancer ([facility] C15.9    Dysphagia R13.10    Esophageal obstruction K22.2    Weight loss R63.4    GI bleed K92.2    COPD (chronic obstructive pulmonary disease) (HCC) J44.9    HTN (hypertension) I10    [facility] J96.90       Respiratory Therapist: Rodrigo Calderon, RT

## 2018-05-07 NOTE — PROGRESS NOTES
Patient arrived to unit from ED at 0259. Patient reports feeling \"much better\" than he did when first going to ED. Patient on ventimask on 6L with FiO2 of 24 with an oxygen saturation of 93%. All other vitals stable. Oriented patient to room and unit. Call bell within reach. Will continue with admission documentation.      Victor Manuel Leal RN

## 2018-05-07 NOTE — PROGRESS NOTES
General Surgery End of Shift Nursing Note    Bedside shift change report given to Nicolas Frey (oncoming nurse) by Jose Luis Carmona RN (offgoing nurse). Report included the following information SBAR, ED Summary, Intake/Output, Recent Results and Cardiac Rhythm NSR. Shift worked:   7p-7A   Summary of shift:    Patient admitted to ED for possible sepsis and respiratory distress- placed on venti mask and oxygen saturations maintained in the low to mid 90s. Patient given a total of 2.5 L fluid bolus   Issues for physician to address:   New admission     Number times ambulated in hallway past shift: 0    Number of times OOB to chair past shift: 0-admitted at 0300    Pain Management:  Current medication: Norco if needed but does not typically use pain medications  Patient states pain is manageable on current pain medication: YES    GI:    Current diet:  DIET CARDIAC Regular    Tolerating current diet: YES  Passing flatus: YES  Last Bowel Movement: yesterday at home      Respiratory:    Incentive Spirometer at bedside: YES  Patient instructed on use: YES    Patient Safety:    Falls Score: 1  Bed Alarm On? No  Sitter?  No    Gustavo Vasquez

## 2018-05-07 NOTE — ED NOTES
Spoke with Dr. Alejandro Carlos concerning patient's BP. Patient's BP responded well after 1 liter IV fluids and, after further investigation of chart, realized patient had not received IV fluids initially as part of sepsis protocol. Collaboration with Hospitalist resulted in order for lactic redraw and another 500ml bolus. Thoracic Consult  Assessment/Plan:    Hiatal hernia with GERD without esophagitis  Ms Jn Alcocer has a large symptomatic hiatal hernia  We discussed options that included further management with proton pump inhibitors as well as a surgical approach  I described for her a laparoscopic paraesophageal hernia repair with, fundoplication, and possible esophageal lengthening procedure  I emphasized that surgical repair would be done to improve her quality of life as I think it would be low risk that this hernia would become strangulated  I, personally, described the risks and benefits associated with the procedure and she would like to proceed  She will undergo routine preoperative testing and pick a date with our surgical scheduler  Diagnoses and all orders for this visit:    Hiatal hernia with GERD without esophagitis  -     Ambulatory referral to Thoracic Surgery  -     Type and screen; Future  -     Basic metabolic panel; Future  -     APTT; Future  -     CBC and Platelet; Future  -     Protime-INR; Future  -     EKG 12 lead; Future  -     Case request operating room: REPAIR HERNIA PARAESOPHAGEAL  LAPAROSCOPIC, ESOPHAGOGASTRODUODENOSCOPY (EGD); Standing  -     Case request operating room: REPAIR HERNIA PARAESOPHAGEAL  LAPAROSCOPIC, ESOPHAGOGASTRODUODENOSCOPY (EGD)    Other orders  -     Diet NPO; Sips with meds; Standing  -     Height and weight upon arrival; Standing  -     Void on call to OR; Standing  -     Insert peripheral IV; Standing  -     Place sequential compression device; Standing  -     Shower/scrub; Standing  -     sodium chloride 0 9 % infusion;  Infuse 50 mL/hr into a venous catheter continuous   -     ceFAZolin (ANCEF) IVPB (premix) 2,000 mg; Infuse 2,000 mg into a venous catheter once   -     metroNIDAZOLE (FLAGYL) IVPB (premix) 500 mg; Infuse 100 mL (500 mg total) into a venous catheter once           Thoracic History   Diagnosis: Hiatal hernia   Procedures/Surgeries:    Pathology:    Adjuvant Therapy:       Subjective:    Patient ID: Eulogio Blair is a 78 y o  female  Ms Saeed Kelley is a 61-year-old referred by Dr Sarah Velázquez for evaluation for a hiatal hernia repair  She has had a long-standing hiatal hernia and gastroesophageal reflux disease  In July of 2017 she was anticoagulated with aspirin and experienced an upper GI bleed associated with Jim's ulcers  She has had no further bleeding since stopping the aspirin  She has some dysphagia in the cervical region when eating bread but does not feel like food gets stuck further down in her chest   She does have frequent regurgitant symptoms with initially brackish ascitic fluid but now that she is on proton pump inhibitors it is less ascitic fluid  She has undergone a barium swallow in October of 2017 confirming a large hiatal hernia with a large portion of her stomach in her chest   She appeared to have normal esophageal motility and no reflux was observed on the swallow  She also underwent esophageal manometry in January of 2018 and this demonstrated normal esophageal contractility and bolus transit  Her LES relaxed normally and her DCI was 1006  She has had no major upper abdominal surgery other than a laparoscopic cholecystectomy  She denies weight loss, hematemesis, cough or admissions for pneumonia  She does have irritable bowel syndrome that is constipation predominant  This is controlled with medication          The following portions of the patient's history were reviewed and updated as appropriate: allergies, current medications, past family history, past medical history, past social history, past surgical history and problem list     Past Medical History:   Diagnosis Date    Anemia     Hx secondary to bleeding ulcer    Asthma     seasonal    Disease of thyroid gland     GERD (gastroesophageal reflux disease)     Goiter, nodular     History of transfusion     x1 after bleeding ulcer    Hyperlipidemia     Hypertension     Irritable bowel syndrome     Kidney stone     RA (rheumatoid arthritis) ([facility] [address] )     Seasonal allergies       Past Surgical History:   Procedure Laterality Date    CARDIAC CATHETERIZATION      x2 secondary to exertional chest pain, due to lung issues, possible mild COPD    CHOLECYSTECTOMY  2015    lap    ESOPHAGOGASTRODUODENOSCOPY N/A 1/23/2018    Procedure: ESOPHAGOGASTRODUODENOSCOPY (EGD); Surgeon: Ramona Carroll MD;  Location: [facility]; Service: Gastroenterology    HYSTERECTOMY      partial    SKIN BIOPSY Right     lump benign    THYROIDECTOMY, PARTIAL      TONSILLECTOMY        Family History   Problem Relation Age of Onset    Alzheimer's disease Mother     Cancer Mother      skin     Thyroid disease Mother     Cancer Father      prostate    Stroke Father     Hypertension Father     Thyroid disease Sister     Hyperlipidemia Brother     No Known Problems Son     No Known Problems Son       Social History     Social History    Marital status: /Civil Union     Spouse name: N/A    Number of children: N/A    Years of education: N/A     Occupational History    Not on file  Social History Main Topics    Smoking status: Never Smoker    Smokeless tobacco: Never Used    Alcohol use No    Drug use: No    Sexual activity: Not on file     Other Topics Concern    Not on file     Social History Narrative    No narrative on file      Review of Systems   Constitutional: Negative for activity change, appetite change, chills, fever and unexpected weight change  HENT: Negative for voice change  Respiratory: Negative for cough, shortness of breath and wheezing  Cardiovascular: Positive for chest pain (Associated with eating) and leg swelling (Stable)  Negative for palpitations  Gastrointestinal: Positive for abdominal distention and constipation (Better recently)  Negative for abdominal pain, diarrhea, nausea and vomiting  Musculoskeletal: Negative for arthralgias and myalgias  Skin: Negative for rash  Neurological: Negative for dizziness, seizures, weakness, numbness and headaches  Hematological: Negative for adenopathy  Psychiatric/Behavioral: Negative for confusion  Objective:   Physical Exam   Constitutional: She is oriented to person, place, and time  She appears well-developed and well-nourished  HENT:   Head: Normocephalic and atraumatic  Mouth/Throat: No oropharyngeal exudate  Eyes: Conjunctivae and EOM are normal  Pupils are equal, round, and reactive to light  No scleral icterus  Neck: Normal range of motion  Neck supple  No tracheal deviation present  No thyromegaly present  Cardiovascular: Normal rate, regular rhythm and normal heart sounds  Pulmonary/Chest: Effort normal and breath sounds normal  No respiratory distress  She has no wheezes  Abdominal: Soft  She exhibits no distension  There is no tenderness  Musculoskeletal: Normal range of motion  Lymphadenopathy:     She has no cervical adenopathy  Neurological: She is alert and oriented to person, place, and time  Skin: Skin is warm and dry  Psychiatric: She has a normal mood and affect  Her behavior is normal  Judgment and thought content normal    Vitals reviewed  BP (!) 173/94 (BP Location: Right arm, Patient Position: Sitting, Cuff Size: Standard)   Pulse 58   Temp 97 5 °F (36 4 °C) (Tympanic)   Ht 5' 5" (1 651 m)   Wt 93 4 kg (205 lb 12 8 oz)   SpO2 99%   BMI 34 25 kg/m²      Upper GI on October 22, 2017 is personally reviewed  FINDINGS:    There  is  a  large  hiatal  hernia  present  with  a  good  portion  of  the  stomach  lying  within  the  chest     Esophageal  motility  is  normal  and  emptying  of  contrast  from  the  esophagus  is  prompt   There  is  no  mucosal  mass,  ulceration  or  fold  thickening  identified       Surgical  clips  on  the  right  side  of  the  neck  related  to  right  thyroidectomy      The  stomach  is  unremarkable  in  size     The  gastric  mucosa  is  normal     No  penetrating  ulcers  or  masses  Contrast  empties  promptly  into  the  duodenum     The  duodenum  is  normal  in  caliber     The  ligament  of  Treitz/duodenojejunal  junction  lies  in  a  normal  position  Gastroesophageal  reflux  was  not  observed         Large  hiatal  hernia  as  described  above             IMPRESSION:    Large  hiatal  hernia  with  a  good  portion  of  the  upper  stomach  lying  within  the  lower  chest     Otherwise  unremarkable

## 2018-05-07 NOTE — ED NOTES
TRANSFER - OUT REPORT:    Verbal report given to [facility] JAYLENE OZUNA (name) on Lucia Childs  being transferred to Gen Surg (unit) for routine progression of care       Report consisted of patients Situation, Background, Assessment and   Recommendations(SBAR). Information from the following report(s) SBAR, Kardex, ED Summary, MAR, Accordion, Recent Results, Med Rec Status and Cardiac Rhythm NSR was reviewed with the receiving nurse. Lines:   Peripheral IV 05/06/18 Right Arm (Active)   Site Assessment Clean, dry, & intact 5/6/2018  6:25 PM   Phlebitis Assessment 0 5/6/2018  6:25 PM   Dressing Status Clean, dry, & intact 5/6/2018  6:25 PM   Hub Color/Line Status Pink 5/6/2018  6:25 PM        Opportunity for questions and clarification was provided.       Patient transported with:   O2 @ 6 liters

## 2018-05-07 NOTE — PROGRESS NOTES
Bedside and Verbal shift change report given to Rajwinder Paris (oncoming nurse) by Margret (offgoing nurse). Report included the following information SBAR, Kardex, Intake/Output, MAR and Recent Results.

## 2018-05-07 NOTE — PROGRESS NOTES
Pt is a 64 y/o  male who was admitted with a diagnosis of Respiratory Failure. CM met with pt re: discharge planning. CM introduced self, role. Pt verbalized understanding. Pt verified demographic information on chart. Pt's PCP is Dr. Cash Fontanez who the pt last saw on Friday, 5/4. Pt lives with his wife and stepdaughter in a 1 story home with 3 steps to enter. Pt is independent in ADL/IADL needs. Pt has not had prior home health or SNF experienced prior to admission. Pt does drive, but has supportive family to assist with transportation as needed, to include discharge. Pt does not have access to DME in the home. Pt stated he has no further questions or needs at this time. CM anticipates d/c in 4 days based on GLOS. CM to continue to remain available for support, discharge planning as needed. Care Management Interventions  PCP Verified by CM: Yes  Last Visit to PCP: 05/04/18  Mode of Transport at Discharge: Other (see comment) (Pt wife to transport at discharge)  Transition of Care Consult (CM Consult): Discharge Planning (Pt is independent in ADL/IADL needs at baseline. Pt has not utilized HH or SNF)  Discharge Durable Medical Equipment: No (Pt does not have DME in the home)  Physical Therapy Consult: No  Occupational Therapy Consult: No  Speech Therapy Consult: No  Current Support Network: Lives with Spouse, Own Home (Pt lives with wife and step daughter in a 1 story home with 3 steps to enter)  Confirm Follow Up Transport: Self (Pt does drive, but has supportive family to assist with transportation as needed)  Plan discussed with Pt/Family/Caregiver: Yes  Discharge Location  Discharge Placement:  (TBD)    Reason for Admission:   Respiratory Failure                  RRAT Score:     14             Do you (patient/family) have any concerns for transition/discharge?      None identified, pt has supportive family at home              Plan for utilizing home health:     TBD, possible HH PT/OT    Likelihood of readmission?    Moderate            Transition of Care Plan:    Continue to follow with PCP                      ULYSSES Gardner  [address]  [phone]

## 2018-05-07 NOTE — PROGRESS NOTES
Hospitalist Addendum/Cross Cover  -called to see patient with drop in bp. Noted to have been admitted for pneumonia.  Review reveals no IVF given in ED.   -normal mentation in ED  -on VM only due to nasal trauma as a boy and cannot tolerate nasal canula (suspect this is making SOFA score higher)      Documenting Sepsis +/- Septic Shock (on admission values used)    TERMS OF SEPSIS  YES NO     Documented Source of suspected or possible infection y   <--l                     <---l       l                          l            l                          l   Temperature >38.3 (100.9)OR <36 (96.8)  n <--l      l                          l   Heart Rate >90 y       l       l      l-----SEPSIS       l       (Source + 2 SIRS)   l   Respiratory Rate >20  n      l----SIRS Criteria       l                          l   White Blood Count >12,000 or <4,000  n      l     (TWO required)      l                          l   Bandemia >10% y  <--l <--l                           l                                       l------SEVERE SEPSIS                                 (Sepsis + end organ Dysfunction)   Systolic Blood Pressure <31 OR decrease in systolic blood pressure >37 from normal y    <--l       l                                 l                                  l                                  l   Mean Arterial Pressure <65  n      l                                 l   Creatinine >2 OR urine output <0.5 ml/kg/hr for 2 hours  n      l----End Organ                Damage          (defined by any ONE of           these present)                                 l                                  l                                  l   Bilirubin >2mg/dl  n      l                                 l   Platelets <462,683  n      l                                 l   INR > 1.5 OR aPTT > 60  n      l                                 l   Lactate >2 (severe sepsis) y  <--l                            <--l          Lactate >2mmol/L + pressors + SOFA score > = 2. (sepsis must be present OR have hypotension in the first 60min after completion of 30cc/kg IVF bolus)  n <----------------------------- Septic Shock       SOFA SCORE: 4    Severe sepsis, POA with hypotension due to pneumonia causing AHRF  -will increase IVF to 126cc/h  -give additional IVF given drop in bp to 19'P systolic, seems to be fluid sensitive so no need for pressors at this time  -SOFA score is elevated >2  -repeat lactic pending  -cap refill normal at this time  -if lactic >2 will repeat IVF bolus  Jose D Hernández MD      ADDENDUM  -repeat lactic 2.3 - will give additional bolus of fluid and continue rate of IVF at 126cc/h  Jose D Hernández MD

## 2018-05-07 NOTE — PROGRESS NOTES
Critical platelet result of 25 received from Ac in lab. MD was NOT notified due to a platelet parameter of 10 due to his MDS. Will monitor for s/s of bleeding.      Nerissa Hairston RN

## 2018-05-07 NOTE — INTERDISCIPLINARY ROUNDS
.Interdisciplinary team rounds were held 5/7/2018 with the following team members:Care Management, Nursing and Pharmacy and the patient. Plan of care discussed. See clinical pathway and/or care plan for interventions and desired outcomes.

## 2018-05-07 NOTE — PROGRESS NOTES
Pt removed venti mask to go to bathroom, O2 was 77% upon return. Placed back on Venti mask and returned to 90% quickly.

## 2018-05-07 NOTE — H&P
Hospitalist Admission Note    NAME: Tami Rdz   :  1955   MRN:  [id number]     Date/Time:  2018 10:06 PM    Patient PCP: Choco Jane MD  ______________________________________________________________________  Given the patient's current clinical presentation, I have a high level of concern for decompensation if discharged from the emergency department. Complex decision making was performed, which includes reviewing the patient's available past medical records, laboratory results, and x-ray films. My assessment of this patient's clinical condition and my plan of care is as follows. Assessment / Plan:  Acute hypoxic respiratory failure  Pneumonia  Acute COPD exacerbation  Lactic acidosis  -CXR showed changes of emphysema, airspace disease in perihilar regions R>L and lung bases  -obtain Bcx  -start rocephin/azithromycin, IV steroids, mucinex  -nebs, advair  -start IVF, trend lactate  -O2 suppl, wean as tolerated (pt does not use home O2)    Tobacco use  -cont' nicotine patch    Hx nonischemic cardiomyopathy  -cont' ASA  -follows with Dr Farzana Driver' home valium prn    Hx of esophageal ca    Code Status: Full  Surrogate Decision Maker: pt's wife  DVT Prophylaxis: lovenox  GI Prophylaxis: not indicated  Baseline: independent. Not on home O2      Subjective:   CHIEF COMPLAINT: sob    HISTORY OF PRESENT ILLNESS:     Jensen Reyes is a 63 y.o.  male with PMHx significant for hx of esophageal ca, nonischemic cardiomyopathy, COPD, tobacco use, present to the ED c/o worsening of SOB for over 3 weeks. Pt reports recently treated for the flu 1 month ago and symptoms lingers with worsening of in the last 3 days. Pt was seen by his PCP 2 days ago and was told to return to the ER if symptoms does not improve with medications prescribed. On arrival to the ER, pt was hypoxic with SpO2 81% on RA, improved on 4LNC.     Vitals: T100.3, P112, /67, BP 81% on RA  Labs: trop 0.04, probnp 170, lactate 2.4  CXR showed changes of emphysema, airspace disease in perihilar regions R>L and lung bases    We were asked to admit for work up and evaluation of the above problems.      Past Medical History:   Diagnosis Date    Arthritis     osteoarthritis    Asthma     [facility]     esophagus    Cancer of esophagus ([facility] 4/2012    adenocarcinoma; Dr. Alferd Felty Chronic pain     lower back, has crushed vertebrae    COPD     Essential hypertension     GERD (gastroesophageal reflux disease)     Hypertension     not on medication    Nonischemic cardiomyopathy ([facility]     2003 EF 35-50%; negative cath, 2/2012 EF 55%; Dr. Lida Kramer Other ill-defined conditions(799.89)     Epstein's esophagus    Other ill-defined conditions(799.89)     esophageal nodule - pre-cancerous    Other ill-defined conditions(799.89)     pneumonia multiple times    Psychiatric disorder     depression    Seizures ([facility]     caused with allergic reaction from Aleve    Thyroid disease     Unspecified sleep apnea     c-pap-doesnt wear now/apnea resolved after surgery/lost weight and no sleep apnea        Past Surgical History:   Procedure Laterality Date    COLONOSCOPY N/A 1/11/2017    COLONOSCOPY performed by T[facility] Gregory [facility] at [facility] ENDOSCOPY    COLONOSCOPY N/A 1/13/2017    COLONOSCOPY er 26 performed by Tania Gregory MD at [address] [place] [address], BLEEDING  1/13/2017         COLONOSCOPY,REMV LESN,SNARE  1/11/2017         COLORECTAL SCRN; HI RISK IND  1/11/2017         HX CHOLECYSTECTOMY      HX GI  4/2/12    ESOPHAGOGASTRECTOMY    HX HEART CATHETERIZATION      HX OTHER SURGICAL      surgery to remove nodule and barrets esophagus    IL COLSC FLX W/RMVL OF TUMOR POLYP LESION SNARE TQ  12/30/2011    colon x 3     IL EGD BALLOON DILATION ESOPHAGUS <30 MM DIAM  6/20/2012         IL EGD BALLOON DILATION ESOPHAGUS <30 MM DIAM  7/5/2012  AR EGD BALLOON DILATION ESOPHAGUS <30 MM DIAM  7/24/2012         AR EGD BALLOON DILATION ESOPHAGUS <30 MM DIAM  8/28/2012         AR EGD BALLOON DILATION ESOPHAGUS <30 MM DIAM  9/24/2012         AR EGD BALLOON DILATION ESOPHAGUS <30 MM DIAM  11/6/2012         AR EGD BALLOON DILATION ESOPHAGUS <30 MM DIAM  3/4/2013         AR EGD BALLOON DILATION ESOPHAGUS <30 MM DIAM  4/26/2013         AR EGD BALLOON DILATION ESOPHAGUS <30 MM DIAM  7/31/2013         AR EGD BALLOON DILATION ESOPHAGUS <30 MM DIAM  9/25/2013         AR EGD TRANSORAL BIOPSY SINGLE/MULTIPLE  12/30/2011         AR EGD TRANSORAL BIOPSY SINGLE/MULTIPLE  8/28/2012         AR EGD TRANSORAL BIOPSY SINGLE/MULTIPLE  11/6/2012         AR EGD TRANSORAL BIOPSY SINGLE/MULTIPLE  3/4/2013         AR EGD TRANSORAL BIOPSY SINGLE/MULTIPLE  4/30/2013         UPPER GI ENDOSCOPY,BALL DIL,30MM  5/29/2015         UPPER GI ENDOSCOPY,BIOPSY  5/29/2015         UPPER GI ENDOSCOPY,BIOPSY  1/11/2017         UPPER GI ENDOSCOPY,STENT PLACEMENT  4/23/2013            Social History   Substance Use Topics    Smoking status: Current Every Day Smoker     Packs/day: 1.00     Years: 40.00     Types: Cigarettes    Smokeless tobacco: Never Used      Comment: cigarettes 2-3 daily and using e cig    Alcohol use No        Family History   Problem Relation Age of Onset    COPD Mother    [place] Asthma Mother     Colon Cancer Mother     Other Mother      food allergies/Steffany's disease/Epstein's esophagus    Heart Attack Mother     Heart Disease Mother     Cancer Mother      colon    Other Father      abestos/food allergies/degernative disc disease    Colon Cancer Maternal Grandmother      Allergies   Allergen Reactions    Aleve [Naproxen Sodium] Anaphylaxis     Hives, passed out, had seizure    Sulfa (Sulfonamide Antibiotics) Hives        Prior to Admission medications    Medication Sig Start Date End Date Taking?  Authorizing Provider   tiotropium [facility] WITH HANDIHALER) 18 mcg inhalation capsule Take 1 Cap by inhalation daily. Yes Risa Evans MD   budesonide-formoterol (SYMBICORT) 160-4.5 mcg/actuation HFAA Take 2 Puffs by inhalation two (2) times a day. Yes Risa Evans MD   omeprazole (PRILOSEC) 20 mg capsule Take 20 mg by mouth daily. Yes Historical Provider   aspirin 81 mg tablet Take 81 mg by mouth daily. Yes Historical Provider   albuterol (PROVENTIL, VENTOLIN) 90 mcg/Actuation inhaler Take 2 Puffs by inhalation every six (6) hours as needed. Yes Historical Provider   diazepam (VALIUM) 10 mg tablet Take 10 mg by mouth nightly. Yes Historical Provider   umeclidinium-vilanterol (ANORO ELLIPTA) 62.5-25 mcg/actuation inhaler Take 1 Puff by inhalation daily. Historical Provider   HYDROcodone-acetaminophen (NORCO) 5-325 mg per tablet Take 1 Tab by mouth daily as needed. Indications: PAIN    Historical Provider       REVIEW OF SYSTEMS:     I am not able to complete the review of systems because:    The patient is intubated and sedated    The patient has altered mental status due to his acute medical problems    The patient has baseline aphasia from prior stroke(s)    The patient has baseline dementia and is not reliable historian    The patient is in acute medical distress and unable to provide information           Total of 12 systems reviewed as follows:       POSITIVE= BOLD text  Negative = text not BOLD  General:  fever, chills, sweats, generalized weakness, weight loss/gain,      loss of appetite   Eyes:    blurred vision, eye pain, loss of vision, double vision  ENT:    rhinorrhea, pharyngitis   Respiratory:   cough, sputum production, SOB, SEXTON, wheezing, pleuritic pain   Cardiology:   chest pain, palpitations, orthopnea, PND, edema, syncope   Gastrointestinal:  abdominal pain , N/V, diarrhea, dysphagia, constipation, bleeding   Genitourinary:  frequency, urgency, dysuria, hematuria, incontinence   Muskuloskeletal :  arthralgia, myalgia, back pain  Hematology:  easy bruising, nose or gum bleeding, lymphadenopathy   Dermatological: rash, ulceration, pruritis, color change / jaundice  Endocrine:   hot flashes or polydipsia   Neurological:  headache, dizziness, confusion, focal weakness, paresthesia,     Speech difficulties, memory loss, gait difficulty  Psychological: Feelings of anxiety, depression, agitation    Objective:   VITALS:    Visit Vitals    /73    Pulse (!) 112    Temp 100.3 °F (37.9 °C)    Resp 18    Ht 5' 11\" (1.803 m)    Wt 59.7 kg (131 lb 9.8 oz)    SpO2 95%    BMI 18.36 kg/m2       PHYSICAL EXAM:    General:    Alert, cooperative, no distress, appears stated age. HEENT: Atraumatic, anicteric sclerae, pink conjunctivae     No oral ulcers, mucosa moist, throat clear  Neck:  Supple, symmetrical,  thyroid: non tender  Lungs:   CTA b/l.  mild wheezing. No rales. Chest wall:  No tenderness. No accessory muscle use. Heart:   Tachycardic, No  Murmur. No edema  Abdomen:   Soft, NT. ND  BS+  Extremities: No cyanosis. No clubbing,      Skin turgor normal, Radial dial pulse 2+  Skin:     Not pale. Not Jaundiced  No rashes   Psych:  Not depressed. Not anxious or agitated. Neurologic: No facial asymmetry. No aphasia or slurred speech. Symmetrical strength, Sensation grossly intact.  AAOx4.     _______________________________________________________________________  Care Plan discussed with:    Comments   Patient x    Family      RN x    Care Manager                    Consultant:  jennifer ED physician   _______________________________________________________________________  Expected  Disposition:   Home with Family    HH/PT/OT/RN x   SNF/LTC    ESSENCE    ________________________________________________________________________  TOTAL TIME:  72 Minutes    Critical Care Provided     Minutes non procedure based      Comments    x Reviewed previous records   >50% of visit spent in counseling and coordination of care x Discussion with patient and/or family and questions answered       ________________________________________________________________________  Signed: Myke Wilson MD    Procedures: see electronic medical records for all procedures/Xrays and details which were not copied into this note but were reviewed prior to creation of Plan. LAB DATA REVIEWED:    Recent Results (from the past 24 hour(s))   EKG, 12 LEAD, INITIAL    Collection Time: 05/06/18  5:53 PM   Result Value Ref Range    Ventricular Rate 106 BPM    Atrial Rate 106 BPM    P-R Interval 126 ms    QRS Duration 120 ms    Q-T Interval 344 ms    QTC Calculation (Bezet) 456 ms    Calculated P Axis 70 degrees    Calculated R Axis -75 degrees    Calculated T Axis 36 degrees    Diagnosis       Sinus tachycardia  Left axis deviation  Right bundle branch block  Abnormal ECG  When compared with ECG of 25-APR-2013 16:46,  Vent. rate has increased BY  55 BPM  QRS axis shifted left     CBC WITH AUTOMATED DIFF    Collection Time: 05/06/18  6:00 PM   Result Value Ref Range    WBC 7.9 4.1 - 11.1 K/uL    RBC 4.34 4.10 - 5.70 M/uL    HGB 13.1 12.1 - 17.0 g/dL    HCT 40.0 36.6 - 50.3 %    MCV 92.2 80.0 - 99.0 FL    MCH 30.2 26.0 - 34.0 PG    MCHC 32.8 30.0 - 36.5 g/dL    RDW 14.5 11.5 - 14.5 %    PLATELET 338 580 - 334 K/uL    MPV 9.3 8.9 - 12.9 FL    NRBC 0.0 0  WBC    ABSOLUTE NRBC 0.00 0.00 - 0.01 K/uL    NEUTROPHILS 66 32 - 75 %    BAND NEUTROPHILS 13 %    LYMPHOCYTES 13 12 - 49 %    MONOCYTES 6 5 - 13 %    EOSINOPHILS 2 0 - 7 %    BASOPHILS 0 0 - 1 %    IMMATURE GRANULOCYTES 0 0.0 - 0.5 %    ABS. NEUTROPHILS 6.2 1.8 - 8.0 K/UL    ABS. LYMPHOCYTES 1.0 0.8 - 3.5 K/UL    ABS. MONOCYTES 0.5 0.0 - 1.0 K/UL    ABS. EOSINOPHILS 0.2 0.0 - 0.4 K/UL    ABS. BASOPHILS 0.0 0.0 - 0.1 K/UL    ABS. IMM.  GRANS. 0.0 0.00 - 0.04 K/UL    DF MANUAL      RBC COMMENTS SCHISTOCYTES  PRESENT       METABOLIC PANEL, COMPREHENSIVE    Collection Time: 05/06/18  6:00 PM   Result Value Ref Range    Sodium 136 136 - 145 mmol/L    Potassium 3.7 3.5 - 5.1 mmol/L    Chloride 98 97 - 108 mmol/L    CO2 34 (H) 21 - 32 mmol/L    Anion gap 4 (L) 5 - 15 mmol/L    Glucose 156 (H) 65 - 100 mg/dL    BUN 14 6 - 20 MG/DL    Creatinine 0.93 0.70 - 1.30 MG/DL    BUN/Creatinine ratio 15 12 - 20      GFR est AA >60 >60 ml/min/1.73m2    GFR est non-AA >60 >60 ml/min/1.73m2    Calcium 8.5 8.5 - 10.1 MG/DL    Bilirubin, total 0.4 0.2 - 1.0 MG/DL    ALT (SGPT) 21 12 - 78 U/L    AST (SGOT) 16 15 - 37 U/L    Alk.  phosphatase 91 45 - 117 U/L    Protein, total 7.4 6.4 - 8.2 g/dL    Albumin 3.2 (L) 3.5 - 5.0 g/dL    Globulin 4.2 (H) 2.0 - 4.0 g/dL    A-G Ratio 0.8 (L) 1.1 - 2.2     NT-PRO BNP    Collection Time: 05/06/18  6:00 PM   Result Value Ref Range    NT pro- (H) 0 - 125 PG/ML   TROPONIN I    Collection Time: 05/06/18  6:00 PM   Result Value Ref Range    Troponin-I, Qt. <0.04 <0.05 ng/mL   CK W/ REFLX CKMB    Collection Time: 05/06/18  6:00 PM   Result Value Ref Range     39 - 308 U/L   LACTIC ACID    Collection Time: 05/06/18  6:00 PM   Result Value Ref Range    Lactic acid 2.3 (HH) 0.4 - 2.0 MMOL/L

## 2018-05-07 NOTE — PROGRESS NOTES
Hospitalist Progress Note    NAME: Mary Anne Morning   :  1955   MRN:  [id number]       Assessment / Plan:  Acute hypoxic respiratory failure: not on Home O2, c/w supplemental O2, wean down as tolerated. On ventimask due to be a mouth breather  Pneumonia: c/w CTX/Z-max, bronchodilators, mucolytics. Acute COPD exacerbation: as above, with steroids as  Well. Can use Spiriva and Symbicort  Lactic acidosis: so far resolved, monitor. Tobacco use cont' nicotine patch  Hx nonischemic cardiomyopathy cont' ASA follows with Dr Barry Hutchinson cont' home valium prn  Hx of esophageal ca: so far stable. Malnutrition: counseled, BMI 18.3  Surrogate Decision Maker: pt's wife  Baseline: independent. Not on home O2  Code status: Full  Prophylaxis: Lovenox  Recommended Disposition:  PT, OT, RN     Subjective:     Chief Complaint / Reason for Physician Visit  \"I feel better now\". Discussed with RN events overnight. Review of Systems:  Symptom Y/N Comments  Symptom Y/N Comments   Fever/Chills    Chest Pain     Poor Appetite    Edema     Cough    Abdominal Pain     Sputum    Joint Pain     SOB/SEXTON y   Pruritis/Rash     Nausea/vomit    Tolerating PT/OT     Diarrhea    Tolerating Diet y    Constipation    Other       Could NOT obtain due to:      Objective:     VITALS:   Last 24hrs VS reviewed since prior progress note.  Most recent are:  Patient Vitals for the past 24 hrs:   Temp Pulse Resp BP SpO2   18 1145 - - - - 94 %   18 1115 97.7 °F (36.5 °C) 74 18 103/64 95 %   18 0832 98 °F (36.7 °C) 92 20 114/68 90 %   18 0725 - - - - 90 %   18 0630 - - - - 95 %   18 0435 - - - - 94 %   18 0333 - - - - 93 %   18 0324 - - - - 92 %   18 0259 98 °F (36.7 °C) 74 18 128/79 92 %   18 0230 - - - 98/65 93 %   18 0115 - - - 99/61 96 %   18 0111 - - - 100/62 93 %   18 0015 - - - (!) 88/61 91 %   18 0000 - - - 93/59 91 %   18 1915 - - - 126/73 95 % 05/06/18 1744 100.3 °F (37.9 °C) (!) 112 18 136/67 (!) 81 %       Intake/Output Summary (Last 24 hours) at 05/07/18 1427  Last data filed at 05/07/18 0826   Gross per 24 hour   Intake           2068.8 ml   Output                0 ml   Net           2068.8 ml        PHYSICAL EXAM:  General: WD, WN. Alert, cooperative, no acute distress    EENT:  EOMI. Anicteric sclerae. MMM  Resp:  Coarse BS, mild crackles in bases  CV:  Regular  rhythm,  No edema  GI:  Soft, Non distended, Non tender.  +Bowel sounds  Neurologic:  Alert and oriented X 3, normal speech,   Psych:   Good insight. Not anxious nor agitated  Skin:  No rashes. No jaundice    Reviewed most current lab test results and cultures  YES  Reviewed most current radiology test results   YES  Review and summation of old records today    NO  Reviewed patient's current orders and MAR    YES  PMH/SH reviewed - no change compared to H&P  ________________________________________________________________________  Care Plan discussed with:    Comments   Patient y    Family      RN y    Care Manager     Consultant                        Multidiciplinary team rounds were held today with , nursing, pharmacist and clinical coordinator. Patient's plan of care was discussed; medications were reviewed and discharge planning was addressed. ________________________________________________________________________  Total NON critical care TIME:  35   Minutes    Total CRITICAL CARE TIME Spent:   Minutes non procedure based      Comments   >50% of visit spent in counseling and coordination of care y    ________________________________________________________________________  Pop Harden MD     Procedures: see electronic medical records for all procedures/Xrays and details which were not copied into this note but were reviewed prior to creation of Plan. LABS:  I reviewed today's most current labs and imaging studies.   Pertinent labs include:  Recent Labs 05/07/18   0616  05/06/18   1800   WBC  5.2  7.9   HGB  11.4*  13.1   HCT  35.5*  40.0   PLT  254  285     Recent Labs      05/07/18   0616  05/06/18   1800   NA  138  136   K  4.1  3.7   CL  105  98   CO2  28  34*   GLU  151*  156*   BUN  15  14   CREA  0.73  0.93   CA  7.9*  8.5   ALB   --   3.2*   TBILI   --   0.4   SGOT   --   16   ALT   --   21       Signed: Isidro Reynolds MD

## 2018-05-08 ENCOUNTER — APPOINTMENT (OUTPATIENT)
Dept: CT IMAGING | Age: 63
DRG: 871 | End: 2018-05-08
Attending: INTERNAL MEDICINE
Payer: MEDICARE

## 2018-05-08 LAB
ALBUMIN SERPL-MCNC: 2.6 G/DL (ref 3.5–5)
ALBUMIN/GLOB SERPL: 0.7 {RATIO} (ref 1.1–2.2)
ALP SERPL-CCNC: 77 U/L (ref 45–117)
ALT SERPL-CCNC: 29 U/L (ref 12–78)
ANION GAP SERPL CALC-SCNC: 6 MMOL/L (ref 5–15)
AST SERPL-CCNC: 20 U/L (ref 15–37)
BASOPHILS # BLD: 0 K/UL (ref 0–0.1)
BASOPHILS NFR BLD: 0 % (ref 0–1)
BILIRUB SERPL-MCNC: 0.3 MG/DL (ref 0.2–1)
BUN SERPL-MCNC: 22 MG/DL (ref 6–20)
BUN/CREAT SERPL: 27 (ref 12–20)
CALCIUM SERPL-MCNC: 8.3 MG/DL (ref 8.5–10.1)
CHLORIDE SERPL-SCNC: 104 MMOL/L (ref 97–108)
CO2 SERPL-SCNC: 29 MMOL/L (ref 21–32)
CREAT SERPL-MCNC: 0.81 MG/DL (ref 0.7–1.3)
DIFFERENTIAL METHOD BLD: ABNORMAL
EOSINOPHIL # BLD: 0 K/UL (ref 0–0.4)
EOSINOPHIL NFR BLD: 0 % (ref 0–7)
ERYTHROCYTE [DISTWIDTH] IN BLOOD BY AUTOMATED COUNT: 14.6 % (ref 11.5–14.5)
GLOBULIN SER CALC-MCNC: 4 G/DL (ref 2–4)
GLUCOSE SERPL-MCNC: 191 MG/DL (ref 65–100)
HCT VFR BLD AUTO: 35.1 % (ref 36.6–50.3)
HGB BLD-MCNC: 11.3 G/DL (ref 12.1–17)
IMM GRANULOCYTES # BLD: 0.1 K/UL (ref 0–0.04)
IMM GRANULOCYTES NFR BLD AUTO: 1 % (ref 0–0.5)
LYMPHOCYTES # BLD: 0.9 K/UL (ref 0.8–3.5)
LYMPHOCYTES NFR BLD: 11 % (ref 12–49)
MAGNESIUM SERPL-MCNC: 2.2 MG/DL (ref 1.6–2.4)
MCH RBC QN AUTO: 29.7 PG (ref 26–34)
MCHC RBC AUTO-ENTMCNC: 32.2 G/DL (ref 30–36.5)
MCV RBC AUTO: 92.4 FL (ref 80–99)
MONOCYTES # BLD: 0.3 K/UL (ref 0–1)
MONOCYTES NFR BLD: 4 % (ref 5–13)
NEUTS SEG # BLD: 6.9 K/UL (ref 1.8–8)
NEUTS SEG NFR BLD: 84 % (ref 32–75)
NRBC # BLD: 0 K/UL (ref 0–0.01)
NRBC BLD-RTO: 0 PER 100 WBC
PLATELET # BLD AUTO: 295 K/UL (ref 150–400)
PMV BLD AUTO: 9.1 FL (ref 8.9–12.9)
POTASSIUM SERPL-SCNC: 4 MMOL/L (ref 3.5–5.1)
PROT SERPL-MCNC: 6.6 G/DL (ref 6.4–8.2)
RBC # BLD AUTO: 3.8 M/UL (ref 4.1–5.7)
SODIUM SERPL-SCNC: 139 MMOL/L (ref 136–145)
WBC # BLD AUTO: 8.1 K/UL (ref 4.1–11.1)

## 2018-05-08 PROCEDURE — 74011250636 HC RX REV CODE- 250/636: Performed by: INTERNAL MEDICINE

## 2018-05-08 PROCEDURE — 74011000250 HC RX REV CODE- 250: Performed by: INTERNAL MEDICINE

## 2018-05-08 PROCEDURE — 74011636320 HC RX REV CODE- 636/320: Performed by: INTERNAL MEDICINE

## 2018-05-08 PROCEDURE — 71275 CT ANGIOGRAPHY CHEST: CPT

## 2018-05-08 PROCEDURE — 80053 COMPREHEN METABOLIC PANEL: CPT | Performed by: INTERNAL MEDICINE

## 2018-05-08 PROCEDURE — 74011250637 HC RX REV CODE- 250/637: Performed by: INTERNAL MEDICINE

## 2018-05-08 PROCEDURE — 83735 ASSAY OF MAGNESIUM: CPT | Performed by: INTERNAL MEDICINE

## 2018-05-08 PROCEDURE — 74011000258 HC RX REV CODE- 258: Performed by: EMERGENCY MEDICINE

## 2018-05-08 PROCEDURE — 65660000000 HC RM CCU STEPDOWN

## 2018-05-08 PROCEDURE — 85025 COMPLETE CBC W/AUTO DIFF WBC: CPT | Performed by: INTERNAL MEDICINE

## 2018-05-08 PROCEDURE — 74011250636 HC RX REV CODE- 250/636: Performed by: EMERGENCY MEDICINE

## 2018-05-08 PROCEDURE — 94640 AIRWAY INHALATION TREATMENT: CPT

## 2018-05-08 PROCEDURE — 36415 COLL VENOUS BLD VENIPUNCTURE: CPT | Performed by: INTERNAL MEDICINE

## 2018-05-08 RX ORDER — IPRATROPIUM BROMIDE AND ALBUTEROL SULFATE 2.5; .5 MG/3ML; MG/3ML
3 SOLUTION RESPIRATORY (INHALATION) 3 TIMES DAILY
Status: DISCONTINUED | OUTPATIENT
Start: 2018-05-08 | End: 2018-05-09

## 2018-05-08 RX ORDER — SODIUM CHLORIDE 0.9 % (FLUSH) 0.9 %
10 SYRINGE (ML) INJECTION
Status: COMPLETED | OUTPATIENT
Start: 2018-05-08 | End: 2018-05-08

## 2018-05-08 RX ORDER — SORBITOL SOLUTION 70 %
30 SOLUTION, ORAL MISCELLANEOUS
Status: DISCONTINUED | OUTPATIENT
Start: 2018-05-08 | End: 2018-05-11 | Stop reason: HOSPADM

## 2018-05-08 RX ORDER — SODIUM CHLORIDE 9 MG/ML
50 INJECTION, SOLUTION INTRAVENOUS
Status: COMPLETED | OUTPATIENT
Start: 2018-05-08 | End: 2018-05-08

## 2018-05-08 RX ADMIN — IPRATROPIUM BROMIDE AND ALBUTEROL SULFATE 3 ML: .5; 3 SOLUTION RESPIRATORY (INHALATION) at 03:29

## 2018-05-08 RX ADMIN — IPRATROPIUM BROMIDE AND ALBUTEROL SULFATE 3 ML: .5; 3 SOLUTION RESPIRATORY (INHALATION) at 15:11

## 2018-05-08 RX ADMIN — PANTOPRAZOLE SODIUM 40 MG: 40 TABLET, DELAYED RELEASE ORAL at 06:44

## 2018-05-08 RX ADMIN — ASPIRIN 81 MG 81 MG: 81 TABLET ORAL at 08:41

## 2018-05-08 RX ADMIN — IOPAMIDOL 80 ML: 755 INJECTION, SOLUTION INTRAVENOUS at 14:41

## 2018-05-08 RX ADMIN — SORBITOL SOLUTION (BULK) 30 ML: 70 SOLUTION at 18:23

## 2018-05-08 RX ADMIN — GUAIFENESIN 600 MG: 600 TABLET, EXTENDED RELEASE ORAL at 08:41

## 2018-05-08 RX ADMIN — METHYLPREDNISOLONE SODIUM SUCCINATE 40 MG: 40 INJECTION, POWDER, FOR SOLUTION INTRAMUSCULAR; INTRAVENOUS at 20:45

## 2018-05-08 RX ADMIN — ENOXAPARIN SODIUM 40 MG: 40 INJECTION SUBCUTANEOUS at 20:45

## 2018-05-08 RX ADMIN — BUDESONIDE AND FORMOTEROL FUMARATE DIHYDRATE 2 PUFF: 160; 4.5 AEROSOL RESPIRATORY (INHALATION) at 06:43

## 2018-05-08 RX ADMIN — CEFTRIAXONE SODIUM 2 G: 2 INJECTION, POWDER, FOR SOLUTION INTRAMUSCULAR; INTRAVENOUS at 18:23

## 2018-05-08 RX ADMIN — GUAIFENESIN 600 MG: 600 TABLET, EXTENDED RELEASE ORAL at 20:45

## 2018-05-08 RX ADMIN — Medication 10 ML: at 14:41

## 2018-05-08 RX ADMIN — IPRATROPIUM BROMIDE AND ALBUTEROL SULFATE 3 ML: .5; 3 SOLUTION RESPIRATORY (INHALATION) at 20:06

## 2018-05-08 RX ADMIN — Medication 10 ML: at 22:40

## 2018-05-08 RX ADMIN — BUDESONIDE AND FORMOTEROL FUMARATE DIHYDRATE 2 PUFF: 160; 4.5 AEROSOL RESPIRATORY (INHALATION) at 20:46

## 2018-05-08 RX ADMIN — IPRATROPIUM BROMIDE AND ALBUTEROL SULFATE 3 ML: .5; 3 SOLUTION RESPIRATORY (INHALATION) at 08:30

## 2018-05-08 RX ADMIN — FUROSEMIDE 20 MG: 20 TABLET ORAL at 08:41

## 2018-05-08 RX ADMIN — Medication 10 ML: at 06:44

## 2018-05-08 RX ADMIN — AZITHROMYCIN MONOHYDRATE 500 MG: 500 INJECTION, POWDER, LYOPHILIZED, FOR SOLUTION INTRAVENOUS at 18:28

## 2018-05-08 RX ADMIN — METHYLPREDNISOLONE SODIUM SUCCINATE 40 MG: 40 INJECTION, POWDER, FOR SOLUTION INTRAMUSCULAR; INTRAVENOUS at 08:42

## 2018-05-08 RX ADMIN — SODIUM CHLORIDE 50 ML/HR: 900 INJECTION, SOLUTION INTRAVENOUS at 14:00

## 2018-05-08 NOTE — PROGRESS NOTES
Interdisciplinary Rounds were completed on this patient. Rounds included nursing, clinical care leader, pharmacy, and case management. Patient was doing well without problems. Patient had the following concerns: none. Goals for the day will include: continue RX as is and mobilize, possible need for oxygen at home.

## 2018-05-08 NOTE — PROGRESS NOTES
ADULT PROTOCOL: JET AEROSOL ASSESSMENT    Patient  Lucia Childs     63 y.o.   male     5/8/2018  9:48 AM    Breath Sounds Pre Procedure: Right Breath Sounds: Expiratory wheezing                               Left Breath Sounds: Expiratory wheezing    Breath Sounds Post Procedure: Right Breath Sounds: Diminished                                 Left Breath Sounds: Diminished    Breathing pattern: Pre procedure Breathing Pattern: Regular          Post procedure Breathing Pattern: Regular    Heart Rate: Pre procedure Pulse: 81           Post procedure Pulse: 67    Resp Rate: Pre procedure Respirations: 18           Post procedure Respirations: 20      Cough: Pre procedure Cough: Barking               Post procedure Cough: Congested      Oxygen: 28% Ventimask  Changed: Yes 3L/NC    SpO2: Pre procedure SpO2: 94 %                 Post procedure SpO2: 93 %     Nebulizer Therapy: Current medications Aerosolized Medications: DuoNeb      Changed: Yes Q6 PRN      Problem List:   Patient Active Problem List   Diagnosis Code    Epstein's esophagus K22.70    Esophageal cancer (HCC) C15.9    Dysphagia R13.10    Esophageal obstruction K22.2    Weight loss R63.4    GI bleed K92.2    COPD (chronic obstructive pulmonary disease) (HCC) J44.9    HTN (hypertension) I10    Respiratory failure ([facility] J96.90       Respiratory Therapist: Lori Benedict, RT

## 2018-05-08 NOTE — ROUTINE PROCESS
General Surgery End of Shift Nursing Note    Bedside shift change report given to [address] [place] (oncoming nurse) by Leslye Bradford RN (offgoing nurse). Report included the following information SBAR. Shift worked:   7a-7p   Summary of shift:    Ct scan today   Issues for physician to address:   none     Number times ambulated in hallway past shift: 1    Number of times OOB to chair past shift: 2    Pain Management:  Current medication:   Patient states pain is manageable on current pain medication:     GI:    Current diet:  DIET CARDIAC Regular  DIET SNACKS AM Snack, PM Snack, HS Snack  DIET NUTRITIONAL SUPPLEMENTS No; Lunch, Dinner; Magic Cups    Tolerating current diet: YES  Passing flatus: YES  Last Bowel Movement:    Appearance:     Respiratory:    Incentive Spirometer at bedside: YES  Patient instructed on use: YES    Patient Safety:    Falls Score: 1  Bed Alarm On? No  Sitter?  No    Charlette Sherman RN

## 2018-05-08 NOTE — PROGRESS NOTES
Hospitalist Progress Note    NAME: Rozina Song   :  1955   MRN:  [id number]       Assessment / Plan:  Acute hypoxic respiratory failure: not on Home O2  Pneumonia:  Acute COPD exacerbation:   -continue Solumedrol 40 mg IV every 12 hours and scheduled Duonebs  -continue Azithromycin and Rocephin  -continue home Spiriva and Symbicort  -patient only with mild fever (100.3) and no Leukocytosis and also had recent travel prior to this admission     Tobacco Abuse:  -continue nicotine patch, cessation encouraged    Elevated Lactic acid: resolved    Hx nonischemic cardiomyopathy   -continue home ASA; follows with Dr Kaylyn Mora: continue home valium prn    Hx of esophageal ca: CT Chest as above    Malnutrition: BMI 18.3    Prn sorbitol ordered    Surrogate Decision Maker: pt's wife  Baseline: independent. Not on home O2  Code status: Full  Prophylaxis: Lovenox  Recommended Disposition:  PT, OT, RN     Subjective:     Chief Complaint / Reason for Physician Visit: follow-up respiratory failure  Patient seen for follow-up  Wife and son at bedside  GERD controlled, occasional trouble swallowing with globus sensation but no signs/symptoms/concerns for aspiration    Review of Systems:  Symptom Y/N Comments  Symptom Y/N Comments   Fever/Chills    Chest Pain     Poor Appetite    Edema n    Cough y   Abdominal Pain     Sputum y   Joint Pain     SOB/SEXTON y   Pruritis/Rash     Nausea/vomit    Tolerating PT/OT     Diarrhea    Tolerating Diet y    Constipation y   Other       Could NOT obtain due to:      Objective:     VITALS:   Last 24hrs VS reviewed since prior progress note.  Most recent are:  Patient Vitals for the past 24 hrs:   Temp Pulse Resp BP SpO2   18 1152 - 82 18 112/70 92 %   18 0831 - - - - 94 %   18 0756 97.9 °F (36.6 °C) 78 18 99/60 93 %   18 0345 97.4 °F (36.3 °C) 79 18 101/62 94 %   18 2334 98 °F (36.7 °C) 86 18 109/73 91 %   18 1952 97.8 °F (36.6 °C) 94 18 114/65 95 %   05/07/18 1628 - - - - 92 %   05/07/18 1610 - - - - 93 %   05/07/18 1549 97.9 °F (36.6 °C) 94 18 121/67 90 %   05/07/18 1530 - - - - 92 %       Intake/Output Summary (Last 24 hours) at 05/08/18 1255  Last data filed at 05/08/18 0646   Gross per 24 hour   Intake              600 ml   Output              725 ml   Net             -125 ml        PHYSICAL EXAM:  General: WD, WN. Alert, cooperative, no acute distress    EENT:  EOMI. Anicteric sclerae. MMM  Resp:  Diffuse wheezing, poor air movement at bases  CV:  Regular  rhythm,  No edema  GI:  Soft, Non distended, Non tender.  +Bowel sounds  Neurologic:  Alert and oriented X 3, normal speech,   Psych:   Good insight. Not anxious nor agitated  Skin:  No rashes. No jaundice    Reviewed most current lab test results and cultures  YES  Reviewed most current radiology test results   YES  Review and summation of old records today    NO  Reviewed patient's current orders and MAR    YES  PMH/SH reviewed - no change compared to H&P  ________________________________________________________________________  Care Plan discussed with:    Comments   Patient y    Family  y    RN y    Care Manager     Consultant                        Multidiciplinary team rounds were held today with , nursing, pharmacist and clinical coordinator. Patient's plan of care was discussed; medications were reviewed and discharge planning was addressed. ________________________________________________________________________  Total NON critical care TIME:  35   Minutes    Total CRITICAL CARE TIME Spent:   Minutes non procedure based      Comments   >50% of visit spent in counseling and coordination of care x    ________________________________________________________________________  Cynthia Rivera MD     Procedures: see electronic medical records for all procedures/Xrays and details which were not copied into this note but were reviewed prior to creation of Plan. LABS:  I reviewed today's most current labs and imaging studies.   Pertinent labs include:  Recent Labs      05/08/18 0221 05/07/18   0616  05/06/18   1800   WBC  8.1  5.2  7.9   HGB  11.3*  11.4*  13.1   HCT  35.1*  35.5*  40.0   PLT  295  254  285     Recent Labs      05/08/18 0221 05/07/18   0616  05/06/18   1800   NA  139  138  136   K  4.0  4.1  3.7   CL  104  105  98   CO2  29  28  34*   GLU  191*  151*  156*   BUN  22*  15  14   CREA  0.81  0.73  0.93   CA  8.3*  7.9*  8.5   MG  2.2   --    --    ALB  2.6*   --   3.2*   TBILI  0.3   --   0.4   SGOT  20   --   16   ALT  29   --   21       Signed: Dianah Simmonds, MD

## 2018-05-09 LAB
ANION GAP SERPL CALC-SCNC: 3 MMOL/L (ref 5–15)
BACTERIA SPEC CULT: ABNORMAL
BACTERIA SPEC CULT: ABNORMAL
BUN SERPL-MCNC: 22 MG/DL (ref 6–20)
BUN/CREAT SERPL: 33 (ref 12–20)
CALCIUM SERPL-MCNC: 8 MG/DL (ref 8.5–10.1)
CHLORIDE SERPL-SCNC: 103 MMOL/L (ref 97–108)
CO2 SERPL-SCNC: 33 MMOL/L (ref 21–32)
CREAT SERPL-MCNC: 0.67 MG/DL (ref 0.7–1.3)
ERYTHROCYTE [DISTWIDTH] IN BLOOD BY AUTOMATED COUNT: 14.6 % (ref 11.5–14.5)
GLUCOSE SERPL-MCNC: 134 MG/DL (ref 65–100)
GRAM STN SPEC: ABNORMAL
HCT VFR BLD AUTO: 34.4 % (ref 36.6–50.3)
HGB BLD-MCNC: 11.3 G/DL (ref 12.1–17)
MCH RBC QN AUTO: 29.8 PG (ref 26–34)
MCHC RBC AUTO-ENTMCNC: 32.8 G/DL (ref 30–36.5)
MCV RBC AUTO: 90.8 FL (ref 80–99)
NRBC # BLD: 0 K/UL (ref 0–0.01)
NRBC BLD-RTO: 0 PER 100 WBC
PLATELET # BLD AUTO: 321 K/UL (ref 150–400)
PMV BLD AUTO: 9.1 FL (ref 8.9–12.9)
POTASSIUM SERPL-SCNC: 4.1 MMOL/L (ref 3.5–5.1)
RBC # BLD AUTO: 3.79 M/UL (ref 4.1–5.7)
SERVICE CMNT-IMP: ABNORMAL
SODIUM SERPL-SCNC: 139 MMOL/L (ref 136–145)
WBC # BLD AUTO: 7.4 K/UL (ref 4.1–11.1)

## 2018-05-09 PROCEDURE — 74011250637 HC RX REV CODE- 250/637: Performed by: INTERNAL MEDICINE

## 2018-05-09 PROCEDURE — 74011000250 HC RX REV CODE- 250: Performed by: INTERNAL MEDICINE

## 2018-05-09 PROCEDURE — 85027 COMPLETE CBC AUTOMATED: CPT | Performed by: INTERNAL MEDICINE

## 2018-05-09 PROCEDURE — 80048 BASIC METABOLIC PNL TOTAL CA: CPT | Performed by: INTERNAL MEDICINE

## 2018-05-09 PROCEDURE — 36415 COLL VENOUS BLD VENIPUNCTURE: CPT | Performed by: INTERNAL MEDICINE

## 2018-05-09 PROCEDURE — 74011000258 HC RX REV CODE- 258: Performed by: EMERGENCY MEDICINE

## 2018-05-09 PROCEDURE — 74011250636 HC RX REV CODE- 250/636: Performed by: EMERGENCY MEDICINE

## 2018-05-09 PROCEDURE — 92610 EVALUATE SWALLOWING FUNCTION: CPT

## 2018-05-09 PROCEDURE — 65660000000 HC RM CCU STEPDOWN

## 2018-05-09 PROCEDURE — 94640 AIRWAY INHALATION TREATMENT: CPT

## 2018-05-09 PROCEDURE — 74011250636 HC RX REV CODE- 250/636: Performed by: INTERNAL MEDICINE

## 2018-05-09 RX ORDER — IPRATROPIUM BROMIDE AND ALBUTEROL SULFATE 2.5; .5 MG/3ML; MG/3ML
3 SOLUTION RESPIRATORY (INHALATION)
Status: DISCONTINUED | OUTPATIENT
Start: 2018-05-09 | End: 2018-05-10

## 2018-05-09 RX ORDER — MONTELUKAST SODIUM 10 MG/1
10 TABLET ORAL
Status: DISCONTINUED | OUTPATIENT
Start: 2018-05-09 | End: 2018-05-11 | Stop reason: HOSPADM

## 2018-05-09 RX ADMIN — Medication 5 ML: at 06:00

## 2018-05-09 RX ADMIN — IPRATROPIUM BROMIDE AND ALBUTEROL SULFATE 3 ML: .5; 3 SOLUTION RESPIRATORY (INHALATION) at 06:20

## 2018-05-09 RX ADMIN — BUDESONIDE AND FORMOTEROL FUMARATE DIHYDRATE 2 PUFF: 160; 4.5 AEROSOL RESPIRATORY (INHALATION) at 07:00

## 2018-05-09 RX ADMIN — Medication 10 ML: at 21:37

## 2018-05-09 RX ADMIN — BUDESONIDE AND FORMOTEROL FUMARATE DIHYDRATE 2 PUFF: 160; 4.5 AEROSOL RESPIRATORY (INHALATION) at 21:00

## 2018-05-09 RX ADMIN — Medication 10 ML: at 13:33

## 2018-05-09 RX ADMIN — CEFTRIAXONE SODIUM 2 G: 2 INJECTION, POWDER, FOR SOLUTION INTRAMUSCULAR; INTRAVENOUS at 18:50

## 2018-05-09 RX ADMIN — GUAIFENESIN 600 MG: 600 TABLET, EXTENDED RELEASE ORAL at 08:52

## 2018-05-09 RX ADMIN — PANTOPRAZOLE SODIUM 40 MG: 40 TABLET, DELAYED RELEASE ORAL at 07:02

## 2018-05-09 RX ADMIN — Medication 1 CAPSULE: at 08:52

## 2018-05-09 RX ADMIN — ENOXAPARIN SODIUM 40 MG: 40 INJECTION SUBCUTANEOUS at 21:36

## 2018-05-09 RX ADMIN — MONTELUKAST SODIUM 10 MG: 10 TABLET, FILM COATED ORAL at 21:36

## 2018-05-09 RX ADMIN — FUROSEMIDE 20 MG: 20 TABLET ORAL at 08:52

## 2018-05-09 RX ADMIN — IPRATROPIUM BROMIDE AND ALBUTEROL SULFATE 3 ML: .5; 3 SOLUTION RESPIRATORY (INHALATION) at 19:12

## 2018-05-09 RX ADMIN — DIAZEPAM 2.5 MG: 5 TABLET ORAL at 23:07

## 2018-05-09 RX ADMIN — ASPIRIN 81 MG 81 MG: 81 TABLET ORAL at 08:52

## 2018-05-09 RX ADMIN — METHYLPREDNISOLONE SODIUM SUCCINATE 40 MG: 40 INJECTION, POWDER, FOR SOLUTION INTRAMUSCULAR; INTRAVENOUS at 08:52

## 2018-05-09 RX ADMIN — GUAIFENESIN 600 MG: 600 TABLET, EXTENDED RELEASE ORAL at 21:36

## 2018-05-09 RX ADMIN — AZITHROMYCIN MONOHYDRATE 500 MG: 500 INJECTION, POWDER, LYOPHILIZED, FOR SOLUTION INTRAVENOUS at 22:33

## 2018-05-09 RX ADMIN — IPRATROPIUM BROMIDE AND ALBUTEROL SULFATE 3 ML: .5; 3 SOLUTION RESPIRATORY (INHALATION) at 23:00

## 2018-05-09 RX ADMIN — IPRATROPIUM BROMIDE AND ALBUTEROL SULFATE 3 ML: .5; 3 SOLUTION RESPIRATORY (INHALATION) at 13:37

## 2018-05-09 NOTE — PROGRESS NOTES
Hospitalist Progress Note    NAME: Laila Keys   :  1955   MRN:  [id number]       Assessment / Plan:  Acute hypoxic respiratory failure: not on Home O2  Pneumonia:  Acute COPD exacerbation:   -continue Solumedrol 40 mg IV every 12 hours and scheduled Duonebs (changed to every 4 hours)  -continue Azithromycin and Rocephin  -continue home Spiriva and Symbicort, start singulair  -CTA Chest ruled out PE but did show bibasilar PNA, doubt aspiration as patient did well with his speech therapy evaluation thus will not pursue MBS at this time  -given patient's COPD/Emphysema I have consulted Pulmonology as patient does not have an outpatient lung specialist and really needs one, consult discussed with Dr. Ayden Prince    Tobacco Abuse:  -continue nicotine patch, cessation encouraged, patient plans for cessation    Elevated Lactic acid: resolved    Hx nonischemic cardiomyopathy   -continue home ASA; follows with Dr Liu Liu: continue home valium prn    Hx of esophageal ca: Malnutrition: BMI 18.3    Constipation resolved with sorbitol    Surrogate Decision Maker: pt's wife  Baseline: independent. Not on home O2  Code status: Full  Prophylaxis: Lovenox  Recommended Disposition:  PT, OT, RN     Subjective:     Chief Complaint / Reason for Physician Visit: follow-up respiratory failure  Patient seen for follow-up  Feeling a little better, notes that he feels he needs Duonebs more ofter    Review of Systems:  Symptom Y/N Comments  Symptom Y/N Comments   Fever/Chills    Chest Pain     Poor Appetite    Edema n    Cough y   Abdominal Pain     Sputum y clearing  Joint Pain     SOB/SEXTON y Minimally immproved  Pruritis/Rash     Nausea/vomit    Tolerating PT/OT     Diarrhea    Tolerating Diet y Denies aspiration   Constipation n   Other       Could NOT obtain due to:      Objective:     VITALS:   Last 24hrs VS reviewed since prior progress note.  Most recent are:  Patient Vitals for the past 24 hrs:   Temp Pulse Resp BP SpO2   05/09/18 1158 - - - - 96 %   05/09/18 1108 97.3 °F (36.3 °C) 64 16 115/69 95 %   05/09/18 0819 97.8 °F (36.6 °C) 77 16 126/85 95 %   05/09/18 0621 - - - - 96 %   05/09/18 0522 98.3 °F (36.8 °C) 62 16 120/67 96 %   05/08/18 2336 97.4 °F (36.3 °C) 88 18 130/72 95 %   05/08/18 2006 - - - - 92 %   05/08/18 1933 98.2 °F (36.8 °C) (!) 102 18 131/76 95 %   05/08/18 1553 97.8 °F (36.6 °C) 82 18 113/63 96 %   05/08/18 1511 - - - - 96 %       Intake/Output Summary (Last 24 hours) at 05/09/18 1317  Last data filed at 05/09/18 0522   Gross per 24 hour   Intake             1960 ml   Output                0 ml   Net             1960 ml        PHYSICAL EXAM:  General: WD, WN. Alert, cooperative, no acute distress    EENT:  EOMI. Anicteric sclerae. MMM  Resp:  Less wheezing, still with poor air movement at bases, mild coarse bibasilar breath sounds  CV:  Regular  rhythm,  No edema  GI:  Soft, Non distended, Non tender.  +Bowel sounds  Neurologic:  Alert and oriented X 3, normal speech,   Psych:   Good insight. Not anxious nor agitated  Skin:  No rashes. No jaundice    Reviewed most current lab test results and cultures  YES  Reviewed most current radiology test results   YES  Review and summation of old records today    NO  Reviewed patient's current orders and MAR    YES  PMH/SH reviewed - no change compared to H&P  ________________________________________________________________________  Care Plan discussed with:    Comments   Patient y    Family  y    RN y    Care Manager     Consultant  y Dr. Jon Pena team rounds were held today with , nursing, pharmacist and clinical coordinator. Patient's plan of care was discussed; medications were reviewed and discharge planning was addressed.      ________________________________________________________________________  Total NON critical care TIME:  25   Minutes    Total CRITICAL CARE TIME Spent:   Minutes non procedure based      Comments   >50% of visit spent in counseling and coordination of care     ________________________________________________________________________  Daljit Faulkner MD     Procedures: see electronic medical records for all procedures/Xrays and details which were not copied into this note but were reviewed prior to creation of Plan. LABS:  I reviewed today's most current labs and imaging studies.   Pertinent labs include:  Recent Labs      05/09/18 0253 05/08/18 0221 05/07/18 0616   WBC  7.4  8.1  5.2   HGB  11.3*  11.3*  11.4*   HCT  34.4*  35.1*  35.5*   PLT  321  295  254     Recent Labs      05/09/18 0253 05/08/18 0221 05/07/18 0616  05/06/18   1800   NA  139  139  138  136   K  4.1  4.0  4.1  3.7   CL  103  104  105  98   CO2  33*  29  28  34*   GLU  134*  191*  151*  156*   BUN  22*  22*  15  14   CREA  0.67*  0.81  0.73  0.93   CA  8.0*  8.3*  7.9*  8.5   MG   --   2.2   --    --    ALB   --   2.6*   --   3.2*   TBILI   --   0.3   --   0.4   SGOT   --   20   --   16   ALT   --   29   --   21       Signed: Daljit Faulkner MD

## 2018-05-09 NOTE — CONSULTS
[facility]  Pulmonary, Critical Care, and Sleep Medicine    Initial Patient Consult    Name: Umang Hernandez MRN: [id number]   : 1955 Hospital: [facility]   Date: 2018        IMPRESSION:   · COPD with acute exacerbation  · Acute Hypoxic Respiratory Failure  · Bibasilar Pneumonia  · Hx of esophageal CA s/p surgery for treatment, Occurred about . S/p esophajectomy and gastric pull through. · Anemia: hgb of 11.3.   · Had recurrent esophageal strictures  · Smoker  · Anxiety on Valium as needed. · Increased lactic acidosis on arrival.   · Nonischemic Cardiomyopathy  · Malnutrition  · GERD  · Possible aspiration events      RECOMMENDATIONS:   · Speech Eval   · Smoking cessation, on nicotine patch. · Will need complete PFTs as an outpt. · Will need to assess for home oxygen needs. Try to wean as much as possible. Subjective: This patient has been seen and evaluated at the request of Dr. Charisse Phipps for above. Patient is a 63 y.o. male   Who presents for evaluation of above. He has hx of esophageal Ca. COPD, Arthritis. Thyroid disease. Has been having shortness of breath for 3 weeks. Was dx with the flu about 3-4 weeks ago. Has a productive cough of thick green sputum. While being evaluated by his PCP was noted to be hypoxic on RA. ON day of admission was feeling worse. IN ER he was noted to have pox of 81% on RA at rest.     The last 3 weeks have very difficult for pt. He was dx with the flu about 4 weeks ago. Then was getting better, suddenly worsened again, was note to have increased sputum production. Noted to have increased fatigue, felt poorly and then started have left lower chest and back pain. This is usually the first sign of pneumonia. He started have poor energy levels and finally could not take it any more so came in for evaluation. He has a cough. Has some GERD. He reports he was treated with steroids for about 10-14 days.  Initially when he go sick he thought is was allergies, but then started feeling more poorly. He had been treated as an outpt with Anoro. He felt like the anoro was doing pretty well. Then most recently went back to Spiriva and Symbicort. Has an albuterol that he uses as needed. He denied any fevers, chills, sweats, aspiration. NO issues with diarrhea. Was a smoker of 1ppd. He uses marijuana, ?.   NO etoh. . Recently lost his mother. He went to [facility] for her  and felt run down from that. He denies any aspiration. Has been on disability for a long while due to a viral cardiomyopathy. Denies any recent weight changes.      Past Medical History:   Diagnosis Date    Arthritis     osteoarthritis    Asthma     Cancer ([facility]     esophagus    Cancer of esophagus ([facility] 2012    adenocarcinoma; Dr. Jagdish Ruiz Chronic pain     lower back, has crushed vertebrae    COPD     Essential hypertension     GERD (gastroesophageal reflux disease)     Hypertension     not on medication    Nonischemic cardiomyopathy ([facility] [facility]      EF 35-50%; negative cath, 2012 EF 55%; Dr. Vinita Morrison Other ill-defined conditions(799.89)     Epstein's esophagus    Other ill-defined conditions(799.89)     esophageal nodule - pre-cancerous    Other ill-defined conditions(799.89)     pneumonia multiple times    Psychiatric disorder     depression    Seizures ([facility] [facility]     caused with allergic reaction from Aleve    Thyroid disease     Unspecified sleep apnea     c-pap-doesnt wear now/apnea resolved after surgery/lost weight and no sleep apnea      Past Surgical History:   Procedure Laterality Date    COLONOSCOPY N/A 2017    COLONOSCOPY performed by [facility]ilka Fischer MD at [facility] ENDOSCOPY    COLONOSCOPY N/A 2017    COLONOSCOPY er 26 performed by Yamilka Fischer MD at [facility], BLEEDING  2017         COLONOSCOPY,YINKA SAMAYOA,SNARE  2017         COLORECTAL SCRN; HI RISK IND  1/11/2017         HX CHOLECYSTECTOMY      HX GI  4/2/12    ESOPHAGOGASTRECTOMY    HX HEART CATHETERIZATION      HX OTHER SURGICAL      surgery to remove nodule and barrets esophagus    KS COLSC FLX W/RMVL OF TUMOR POLYP LESION SNARE TQ  12/30/2011    colon x 3     KS EGD BALLOON DILATION ESOPHAGUS <30 MM DIAM  6/20/2012         KS EGD BALLOON DILATION ESOPHAGUS <30 MM DIAM  7/5/2012         KS EGD BALLOON DILATION ESOPHAGUS <30 MM DIAM  7/24/2012         KS EGD BALLOON DILATION ESOPHAGUS <30 MM DIAM  8/28/2012         KS EGD BALLOON DILATION ESOPHAGUS <30 MM DIAM  9/24/2012         KS EGD BALLOON DILATION ESOPHAGUS <30 MM DIAM  11/6/2012         KS EGD BALLOON DILATION ESOPHAGUS <30 MM DIAM  3/4/2013         KS EGD BALLOON DILATION ESOPHAGUS <30 MM DIAM  4/26/2013         KS EGD BALLOON DILATION ESOPHAGUS <30 MM DIAM  7/31/2013         KS EGD BALLOON DILATION ESOPHAGUS <30 MM DIAM  9/25/2013         KS EGD TRANSORAL BIOPSY SINGLE/MULTIPLE  12/30/2011         KS EGD TRANSORAL BIOPSY SINGLE/MULTIPLE  8/28/2012         KS EGD TRANSORAL BIOPSY SINGLE/MULTIPLE  11/6/2012         KS EGD TRANSORAL BIOPSY SINGLE/MULTIPLE  3/4/2013         KS EGD TRANSORAL BIOPSY SINGLE/MULTIPLE  4/30/2013         UPPER GI ENDOSCOPY,BALL DIL,30MM  5/29/2015         UPPER GI ENDOSCOPY,BIOPSY  5/29/2015         UPPER GI ENDOSCOPY,BIOPSY  1/11/2017         UPPER GI ENDOSCOPY,STENT PLACEMENT  4/23/2013           Prior to Admission medications    Medication Sig Start Date End Date Taking? Authorizing Provider   tiotropium (SPIRIVA WITH HANDIHALER) 18 mcg inhalation capsule Take 1 Cap by inhalation daily. Yes Risa Evans MD   budesonide-formoterol (SYMBICORT) 160-4.5 mcg/actuation HFAA Take 2 Puffs by inhalation two (2) times a day. Yes Risa Evans MD   omeprazole (PRILOSEC) 20 mg capsule Take 20 mg by mouth daily. Yes Historical Provider   aspirin 81 mg tablet Take 81 mg by mouth daily. Yes Historical Provider   albuterol (PROVENTIL, VENTOLIN) 90 mcg/Actuation inhaler Take 2 Puffs by inhalation every six (6) hours as needed. Yes Historical Provider   diazepam (VALIUM) 10 mg tablet Take 10 mg by mouth nightly. Yes Historical Provider   HYDROcodone-acetaminophen (NORCO) 5-325 mg per tablet Take 1 Tab by mouth daily as needed.  Indications: PAIN    Historical Provider     Allergies   Allergen Reactions    Aleve [Naproxen Sodium] Anaphylaxis     Hives, passed out, had seizure    Sulfa (Sulfonamide Antibiotics) Hives      Social History   Substance Use Topics    Smoking status: Current Every Day Smoker     Packs/day: 1.00     Years: 40.00     Types: Cigarettes    Smokeless tobacco: Never Used      Comment: cigarettes 2-3 daily and using e cig    Alcohol use No      Family History   Problem Relation Age of Onset    COPD Mother     Asthma Mother     Colon Cancer Mother     Other Mother      food allergies/Steffany's disease/Epstein's esophagus    Heart Attack Mother     Heart Disease Mother     Cancer Mother      colon    Other Father      abestos/food allergies/degernative disc disease    Colon Cancer Maternal Grandmother         Current Facility-Administered Medications   Medication Dose Route Frequency    albuterol-ipratropium (DUO-NEB) 2.5 MG-0.5 MG/3 ML  3 mL Nebulization Q4H RT    montelukast (SINGULAIR) tablet 10 mg  10 mg Oral QHS    lactobac ac& pc-s.therm-b.anim (FAINA Q/RISAQUAD)  1 Cap Oral DAILY    aspirin chewable tablet 81 mg  81 mg Oral DAILY    pantoprazole (PROTONIX) tablet 40 mg  40 mg Oral ACB    furosemide (LASIX) tablet 20 mg  20 mg Oral DAILY    budesonide-formoterol (SYMBICORT) 160-4.5 mcg/actuation HFA inhaler 2 Puff (Patient Supplied)  2 Puff Inhalation BID    tiotropium (SPIRIVA) inhalation capsule 18 mcg (Patient Supplied)  1 Cap Inhalation DAILY    cefTRIAXone (ROCEPHIN) 2 g in 0.9% sodium chloride (MBP/ADV) 50 mL  2 g IntraVENous Q24H    azithromycin (ZITHROMAX) 500 mg in 0.9% sodium chloride (MBP/ADV) 250 mL  500 mg IntraVENous Q24H    guaiFENesin ER (MUCINEX) tablet 600 mg  600 mg Oral Q12H    sodium chloride (NS) flush 5-10 mL  5-10 mL IntraVENous Q8H    enoxaparin (LOVENOX) injection 40 mg  40 mg SubCUTAneous Q24H    nicotine (NICODERM CQ) 21 mg/24 hr patch 1 Patch  1 Patch TransDERmal DAILY       Review of Systems:  Constitutional: positive for fatigue and malaise  Eyes: negative  Ears, nose, mouth, throat, and face: positive for nasal congestion  Respiratory: positive for cough, sputum, pleurisy/chest pain, pneumonia, wheezing, dyspnea on exertion or chronic bronchitis  Cardiovascular: positive for chest pain, dyspnea, fatigue, paroxysmal nocturnal dyspnea, dyspnea on exertion  Gastrointestinal: positive for dysphagia and reflux symptoms  Genitourinary:negative  Integument/breast: negative  Hematologic/lymphatic: negative  Musculoskeletal:negative  Neurological: negative  Behavioral/Psych: negative  Endocrine: negative  Allergic/Immunologic: negative    Objective:   Vital Signs:    Visit Vitals    /69 (BP 1 Location: Left arm, BP Patient Position: At rest)    Pulse 64    Temp 97.3 °F (36.3 °C)    Resp 16    Ht 5' 11\" (1.803 m)    Wt 59.7 kg (131 lb 9.8 oz)    SpO2 92%    BMI 18.36 kg/m2       O2 Device: Nasal cannula   O2 Flow Rate (L/min): 4 l/min   Temp (24hrs), Av.8 °F (36.6 °C), Min:97.3 °F (36.3 °C), Max:98.3 °F (36.8 °C)       Intake/Output:   Last shift:         Last 3 shifts: 1 -  0700  In: 8967 [P.O.:2140; I.V.:600]  Out: 725 [Urine:725]    Intake/Output Summary (Last 24 hours) at 18 1347  Last data filed at 18 0522   Gross per 24 hour   Intake             1960 ml   Output                0 ml   Net             1960 ml      Physical Exam:   General:  Alert, cooperative, no distress, appears stated age. Sitting up in bed, no distress.     Head:  Normocephalic, without obvious abnormality, atraumatic. Eyes:  Conjunctivae/corneas clear. PERRL, EOMs intact. Nose: Nares normal. Septum midline. Mucosa normal. No drainage or sinus tenderness. Throat: Lips, mucosa, and tongue normal. Teeth and gums normal.   Neck: Supple, symmetrical, trachea midline, no adenopathy, thyroid: no enlargment/tenderness/nodules, no carotid bruit and no JVD. Back:   Symmetric, no curvature. ROM normal.   Lungs:   Clear to auscultation bilaterally. Decreased air movement but clear. Chest wall:  No tenderness or deformity. Heart:  Regular rate and rhythm, S1, S2 normal, no murmur, click, rub or gallop. Abdomen:   Soft, non-tender. Bowel sounds normal. No masses,  No organomegaly. Extremities: Extremities normal, atraumatic, no cyanosis or edema. Pulses: 2+ and symmetric all extremities. Skin: Skin color, texture, turgor normal. No rashes or lesions   Lymph nodes: Cervical, supraclavicular, and axillary nodes normal.   Neurologic: Grossly nonfocal, Psych: NO overt anxiety or depression.       Data review:     Recent Results (from the past 24 hour(s))   CBC W/O DIFF    Collection Time: 05/09/18  2:53 AM   Result Value Ref Range    WBC 7.4 4.1 - 11.1 K/uL    RBC 3.79 (L) 4.10 - 5.70 M/uL    HGB 11.3 (L) 12.1 - 17.0 g/dL    HCT 34.4 (L) 36.6 - 50.3 %    MCV 90.8 80.0 - 99.0 FL    MCH 29.8 26.0 - 34.0 PG    MCHC 32.8 30.0 - 36.5 g/dL    RDW 14.6 (H) 11.5 - 14.5 %    PLATELET 748 290 - 350 K/uL    MPV 9.1 8.9 - 12.9 FL    NRBC 0.0 0  WBC    ABSOLUTE NRBC 0.00 0.00 - 5.09 K/uL   METABOLIC PANEL, BASIC    Collection Time: 05/09/18  2:53 AM   Result Value Ref Range    Sodium 139 136 - 145 mmol/L    Potassium 4.1 3.5 - 5.1 mmol/L    Chloride 103 97 - 108 mmol/L    CO2 33 (H) 21 - 32 mmol/L    Anion gap 3 (L) 5 - 15 mmol/L    Glucose 134 (H) 65 - 100 mg/dL    BUN 22 (H) 6 - 20 MG/DL    Creatinine 0.67 (L) 0.70 - 1.30 MG/DL    BUN/Creatinine ratio 33 (H) 12 - 20      GFR est AA >60 >60 ml/min/1.73m2    GFR est non-AA >60 >60 ml/min/1.73m2    Calcium 8.0 (L) 8.5 - 10.1 MG/DL     EK18: Sinus tachycardia. Left axis deviation   Right bundle branch block         Imaging:  I have personally reviewed the patients radiographs and have reviewed the reports:  18: Ct of chest: IMPRESSION:  1. No evidence for pulmonary emboli. 2. Findings consistent with bilateral lower lobe airways infection with mucus  and debris within the airways and small peripheral opacities and tree-in-bud  opacities. This is likely due to bilateral aspiration. Left lower lobe is more  affected than the right. 3. Patient status post gastric pull-through procedure.   4. There is severe emphysema        Jeny Busby MD

## 2018-05-09 NOTE — ROUTINE PROCESS
Bedside, Verbal and Written shift change report given to Zahira Paniter (oncoming nurse) by Gi Corley RN (offgoing nurse). Report included the following information SBAR, Kardex, MAR and Recent Results.

## 2018-05-09 NOTE — PROGRESS NOTES
General Surgery End of Shift Nursing Note    Bedside shift change report given to Salud Rodriguez RN (oncoming nurse) by Luisa Ferguson RN (offgoing nurse). Report included the following information SBAR, Kardex, Intake/Output, MAR, Recent Results and Cardiac Rhythm NSR. Shift worked:   7p-7a   Summary of shift:    Patient maintained oxygen sat > 92% and decreased to 5L NC   Issues for physician to address:        Number times ambulated in hallway past shift: 3    Number of times OOB to chair past shift: 0    Pain Management:  Current medication: N/A  Patient states pain is manageable on current pain medication: N/A    GI:    Current diet:  DIET CARDIAC Regular  DIET SNACKS AM Snack, PM Snack, HS Snack  DIET NUTRITIONAL SUPPLEMENTS No; Lunch, Dinner; Magic Cups  DIET ONE TIME MESSAGE    Tolerating current diet: YES  Passing flatus: YES  Last Bowel Movement: yesterday      Respiratory:    Incentive Spirometer at bedside: YES    Patient Safety:    Falls Score: 1  Bed Alarm On? No  Sitter?  No    Iraj Tomas
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
## 2018-05-10 LAB
ANION GAP SERPL CALC-SCNC: 2 MMOL/L (ref 5–15)
BUN SERPL-MCNC: 16 MG/DL (ref 6–20)
BUN/CREAT SERPL: 25 (ref 12–20)
CALCIUM SERPL-MCNC: 8.3 MG/DL (ref 8.5–10.1)
CHLORIDE SERPL-SCNC: 102 MMOL/L (ref 97–108)
CO2 SERPL-SCNC: 38 MMOL/L (ref 21–32)
CREAT SERPL-MCNC: 0.65 MG/DL (ref 0.7–1.3)
ERYTHROCYTE [DISTWIDTH] IN BLOOD BY AUTOMATED COUNT: 14.6 % (ref 11.5–14.5)
GLUCOSE SERPL-MCNC: 86 MG/DL (ref 65–100)
HCT VFR BLD AUTO: 37.2 % (ref 36.6–50.3)
HGB BLD-MCNC: 11.9 G/DL (ref 12.1–17)
MCH RBC QN AUTO: 29.2 PG (ref 26–34)
MCHC RBC AUTO-ENTMCNC: 32 G/DL (ref 30–36.5)
MCV RBC AUTO: 91.4 FL (ref 80–99)
NRBC # BLD: 0 K/UL (ref 0–0.01)
NRBC BLD-RTO: 0 PER 100 WBC
PLATELET # BLD AUTO: 352 K/UL (ref 150–400)
PMV BLD AUTO: 9.3 FL (ref 8.9–12.9)
POTASSIUM SERPL-SCNC: 3.7 MMOL/L (ref 3.5–5.1)
RBC # BLD AUTO: 4.07 M/UL (ref 4.1–5.7)
SODIUM SERPL-SCNC: 142 MMOL/L (ref 136–145)
WBC # BLD AUTO: 7.6 K/UL (ref 4.1–11.1)

## 2018-05-10 PROCEDURE — 74011250637 HC RX REV CODE- 250/637: Performed by: INTERNAL MEDICINE

## 2018-05-10 PROCEDURE — 85027 COMPLETE CBC AUTOMATED: CPT | Performed by: INTERNAL MEDICINE

## 2018-05-10 PROCEDURE — 74011250636 HC RX REV CODE- 250/636: Performed by: INTERNAL MEDICINE

## 2018-05-10 PROCEDURE — 74011000258 HC RX REV CODE- 258: Performed by: EMERGENCY MEDICINE

## 2018-05-10 PROCEDURE — 77010033678 HC OXYGEN DAILY

## 2018-05-10 PROCEDURE — 65660000000 HC RM CCU STEPDOWN

## 2018-05-10 PROCEDURE — 94640 AIRWAY INHALATION TREATMENT: CPT

## 2018-05-10 PROCEDURE — 74011000250 HC RX REV CODE- 250: Performed by: INTERNAL MEDICINE

## 2018-05-10 PROCEDURE — 74011250636 HC RX REV CODE- 250/636: Performed by: EMERGENCY MEDICINE

## 2018-05-10 PROCEDURE — 74011636637 HC RX REV CODE- 636/637: Performed by: INTERNAL MEDICINE

## 2018-05-10 PROCEDURE — 80048 BASIC METABOLIC PNL TOTAL CA: CPT | Performed by: INTERNAL MEDICINE

## 2018-05-10 PROCEDURE — 36415 COLL VENOUS BLD VENIPUNCTURE: CPT | Performed by: INTERNAL MEDICINE

## 2018-05-10 RX ORDER — PREDNISONE 20 MG/1
60 TABLET ORAL
Status: DISCONTINUED | OUTPATIENT
Start: 2018-05-10 | End: 2018-05-11 | Stop reason: HOSPADM

## 2018-05-10 RX ORDER — IPRATROPIUM BROMIDE AND ALBUTEROL SULFATE 2.5; .5 MG/3ML; MG/3ML
3 SOLUTION RESPIRATORY (INHALATION)
Status: DISCONTINUED | OUTPATIENT
Start: 2018-05-10 | End: 2018-05-11 | Stop reason: HOSPADM

## 2018-05-10 RX ORDER — ENOXAPARIN SODIUM 100 MG/ML
30 INJECTION SUBCUTANEOUS EVERY 24 HOURS
Status: DISCONTINUED | OUTPATIENT
Start: 2018-05-10 | End: 2018-05-11 | Stop reason: HOSPADM

## 2018-05-10 RX ADMIN — BUDESONIDE AND FORMOTEROL FUMARATE DIHYDRATE 2 PUFF: 160; 4.5 AEROSOL RESPIRATORY (INHALATION) at 21:28

## 2018-05-10 RX ADMIN — FUROSEMIDE 20 MG: 20 TABLET ORAL at 08:45

## 2018-05-10 RX ADMIN — GUAIFENESIN 600 MG: 600 TABLET, EXTENDED RELEASE ORAL at 21:27

## 2018-05-10 RX ADMIN — GUAIFENESIN 600 MG: 600 TABLET, EXTENDED RELEASE ORAL at 08:44

## 2018-05-10 RX ADMIN — ENOXAPARIN SODIUM 30 MG: 30 INJECTION SUBCUTANEOUS at 21:27

## 2018-05-10 RX ADMIN — Medication 10 ML: at 19:42

## 2018-05-10 RX ADMIN — IPRATROPIUM BROMIDE AND ALBUTEROL SULFATE 3 ML: .5; 3 SOLUTION RESPIRATORY (INHALATION) at 03:00

## 2018-05-10 RX ADMIN — AZITHROMYCIN MONOHYDRATE 500 MG: 500 INJECTION, POWDER, LYOPHILIZED, FOR SOLUTION INTRAVENOUS at 20:20

## 2018-05-10 RX ADMIN — Medication 1 CAPSULE: at 08:45

## 2018-05-10 RX ADMIN — IPRATROPIUM BROMIDE AND ALBUTEROL SULFATE 3 ML: .5; 3 SOLUTION RESPIRATORY (INHALATION) at 13:14

## 2018-05-10 RX ADMIN — PREDNISONE 60 MG: 20 TABLET ORAL at 08:44

## 2018-05-10 RX ADMIN — DIAZEPAM 2.5 MG: 5 TABLET ORAL at 22:54

## 2018-05-10 RX ADMIN — IPRATROPIUM BROMIDE AND ALBUTEROL SULFATE 3 ML: .5; 3 SOLUTION RESPIRATORY (INHALATION) at 20:53

## 2018-05-10 RX ADMIN — ASPIRIN 81 MG 81 MG: 81 TABLET ORAL at 08:44

## 2018-05-10 RX ADMIN — BUDESONIDE AND FORMOTEROL FUMARATE DIHYDRATE 2 PUFF: 160; 4.5 AEROSOL RESPIRATORY (INHALATION) at 06:48

## 2018-05-10 RX ADMIN — MONTELUKAST SODIUM 10 MG: 10 TABLET, FILM COATED ORAL at 21:27

## 2018-05-10 RX ADMIN — CEFTRIAXONE SODIUM 2 G: 2 INJECTION, POWDER, FOR SOLUTION INTRAMUSCULAR; INTRAVENOUS at 19:41

## 2018-05-10 RX ADMIN — Medication 10 ML: at 21:27

## 2018-05-10 RX ADMIN — PANTOPRAZOLE SODIUM 40 MG: 40 TABLET, DELAYED RELEASE ORAL at 06:48

## 2018-05-10 RX ADMIN — IPRATROPIUM BROMIDE AND ALBUTEROL SULFATE 3 ML: .5; 3 SOLUTION RESPIRATORY (INHALATION) at 07:20

## 2018-05-10 NOTE — PROGRESS NOTES
7am-7pm    Received report from george MARIEE    Patient was in chair reading    No complaints    Added humidification to O2 Nasal Cannula. Home O2 test done by RT.  95% Nasal Cannula 4 litres  91% sitting room air  85% walking

## 2018-05-10 NOTE — PROGRESS NOTES
ADULT PROTOCOL: JET AEROSOL ASSESSMENT    Patient  Ruchi Cramer     63 y.o.   male     5/10/2018  9:48 AM    Breath Sounds Pre Procedure: Right Breath Sounds: Clear                               Left Breath Sounds: Clear    Breath Sounds Post Procedure: Right Breath Sounds: Clear                                 Left Breath Sounds: Clear    Breathing pattern: Pre procedure Breathing Pattern: Regular          Post procedure Breathing Pattern: Regular    Heart Rate: Pre procedure Pulse: 82           Post procedure Pulse: 80    Resp Rate: Pre procedure Respirations: 16           Post procedure Respirations: 16            Cough: Pre procedure Cough: Non-productive               Post procedure Cough: Non-productive      Oxygen: O2 Device: Nasal cannula   Flow rate (L/min) 3.5 lpm     Changed: NO    SpO2: Pre procedure SpO2: 95 %   with oxygen              Post procedure SpO2: 92 %  with oxygen    Nebulizer Therapy: Current medications Aerosolized Medications: DuoNeb      Changed: YES/ Changed to Q6 and PRN    Smoking History: current smoker    Problem List:   Patient Active Problem List   Diagnosis Code    Epstein's esophagus K22.70    [facility] C15.9    Dysphagia R13.10    Esophageal obstruction K22.2    Weight loss R63.4    GI bleed K92.2    COPD (chronic obstructive pulmonary disease) ([facility] J44.9    HTN (hypertension) I10    Respiratory failure ([facility] J96.90       Respiratory Therapist: RT Gabino

## 2018-05-10 NOTE — PROGRESS NOTES
[facility]  Pulmonary, Critical Care, and Sleep Medicine    Patient Consult    Name: Mundo Dominguez MRN: [id number]   : 1955 Hospital: [address] [facility]   Date: 5/10/2018        IMPRESSION:   · COPD with acute exacerbation seems to be improving. · Acute Hypoxic Respiratory Failure-will need to assess for home oxygen needs. · Bibasilar Pneumonia on appropriate therapy. · Hx of esophageal CA s/p surgery for treatment, Occurred about . S/p esophajectomy and gastric pull through. · Anemia: hgb of 11.3.   · Had recurrent esophageal strictures  · Smoker-we discussed smoking cessation. · Anxiety on Valium as needed. · Increased lactic acidosis on arrival.   · Nonischemic Cardiomyopathy  · Malnutrition  · GERD  · Possible aspiration events  · Discussed with Dr. Arianna Chan. RECOMMENDATIONS:   · Will as PT and OT to assess. · Assess for home oxygen needs   · Smoking cessation, on nicotine patch. · Will need complete PFTs as an outpt. · Will need to assess for home oxygen needs. Try to wean as much as possible. Subjective: Today: Seems to be more comfortable this am. Notes a mainly dry cough. NO chest pain, no back pain, no leg pain. Tolerated eating breakfast pretty well. NO Sinus pain or pressure. He slept ok last pm.         This patient has been seen and evaluated at the request of Dr. Arianna Chan for above. Patient is a 63 y.o. male   Who presents for evaluation of above. He has hx of esophageal Ca. COPD, Arthritis. Thyroid disease. Has been having shortness of breath for 3 weeks. Was dx with the flu about 3-4 weeks ago. Has a productive cough of thick green sputum. While being evaluated by his PCP was noted to be hypoxic on RA. ON day of admission was feeling worse. IN ER he was noted to have pox of 81% on RA at rest.     The last 3 weeks have very difficult for pt. He was dx with the flu about 4 weeks ago.  Then was getting better, suddenly worsened again, was note to have increased sputum production. Noted to have increased fatigue, felt poorly and then started have left lower chest and back pain. This is usually the first sign of pneumonia. He started have poor energy levels and finally could not take it any more so came in for evaluation. He has a cough. Has some GERD. He reports he was treated with steroids for about 10-14 days. Initially when he go sick he thought is was allergies, but then started feeling more poorly. He had been treated as an outpt with Anoro. He felt like the anoro was doing pretty well. Then most recently went back to Spiriva and Symbicort. Has an albuterol that he uses as needed. He denied any fevers, chills, sweats, aspiration. NO issues with diarrhea. Was a smoker of 1ppd. He uses marijuana, ?.   NO etoh. . Recently lost his mother. He went to [facility]  and felt run down from that. He denies any aspiration. Has been on disability for a long while due to a viral cardiomyopathy. Denies any recent weight changes.      Past Medical History:   Diagnosis Date    Arthritis     osteoarthritis    Asthma     Cancer ([facility]     esophagus    Cancer of esophagus ([facility] 2012    adenocarcinoma; Dr. Kim Reece Chronic pain     lower back, has crushed vertebrae    COPD     Essential hypertension     GERD (gastroesophageal reflux disease)     Hypertension     not on medication    Nonischemic cardiomyopathy ([address]      EF 35-50%; negative cath, 2012 EF 55%; Dr. Priya Villafuerte Other ill-defined conditions(799.89)     Epstein's esophagus    Other ill-defined conditions(799.89)     esophageal nodule - pre-cancerous    Other ill-defined conditions(799.89)     pneumonia multiple times    Psychiatric disorder     depression    Seizures ([facility]     caused with allergic reaction from Aleve    Thyroid disease     Unspecified sleep apnea     c-pap-doesnt wear now/apnea resolved after surgery/lost weight and no sleep apnea      Past Surgical History:   Procedure Laterality Date    COLONOSCOPY N/A 1/11/2017    COLONOSCOPY performed by [facility]ullen MD at [facility] ENDOSCOPY    COLONOSCOPY N/A 1/13/2017    COLONOSCOPY er 26 performed by Jessica Mullen MD at [facility] ENDOSCOPY    COLONOSCOPY,FLEX,W/CONTROL, BLEEDING  1/13/2017         COLONOSCOPY,REMV LESN,SNARE  1/11/2017         COLORECTAL SCRN; HI RISK IND  1/11/2017         HX CHOLECYSTECTOMY      HX GI  4/2/12    ESOPHAGOGASTRECTOMY    HX HEART CATHETERIZATION      HX OTHER SURGICAL      surgery to remove nodule and barrets esophagus    GA COLSC FLX W/RMVL OF TUMOR POLYP LESION SNARE TQ  12/30/2011    colon x 3     GA EGD BALLOON DILATION ESOPHAGUS <30 MM DIAM  6/20/2012         GA EGD BALLOON DILATION ESOPHAGUS <30 MM DIAM  7/5/2012         GA EGD BALLOON DILATION ESOPHAGUS <30 MM DIAM  7/24/2012         GA EGD BALLOON DILATION ESOPHAGUS <30 MM DIAM  8/28/2012         GA EGD BALLOON DILATION ESOPHAGUS <30 MM DIAM  9/24/2012         GA EGD BALLOON DILATION ESOPHAGUS <30 MM DIAM  11/6/2012         GA EGD BALLOON DILATION ESOPHAGUS <30 MM DIAM  3/4/2013         GA EGD BALLOON DILATION ESOPHAGUS <30 MM DIAM  4/26/2013         GA EGD BALLOON DILATION ESOPHAGUS <30 MM DIAM  7/31/2013         GA EGD BALLOON DILATION ESOPHAGUS <30 MM DIAM  9/25/2013         GA EGD TRANSORAL BIOPSY SINGLE/MULTIPLE  12/30/2011         GA EGD TRANSORAL BIOPSY SINGLE/MULTIPLE  8/28/2012         GA EGD TRANSORAL BIOPSY SINGLE/MULTIPLE  11/6/2012         GA EGD TRANSORAL BIOPSY SINGLE/MULTIPLE  3/4/2013         GA EGD TRANSORAL BIOPSY SINGLE/MULTIPLE  4/30/2013         UPPER GI ENDOSCOPY,BALL DIL,30MM  5/29/2015         UPPER GI ENDOSCOPY,BIOPSY  5/29/2015         UPPER GI ENDOSCOPY,BIOPSY  1/11/2017         UPPER GI ENDOSCOPY,STENT PLACEMENT  4/23/2013           Prior to Admission medications    Medication Sig Start Date End Date Taking? Authorizing Provider   tiotropium (SPIRIVA WITH HANDIHALER) 18 mcg inhalation capsule Take 1 Cap by inhalation daily. Yes Phys Other, MD   budesonide-formoterol (SYMBICORT) 160-4.5 mcg/actuation HFAA Take 2 Puffs by inhalation two (2) times a day. Yes Phys Other, MD   omeprazole (PRILOSEC) 20 mg capsule Take 20 mg by mouth daily. Yes Historical Provider   aspirin 81 mg tablet Take 81 mg by mouth daily. Yes Historical Provider   albuterol (PROVENTIL, VENTOLIN) 90 mcg/Actuation inhaler Take 2 Puffs by inhalation every six (6) hours as needed. Yes Historical Provider   diazepam (VALIUM) 10 mg tablet Take 10 mg by mouth nightly. Yes Historical Provider   HYDROcodone-acetaminophen (NORCO) 5-325 mg per tablet Take 1 Tab by mouth daily as needed.  Indications: PAIN    Historical Provider     Allergies   Allergen Reactions    Aleve [Naproxen Sodium] Anaphylaxis     Hives, passed out, had seizure    Sulfa (Sulfonamide Antibiotics) Hives      Social History   Substance Use Topics    Smoking status: Current Every Day Smoker     Packs/day: 1.00     Years: 40.00     Types: Cigarettes    Smokeless tobacco: Never Used      Comment: cigarettes 2-3 daily and using e cig    Alcohol use No      Family History   Problem Relation Age of Onset    COPD Mother     Asthma Mother     Colon Cancer Mother     Other Mother      food allergies/Steffany's disease/Epstein's esophagus    Heart Attack Mother     Heart Disease Mother     Cancer Mother      colon    Other Father      abestos/food allergies/degernative disc disease    Colon Cancer Maternal Grandmother         Current Facility-Administered Medications   Medication Dose Route Frequency    predniSONE (DELTASONE) tablet 60 mg  60 mg Oral DAILY WITH BREAKFAST    albuterol-ipratropium (DUO-NEB) 2.5 MG-0.5 MG/3 ML  3 mL Nebulization Q6H RT    montelukast (SINGULAIR) tablet 10 mg  10 mg Oral QHS    lactobac ac& pc-s.therm-b.anim (FAINA Q/RISAQUAD)  1 Cap Oral DAILY    aspirin chewable tablet 81 mg  81 mg Oral DAILY    pantoprazole (PROTONIX) tablet 40 mg  40 mg Oral ACB    furosemide (LASIX) tablet 20 mg  20 mg Oral DAILY    budesonide-formoterol (SYMBICORT) 160-4.5 mcg/actuation HFA inhaler 2 Puff (Patient Supplied)  2 Puff Inhalation BID    tiotropium (SPIRIVA) inhalation capsule 18 mcg (Patient Supplied)  1 Cap Inhalation DAILY    cefTRIAXone (ROCEPHIN) 2 g in 0.9% sodium chloride (MBP/ADV) 50 mL  2 g IntraVENous Q24H    azithromycin (ZITHROMAX) 500 mg in 0.9% sodium chloride (MBP/ADV) 250 mL  500 mg IntraVENous Q24H    guaiFENesin ER (MUCINEX) tablet 600 mg  600 mg Oral Q12H    sodium chloride (NS) flush 5-10 mL  5-10 mL IntraVENous Q8H    enoxaparin (LOVENOX) injection 40 mg  40 mg SubCUTAneous Q24H    nicotine (NICODERM CQ) 21 mg/24 hr patch 1 Patch  1 Patch TransDERmal DAILY       Review of Systems:  Constitutional: positive for fatigue and malaise  Eyes: negative  Ears, nose, mouth, throat, and face: positive for nasal congestion  Respiratory: positive for cough, sputum, pleurisy/chest pain, pneumonia, wheezing, dyspnea on exertion or chronic bronchitis  Cardiovascular: positive for chest pain, dyspnea, fatigue, paroxysmal nocturnal dyspnea, dyspnea on exertion  Gastrointestinal: positive for dysphagia and reflux symptoms  Genitourinary:negative  Integument/breast: negative  Hematologic/lymphatic: negative  Musculoskeletal:negative  Neurological: negative  Behavioral/Psych: negative  Endocrine: negative  Allergic/Immunologic: negative    Objective:   Vital Signs:    Visit Vitals    /84 (BP 1 Location: Right arm, BP Patient Position: At rest)    Pulse 74    Temp 97.4 °F (36.3 °C)    Resp 16    Ht 5' 11\" (1.803 m)    Wt 59.7 kg (131 lb 9.8 oz)    SpO2 94%    BMI 18.36 kg/m2       O2 Device: Nasal cannula   O2 Flow Rate (L/min): 3.5 l/min   Temp (24hrs), Av.6 °F (36.4 °C), Min:97.3 °F (36.3 °C), Max:98 °F (36.7 °C) Intake/Output:   Last shift:         Last 3 shifts: 05/08 1901 - 05/10 0700  In: 1680 [P.O.:1080; I.V.:600]  Out: -     Intake/Output Summary (Last 24 hours) at 05/10/18 1045  Last data filed at 05/09/18 2335   Gross per 24 hour   Intake              300 ml   Output                0 ml   Net              300 ml      Physical Exam:   General:  Alert, cooperative, no distress, appears stated age. Sitting up in chair eating breakfast. ON NC oxygen. , no distress. Head:  Normocephalic, without obvious abnormality, atraumatic. Eyes:  Conjunctivae/corneas clear. PERRL, EOMs intact. Nose: Nares normal. Septum midline. Mucosa normal. No drainage or sinus tenderness. Throat: Lips, mucosa, and tongue normal. Teeth and gums normal.   Neck: Supple, symmetrical, trachea midline, no adenopathy, thyroid: no enlargment/tenderness/nodules, no carotid bruit and no JVD. Back:   Symmetric, no curvature. ROM normal.   Lungs:   Clear to auscultation bilaterally. Decreased air movement but clear. Chest wall:  No tenderness or deformity. Heart:  Regular rate and rhythm, S1, S2 normal, no murmur, click, rub or gallop. Abdomen:   Soft, non-tender. Bowel sounds normal. No masses,  No organomegaly. Extremities: Extremities normal, atraumatic, no cyanosis or edema. Pulses: 2+ and symmetric all extremities. Skin: Skin color, texture, turgor normal. No rashes or lesions   Lymph nodes: Cervical, supraclavicular, and axillary nodes normal.   Neurologic: Grossly nonfocal, Psych: NO overt anxiety or depression.       Data review:     Recent Results (from the past 24 hour(s))   CBC W/O DIFF    Collection Time: 05/10/18  3:16 AM   Result Value Ref Range    WBC 7.6 4.1 - 11.1 K/uL    RBC 4.07 (L) 4.10 - 5.70 M/uL    HGB 11.9 (L) 12.1 - 17.0 g/dL    HCT 37.2 36.6 - 50.3 %    MCV 91.4 80.0 - 99.0 FL    MCH 29.2 26.0 - 34.0 PG    MCHC 32.0 30.0 - 36.5 g/dL    RDW 14.6 (H) 11.5 - 14.5 %    PLATELET 478 022 - 237 K/uL    MPV 9.3 8.9 - 12.9 FL    NRBC 0.0 0  WBC    ABSOLUTE NRBC 0.00 0.00 - 9.31 K/uL   METABOLIC PANEL, BASIC    Collection Time: 05/10/18  3:16 AM   Result Value Ref Range    Sodium 142 136 - 145 mmol/L    Potassium 3.7 3.5 - 5.1 mmol/L    Chloride 102 97 - 108 mmol/L    CO2 38 (H) 21 - 32 mmol/L    Anion gap 2 (L) 5 - 15 mmol/L    Glucose 86 65 - 100 mg/dL    BUN 16 6 - 20 MG/DL    Creatinine 0.65 (L) 0.70 - 1.30 MG/DL    BUN/Creatinine ratio 25 (H) 12 - 20      GFR est AA >60 >60 ml/min/1.73m2    GFR est non-AA >60 >60 ml/min/1.73m2    Calcium 8.3 (L) 8.5 - 10.1 MG/DL     EK18: Sinus tachycardia. Left axis deviation   Right bundle branch block         Imaging:  I have personally reviewed the patients radiographs and have reviewed the reports:  18: Ct of chest: IMPRESSION:  1. No evidence for pulmonary emboli. 2. Findings consistent with bilateral lower lobe airways infection with mucus  and debris within the airways and small peripheral opacities and tree-in-bud  opacities. This is likely due to bilateral aspiration. Left lower lobe is more  affected than the right. 3. Patient status post gastric pull-through procedure.   4. There is severe emphysema        Thao Blanchard MD

## 2018-05-10 NOTE — PROGRESS NOTES
CM sent referral to [address] [place] [address] for O2 for home for patient. Fax for application sent. RAYNE spoke with Helena Han at [facility]. Fax received. Processing with insurance and will deliver in am if insurance approves.      Tunde Lan, MONAN, RN  [phone]

## 2018-05-10 NOTE — PROGRESS NOTES
Problem: Dysphagia (Adult)  Goal: *Acute Goals and Plan of Care (Insert Text)  Speech pathology goals  Initiated 5/9/2018  1. Patient will tolerate regular/thin liquid diet with no overt s/s aspiration within 7 days. MET    Reviewed chart and note low concern for silent aspiration and no need for MBS at this time. Discussed case with RN who reported continued excellent tolerance of regular diet. Further SLP treatment no longer indicated at this time. Will sign off. Please re-consult if further needs arise. Thank you.     Connor Mendoza., CCC-SLP

## 2018-05-10 NOTE — PROGRESS NOTES
Respiratory care note:  1145 sats 95%, HR 72 at rest on 4 lpm NC  NC removed. 1150 Sats 91%, HR 80 sitting at rest on Room Air. 1152 Patient walking a short distance. Sats 85%, HR 90 on Room air. Returned to sitting on side of bed.  NC replaced at 4 lpm.  1155 Sats 95%, HR 68 at rest on NC at 4 lpm.

## 2018-05-11 ENCOUNTER — HOME HEALTH ADMISSION (OUTPATIENT)
Dept: HOME HEALTH SERVICES | Facility: HOME HEALTH | Age: 63
End: 2018-05-11

## 2018-05-11 VITALS
BODY MASS INDEX: 19.07 KG/M2 | HEIGHT: 71 IN | RESPIRATION RATE: 18 BRPM | HEART RATE: 77 BPM | DIASTOLIC BLOOD PRESSURE: 80 MMHG | WEIGHT: 136.24 LBS | OXYGEN SATURATION: 96 % | TEMPERATURE: 98.1 F | SYSTOLIC BLOOD PRESSURE: 121 MMHG

## 2018-05-11 PROCEDURE — 94640 AIRWAY INHALATION TREATMENT: CPT

## 2018-05-11 PROCEDURE — 74011250637 HC RX REV CODE- 250/637: Performed by: INTERNAL MEDICINE

## 2018-05-11 PROCEDURE — 74011000250 HC RX REV CODE- 250: Performed by: INTERNAL MEDICINE

## 2018-05-11 PROCEDURE — 77010033678 HC OXYGEN DAILY

## 2018-05-11 PROCEDURE — 74011636637 HC RX REV CODE- 636/637: Performed by: INTERNAL MEDICINE

## 2018-05-11 RX ORDER — DIAZEPAM 5 MG/1
2.5 TABLET ORAL
Qty: 1 TAB | Refills: 0 | Status: SHIPPED
Start: 2018-05-11 | End: 2018-05-11

## 2018-05-11 RX ORDER — PREDNISONE 10 MG/1
60 TABLET ORAL
Qty: 27 TAB | Refills: 0 | Status: SHIPPED | OUTPATIENT
Start: 2018-05-11

## 2018-05-11 RX ORDER — GUAIFENESIN 600 MG/1
600 TABLET, EXTENDED RELEASE ORAL EVERY 12 HOURS
Qty: 20 TAB | Refills: 0 | Status: SHIPPED | OUTPATIENT
Start: 2018-05-11 | End: 2018-05-21

## 2018-05-11 RX ORDER — DIAZEPAM 5 MG/1
5 TABLET ORAL
Qty: 1 TAB | Refills: 0 | Status: SHIPPED | OUTPATIENT
Start: 2018-05-11 | End: 2018-05-18

## 2018-05-11 RX ORDER — MONTELUKAST SODIUM 10 MG/1
10 TABLET ORAL
Qty: 30 TAB | Refills: 0 | Status: SHIPPED | OUTPATIENT
Start: 2018-05-11

## 2018-05-11 RX ORDER — IBUPROFEN 200 MG
1 TABLET ORAL DAILY
Qty: 30 PATCH | Refills: 0 | Status: SHIPPED | OUTPATIENT
Start: 2018-05-11 | End: 2018-06-10

## 2018-05-11 RX ORDER — FUROSEMIDE 20 MG/1
20 TABLET ORAL
Qty: 30 TAB | Refills: 0 | Status: SHIPPED | OUTPATIENT
Start: 2018-05-11

## 2018-05-11 RX ORDER — CEFDINIR 300 MG/1
300 CAPSULE ORAL 2 TIMES DAILY
Qty: 4 CAP | Refills: 0 | Status: SHIPPED | OUTPATIENT
Start: 2018-05-11 | End: 2018-05-13

## 2018-05-11 RX ADMIN — IPRATROPIUM BROMIDE AND ALBUTEROL SULFATE 3 ML: .5; 3 SOLUTION RESPIRATORY (INHALATION) at 01:33

## 2018-05-11 RX ADMIN — Medication 1 CAPSULE: at 07:54

## 2018-05-11 RX ADMIN — IPRATROPIUM BROMIDE AND ALBUTEROL SULFATE 3 ML: .5; 3 SOLUTION RESPIRATORY (INHALATION) at 07:57

## 2018-05-11 RX ADMIN — BUDESONIDE AND FORMOTEROL FUMARATE DIHYDRATE 2 PUFF: 160; 4.5 AEROSOL RESPIRATORY (INHALATION) at 07:53

## 2018-05-11 RX ADMIN — GUAIFENESIN 600 MG: 600 TABLET, EXTENDED RELEASE ORAL at 07:54

## 2018-05-11 RX ADMIN — ASPIRIN 81 MG 81 MG: 81 TABLET ORAL at 07:55

## 2018-05-11 RX ADMIN — Medication 10 ML: at 05:47

## 2018-05-11 RX ADMIN — PANTOPRAZOLE SODIUM 40 MG: 40 TABLET, DELAYED RELEASE ORAL at 07:54

## 2018-05-11 RX ADMIN — PREDNISONE 60 MG: 20 TABLET ORAL at 08:00

## 2018-05-11 NOTE — PROGRESS NOTES
Occupational Therapy  Chart reviewed. Pt is discharging home today. Per RN, pt is up ad lexie. Scheduled to have to [facility] services. Will sign off.  Jignesh Chen, OT

## 2018-05-11 NOTE — PROGRESS NOTES
[facility]  Pulmonary, Critical Care, and Sleep Medicine    Patient Consult    Name: Lucia Childs MRN: [id number]   : 1955 Hospital: [facility]   Date: 2018        IMPRESSION:   · COPD with acute exacerbation seems to be improving. Agree with discharge today. · Acute Hypoxic Respiratory Failure-Home o2 set up. · Bibasilar Pneumonia on appropriate therapy. · Hx of esophageal CA s/p surgery for treatment, Occurred about . S/p esophajectomy and gastric pull through. · Anemia: hgb of 11.3.   · Had recurrent esophageal strictures  · Smoker-we discussed smoking cessation. · Anxiety on Valium as needed. · Increased lactic acidosis on arrival.   · Nonischemic Cardiomyopathy  · Malnutrition  · GERD  · Possible aspiration events  · Discussed with Dr. Miller Dodge. RECOMMENDATIONS:   · Home oxygen set up. · He may want to try trelegy as an outpt. Will be sent home on Symbicort, Spiriva. He likes anoro. · Smoking cessation, on nicotine patch. · Will need complete PFTs as an outpt. · Will see in office in about 2-3 weeks with me. Subjective: Today:  He is ready to go home. NO chest pain, no back pain, no headaches and no sinus pain or pressure. Tolerating po intake pretty well. NO issues last pm.         This patient has been seen and evaluated at the request of Dr. Miller Dodge for above. Patient is a 63 y.o. male   Who presents for evaluation of above. He has hx of esophageal Ca. COPD, Arthritis. Thyroid disease. Has been having shortness of breath for 3 weeks. Was dx with the flu about 3-4 weeks ago. Has a productive cough of thick green sputum. While being evaluated by his PCP was noted to be hypoxic on RA. ON day of admission was feeling worse. IN ER he was noted to have pox of 81% on RA at rest.     The last 3 weeks have very difficult for pt. He was dx with the flu about 4 weeks ago.  Then was getting better, suddenly worsened again, was note to have increased sputum production. Noted to have increased fatigue, felt poorly and then started have left lower chest and back pain. This is usually the first sign of pneumonia. He started have poor energy levels and finally could not take it any more so came in for evaluation. He has a cough. Has some GERD. He reports he was treated with steroids for about 10-14 days. Initially when he go sick he thought is was allergies, but then started feeling more poorly. He had been treated as an outpt with Anoro. He felt like the anoro was doing pretty well. Then most recently went back to Spiriva and Symbicort. Has an albuterol that he uses as needed. He denied any fevers, chills, sweats, aspiration. NO issues with diarrhea. Was a smoker of 1ppd. He uses marijuana, ?.   NO etoh. . Recently lost his mother. He went to [facility] for her  and felt run down from that. He denies any aspiration. Has been on disability for a long while due to a viral cardiomyopathy. Denies any recent weight changes.      Past Medical History:   Diagnosis Date    Arthritis     osteoarthritis    Asthma     Cancer [facility]     esophagus    Cancer of esophagus ([facility] 2012    adenocarcinoma; Dr. Freddy Flannery Chronic pain     lower back, has crushed vertebrae    COPD     Essential hypertension     GERD (gastroesophageal reflux disease)     Hypertension     not on medication    Nonischemic cardiomyopathy ([facility]      EF 35-50%; negative cath, 2012 EF 55%; Dr. Francisco Javier Parker Other ill-defined conditions(799.89)     Epstein's esophagus    Other ill-defined conditions(799.89)     esophageal nodule - pre-cancerous    Other ill-defined conditions(799.89)     pneumonia multiple times    Psychiatric disorder     depression    Seizures ([facility]     caused with allergic reaction from Aleve    Thyroid disease     Unspecified sleep apnea     c-pap-doesnt wear now/apnea resolved after surgery/lost weight and no sleep apnea      Past Surgical History:   Procedure Laterality Date    COLONOSCOPY N/A 1/11/2017    COLONOSCOPY performed by María Bartlett MD at [facility] ENDOSCOPY    COLONOSCOPY N/A 1/13/2017    COLONOSCOPY er 26 performed by María Bartlett MD at [facility] ENDOSCOPY    COLONOSCOPY,FLEX,W/CONTROL, BLEEDING  1/13/2017         COLONOSCOPY,REMV LESN,SNARE  1/11/2017         COLORECTAL SCRN; HI RISK IND  1/11/2017         HX CHOLECYSTECTOMY      HX GI  4/2/12    ESOPHAGOGASTRECTOMY    HX HEART CATHETERIZATION      HX OTHER SURGICAL      surgery to remove nodule and barrets esophagus    DC COLSC FLX W/RMVL OF TUMOR POLYP LESION SNARE TQ  12/30/2011    colon x 3     DC EGD BALLOON DILATION ESOPHAGUS <30 MM DIAM  6/20/2012         DC EGD BALLOON DILATION ESOPHAGUS <30 MM DIAM  7/5/2012         DC EGD BALLOON DILATION ESOPHAGUS <30 MM DIAM  7/24/2012         DC EGD BALLOON DILATION ESOPHAGUS <30 MM DIAM  8/28/2012         DC EGD BALLOON DILATION ESOPHAGUS <30 MM DIAM  9/24/2012         DC EGD BALLOON DILATION ESOPHAGUS <30 MM DIAM  11/6/2012         DC EGD BALLOON DILATION ESOPHAGUS <30 MM DIAM  3/4/2013         DC EGD BALLOON DILATION ESOPHAGUS <30 MM DIAM  4/26/2013         DC EGD BALLOON DILATION ESOPHAGUS <30 MM DIAM  7/31/2013         DC EGD BALLOON DILATION ESOPHAGUS <30 MM DIAM  9/25/2013         DC EGD TRANSORAL BIOPSY SINGLE/MULTIPLE  12/30/2011         DC EGD TRANSORAL BIOPSY SINGLE/MULTIPLE  8/28/2012         DC EGD TRANSORAL BIOPSY SINGLE/MULTIPLE  11/6/2012         DC EGD TRANSORAL BIOPSY SINGLE/MULTIPLE  3/4/2013         DC EGD TRANSORAL BIOPSY SINGLE/MULTIPLE  4/30/2013         UPPER GI ENDOSCOPY,BALL DIL,30MM  5/29/2015         UPPER GI ENDOSCOPY,BIOPSY  5/29/2015         UPPER GI ENDOSCOPY,BIOPSY  1/11/2017         UPPER GI ENDOSCOPY,STENT PLACEMENT  4/23/2013           Prior to Admission medications    Medication Sig Start Date End Date Taking?  Authorizing Provider   guaiFENesin ER (MUCINEX) 600 mg ER tablet Take 1 Tab by mouth every twelve (12) hours for 10 days. 5/11/18 5/21/18 Yes Leonid Woo MD   LLeonela acidoph & paracasei- S therm- Bifido (AFINA-Q/RISAQUAD) 8 billion cell cap cap Take 1 Cap by mouth daily for 14 days. An over the counter probiotic may be substituted in place of this Rx. 5/11/18 5/25/18 Yes Leonid Woo MD   montelukast (SINGULAIR) 10 mg tablet Take 1 Tab by mouth nightly. 5/11/18  Yes Leonid Woo MD   nicotine (NICODERM CQ) 21 mg/24 hr 1 Patch by TransDERmal route daily for 30 days. 5/11/18 6/10/18 Yes Leonid Woo MD   predniSONE (DELTASONE) 10 mg tablet Take 6 Tabs by mouth daily (with breakfast). Do this for 1 day, then decrease to 40 mg every day x3d, then decrease to 20 mg every day x3d, then decrease to 10 mg daily x3d, then stop 5/11/18  Yes Leonid Woo MD   umeclidinium-vilanterol (ANORO ELLIPTA) 62.5-25 mcg/actuation inhaler Take 1 Puff by inhalation daily. 5/11/18  Yes Leonid Woo MD   cefdinir (OMNICEF) 300 mg capsule Take 1 Cap by mouth two (2) times a day for 2 days. 5/11/18 5/13/18 Yes Leonid Woo MD   diazePAM (VALIUM) 5 mg tablet Take 1 Tab by mouth nightly as needed for Anxiety for up to 7 days. Max Daily Amount: 5 mg. 5/11/18 5/18/18 Yes Leonid Woo MD   furosemide (LASIX) 20 mg tablet Take 1 Tab by mouth daily as needed. For swelling 5/11/18  Yes Leonid Woo MD   tiotropium (SPIRIVA WITH HANDIHALER) 18 mcg inhalation capsule Take 1 Cap by inhalation daily. Yes Risa Evans MD   budesonide-formoterol (SYMBICORT) 160-4.5 mcg/actuation HFAA Take 2 Puffs by inhalation two (2) times a day. Yes Risa Evans MD   omeprazole (PRILOSEC) 20 mg capsule Take 20 mg by mouth daily. Yes Historical Provider   aspirin 81 mg tablet Take 81 mg by mouth daily. Yes Historical Provider   albuterol (PROVENTIL, VENTOLIN) 90 mcg/Actuation inhaler Take 2 Puffs by inhalation every six (6) hours as needed. Yes Historical Provider   HYDROcodone-acetaminophen (NORCO) 5-325 mg per tablet Take 1 Tab by mouth daily as needed.  Indications: PAIN    Historical Provider     Allergies   Allergen Reactions    Aleve [Naproxen Sodium] Anaphylaxis     Hives, passed out, had seizure    Sulfa (Sulfonamide Antibiotics) Hives      Social History   Substance Use Topics    Smoking status: Current Every Day Smoker     Packs/day: 1.00     Years: 40.00     Types: Cigarettes    Smokeless tobacco: Never Used      Comment: cigarettes 2-3 daily and using e cig    Alcohol use No      Family History   Problem Relation Age of Onset    COPD Mother     Asthma Mother     Colon Cancer Mother     Other Mother      food allergies/Steffany's disease/Epstein's esophagus    Heart Attack Mother     Heart Disease Mother     Cancer Mother      colon    Other Father      abestos/food allergies/degernative disc disease    Colon Cancer Maternal Grandmother         Current Facility-Administered Medications   Medication Dose Route Frequency    predniSONE (DELTASONE) tablet 60 mg  60 mg Oral DAILY WITH BREAKFAST    albuterol-ipratropium (DUO-NEB) 2.5 MG-0.5 MG/3 ML  3 mL Nebulization Q6H RT    enoxaparin (LOVENOX) injection 30 mg  30 mg SubCUTAneous Q24H    montelukast (SINGULAIR) tablet 10 mg  10 mg Oral QHS    lactobac ac& pc-s.therm-b.anim (FAINA Q/RISAQUAD)  1 Cap Oral DAILY    aspirin chewable tablet 81 mg  81 mg Oral DAILY    pantoprazole (PROTONIX) tablet 40 mg  40 mg Oral ACB    furosemide (LASIX) tablet 20 mg  20 mg Oral DAILY    budesonide-formoterol (SYMBICORT) 160-4.5 mcg/actuation HFA inhaler 2 Puff (Patient Supplied)  2 Puff Inhalation BID    tiotropium (SPIRIVA) inhalation capsule 18 mcg (Patient Supplied)  1 Cap Inhalation DAILY    cefTRIAXone (ROCEPHIN) 2 g in 0.9% sodium chloride (MBP/ADV) 50 mL  2 g IntraVENous Q24H    azithromycin (ZITHROMAX) 500 mg in 0.9% sodium chloride (MBP/ADV) 250 mL  500 mg IntraVENous Q24H    guaiFENesin ER (MUCINEX) tablet 600 mg  600 mg Oral Q12H    sodium chloride (NS) flush 5-10 mL  5-10 mL IntraVENous Q8H    nicotine (NICODERM CQ) 21 mg/24 hr patch 1 Patch  1 Patch TransDERmal DAILY       Review of Systems: Update on 18:   Constitutional: positive for fatigue and malaise  Eyes: negative  Ears, nose, mouth, throat, and face: positive for nasal congestion  Respiratory: positive for cough, sputum, pleurisy/chest pain, pneumonia, wheezing, dyspnea on exertion or chronic bronchitis  Cardiovascular: positive for chest pain, dyspnea, fatigue, paroxysmal nocturnal dyspnea, dyspnea on exertion  Gastrointestinal: positive for dysphagia and reflux symptoms  Genitourinary:negative  Integument/breast: negative  Hematologic/lymphatic: negative  Musculoskeletal:negative  Neurological: negative  Behavioral/Psych: negative  Endocrine: negative  Allergic/Immunologic: negative    Objective:   Vital Signs:    Visit Vitals    /80 (BP 1 Location: Left leg, BP Patient Position: At rest)    Pulse 77    Temp 98.1 °F (36.7 °C)    Resp 18    Ht 5' 11\" (1.803 m)    Wt 61.8 kg (136 lb 3.9 oz)    SpO2 96%    BMI 19 kg/m2       O2 Device: Nasal cannula   O2 Flow Rate (L/min): 4 l/min   Temp (24hrs), Av.8 °F (36.6 °C), Min:97.3 °F (36.3 °C), Max:98.4 °F (36.9 °C)       Intake/Output:   Last shift:         Last 3 shifts:  1901 -  0700  In: 960 [P.O.:360; I.V.:600]  Out: -     Intake/Output Summary (Last 24 hours) at 18 0837  Last data filed at 18 0000   Gross per 24 hour   Intake              660 ml   Output                0 ml   Net              660 ml      Physical Exam:   General:  Alert, cooperative, no distress, appears stated age. Sitting up in chair eating breakfast. ON NC oxygen. , no distress. Head:  Normocephalic, without obvious abnormality, atraumatic. Eyes:  Conjunctivae/corneas clear. PERRL, EOMs intact. Nose: Nares normal. Septum midline.  Mucosa normal. No drainage or sinus tenderness. Throat: Lips, mucosa, and tongue normal. Teeth and gums normal.   Neck: Supple, symmetrical, trachea midline, no adenopathy, thyroid: no enlargment/tenderness/nodules, no carotid bruit and no JVD. Back:   Symmetric, no curvature. ROM normal.   Lungs:   Clear to auscultation bilaterally. Decreased air movement but clear. Breathing comfortably. Chest wall:  No tenderness or deformity. Heart:  Regular rate and rhythm, S1, S2 normal, no murmur, click, rub or gallop. Abdomen:   Soft, non-tender. Bowel sounds normal. No masses,  No organomegaly. Extremities: Extremities normal, atraumatic, no cyanosis or edema. Pulses: 2+ and symmetric all extremities. Skin: Skin color, texture, turgor normal. No rashes or lesions   Lymph nodes: Cervical, supraclavicular, and axillary nodes normal.   Neurologic: Grossly nonfocal, Psych: NO overt anxiety or depression. Data review:     No results found for this or any previous visit (from the past 24 hour(s)). EK18: Sinus tachycardia. Left axis deviation   Right bundle branch block         Imaging:  I have personally reviewed the patients radiographs and have reviewed the reports:  18: Ct of chest: IMPRESSION:  1. No evidence for pulmonary emboli. 2. Findings consistent with bilateral lower lobe airways infection with mucus  and debris within the airways and small peripheral opacities and tree-in-bud  opacities. This is likely due to bilateral aspiration. Left lower lobe is more  affected than the right. 3. Patient status post gastric pull-through procedure.   4. There is severe emphysema        Keith Llanos MD

## 2018-05-11 NOTE — PROGRESS NOTES
Pt is discharging to home by car with wife, she is on her way to transport him home. CM met with patient to discuss discharge plan. [facility] [address] was here and left him with portable tank for transport home. CM will call [facility] when pt is discharging out of facility and they will arrange to meet family at home to deliver remaining needs and provide education. [facility] will provide Hospital to Home visit and they have been to see pt in his room to discuss. Follow up appts have been made. Patient agreeable to plan. 2 IM reviewed/signed by patient, copy to chart and copy with patient. Care Management Interventions  PCP Verified by CM: Yes  Last Visit to PCP: 05/04/18  Mode of Transport at Discharge: Other (see comment)  Transition of Care Consult (CM Consult): [address]: Yes  Discharge Durable Medical Equipment: Yes (O2 via nc for home provided by [facility] [address])  Physical Therapy Consult: Yes  Occupational Therapy Consult: Yes  Speech Therapy Consult: Yes  Current Support Network: Lives with Spouse, Own Home (Pt lives with wife and step daughter in a 1 story home with 3 steps to enter)  Confirm Follow Up Transport: Family (wife is enroute to transport patient to home by car)  Plan discussed with Pt/Family/Caregiver: Yes  Freedom of Choice Offered: Yes  Discharge Location  Discharge Placement: Home with home health  .     .me  [phone]

## 2018-05-11 NOTE — PROGRESS NOTES
PT consult received and chart reviewed. Noted patient up ad lexie per RN. Consulted with patient who confirmed no concerns with mobility nor stability at this time. He owns a portable pulse oximeter and voices understanding of ability to analyze readings. No further PT needs or concerns; encouraged patient to mobilize at least 3 times daily throughout remainder of admission in order to maintain current strength and functioning with RN notified of such. Will sign off.      Christina Bailon, PT, DPT, [facility]

## 2018-05-11 NOTE — DISCHARGE SUMMARY
Hospitalist Discharge Summary     Patient ID:  Rizwana Ku  [id number]  63 y.o.  1955    PCP on record: Albania Graham MD    Admit date: 5/6/2018  Discharge date and time: 5/11/2018      DISCHARGE DIAGNOSIS:  Acute hypoxic respiratory failure  Pneumonia  Acute COPD exacerbation  Tobacco Abuse:  Elevated Lactic acid: resolved  Hx nonischemic cardiomyopathy   Anxiety  Hx of esophageal ca  CONSULTATIONS:  IP CONSULT TO PULMONOLOGY    Excerpted HPI from H&P of Bettina Ritchie MD:  Renny French is a 63 y.o.  male with PMHx significant for hx of esophageal ca, nonischemic cardiomyopathy, COPD, tobacco use, present to the ED c/o worsening of SOB for over 3 weeks. Pt reports recently treated for the flu 1 month ago and symptoms lingers with worsening of in the last 3 days. Pt was seen by his PCP 2 days ago and was told to return to the ER if symptoms does not improve with medications prescribed. On arrival to the ER, pt was hypoxic with SpO2 81% on RA, improved on 4LNC. Vitals: T100.3, P112, /67, BP 81% on RA  Labs: trop 0.04, probnp 170, lactate 2.4  CXR showed changes of emphysema, airspace disease in perihilar regions R>L and lung bases     We were asked to admit for work up and evaluation of the above problems. \"     ______________________________________________________________________  DISCHARGE SUMMARY/HOSPITAL COURSE:  for full details see H&P, daily progress notes, labs, consult notes.      Hospital course via problem below:  Acute hypoxic respiratory failure: not on Home O2  Pneumonia:  Acute COPD exacerbation  Severe Sepsis POA: resolved (Bands 13%, elevated, tachycardia and LA of 2.3)  -change to Prednisone 60 mg daily, slow taper indicated, discussed with Pulmonary  -Completed Azithromycin in house, Rocephin changed to Cefdinir on discharge  -continue home Anoro (patient already had at home and does not like spriva) continue and Symbicort, continue singulair (new medication)  -CTA Chest ruled out PE but did show bibasilar PNA, doubt aspiration as patient did well with his speech therapy evaluation thus did not pursue MBS   -case discussed with Dr. Farnaz Sullivan today  -Patient required O2 at discharge    Tobacco Abuse:  -continue nicotine patch, cessation encouraged, patient plans for cessation     Elevated Lactic acid: resolved     Hx nonischemic cardiomyopathy   -continue home ASA; follows with Dr Haider Franco  -prn lasix given at discharge  -LVEF unknown thus did not pursue ACEI or BB, even so BP was wnl and would likely not tolerate addition of ACIE/ARB, BB undesirable with patient lung disease     Anxiety: continue home valium prn, patient no longer had any at home, 1 week Rx given to help with difficulty sleeping while on Prednisone     Hx of esophageal ca     Severe Malnutrition: BMI 18.3, albumin also low at 2.6 with last check; appreciate Nutrition recommendations           _______________________________________________________________________  Patient seen and examined by me on discharge day. Pertinent Findings:  Gen:    Not in distress  Chest: Clear lungs without wheezing  CVS:   Regular rhythm  Abd:  ND  Neuro:  Alert  _______________________________________________________________________  DISCHARGE MEDICATIONS:   Current Discharge Medication List      START taking these medications    Details   guaiFENesin ER (MUCINEX) 600 mg ER tablet Take 1 Tab by mouth every twelve (12) hours for 10 days. Qty: 20 Tab, Refills: 0      L. acidoph & paracasei- S therm- Bifido (FAINA-Q/RISAQUAD) 8 billion cell cap cap Take 1 Cap by mouth daily for 14 days. An over the counter probiotic may be substituted in place of this Rx. Qty: 14 Cap, Refills: 0      montelukast (SINGULAIR) 10 mg tablet Take 1 Tab by mouth nightly. Qty: 30 Tab, Refills: 0      nicotine (NICODERM CQ) 21 mg/24 hr 1 Patch by TransDERmal route daily for 30 days.   Qty: 30 Patch, Refills: 0      predniSONE (DELTASONE) 10 mg tablet Take 6 Tabs by mouth daily (with breakfast). Do this for 1 day, then decrease to 40 mg every day x3d, then decrease to 20 mg every day x3d, then decrease to 10 mg daily x3d, then stop  Qty: 27 Tab, Refills: 0      umeclidinium-vilanterol (ANORO ELLIPTA) 62.5-25 mcg/actuation inhaler Take 1 Puff by inhalation daily. Qty: 1 Inhaler, Refills: 0      cefdinir (OMNICEF) 300 mg capsule Take 1 Cap by mouth two (2) times a day for 2 days. Qty: 4 Cap, Refills: 0      furosemide (LASIX) 20 mg tablet Take 1 Tab by mouth daily as needed. For swelling  Qty: 30 Tab, Refills: 0         CONTINUE these medications which have CHANGED    Details   diazePAM (VALIUM) 5 mg tablet Take 1 Tab by mouth nightly as needed for Anxiety for up to 7 days. Max Daily Amount: 5 mg. Qty: 1 Tab, Refills: 0    Associated Diagnoses: Anxiety         CONTINUE these medications which have NOT CHANGED    Details   budesonide-formoterol (SYMBICORT) 160-4.5 mcg/actuation HFAA Take 2 Puffs by inhalation two (2) times a day. omeprazole (PRILOSEC) 20 mg capsule Take 20 mg by mouth daily. aspirin 81 mg tablet Take 81 mg by mouth daily. albuterol (PROVENTIL, VENTOLIN) 90 mcg/Actuation inhaler Take 2 Puffs by inhalation every six (6) hours as needed. STOP taking these medications       tiotropium (SPIRIVA WITH HANDIHALER) 18 mcg inhalation capsule Comments:   Reason for Stopping:         HYDROcodone-acetaminophen (NORCO) 5-325 mg per tablet Comments:   Reason for Stopping:               My Recommended Diet, Activity, Wound Care, and follow-up labs are listed in the patient's Discharge Insturctions which I have personally completed and reviewed.     ______________________________________________________________________    Risk of deterioration: Low    Condition at Discharge:  Stable  ______________________________________________________________________    Disposition  Home with family and home health services  ______________________________________________________________________    Care Plan discussed with:   Patient, RN, Care Manager, Consultant    ______________________________________________________________________    Code Status: Full Code  ______________________________________________________________________      Follow up with:   PCP : Patsi Hammans, MD  Follow-up Information     Follow up With Details Comments Jayjay Cueva, MD   [address] [place] [address]  [place] [address] [address] [place] [address] Dr Sukumar Myers MD In 2 weeks COpD, hospital follow-up [address] [place] [address],[address]  [phone]                Total time in minutes spent coordinating this discharge (includes going over instructions, follow-up, prescriptions, and preparing report for sign off to her PCP) :  35 minutes    Signed:  Gregorio Trent MD

## 2018-05-11 NOTE — PROGRESS NOTES
ADULT PROTOCOL: JET AEROSOL  REASSESSMENT    Patient  Mary Anne Morning     63 y.o.   male     5/11/2018  2:17 AM    Breath Sounds Pre Procedure: Right Breath Sounds: Diminished                               Left Breath Sounds: Diminished    Breath Sounds Post Procedure: Right Breath Sounds: Diminished                                 Left Breath Sounds: Diminished    Breathing pattern: Pre procedure Breathing Pattern: Regular          Post procedure Breathing Pattern: Regular    Heart Rate: Pre procedure Pulse: 67           Post procedure Pulse: 66    Resp Rate: Pre procedure Respirations: 18           Post procedure Respirations: 18    Peak Flow: Pre bronchodilator   n/a          Post bronchodilator   n/a    Incentive Spirometry:   n/a      n/a    Cough: Pre procedure Cough: Non-productive               Post procedure Cough: Non-productive    Suctioned:no    Sputum: Pre procedure Sputum amount:  (n/a)  Sputum color/odor:  (n/a)  Sputum consistency:  (n/a)  Sputum method obtained:  (n/a)                 Post procedure  none    Oxygen: O2 Device: Nasal cannula   Flow rate (L/min) 4     Changed: NO    SpO2: Pre procedure SpO2: 98 %   with oxygen              Post procedure SpO2: 95 %  with oxygen    Nebulizer Therapy: Current medications Aerosolized Medications: DuoNeb      Changed: NO  Problem List:   Patient Active Problem List   Diagnosis Code    Epstein's esophagus K22.70    Esophageal cancer (HCC) C15.9    Dysphagia R13.10    Esophageal obstruction K22.2    Weight loss R63.4    GI bleed K92.2    COPD (chronic obstructive pulmonary disease) ([facility] [address] J44.9    HTN (hypertension) I10    [facility] failure [facility] J96.90       Respiratory Therapist: RT Elsie

## 2018-05-11 NOTE — PROGRESS NOTES
Home Health Care Discharge Planning: [facility]  Face to Face Encounter      NAME: Lakeisha Ang   :  1955   MRN:  [id number]     Primary Diagnosis: COPD, hypoxia    Date of Face to Face:  2018 7:52 AM                                  Face to Face Encounter findings are related to primary reason for home care:   YES    1. I certify that the patient needs intermittent skilled nursing care, physical therapy and/or speech therapy. I will not be following this patient in the Community and Dr. Eben Blackmon MD will be responsible for signing the [address] of Care. 2. Initial Orders for Care: Skilled Nursing; Hospital to home Visit    3. I certify that this patient is homebound because of illness or injury, need the aid of supportive devices such as crutches, canes, wheelchairs, and walkers; the use of special transportation; or the assistance of another person in order to leave their place of residence. There exists a normal inability to leave home and leaving home requires a considerable and taxing effort. 4. I certify that this patient is under my care and that I had a Face-to-Face Encounter that meets the physician Face-to-Face Encounter requirements.   Document the physical findings from the Face-to-Face Encounter that support the need for skilled services: Has new diagnosis that requires skilled nursing teaching and intervention     Cande Jain MD  Discharging Physician  Office: [phone]  Fax:   [phone]

## 2018-05-11 NOTE — PROGRESS NOTES
CM: Driss Vicente, contacted [employer] (DME) Sabrina Okeefe, via telephone, regarding pt's Home O2. However, the attempt was unsuccessful due to [employer] offices not being open until 9:00AM.  CM left voicemail requesting a return call and if pt received auth for DME and if accepted what time with 02 be delivered. CM will continue to follow up with DME company, to verify pt's auth. *CM acknowledged consult*  CM will complete room visit, to discuss [facility] with pt, and what services they will provide for pt's diagnosis. If pt agrees CM will send [facility] visit to [facility], via [facility], and inform pt of [address] document (signed) placed in chart. UPDATE: 8:37AM    CM was informed that pt has received auth for Home O2, and Jinny hale is on the way to drop DME off to [facility]. Pt will be advised. UPDATE:9:21AM    RAYNE aware that pt has been accepted to [facility] visit. CM will inform pt.       ULYSSES Adair CM  [phone]

## 2018-05-11 NOTE — DISCHARGE INSTRUCTIONS
HOSPITALIST DISCHARGE INSTRUCTIONS    NAME: Laila Keys   :  1955   MRN:  [id number]     Date/Time:  2018 7:48 AM    ADMIT DATE: 2018     DISCHARGE DATE: 2018     DISCHARGE DIAGNOSIS:  Acute hypoxic respiratory failure  Pneumonia  Acute COPD exacerbation  Tobacco Abuse:  Elevated Lactic acid: resolved  Hx nonischemic cardiomyopathy   Anxiety  Hx of esophageal ca       MEDICATIONS:  As per medication reconciliation  list  · It is important that you take the medication exactly as they are prescribed. · Keep your medication in the bottles provided by the pharmacist and keep a list of the medication names, dosages, and times to be taken in your wallet. · Do not take other medications without consulting your doctor. Pain Management: per above medications    What to do at Home    Recommended diet:  Regular Diet    Recommended activity: Activity as tolerated      If you experience any of the following symptoms then please call your primary care physician or return to the emergency room if you cannot get hold of your doctor:  Fever, chills, nausea, vomiting, diarrhea, change in mentation, falling, bleeding, shortness of breath or any other concerning symptoms. Follow Up: With Dr. Ayden Prince in 2 weeks  With your PCP, Dr. Fadumo Pope MD within 1 week for hospital follow-up    Information obtained by :  I understand that if any problems occur once I am at home I am to contact my physician. I understand and acknowledge receipt of the instructions indicated above.                                                                                                                                            Physician's or R.N.'s Signature                                                                  Date/Time                                                                                                                                              Patient or Representative Signature Date/Time

## 2018-05-12 LAB
BACTERIA SPEC CULT: NORMAL
BACTERIA SPEC CULT: NORMAL
SERVICE CMNT-IMP: NORMAL
SERVICE CMNT-IMP: NORMAL

## 2018-05-13 ENCOUNTER — HOME CARE VISIT (OUTPATIENT)
Dept: HOME HEALTH SERVICES | Facility: HOME HEALTH | Age: 63
End: 2018-05-13

## 2018-05-17 ENCOUNTER — HOME CARE VISIT (OUTPATIENT)
Dept: SCHEDULING | Facility: HOME HEALTH | Age: 63
End: 2018-05-17

## 2018-05-17 PROCEDURE — G0299 HHS/HOSPICE OF RN EA 15 MIN: HCPCS

## 2018-11-12 ENCOUNTER — HOSPITAL ENCOUNTER (OUTPATIENT)
Dept: GENERAL RADIOLOGY | Age: 63
Discharge: HOME | End: 2018-11-12
Payer: MEDICARE

## 2018-11-12 DIAGNOSIS — J44.9 COPD (CHRONIC OBSTRUCTIVE PULMONARY DISEASE) (HCC): ICD-10-CM

## 2018-11-12 PROCEDURE — 71046 X-RAY EXAM CHEST 2 VIEWS: CPT

## 2019-03-20 ENCOUNTER — HOSPITAL ENCOUNTER (OUTPATIENT)
Dept: GENERAL RADIOLOGY | Age: 64
Discharge: HOME | End: 2019-03-20
Payer: MEDICARE

## 2019-03-20 DIAGNOSIS — J44.9 COPD (CHRONIC OBSTRUCTIVE PULMONARY DISEASE) (HCC): ICD-10-CM

## 2019-03-20 PROCEDURE — 71046 X-RAY EXAM CHEST 2 VIEWS: CPT

## 2019-04-29 ENCOUNTER — HOSPITAL ENCOUNTER (OUTPATIENT)
Dept: GENERAL RADIOLOGY | Age: 64
Discharge: HOME | End: 2019-04-29
Payer: MEDICARE

## 2019-04-29 DIAGNOSIS — J11.00 PNEUMONIA AND INFLUENZA: ICD-10-CM

## 2019-04-29 PROCEDURE — 71046 X-RAY EXAM CHEST 2 VIEWS: CPT

## 2019-08-06 ENCOUNTER — HOSPITAL ENCOUNTER (OUTPATIENT)
Dept: GENERAL RADIOLOGY | Age: 64
Discharge: HOME | End: 2019-08-06
Payer: MEDICARE

## 2019-08-06 DIAGNOSIS — J44.9 COPD (CHRONIC OBSTRUCTIVE PULMONARY DISEASE) (HCC): ICD-10-CM

## 2019-08-06 PROCEDURE — 71046 X-RAY EXAM CHEST 2 VIEWS: CPT

## 2020-10-19 ENCOUNTER — HOSPITAL ENCOUNTER (OUTPATIENT)
Dept: CT IMAGING | Age: 65
Discharge: HOME | End: 2020-10-19
Attending: INTERNAL MEDICINE
Payer: MEDICARE

## 2020-10-19 DIAGNOSIS — R91.1 PULMONARY NODULE: ICD-10-CM

## 2020-10-19 PROCEDURE — 71250 CT THORAX DX C-: CPT

## 2020-11-02 ENCOUNTER — TRANSCRIBE ORDER (OUTPATIENT)
Dept: SCHEDULING | Age: 65
End: 2020-11-02

## 2020-11-02 DIAGNOSIS — R91.1 PULMONARY NODULE: Primary | ICD-10-CM

## 2021-04-19 ENCOUNTER — HOSPITAL ENCOUNTER (OUTPATIENT)
Dept: CT IMAGING | Age: 66
Discharge: HOME | End: 2021-04-19
Attending: INTERNAL MEDICINE
Payer: MEDICARE

## 2021-04-19 DIAGNOSIS — R91.1 PULMONARY NODULE: ICD-10-CM

## 2021-04-19 PROCEDURE — 71250 CT THORAX DX C-: CPT

## 2021-04-29 ENCOUNTER — TRANSCRIBE ORDER (OUTPATIENT)
Dept: SCHEDULING | Age: 66
End: 2021-04-29

## 2021-04-29 DIAGNOSIS — J44.9 CHRONIC OBSTRUCTIVE PULMONARY DISEASE (COPD) (HCC): Primary | ICD-10-CM

## 2021-10-04 ENCOUNTER — HOSPITAL ENCOUNTER (OUTPATIENT)
Dept: CT IMAGING | Age: 66
Discharge: HOME | End: 2021-10-04
Attending: INTERNAL MEDICINE
Payer: MEDICARE

## 2021-10-04 DIAGNOSIS — J44.9 CHRONIC OBSTRUCTIVE PULMONARY DISEASE (COPD) (HCC): ICD-10-CM

## 2021-10-04 PROCEDURE — 71250 CT THORAX DX C-: CPT

## 2022-03-19 PROBLEM — J96.90 RESPIRATORY FAILURE (HCC): Status: ACTIVE | Noted: 2018-05-06

## 2022-03-19 PROBLEM — J44.9 COPD (CHRONIC OBSTRUCTIVE PULMONARY DISEASE) (HCC): Status: ACTIVE | Noted: 2017-01-13

## 2022-03-19 PROBLEM — K92.2 GI BLEED: Status: ACTIVE | Noted: 2017-01-13

## 2022-03-20 PROBLEM — I10 HTN (HYPERTENSION): Status: ACTIVE | Noted: 2017-01-13

## 2022-12-29 ENCOUNTER — TRANSCRIBE ORDER (OUTPATIENT)
Dept: SCHEDULING | Age: 67
End: 2022-12-29

## 2022-12-29 DIAGNOSIS — R91.1 PULMONARY NODULE: Primary | ICD-10-CM

## 2023-04-04 ENCOUNTER — HOSPITAL ENCOUNTER (OUTPATIENT)
Dept: CT IMAGING | Age: 68
End: 2023-04-04
Attending: NURSE PRACTITIONER
Payer: MEDICARE

## 2023-04-04 PROCEDURE — 71250 CT THORAX DX C-: CPT

## 2023-04-22 DIAGNOSIS — R91.1 PULMONARY NODULE: Primary | ICD-10-CM

## 2023-04-26 ENCOUNTER — TRANSCRIBE ORDER (OUTPATIENT)
Dept: SCHEDULING | Age: 68
End: 2023-04-26

## 2023-04-26 ENCOUNTER — TRANSCRIBE ORDERS (OUTPATIENT)
Facility: HOSPITAL | Age: 68
End: 2023-04-26

## 2023-04-26 DIAGNOSIS — R22.1 NECK MASS: Primary | ICD-10-CM

## 2023-04-26 DIAGNOSIS — R22.1 LOCALIZED SWELLING, MASS AND LUMP, NECK: Primary | ICD-10-CM

## 2023-05-15 ENCOUNTER — HOSPITAL ENCOUNTER (OUTPATIENT)
Facility: HOSPITAL | Age: 68
Discharge: HOME | End: 2023-05-18
Payer: MEDICARE

## 2023-05-15 DIAGNOSIS — R22.1 NECK MASS: ICD-10-CM

## 2023-05-15 PROCEDURE — 76536 US EXAM OF HEAD AND NECK: CPT

## 2023-10-09 NOTE — ROUTINE PROCESS
Nanci Choi RN     [place]  Â  10/9/23 Â 2:55 PM  Note Â    ----- Message from [address] sent at 10/9/2023  2:50 PM CDT -----  Please inform of ASCUS pap, positive HPV. Recommend colposcopy with MD  Â         Colposcopy:    Prefer to schedule when NOT on menstrual cycle. Take 800 mg Ibuprofen 1/2 to 1 hour before the procedure    Check your insurance for coverage, co pays etc.    Colposcopy  Colposcopy is a procedure that gives your healthcare provider a magnified view of your cervix. It's done using a lighted microscope called a colposcope. In most cases, a sample of cervical cells is taken during a biopsy. The sample can then be studied in a lab. If any problems are found, you and your healthcare provider will discuss treatment options. It usually takes less than 30 minutes, and you can often go back to your normal routine right away. Reasons for the procedure  Colposcopy is usually done as a follow-up exam to help find the cause of an abnormal Pap test. Results of an abnormal Pap test can mean that the cells donât appear normal or that there are cancer cells. Abnormal cells can also be caused by infections. HPV (human papillomavirus) is a large family of viruses that can be passed from person to person through sex. HPV can cause genital warts. It can also cause changes in cervical cells. If an HPV test is positive and the Pap test is abnormal, a colposcopy may be recommended. Colposcopy is also used to evaluate other problems. These include pain or bleeding during sex, or a sore (lesion) on the vulva or vagina. What are the risks? Problems after colposcopy are very rare, but can include:   Â· Bleeding (if a biopsy is done)  Â· Infection  Getting ready for the procedure  Colposcopy is normally done in your healthcare providerâs office. It will be scheduled for a time when youâre not having your menstrual period. You may be asked to sign a form giving your consent to have the procedure.  A day or 2 PCP YASIR appt scheduled with Dr. Steve Hernandez on May 14 at 3:15pm. Appt added to [address] [facility],  Specialist  PCP SAUL JARVIS appt scheduled with Dr. Ketih Clemente on May 29 at 8:30am. Appt added to [address] [facility], [facility] Specialist before the procedure, your healthcare provider may also ask you to:   Â· Not have sex  Â· Stop using tampons  Â· Not use creams or other vaginal medicines  Â· Not clean out the vagina with water or other fluids (douche)  Â· Take over-the-counter pain medicines an hour or 2 before the procedure  During colposcopy  Â· You will be asked to lie on an exam table with your knees bent, just as you do for a Pap test.  Â· A tool called a speculum is inserted into the vagina to hold it open. Â· A vinegar solution is applied to the cervix to make the abnormal cells easier to see. You may feel pressure or a slight burning for a few moments. In some cases, the cervix may be numbed first with an anesthetic. Â· The cervix is looked at through the colposcope, which is placed outside the vagina. Â· If your healthcare provider sees abnormal areas on your cervix, a biopsy will be done. The tissue sample is sent to a lab for study. Â· An endocervical curettage may also be done at the time of colposcopy. In this procedure, a tool is put into the endocervical canal to get a sample of cells from the endocervix. This area can't be seen with a colposcope. Â· You may feel slight pinching or cramping during the biopsy. Medicine may be applied to the biopsy site to stop bleeding. After the procedure  Â· If you feel lightheaded or dizzy, you can rest on the table until youâre ready to sit up. Â· If a biopsy was done, you may have mild cramping or light bleeding for a few days. You may also have discharge from the medicine used to stop bleeding at the biopsy site. Â· Use pads, not tampons, for at least the first 24 hours. Â· If you have any discomfort, over-the-counter pain medicine can provide relief. Â· Ask your healthcare provider when you can have sex again. Follow-up  If a biopsy was done, your healthcare provider will get the lab report in a week or 2. You and your healthcare provider can then discuss the results.  In some cases, you may be scheduled for more tests or treatment. Be sure to keep follow-up appointments with your healthcare provider. When to call your healthcare provider  Call your healthcare provider if you have:   Â· Heavy vaginal bleeding (more than a pad an hour for 2 hours)  Â· Severe or increasing pelvic pain  Â· A fever over 100.4Â°F (38Â°C), or higher, or as directed by your provider  Â· Bad-smelling or unusual vaginal discharge  Jimi last reviewed this educational content on 4/1/2020  Â© 8058-8389 [facility]. [address] [place] [address]. All rights reserved. This information is not intended as a substitute for professional medical care. Always follow your healthcare professional's instructions. Maria Elena made 10/13 @ 1400 with Dr. Elena Higgins. Pt verbalizes understanding. Initial attending contact date 6/10/21     . See fellow note written above for details. I reviewed the fellow documentation. I have personally seen and examined this patient. I reviewed vitals, labs, medications, cardiac studies, and additional imaging. I agree with the above fellow's findings and plans as written above with the following additions/statements.    88F PMH of HTN, HLD, CAD s/p RINA x3 in 2015, hypothyroidism, IBS, AF on Eliquis, pacemaker status, NHL, PSH of hemithyroidectom admitted s/p elective left inguinal excisional lymph node biopsy. Appeared to have difficulty intubation during the procedure - complained of throat pain, SOB and mild chest discomfort. Cardiology consulted for chest discomfort.     #Chest discomfort  Appears to be related to the intubation. Unlikely to be cardiac in origin, states pain is not similar to time of last PCI. Trop negative x 1  -EKG NSR without ischemic changes,   - check trop/ck-mb/ck x 2 (6 hours apart)   - nonurgent TTE   - CXR   - c/w home aspirin and eliquis, toprol 25, atorvastatin

## 2023-12-07 ENCOUNTER — APPOINTMENT (OUTPATIENT)
Facility: HOSPITAL | Age: 68
DRG: 687 | End: 2023-12-07
Payer: MEDICARE

## 2023-12-07 ENCOUNTER — HOSPITAL ENCOUNTER (INPATIENT)
Facility: HOSPITAL | Age: 68
LOS: 5 days | Discharge: HOME | DRG: 687 | End: 2023-12-13
Attending: STUDENT IN AN ORGANIZED HEALTH CARE EDUCATION/TRAINING PROGRAM | Admitting: STUDENT IN AN ORGANIZED HEALTH CARE EDUCATION/TRAINING PROGRAM
Payer: MEDICARE

## 2023-12-07 DIAGNOSIS — N17.9 AKI (ACUTE KIDNEY INJURY) (HCC): ICD-10-CM

## 2023-12-07 DIAGNOSIS — N32.89 BLADDER WALL THICKENING: ICD-10-CM

## 2023-12-07 DIAGNOSIS — R31.0 GROSS HEMATURIA: Primary | ICD-10-CM

## 2023-12-07 DIAGNOSIS — N13.30 BILATERAL HYDRONEPHROSIS: ICD-10-CM

## 2023-12-07 DIAGNOSIS — K62.89 PROCTITIS: ICD-10-CM

## 2023-12-07 LAB
ALBUMIN SERPL-MCNC: 3.6 G/DL (ref 3.5–5)
ALBUMIN/GLOB SERPL: 0.9 (ref 1.1–2.2)
ALP SERPL-CCNC: 63 U/L (ref 45–117)
ALT SERPL-CCNC: 29 U/L (ref 12–78)
ANION GAP SERPL CALC-SCNC: 5 MMOL/L (ref 5–15)
APPEARANCE UR: CLEAR
AST SERPL-CCNC: 26 U/L (ref 15–37)
BACTERIA URNS QL MICRO: NEGATIVE /HPF
BASOPHILS # BLD: 0 K/UL (ref 0–0.1)
BASOPHILS NFR BLD: 1 % (ref 0–1)
BILIRUB SERPL-MCNC: 0.5 MG/DL (ref 0.2–1)
BILIRUB UR QL: NEGATIVE
BUN SERPL-MCNC: 26 MG/DL (ref 6–20)
BUN/CREAT SERPL: 17 (ref 12–20)
CALCIUM SERPL-MCNC: 8.9 MG/DL (ref 8.5–10.1)
CHLORIDE SERPL-SCNC: 106 MMOL/L (ref 97–108)
CO2 SERPL-SCNC: 29 MMOL/L (ref 21–32)
COLOR UR: ABNORMAL
CREAT SERPL-MCNC: 1.57 MG/DL (ref 0.7–1.3)
DIFFERENTIAL METHOD BLD: ABNORMAL
EOSINOPHIL # BLD: 0.2 K/UL (ref 0–0.4)
EOSINOPHIL NFR BLD: 3 % (ref 0–7)
EPITH CASTS URNS QL MICRO: ABNORMAL /LPF
ERYTHROCYTE [DISTWIDTH] IN BLOOD BY AUTOMATED COUNT: 16 % (ref 11.5–14.5)
GLOBULIN SER CALC-MCNC: 3.8 G/DL (ref 2–4)
GLUCOSE SERPL-MCNC: 121 MG/DL (ref 65–100)
GLUCOSE UR STRIP.AUTO-MCNC: NEGATIVE MG/DL
HCT VFR BLD AUTO: 32.3 % (ref 36.6–50.3)
HGB BLD-MCNC: 10.4 G/DL (ref 12.1–17)
HGB UR QL STRIP: ABNORMAL
HYALINE CASTS URNS QL MICRO: ABNORMAL /LPF (ref 0–2)
IMM GRANULOCYTES # BLD AUTO: 0 K/UL (ref 0–0.04)
IMM GRANULOCYTES NFR BLD AUTO: 0 % (ref 0–0.5)
KETONES UR QL STRIP.AUTO: NEGATIVE MG/DL
LEUKOCYTE ESTERASE UR QL STRIP.AUTO: NEGATIVE
LYMPHOCYTES # BLD: 1 K/UL (ref 0.8–3.5)
LYMPHOCYTES NFR BLD: 18 % (ref 12–49)
MCH RBC QN AUTO: 28.6 PG (ref 26–34)
MCHC RBC AUTO-ENTMCNC: 32.2 G/DL (ref 30–36.5)
MCV RBC AUTO: 88.7 FL (ref 80–99)
MONOCYTES # BLD: 0.5 K/UL (ref 0–1)
MONOCYTES NFR BLD: 9 % (ref 5–13)
NEUTS SEG # BLD: 3.8 K/UL (ref 1.8–8)
NEUTS SEG NFR BLD: 69 % (ref 32–75)
NITRITE UR QL STRIP.AUTO: NEGATIVE
NRBC # BLD: 0 K/UL (ref 0–0.01)
NRBC BLD-RTO: 0 PER 100 WBC
PH UR STRIP: 5 (ref 5–8)
PLATELET # BLD AUTO: 263 K/UL (ref 150–400)
PMV BLD AUTO: 9.8 FL (ref 8.9–12.9)
POTASSIUM SERPL-SCNC: 4.1 MMOL/L (ref 3.5–5.1)
PROT SERPL-MCNC: 7.4 G/DL (ref 6.4–8.2)
PROT UR STRIP-MCNC: ABNORMAL MG/DL
RBC # BLD AUTO: 3.64 M/UL (ref 4.1–5.7)
RBC #/AREA URNS HPF: ABNORMAL /HPF (ref 0–5)
SODIUM SERPL-SCNC: 140 MMOL/L (ref 136–145)
SP GR UR REFRACTOMETRY: 1.01
URINE CULTURE IF INDICATED: ABNORMAL
UROBILINOGEN UR QL STRIP.AUTO: 0.2 EU/DL (ref 0.2–1)
WBC # BLD AUTO: 5.4 K/UL (ref 4.1–11.1)
WBC URNS QL MICRO: ABNORMAL /HPF (ref 0–4)

## 2023-12-07 PROCEDURE — 80053 COMPREHEN METABOLIC PANEL: CPT

## 2023-12-07 PROCEDURE — 81001 URINALYSIS AUTO W/SCOPE: CPT

## 2023-12-07 PROCEDURE — 74177 CT ABD & PELVIS W/CONTRAST: CPT

## 2023-12-07 PROCEDURE — 51798 US URINE CAPACITY MEASURE: CPT

## 2023-12-07 PROCEDURE — 99285 EMERGENCY DEPT VISIT HI MDM: CPT

## 2023-12-07 PROCEDURE — 6360000004 HC RX CONTRAST MEDICATION: Performed by: RADIOLOGY

## 2023-12-07 PROCEDURE — 36415 COLL VENOUS BLD VENIPUNCTURE: CPT

## 2023-12-07 PROCEDURE — 85025 COMPLETE CBC W/AUTO DIFF WBC: CPT

## 2023-12-07 RX ADMIN — IOPAMIDOL 100 ML: 755 INJECTION, SOLUTION INTRAVENOUS at 18:52

## 2023-12-07 ASSESSMENT — PAIN - FUNCTIONAL ASSESSMENT: PAIN_FUNCTIONAL_ASSESSMENT: 0-10

## 2023-12-07 ASSESSMENT — PAIN SCALES - GENERAL: PAINLEVEL_OUTOF10: 3

## 2023-12-08 PROBLEM — R31.9 HEMATURIA: Status: ACTIVE | Noted: 2023-12-08

## 2023-12-08 LAB
ANION GAP SERPL CALC-SCNC: 3 MMOL/L (ref 5–15)
BASOPHILS # BLD: 0 K/UL (ref 0–0.1)
BASOPHILS NFR BLD: 1 % (ref 0–1)
BUN SERPL-MCNC: 27 MG/DL (ref 6–20)
BUN/CREAT SERPL: 16 (ref 12–20)
CALCIUM SERPL-MCNC: 8.7 MG/DL (ref 8.5–10.1)
CHLORIDE SERPL-SCNC: 106 MMOL/L (ref 97–108)
CO2 SERPL-SCNC: 33 MMOL/L (ref 21–32)
CREAT SERPL-MCNC: 1.74 MG/DL (ref 0.7–1.3)
DIFFERENTIAL METHOD BLD: ABNORMAL
EOSINOPHIL # BLD: 0.2 K/UL (ref 0–0.4)
EOSINOPHIL NFR BLD: 3 % (ref 0–7)
ERYTHROCYTE [DISTWIDTH] IN BLOOD BY AUTOMATED COUNT: 16.3 % (ref 11.5–14.5)
GLUCOSE SERPL-MCNC: 129 MG/DL (ref 65–100)
HCT VFR BLD AUTO: 29.5 % (ref 36.6–50.3)
HGB BLD-MCNC: 9.2 G/DL (ref 12.1–17)
IMM GRANULOCYTES # BLD AUTO: 0 K/UL (ref 0–0.04)
IMM GRANULOCYTES NFR BLD AUTO: 0 % (ref 0–0.5)
LYMPHOCYTES # BLD: 1.1 K/UL (ref 0.8–3.5)
LYMPHOCYTES NFR BLD: 22 % (ref 12–49)
MCH RBC QN AUTO: 27.8 PG (ref 26–34)
MCHC RBC AUTO-ENTMCNC: 31.2 G/DL (ref 30–36.5)
MCV RBC AUTO: 89.1 FL (ref 80–99)
MONOCYTES # BLD: 0.5 K/UL (ref 0–1)
MONOCYTES NFR BLD: 11 % (ref 5–13)
NEUTS SEG # BLD: 3.2 K/UL (ref 1.8–8)
NEUTS SEG NFR BLD: 63 % (ref 32–75)
NRBC # BLD: 0 K/UL (ref 0–0.01)
NRBC BLD-RTO: 0 PER 100 WBC
PLATELET # BLD AUTO: 222 K/UL (ref 150–400)
PMV BLD AUTO: 9.5 FL (ref 8.9–12.9)
POTASSIUM SERPL-SCNC: 4.1 MMOL/L (ref 3.5–5.1)
RBC # BLD AUTO: 3.31 M/UL (ref 4.1–5.7)
SODIUM SERPL-SCNC: 142 MMOL/L (ref 136–145)
WBC # BLD AUTO: 5 K/UL (ref 4.1–11.1)

## 2023-12-08 PROCEDURE — 94640 AIRWAY INHALATION TREATMENT: CPT

## 2023-12-08 PROCEDURE — 6370000000 HC RX 637 (ALT 250 FOR IP): Performed by: STUDENT IN AN ORGANIZED HEALTH CARE EDUCATION/TRAINING PROGRAM

## 2023-12-08 PROCEDURE — 2580000003 HC RX 258: Performed by: STUDENT IN AN ORGANIZED HEALTH CARE EDUCATION/TRAINING PROGRAM

## 2023-12-08 PROCEDURE — 6370000000 HC RX 637 (ALT 250 FOR IP): Performed by: INTERNAL MEDICINE

## 2023-12-08 PROCEDURE — 6360000002 HC RX W HCPCS: Performed by: STUDENT IN AN ORGANIZED HEALTH CARE EDUCATION/TRAINING PROGRAM

## 2023-12-08 PROCEDURE — 80048 BASIC METABOLIC PNL TOTAL CA: CPT

## 2023-12-08 PROCEDURE — 2700000000 HC OXYGEN THERAPY PER DAY

## 2023-12-08 PROCEDURE — 85025 COMPLETE CBC W/AUTO DIFF WBC: CPT

## 2023-12-08 PROCEDURE — 36415 COLL VENOUS BLD VENIPUNCTURE: CPT

## 2023-12-08 PROCEDURE — 1100000003 HC PRIVATE W/ TELEMETRY

## 2023-12-08 PROCEDURE — 51798 US URINE CAPACITY MEASURE: CPT

## 2023-12-08 PROCEDURE — 6370000000 HC RX 637 (ALT 250 FOR IP): Performed by: NURSE PRACTITIONER

## 2023-12-08 RX ORDER — MONTELUKAST SODIUM 10 MG/1
10 TABLET ORAL NIGHTLY
Status: DISCONTINUED | OUTPATIENT
Start: 2023-12-08 | End: 2023-12-13 | Stop reason: HOSPADM

## 2023-12-08 RX ORDER — BISACODYL 10 MG
10 SUPPOSITORY, RECTAL RECTAL DAILY PRN
Status: DISCONTINUED | OUTPATIENT
Start: 2023-12-08 | End: 2023-12-13 | Stop reason: HOSPADM

## 2023-12-08 RX ORDER — GINSENG 100 MG
CAPSULE ORAL 2 TIMES DAILY
Status: DISCONTINUED | OUTPATIENT
Start: 2023-12-08 | End: 2023-12-13 | Stop reason: HOSPADM

## 2023-12-08 RX ORDER — ALBUTEROL SULFATE 90 UG/1
2 AEROSOL, METERED RESPIRATORY (INHALATION) EVERY 6 HOURS PRN
Status: DISCONTINUED | OUTPATIENT
Start: 2023-12-08 | End: 2023-12-13 | Stop reason: HOSPADM

## 2023-12-08 RX ORDER — ONDANSETRON 4 MG/1
4 TABLET, ORALLY DISINTEGRATING ORAL EVERY 8 HOURS PRN
Status: DISCONTINUED | OUTPATIENT
Start: 2023-12-08 | End: 2023-12-13 | Stop reason: HOSPADM

## 2023-12-08 RX ORDER — POLYETHYLENE GLYCOL 3350 17 G/17G
17 POWDER, FOR SOLUTION ORAL DAILY PRN
Status: DISCONTINUED | OUTPATIENT
Start: 2023-12-08 | End: 2023-12-08

## 2023-12-08 RX ORDER — SODIUM CHLORIDE 0.9 % (FLUSH) 0.9 %
5-40 SYRINGE (ML) INJECTION EVERY 12 HOURS SCHEDULED
Status: DISCONTINUED | OUTPATIENT
Start: 2023-12-08 | End: 2023-12-13 | Stop reason: HOSPADM

## 2023-12-08 RX ORDER — SENNA AND DOCUSATE SODIUM 50; 8.6 MG/1; MG/1
2 TABLET, FILM COATED ORAL DAILY
Status: DISCONTINUED | OUTPATIENT
Start: 2023-12-08 | End: 2023-12-13 | Stop reason: HOSPADM

## 2023-12-08 RX ORDER — ONDANSETRON 2 MG/ML
4 INJECTION INTRAMUSCULAR; INTRAVENOUS EVERY 6 HOURS PRN
Status: DISCONTINUED | OUTPATIENT
Start: 2023-12-08 | End: 2023-12-13 | Stop reason: HOSPADM

## 2023-12-08 RX ORDER — PANTOPRAZOLE SODIUM 40 MG/1
40 TABLET, DELAYED RELEASE ORAL
Status: DISCONTINUED | OUTPATIENT
Start: 2023-12-08 | End: 2023-12-13 | Stop reason: HOSPADM

## 2023-12-08 RX ORDER — BUDESONIDE AND FORMOTEROL FUMARATE DIHYDRATE 160; 4.5 UG/1; UG/1
2 AEROSOL RESPIRATORY (INHALATION) 2 TIMES DAILY
Status: DISCONTINUED | OUTPATIENT
Start: 2023-12-08 | End: 2023-12-08

## 2023-12-08 RX ORDER — IPRATROPIUM BROMIDE AND ALBUTEROL SULFATE 2.5; .5 MG/3ML; MG/3ML
1 SOLUTION RESPIRATORY (INHALATION)
Status: DISCONTINUED | OUTPATIENT
Start: 2023-12-08 | End: 2023-12-10

## 2023-12-08 RX ORDER — ACETAMINOPHEN 650 MG/1
650 SUPPOSITORY RECTAL EVERY 6 HOURS PRN
Status: DISCONTINUED | OUTPATIENT
Start: 2023-12-08 | End: 2023-12-13 | Stop reason: HOSPADM

## 2023-12-08 RX ORDER — SODIUM CHLORIDE 0.9 % (FLUSH) 0.9 %
5-40 SYRINGE (ML) INJECTION PRN
Status: DISCONTINUED | OUTPATIENT
Start: 2023-12-08 | End: 2023-12-13 | Stop reason: HOSPADM

## 2023-12-08 RX ORDER — GUAIFENESIN 600 MG/1
600 TABLET, EXTENDED RELEASE ORAL 2 TIMES DAILY
Status: DISCONTINUED | OUTPATIENT
Start: 2023-12-08 | End: 2023-12-13 | Stop reason: HOSPADM

## 2023-12-08 RX ORDER — SODIUM CHLORIDE 9 MG/ML
INJECTION, SOLUTION INTRAVENOUS PRN
Status: DISCONTINUED | OUTPATIENT
Start: 2023-12-08 | End: 2023-12-13 | Stop reason: HOSPADM

## 2023-12-08 RX ORDER — POLYETHYLENE GLYCOL 3350 17 G/17G
17 POWDER, FOR SOLUTION ORAL 2 TIMES DAILY
Status: DISCONTINUED | OUTPATIENT
Start: 2023-12-08 | End: 2023-12-13 | Stop reason: HOSPADM

## 2023-12-08 RX ORDER — ACETAMINOPHEN 325 MG/1
650 TABLET ORAL EVERY 6 HOURS PRN
Status: DISCONTINUED | OUTPATIENT
Start: 2023-12-08 | End: 2023-12-13 | Stop reason: HOSPADM

## 2023-12-08 RX ORDER — OXYCODONE HYDROCHLORIDE AND ACETAMINOPHEN 5; 325 MG/1; MG/1
1 TABLET ORAL EVERY 6 HOURS PRN
Status: DISCONTINUED | OUTPATIENT
Start: 2023-12-08 | End: 2023-12-13 | Stop reason: HOSPADM

## 2023-12-08 RX ADMIN — ACETAMINOPHEN 650 MG: 325 TABLET ORAL at 16:53

## 2023-12-08 RX ADMIN — GUAIFENESIN 600 MG: 600 TABLET, EXTENDED RELEASE ORAL at 10:14

## 2023-12-08 RX ADMIN — SODIUM CHLORIDE, PRESERVATIVE FREE 10 ML: 5 INJECTION INTRAVENOUS at 20:37

## 2023-12-08 RX ADMIN — GUAIFENESIN 600 MG: 600 TABLET, EXTENDED RELEASE ORAL at 20:35

## 2023-12-08 RX ADMIN — MONTELUKAST 10 MG: 10 TABLET, FILM COATED ORAL at 01:26

## 2023-12-08 RX ADMIN — MONTELUKAST 10 MG: 10 TABLET, FILM COATED ORAL at 20:35

## 2023-12-08 RX ADMIN — IPRATROPIUM BROMIDE 0.5 MG: 0.5 SOLUTION RESPIRATORY (INHALATION) at 08:10

## 2023-12-08 RX ADMIN — ARFORMOTEROL TARTRATE: 15 SOLUTION RESPIRATORY (INHALATION) at 20:57

## 2023-12-08 RX ADMIN — POLYETHYLENE GLYCOL 3350 17 G: 17 POWDER, FOR SOLUTION ORAL at 09:19

## 2023-12-08 RX ADMIN — IPRATROPIUM BROMIDE 0.5 MG: 0.5 SOLUTION RESPIRATORY (INHALATION) at 11:32

## 2023-12-08 RX ADMIN — IPRATROPIUM BROMIDE 0.5 MG: 0.5 SOLUTION RESPIRATORY (INHALATION) at 20:52

## 2023-12-08 RX ADMIN — SODIUM CHLORIDE, PRESERVATIVE FREE 10 ML: 5 INJECTION INTRAVENOUS at 09:19

## 2023-12-08 RX ADMIN — PANTOPRAZOLE SODIUM 40 MG: 40 TABLET, DELAYED RELEASE ORAL at 06:27

## 2023-12-08 RX ADMIN — ARFORMOTEROL TARTRATE: 15 SOLUTION RESPIRATORY (INHALATION) at 08:09

## 2023-12-08 RX ADMIN — POLYETHYLENE GLYCOL 3350 17 G: 17 POWDER, FOR SOLUTION ORAL at 02:30

## 2023-12-08 RX ADMIN — OXYCODONE HYDROCHLORIDE AND ACETAMINOPHEN 1 TABLET: 5; 325 TABLET ORAL at 19:22

## 2023-12-08 RX ADMIN — DOCUSATE SODIUM 50MG AND SENNOSIDES 8.6MG 2 TABLET: 8.6; 5 TABLET, FILM COATED ORAL at 09:19

## 2023-12-08 RX ADMIN — POLYETHYLENE GLYCOL 3350 17 G: 17 POWDER, FOR SOLUTION ORAL at 20:35

## 2023-12-08 ASSESSMENT — PAIN SCALES - GENERAL
PAINLEVEL_OUTOF10: 0

## 2023-12-08 NOTE — CONSULTS
Requesting Provider: Marcus Mcneil MD - Reason for Consultation: \"Hematuria, asymetric bladder concerning for malignancy \"  Pre-existing [facility] Patient:   No                Patient: Louis Bass MRN: [id number]  SSN: [id number]    YOB: 1955  Age: 79 y.o. Sex: male     Location: [address] [place] [address]       Code Status: Full Code   PCP: Yolanda Lennon, [address]   Emergency Contact:  Primary Emergency Contact: Brigid Camacho, Johann Phone: [phone]   Race/Zoroastrianism/Language: Renetta Hernandez (non-) / Venkatesh / Bernaa Poor   Payor: Payor: Flora Bernal / Plan: [employer] HMO / Product Type: *No Product type* /    Prior Admission Data: 4/26/13 [facility] HISTORICAL CONVERSIONS     Hospitalized:  Hospital Day: 2 - Admitted 12/7/2023  5:11 PM   POD # * No surgery found *  by * Surgery not found * - Blood Loss: * No surgery found * * Surgery not found *     CONSULTANTS  IP CONSULT TO UROLOGY   ADMISSION DIAGNOSES  [unfilled]      Assessment/Plan:       Asymmetric Bladder Wall Thickening; r/o Mass  Hematuria with Clots   Possible MARIA ELENA  Prostatomegaly  Bilateral Hydronephrosis and Hydroureter  - CT images reveal anterior wall thickening and moderately distended bladder with bilateral hydronephrosis/hydroureter; suspect chronic bladder wall thickening possibly r/t prostatomegaly however concern for malignancy can not be excluded at this time; discussed with patient  - Indwelling Gibbons Catheter 18 Fr now placed - assist with possible decompression of bilateral hydro and r/u clots obstruction and ongoing hematuria. - Rectal wall thickening; consider CRS  - If worsens clinically would recommend repeat imaging  - Urology following    D/w Dr. Zimmer Prom note 12/8/23 @ [id number]:  - Patient has fruit punch urine in gibbons tubing. Irritation to tip of penis.    - Urology manually irrigated patient's bladder which removed 4-5 small to medium sized clots and resulted in yellow to pink

## 2023-12-08 NOTE — ED NOTES
Pt bladder scan at this time was 171 mL of urine in bladder. Pt urinating at this time. Brandie French. , PA aware.       Delores Diaz RN  12/07/23 2034

## 2023-12-08 NOTE — CARE COORDINATION
Care Management Initial Assessment       RUR:  Readmission? No  1st IM letter given? Yes   1st  letter given: No  Pt independent with ADLs', ambulating and drives  Wears Home O2- 3L supplied by [employer]       12/08/23 3657   Service Assessment   Patient Orientation Alert and Oriented   Cognition Alert   History Provided By Patient   Primary Caregiver Self   Support Systems Spouse/Significant Other   Patient's Healthcare Decision Maker is: Legal Next of Kin   Prior Functional Level Independent in ADLs/IADLs   Current Functional Level Independent in ADLs/IADLs   Can patient return to prior living arrangement Yes   Ability to make needs known: Good   Family able to assist with home care needs: Yes   Financial Resources [employer] None   Social/Functional History   Lives With Spouse   Type of [address] [facility] [address]  ([address]   [facility] Independent   Transfer Assistance Independent   Active  Yes   Mode of Transportation Car   Discharge Planning   Type of Residence Other (Comment)   Living Arrangements Spouse/Significant Other   Current Services Prior To Admission Oxygen Therapy  ([employer])   Potential Assistance Needed N/A   DME Ordered? No   Type of Home Care Services None   Patient expects to be discharged to: [facility] (ACP) Conversation    Date of Conversation: 12/8/23  Conducted with: Patient with [address]:  No healthcare decision makers have been documented. Click here to complete [address] including selection of the Healthcare Decision Maker Relationship (ie \"Primary\")   Today we documented Decision Maker(s) consistent with Legal Next of Kin hierarchy.     Length of Voluntary ACP Conversation in minutes:  16 minutes    Majo Sultana

## 2023-12-08 NOTE — ED NOTES
TRANSFER - OUT REPORT:    Verbal report given to JOHANA Jurado on [address] [facility]  being transferred to PCU for routine progression of patient care       Report consisted of patient's Situation, Background, Assessment and   Recommendations(SBAR). Information from the following report(s) Nurse Handoff Report, Index, ED Encounter Summary, ED SBAR, Adult Overview, Intake/Output, MAR, Recent Results, Med Rec Status, and Cardiac Rhythm Sinus Rhythm  was reviewed with the receiving nurse. [place] Fall Assessment:    Presents to emergency department  because of falls (Syncope, seizure, or loss of consciousness): No  Age > 70: No  Altered Mental Status, Intoxication with alcohol or substance confusion (Disorientation, impaired judgment, poor safety awaremess, or inability to follow instructions): No  Impaired Mobility: Ambulates or transfers with assistive devices or assistance; Unable to ambulate or transer.: No  Nursing Judgement: Yes          Lines:   Peripheral IV 12/07/23 Right Forearm (Active)   Site Assessment Clean, dry & intact 12/07/23 1748   Line Status Brisk blood return;Specimen collected; Flushed;Normal saline locked 12/07/23 [facility] pulled back 12/07/23 1748   Phlebitis Assessment No symptoms 12/07/23 1748   Infiltration Assessment 0 12/07/23 1748   Alcohol Cap Used Yes 12/07/23 1748   Dressing Status New dressing applied;Clean, dry & intact 12/07/23 1748   Dressing Type Transparent 12/07/23 1748   Dressing Intervention New 12/07/23 1748        Opportunity for questions and clarification was provided.       Patient transported with:  Registered Nurse          Abiodun Oviedo RN  12/08/23 0120

## 2023-12-08 NOTE — ED PROVIDER NOTES
[facility] EMERGENCY DEPT  EMERGENCY DEPARTMENT ENCOUNTER       Pt Name: Brian Boles  MRN: [id number]  [address] [facility] [address] 1955  Date of evaluation: 12/7/2023  Provider: Ebony Sneed PA-C   PCP: Natalie Molina DO  Note Started: 9:24 PM EST 12/7/23     CHIEF COMPLAINT       Chief Complaint   Patient presents with    Hematuria     Pt states he is currently being treated for prostatitis. States he gave himself an enema for constipation on 11/24 and was seen at [facility] at that time. States he thinks when he gave himself the enema he injured something, has been having clots of blood in his urine that have been worsening. HISTORY OF PRESENT ILLNESS: 1 or more elements      History From: Patient  HPI Limitations: None     Jamel Bang is a 79 y.o. male with past medical history of esophageal cancer, GERD, hypertension, COPD, nonischemic cardiomyopathy, depression, sleep apnea, osteoarthritis who presents complaining of hematuria x 1 day. Patient reports that yesterday he gross hematuria, and passed multiple blood clots. States he has not seen hematuria today. He states he was seen at [facility] On 11/24/2023 and was treated for prostatitis. He reports he was constipated after this and the following day he used a enema and is concerned that he injured something. He reports he had gross hematuria the day after he used a fleets enema, and then again yesterday. He tells me that he has been experiencing urinary symptoms since July. He reports that he has been having urinary urgency and incomplete bladder emptying. He reports he saw urologist at [address] and was started on finasteride and alfuzosin. He is also been treated for chronic prostatitis with cephalexin and doxycycline. He states he had a bladder scan with urologist that showed he was retaining some postvoid urine. He states he has not had imaging, has not had a cystoscopy or previous kidney imaging.       He also

## 2023-12-08 NOTE — H&P
Hospitalist Admission Note    NAME:  Thaddeus Merchant   :  1955   MRN:  [id number]     Date/Time:  2023 12:53 AM    Patient PCP: Jesus Castillo, DO    ______________________________________________________________________  Given the patient's current clinical presentation, I have a high level of concern for decompensation if discharged from the emergency department. Complex decision making was performed, which includes reviewing the patient's available past medical records, laboratory results, and x-ray films. My assessment of this patient's clinical condition and my plan of care is as follows. Assessment / Plan: Active Problems:  Hematuria  Suspect primary bladder cancer  Moderate-severe bilateral hydronephrosis  MARIA ELENA versus CKD 3, baseline creatinine unknown  Normocytic anemia  Constipation  COPD  GERD    Plan:  Hematuria  Suspect primary bladder cancer  Moderate-severe bilateral hydronephrosis  MARIA ELENA versus CKD 3, baseline creatinine unknown  Normocytic anemia  Admit to telemetry monitoring  Urology consulted, greatly appreciate their expertise  Bladder management protocol  Trend renal function  Renally dose medications and avoid nephrotoxic agents  Trend CBC    Constipation  Twice daily MiraLAX  Daily sennosides  As needed bisacodyl suppository    COPD  Continue PTA Symbicort, Anoro Ellipta-patient provided  Continue PTA albuterol inhaler as needed    GERD  Formulary substitution Protonix    Medical Decision Making:   I personally reviewed labs: Yes, as listed below  I personally reviewed imaging: CT abdomen pelvis  Toxic drug monitoring: None  Discussed case with: ED provider. After discussion I am in agreement that acuity of patient's medical condition necessitates hospital stay. Code Status: Full  DVT Prophylaxis: SCDs  GI Prophylaxis: Protonix  Baseline: Independent    Subjective:   CHIEF COMPLAINT: Hematuria    HISTORY OF PRESENT ILLNESS:     Jacobo Cheema is a 79 y.o.

## 2023-12-09 PROCEDURE — 6370000000 HC RX 637 (ALT 250 FOR IP): Performed by: INTERNAL MEDICINE

## 2023-12-09 PROCEDURE — 2700000000 HC OXYGEN THERAPY PER DAY

## 2023-12-09 PROCEDURE — 6360000002 HC RX W HCPCS: Performed by: STUDENT IN AN ORGANIZED HEALTH CARE EDUCATION/TRAINING PROGRAM

## 2023-12-09 PROCEDURE — 94640 AIRWAY INHALATION TREATMENT: CPT

## 2023-12-09 PROCEDURE — 6370000000 HC RX 637 (ALT 250 FOR IP): Performed by: NURSE PRACTITIONER

## 2023-12-09 PROCEDURE — 1100000003 HC PRIVATE W/ TELEMETRY

## 2023-12-09 PROCEDURE — 6370000000 HC RX 637 (ALT 250 FOR IP): Performed by: STUDENT IN AN ORGANIZED HEALTH CARE EDUCATION/TRAINING PROGRAM

## 2023-12-09 PROCEDURE — 2580000003 HC RX 258: Performed by: STUDENT IN AN ORGANIZED HEALTH CARE EDUCATION/TRAINING PROGRAM

## 2023-12-09 RX ORDER — MORPHINE SULFATE 2 MG/ML
2 INJECTION, SOLUTION INTRAMUSCULAR; INTRAVENOUS EVERY 4 HOURS PRN
Status: DISCONTINUED | OUTPATIENT
Start: 2023-12-09 | End: 2023-12-13 | Stop reason: HOSPADM

## 2023-12-09 RX ADMIN — SODIUM CHLORIDE, PRESERVATIVE FREE 10 ML: 5 INJECTION INTRAVENOUS at 09:40

## 2023-12-09 RX ADMIN — BACITRACIN: 500 OINTMENT TOPICAL at 00:36

## 2023-12-09 RX ADMIN — BACITRACIN 1 EACH: 500 OINTMENT TOPICAL at 20:47

## 2023-12-09 RX ADMIN — POLYETHYLENE GLYCOL 3350 17 G: 17 POWDER, FOR SOLUTION ORAL at 20:47

## 2023-12-09 RX ADMIN — GUAIFENESIN 600 MG: 600 TABLET, EXTENDED RELEASE ORAL at 20:47

## 2023-12-09 RX ADMIN — BACITRACIN: 500 OINTMENT TOPICAL at 11:17

## 2023-12-09 RX ADMIN — DOCUSATE SODIUM 50MG AND SENNOSIDES 8.6MG 2 TABLET: 8.6; 5 TABLET, FILM COATED ORAL at 09:38

## 2023-12-09 RX ADMIN — SODIUM CHLORIDE, PRESERVATIVE FREE 10 ML: 5 INJECTION INTRAVENOUS at 20:46

## 2023-12-09 RX ADMIN — OXYCODONE HYDROCHLORIDE AND ACETAMINOPHEN 1 TABLET: 5; 325 TABLET ORAL at 09:38

## 2023-12-09 RX ADMIN — MONTELUKAST 10 MG: 10 TABLET, FILM COATED ORAL at 20:47

## 2023-12-09 RX ADMIN — ONDANSETRON 4 MG: 2 INJECTION INTRAMUSCULAR; INTRAVENOUS at 19:54

## 2023-12-09 RX ADMIN — PANTOPRAZOLE SODIUM 40 MG: 40 TABLET, DELAYED RELEASE ORAL at 06:40

## 2023-12-09 RX ADMIN — OXYCODONE HYDROCHLORIDE AND ACETAMINOPHEN 1 TABLET: 5; 325 TABLET ORAL at 02:43

## 2023-12-09 RX ADMIN — ARFORMOTEROL TARTRATE: 15 SOLUTION RESPIRATORY (INHALATION) at 07:37

## 2023-12-09 RX ADMIN — POLYETHYLENE GLYCOL 3350 17 G: 17 POWDER, FOR SOLUTION ORAL at 09:38

## 2023-12-09 RX ADMIN — IPRATROPIUM BROMIDE AND ALBUTEROL SULFATE 1 DOSE: .5; 3 SOLUTION RESPIRATORY (INHALATION) at 07:32

## 2023-12-09 RX ADMIN — GUAIFENESIN 600 MG: 600 TABLET, EXTENDED RELEASE ORAL at 09:38

## 2023-12-09 ASSESSMENT — PAIN SCALES - GENERAL
PAINLEVEL_OUTOF10: 4
PAINLEVEL_OUTOF10: 0

## 2023-12-09 ASSESSMENT — PAIN DESCRIPTION - LOCATION: LOCATION: ABDOMEN

## 2023-12-09 ASSESSMENT — PAIN DESCRIPTION - DESCRIPTORS: DESCRIPTORS: PRESSURE

## 2023-12-09 NOTE — PLAN OF CARE
Problem: Pain  Goal: Verbalizes/displays adequate comfort level or baseline comfort level  12/9/2023 1222 by Adrien Leal RN  Outcome: Progressing     Problem: Safety - Adult  Goal: Free from fall injury  12/9/2023 1222 by Adrien Leal RN  Outcome: Progressing     Problem: ABCDS Injury Assessment  Goal: Absence of physical injury  12/9/2023 1222 by Adrien Leal RN  Outcome: Progressing     Problem: Respiratory - Adult  Goal: Achieves optimal ventilation and oxygenation  Outcome: Progressing

## 2023-12-09 NOTE — CONSULTS
Gastroenterology Consult for ESTABLISHED [facility]     Referring Physician: Dr. Alverto Resendiz Date: 12/9/2023     Subjective:     Chief Complaint: rectal exam, rectal mass    History of Present Illness: Heavenly Alfaro is a 79 y.o. male who is seen in consultation for rectal mass needing rectal exam.    Macho Pacheco is a 89AD with severe COPD on oxygen, cardiomyopathy EF reported ~40%, hx/o esophageal cancer 2012 s/p esophagectomy without radiation therapy c/b anastomotic stricture requiring dilations 2013, multiple colon polyps 2011, chronic prostatitis since summer 2023. He recalls having progressive LUTS symptoms summer 2023 with pains. Saw various teams with PCP and ED visits, recalls seeing urology once who didn't do a rectal exam per his recollection, and has been on/off antibiotics many months this fall. He gave himself an enema as he had constipation recently,  and then had significant pain and saw blood in his urine. Otherwise no changes in weight or GI symptoms. No dysphagia since multiple dilations in 2013.     Past Medical History:   Diagnosis Date    Arthritis     osteoarthritis    Asthma     Cancer ([address])     esophagus    Cancer of esophagus ([address]) 4/2012    adenocarcinoma; Dr. Sid De Oliveira    Chronic pain     lower back, has crushed vertebrae    Essential hypertension     GERD (gastroesophageal reflux disease)     Hypertension     not on medication    Nonischemic cardiomyopathy ([address])     2003 EF 35-50%; negative cath, 2/2012 EF 55%; Dr. Antionette Carter    Other ill-defined conditions(799.89)     Shea's esophagus    Other ill-defined conditions(799.89)     pneumonia multiple times    Other ill-defined conditions(799.89)     esophageal nodule - pre-cancerous    Psychiatric disorder     depression    Seizures ([address])     caused with allergic reaction from Aleve    Thyroid disease     Unspecified sleep apnea     c-pap-doesnt wear now/apnea resolved after surgery/lost weight and no sleep apnea     Past

## 2023-12-10 LAB
ANION GAP SERPL CALC-SCNC: 7 MMOL/L (ref 5–15)
BASOPHILS # BLD: 0 K/UL (ref 0–0.1)
BASOPHILS NFR BLD: 1 % (ref 0–1)
BUN SERPL-MCNC: 20 MG/DL (ref 6–20)
BUN/CREAT SERPL: 17 (ref 12–20)
CALCIUM SERPL-MCNC: 8.8 MG/DL (ref 8.5–10.1)
CHLORIDE SERPL-SCNC: 102 MMOL/L (ref 97–108)
CO2 SERPL-SCNC: 32 MMOL/L (ref 21–32)
CREAT SERPL-MCNC: 1.2 MG/DL (ref 0.7–1.3)
DIFFERENTIAL METHOD BLD: ABNORMAL
EOSINOPHIL # BLD: 0.2 K/UL (ref 0–0.4)
EOSINOPHIL NFR BLD: 3 % (ref 0–7)
ERYTHROCYTE [DISTWIDTH] IN BLOOD BY AUTOMATED COUNT: 16.4 % (ref 11.5–14.5)
GLUCOSE SERPL-MCNC: 102 MG/DL (ref 65–100)
HCT VFR BLD AUTO: 28.8 % (ref 36.6–50.3)
HGB BLD-MCNC: 9.3 G/DL (ref 12.1–17)
IMM GRANULOCYTES # BLD AUTO: 0 K/UL (ref 0–0.04)
IMM GRANULOCYTES NFR BLD AUTO: 0 % (ref 0–0.5)
LYMPHOCYTES # BLD: 1 K/UL (ref 0.8–3.5)
LYMPHOCYTES NFR BLD: 18 % (ref 12–49)
MCH RBC QN AUTO: 28.7 PG (ref 26–34)
MCHC RBC AUTO-ENTMCNC: 32.3 G/DL (ref 30–36.5)
MCV RBC AUTO: 88.9 FL (ref 80–99)
MONOCYTES # BLD: 0.5 K/UL (ref 0–1)
MONOCYTES NFR BLD: 9 % (ref 5–13)
NEUTS SEG # BLD: 4 K/UL (ref 1.8–8)
NEUTS SEG NFR BLD: 69 % (ref 32–75)
NRBC # BLD: 0 K/UL (ref 0–0.01)
NRBC BLD-RTO: 0 PER 100 WBC
PLATELET # BLD AUTO: 213 K/UL (ref 150–400)
PMV BLD AUTO: 9.7 FL (ref 8.9–12.9)
POTASSIUM SERPL-SCNC: 4.2 MMOL/L (ref 3.5–5.1)
RBC # BLD AUTO: 3.24 M/UL (ref 4.1–5.7)
SODIUM SERPL-SCNC: 141 MMOL/L (ref 136–145)
WBC # BLD AUTO: 5.7 K/UL (ref 4.1–11.1)

## 2023-12-10 PROCEDURE — 6370000000 HC RX 637 (ALT 250 FOR IP): Performed by: NURSE PRACTITIONER

## 2023-12-10 PROCEDURE — 2700000000 HC OXYGEN THERAPY PER DAY

## 2023-12-10 PROCEDURE — 2580000003 HC RX 258: Performed by: STUDENT IN AN ORGANIZED HEALTH CARE EDUCATION/TRAINING PROGRAM

## 2023-12-10 PROCEDURE — 36415 COLL VENOUS BLD VENIPUNCTURE: CPT

## 2023-12-10 PROCEDURE — 6370000000 HC RX 637 (ALT 250 FOR IP): Performed by: INTERNAL MEDICINE

## 2023-12-10 PROCEDURE — 1100000003 HC PRIVATE W/ TELEMETRY

## 2023-12-10 PROCEDURE — 85025 COMPLETE CBC W/AUTO DIFF WBC: CPT

## 2023-12-10 PROCEDURE — 6370000000 HC RX 637 (ALT 250 FOR IP): Performed by: STUDENT IN AN ORGANIZED HEALTH CARE EDUCATION/TRAINING PROGRAM

## 2023-12-10 PROCEDURE — 80048 BASIC METABOLIC PNL TOTAL CA: CPT

## 2023-12-10 RX ORDER — SODIUM CHLORIDE 0.9 % (FLUSH) 0.9 %
5-40 SYRINGE (ML) INJECTION PRN
OUTPATIENT
Start: 2023-12-10

## 2023-12-10 RX ORDER — SODIUM CHLORIDE 9 MG/ML
25 INJECTION, SOLUTION INTRAVENOUS PRN
OUTPATIENT
Start: 2023-12-10

## 2023-12-10 RX ORDER — SODIUM CHLORIDE 0.9 % (FLUSH) 0.9 %
5-40 SYRINGE (ML) INJECTION EVERY 12 HOURS SCHEDULED
OUTPATIENT
Start: 2023-12-10

## 2023-12-10 RX ORDER — IPRATROPIUM BROMIDE AND ALBUTEROL SULFATE 2.5; .5 MG/3ML; MG/3ML
1 SOLUTION RESPIRATORY (INHALATION) 2 TIMES DAILY PRN
Status: DISCONTINUED | OUTPATIENT
Start: 2023-12-10 | End: 2023-12-11 | Stop reason: HOSPADM

## 2023-12-10 RX ADMIN — GUAIFENESIN 600 MG: 600 TABLET, EXTENDED RELEASE ORAL at 09:14

## 2023-12-10 RX ADMIN — ONDANSETRON 4 MG: 4 TABLET, ORALLY DISINTEGRATING ORAL at 17:41

## 2023-12-10 RX ADMIN — DOCUSATE SODIUM 50MG AND SENNOSIDES 8.6MG 2 TABLET: 8.6; 5 TABLET, FILM COATED ORAL at 09:14

## 2023-12-10 RX ADMIN — PANTOPRAZOLE SODIUM 40 MG: 40 TABLET, DELAYED RELEASE ORAL at 05:49

## 2023-12-10 RX ADMIN — GUAIFENESIN 600 MG: 600 TABLET, EXTENDED RELEASE ORAL at 21:35

## 2023-12-10 RX ADMIN — SODIUM CHLORIDE, PRESERVATIVE FREE 10 ML: 5 INJECTION INTRAVENOUS at 09:20

## 2023-12-10 RX ADMIN — SODIUM CHLORIDE, PRESERVATIVE FREE 10 ML: 5 INJECTION INTRAVENOUS at 21:36

## 2023-12-10 RX ADMIN — POLYETHYLENE GLYCOL-3350 AND ELECTROLYTES 4000 ML: 236; 6.74; 5.86; 2.97; 22.74 POWDER, FOR SOLUTION ORAL at 17:39

## 2023-12-10 RX ADMIN — MONTELUKAST 10 MG: 10 TABLET, FILM COATED ORAL at 21:35

## 2023-12-10 RX ADMIN — BACITRACIN 1 EACH: 500 OINTMENT TOPICAL at 09:19

## 2023-12-10 RX ADMIN — BACITRACIN 1 EACH: 500 OINTMENT TOPICAL at 21:37

## 2023-12-10 ASSESSMENT — PAIN SCALES - GENERAL
PAINLEVEL_OUTOF10: 0
PAINLEVEL_OUTOF10: 2

## 2023-12-10 ASSESSMENT — PAIN DESCRIPTION - ORIENTATION: ORIENTATION: ANTERIOR

## 2023-12-10 ASSESSMENT — PAIN DESCRIPTION - DESCRIPTORS: DESCRIPTORS: CRAMPING

## 2023-12-10 ASSESSMENT — PAIN DESCRIPTION - LOCATION: LOCATION: ABDOMEN

## 2023-12-10 NOTE — PLAN OF CARE
Problem: Pain  Goal: Verbalizes/displays adequate comfort level or baseline comfort level  Outcome: Progressing     Problem: Safety - Adult  Goal: Free from fall injury  Outcome: Progressing     Problem: ABCDS Injury Assessment  Goal: Absence of physical injury  Outcome: Progressing     Problem: Respiratory - Adult  Goal: Achieves optimal ventilation and oxygenation  12/10/2023 1556 by Susan Clemons RN  Outcome: Progressing  12/10/2023 1051 by Orlando Moe, RT  Outcome: Progressing  Flowsheets (Taken 12/10/2023 1051)  Achieves optimal ventilation and oxygenation:   Assess for changes in respiratory status   Position to facilitate oxygenation and minimize respiratory effort   Respiratory therapy support as indicated   Oxygen supplementation based on oxygen saturation or arterial blood gases   Assess and instruct to report shortness of breath or any respiratory difficulty     Problem: Skin/Tissue Integrity  Goal: Absence of new skin breakdown  Description: 1. Monitor for areas of redness and/or skin breakdown  2. Assess vascular access sites hourly  3. Every 4-6 hours minimum:  Change oxygen saturation probe site  4. Every 4-6 hours:  If on nasal continuous positive airway pressure, respiratory therapy assess nares and determine need for appliance change or resting period.   Outcome: Progressing

## 2023-12-11 ENCOUNTER — ANESTHESIA EVENT (OUTPATIENT)
Facility: HOSPITAL | Age: 68
DRG: 687 | End: 2023-12-11
Payer: MEDICARE

## 2023-12-11 ENCOUNTER — ANESTHESIA (OUTPATIENT)
Facility: HOSPITAL | Age: 68
DRG: 687 | End: 2023-12-11
Payer: MEDICARE

## 2023-12-11 ENCOUNTER — APPOINTMENT (OUTPATIENT)
Facility: HOSPITAL | Age: 68
DRG: 687 | End: 2023-12-11
Payer: MEDICARE

## 2023-12-11 LAB
ANION GAP SERPL CALC-SCNC: 5 MMOL/L (ref 5–15)
BASOPHILS # BLD: 0 K/UL (ref 0–0.1)
BASOPHILS NFR BLD: 1 % (ref 0–1)
BUN SERPL-MCNC: 16 MG/DL (ref 6–20)
BUN/CREAT SERPL: 13 (ref 12–20)
CALCIUM SERPL-MCNC: 8.9 MG/DL (ref 8.5–10.1)
CHLORIDE SERPL-SCNC: 100 MMOL/L (ref 97–108)
CO2 SERPL-SCNC: 32 MMOL/L (ref 21–32)
CREAT SERPL-MCNC: 1.19 MG/DL (ref 0.7–1.3)
DIFFERENTIAL METHOD BLD: ABNORMAL
EOSINOPHIL # BLD: 0.1 K/UL (ref 0–0.4)
EOSINOPHIL NFR BLD: 1 % (ref 0–7)
ERYTHROCYTE [DISTWIDTH] IN BLOOD BY AUTOMATED COUNT: 16.4 % (ref 11.5–14.5)
GLUCOSE SERPL-MCNC: 89 MG/DL (ref 65–100)
HCT VFR BLD AUTO: 28.3 % (ref 36.6–50.3)
HCT VFR BLD AUTO: 28.6 % (ref 36.6–50.3)
HGB BLD-MCNC: 9 G/DL (ref 12.1–17)
HGB BLD-MCNC: 9.3 G/DL (ref 12.1–17)
IMM GRANULOCYTES # BLD AUTO: 0 K/UL (ref 0–0.04)
IMM GRANULOCYTES NFR BLD AUTO: 0 % (ref 0–0.5)
LYMPHOCYTES # BLD: 1 K/UL (ref 0.8–3.5)
LYMPHOCYTES NFR BLD: 18 % (ref 12–49)
MCH RBC QN AUTO: 27.7 PG (ref 26–34)
MCHC RBC AUTO-ENTMCNC: 31.8 G/DL (ref 30–36.5)
MCV RBC AUTO: 87.1 FL (ref 80–99)
MONOCYTES # BLD: 0.6 K/UL (ref 0–1)
MONOCYTES NFR BLD: 10 % (ref 5–13)
NEUTS SEG # BLD: 4 K/UL (ref 1.8–8)
NEUTS SEG NFR BLD: 70 % (ref 32–75)
NRBC # BLD: 0 K/UL (ref 0–0.01)
NRBC BLD-RTO: 0 PER 100 WBC
PLATELET # BLD AUTO: 222 K/UL (ref 150–400)
PMV BLD AUTO: 9.9 FL (ref 8.9–12.9)
POTASSIUM SERPL-SCNC: 3.9 MMOL/L (ref 3.5–5.1)
RBC # BLD AUTO: 3.25 M/UL (ref 4.1–5.7)
SODIUM SERPL-SCNC: 137 MMOL/L (ref 136–145)
WBC # BLD AUTO: 5.7 K/UL (ref 4.1–11.1)

## 2023-12-11 PROCEDURE — 80048 BASIC METABOLIC PNL TOTAL CA: CPT

## 2023-12-11 PROCEDURE — 2709999900 HC NON-CHARGEABLE SUPPLY: Performed by: INTERNAL MEDICINE

## 2023-12-11 PROCEDURE — 88305 TISSUE EXAM BY PATHOLOGIST: CPT

## 2023-12-11 PROCEDURE — 2500000003 HC RX 250 WO HCPCS

## 2023-12-11 PROCEDURE — 6370000000 HC RX 637 (ALT 250 FOR IP): Performed by: STUDENT IN AN ORGANIZED HEALTH CARE EDUCATION/TRAINING PROGRAM

## 2023-12-11 PROCEDURE — 6370000000 HC RX 637 (ALT 250 FOR IP): Performed by: INTERNAL MEDICINE

## 2023-12-11 PROCEDURE — 3700000000 HC ANESTHESIA ATTENDED CARE: Performed by: INTERNAL MEDICINE

## 2023-12-11 PROCEDURE — 85018 HEMOGLOBIN: CPT

## 2023-12-11 PROCEDURE — 2700000000 HC OXYGEN THERAPY PER DAY

## 2023-12-11 PROCEDURE — 36415 COLL VENOUS BLD VENIPUNCTURE: CPT

## 2023-12-11 PROCEDURE — 3600007501: Performed by: INTERNAL MEDICINE

## 2023-12-11 PROCEDURE — 6360000002 HC RX W HCPCS

## 2023-12-11 PROCEDURE — 76770 US EXAM ABDO BACK WALL COMP: CPT

## 2023-12-11 PROCEDURE — 88342 IMHCHEM/IMCYTCHM 1ST ANTB: CPT

## 2023-12-11 PROCEDURE — 3700000001 HC ADD 15 MINUTES (ANESTHESIA): Performed by: INTERNAL MEDICINE

## 2023-12-11 PROCEDURE — 2580000003 HC RX 258: Performed by: STUDENT IN AN ORGANIZED HEALTH CARE EDUCATION/TRAINING PROGRAM

## 2023-12-11 PROCEDURE — 7100000010 HC PHASE II RECOVERY - FIRST 15 MIN: Performed by: INTERNAL MEDICINE

## 2023-12-11 PROCEDURE — 6370000000 HC RX 637 (ALT 250 FOR IP): Performed by: NURSE PRACTITIONER

## 2023-12-11 PROCEDURE — 3600007511: Performed by: INTERNAL MEDICINE

## 2023-12-11 PROCEDURE — 0DBM8ZZ EXCISION OF DESCENDING COLON, VIA NATURAL OR ARTIFICIAL OPENING ENDOSCOPIC: ICD-10-PCS | Performed by: INTERNAL MEDICINE

## 2023-12-11 PROCEDURE — 0DBK8ZZ EXCISION OF ASCENDING COLON, VIA NATURAL OR ARTIFICIAL OPENING ENDOSCOPIC: ICD-10-PCS | Performed by: INTERNAL MEDICINE

## 2023-12-11 PROCEDURE — 0DBN8ZZ EXCISION OF SIGMOID COLON, VIA NATURAL OR ARTIFICIAL OPENING ENDOSCOPIC: ICD-10-PCS | Performed by: INTERNAL MEDICINE

## 2023-12-11 PROCEDURE — 85014 HEMATOCRIT: CPT

## 2023-12-11 PROCEDURE — 7100000011 HC PHASE II RECOVERY - ADDTL 15 MIN: Performed by: INTERNAL MEDICINE

## 2023-12-11 PROCEDURE — 1100000003 HC PRIVATE W/ TELEMETRY

## 2023-12-11 PROCEDURE — 3C1ZX8Z IRRIGATION OF INDWELLING DEVICE USING IRRIGATING SUBSTANCE, EXTERNAL APPROACH: ICD-10-PCS | Performed by: UROLOGY

## 2023-12-11 PROCEDURE — 0DBM8ZX EXCISION OF DESCENDING COLON, VIA NATURAL OR ARTIFICIAL OPENING ENDOSCOPIC, DIAGNOSTIC: ICD-10-PCS | Performed by: INTERNAL MEDICINE

## 2023-12-11 PROCEDURE — 85025 COMPLETE CBC W/AUTO DIFF WBC: CPT

## 2023-12-11 RX ORDER — LIDOCAINE HYDROCHLORIDE 20 MG/ML
INJECTION, SOLUTION EPIDURAL; INFILTRATION; INTRACAUDAL; PERINEURAL PRN
Status: DISCONTINUED | OUTPATIENT
Start: 2023-12-11 | End: 2023-12-11 | Stop reason: SDUPTHER

## 2023-12-11 RX ADMIN — PANTOPRAZOLE SODIUM 40 MG: 40 TABLET, DELAYED RELEASE ORAL at 06:34

## 2023-12-11 RX ADMIN — SODIUM CHLORIDE, PRESERVATIVE FREE 10 ML: 5 INJECTION INTRAVENOUS at 09:39

## 2023-12-11 RX ADMIN — SODIUM CHLORIDE, PRESERVATIVE FREE 10 ML: 5 INJECTION INTRAVENOUS at 21:22

## 2023-12-11 RX ADMIN — PROPOFOL 50 MG: 10 INJECTION, EMULSION INTRAVENOUS at 12:40

## 2023-12-11 RX ADMIN — GUAIFENESIN 600 MG: 600 TABLET, EXTENDED RELEASE ORAL at 21:21

## 2023-12-11 RX ADMIN — SODIUM CHLORIDE: 9 INJECTION, SOLUTION INTRAVENOUS at 12:34

## 2023-12-11 RX ADMIN — PROPOFOL 50 MG: 10 INJECTION, EMULSION INTRAVENOUS at 12:46

## 2023-12-11 RX ADMIN — BACITRACIN 1 EACH: 500 OINTMENT TOPICAL at 21:25

## 2023-12-11 RX ADMIN — PROPOFOL 50 MG: 10 INJECTION, EMULSION INTRAVENOUS at 12:43

## 2023-12-11 RX ADMIN — LIDOCAINE HYDROCHLORIDE 40 MG: 20 INJECTION, SOLUTION EPIDURAL; INFILTRATION; INTRACAUDAL; PERINEURAL at 12:40

## 2023-12-11 RX ADMIN — GUAIFENESIN 600 MG: 600 TABLET, EXTENDED RELEASE ORAL at 09:36

## 2023-12-11 RX ADMIN — BACITRACIN 1 EACH: 500 OINTMENT TOPICAL at 09:37

## 2023-12-11 RX ADMIN — PROPOFOL 50 MG: 10 INJECTION, EMULSION INTRAVENOUS at 12:55

## 2023-12-11 RX ADMIN — PROPOFOL 50 MG: 10 INJECTION, EMULSION INTRAVENOUS at 13:06

## 2023-12-11 RX ADMIN — PROPOFOL 50 MG: 10 INJECTION, EMULSION INTRAVENOUS at 12:59

## 2023-12-11 RX ADMIN — PROPOFOL 50 MG: 10 INJECTION, EMULSION INTRAVENOUS at 12:51

## 2023-12-11 RX ADMIN — MONTELUKAST 10 MG: 10 TABLET, FILM COATED ORAL at 21:21

## 2023-12-11 ASSESSMENT — COPD QUESTIONNAIRES: CAT_SEVERITY: SEVERE

## 2023-12-11 ASSESSMENT — PAIN - FUNCTIONAL ASSESSMENT: PAIN_FUNCTIONAL_ASSESSMENT: 0-10

## 2023-12-11 NOTE — ANESTHESIA PRE PROCEDURE
Department of Anesthesiology  Preprocedure Note       Name:  Brian Boles   Age:  79 y.o.  :  1955                                          MRN:  [id number]         Date:  2023      Surgeon: Suma Callejas):  Rohith Carrasco MD    Procedure: Procedure(s):  COLONOSCOPY DIAGNOSTIC    Medications prior to admission:   Prior to Admission medications    Medication Sig Start Date End Date Taking?  Authorizing Provider   albuterol sulfate HFA (PROVENTIL;VENTOLIN;PROAIR) 108 (90 Base) MCG/ACT inhaler Inhale 2 puffs into the lungs every 6 hours as needed    Automatic Reconciliation, Ar   budesonide-formoterol (SYMBICORT) 160-4.5 MCG/ACT AERO Inhale 2 puffs into the lungs 2 times daily    Automatic Reconciliation, Ar   furosemide (LASIX) 20 MG tablet Take 20 mg by mouth daily as needed 18   Automatic Reconciliation, Ar   montelukast (SINGULAIR) 10 MG tablet Take 10 mg by mouth 18   Automatic Reconciliation, Ar   omeprazole (PRILOSEC) 20 MG delayed release capsule Take 20 mg by mouth daily    Automatic Reconciliation, Ar   umeclidinium-vilanterol (ANORO ELLIPTA) 62.5-25 MCG/ACT inhaler Inhale 1 puff into the lungs daily 18   Automatic Reconciliation, Ar       Current medications:    Current Facility-Administered Medications   Medication Dose Route Frequency Provider Last Rate Last Admin    ipratropium 0.5 mg-albuterol 2.5 mg (DUONEB) nebulizer solution 1 Dose  1 Dose Inhalation BID PRN Chico Breaux MD        morphine (PF) injection 2 mg  2 mg IntraVENous Q4H PRN Chico Breaux MD        albuterol sulfate HFA (PROVENTIL;VENTOLIN;PROAIR) 108 (90 Base) MCG/ACT inhaler 2 puff (Patient Supplied)  2 puff Inhalation Q6H PRN Vinny Mohr DO        pantoprazole (PROTONIX) tablet 40 mg  40 mg Oral QAM AC Vinny Mohr DO   40 mg at 23 0634    montelukast (SINGULAIR) tablet 10 mg  10 mg Oral Nightly Vinny Mohr DO   10 mg at 12/10/23 8571    sodium chloride flush 0.9

## 2023-12-11 NOTE — ADT AUTH CERT
yellow urine in urinal      Extremities  No edema. No cyanosis. Skin:  No significant lesions/ jaundice/ rashes noted. Neurologic:  PERRL. EOMI. No facial asymmetry. No aphasia. No slurred speech. Moves all extremities. No overt focal deficits appreciated      Psych:              Normal affect. Good insight.  Cooperative      Other:     N/A         LAB DATA REVIEWED:    Recent Results         Recent Results (from the past 12 hour(s))   Urinalysis with Reflex to Culture     Collection Time: 12/07/23  5:36 PM     Specimen: Urine   Result Value Ref Range     Color, UA YELLOW/STRAW       Appearance CLEAR CLEAR       Specific Gravity, UA 1.013       pH, Urine 5.0 5.0 - 8.0       Protein, UA TRACE (A) NEG mg/dL     Glucose, UA Negative NEG mg/dL     Ketones, Urine Negative NEG mg/dL     Bilirubin Urine Negative NEG       Blood, Urine MODERATE (A) NEG       Urobilinogen, Urine 0.2 0.2 - 1.0 EU/dL     Nitrite, Urine Negative NEG       Leukocyte Esterase, Urine Negative NEG       Urine Culture if Indicated CULTURE NOT INDICATED BY UA RESULT CNI       WBC, UA 0-4 0 - 4 /hpf     RBC, UA 5-10 0 - 5 /hpf     Epithelial Cells UA MODERATE (A) FEW /lpf     BACTERIA, URINE Negative NEG /hpf     Hyaline Casts, UA 0-2 0 - 2 /lpf   CBC with Auto Differential     Collection Time: 12/07/23  5:45 PM   Result Value Ref Range     WBC 5.4 4.1 - 11.1 K/uL     RBC 3.64 (L) 4.10 - 5.70 M/uL     Hemoglobin 10.4 (L) 12.1 - 17.0 g/dL     Hematocrit 32.3 (L) 36.6 - 50.3 %     MCV 88.7 80.0 - 99.0 FL     MCH 28.6 26.0 - 34.0 PG     MCHC 32.2 30.0 - 36.5 g/dL     RDW 16.0 (H) 11.5 - 14.5 %     Platelets 235 994 - 994 K/uL     MPV 9.8 8.9 - 12.9 FL     Nucleated RBCs 0.0 0  WBC     nRBC 0.00 0.00 - 0.01 K/uL     Neutrophils % 69 32 - 75 %     Lymphocytes % 18 12 - 49 %     Monocytes % 9 5 - 13 %     Eosinophils % 3 0 - 7 %     Basophils % 1 0 - 1 %     Immature Granulocytes 0 0.0 - 0.5 %     Neutrophils Absolute 3.8 1.8 - 8.0 K/UL

## 2023-12-11 NOTE — CARE COORDINATION
Transition of Care Plan:    RUR: 12%  Prior Level of Functioning: Dependent  Disposition: Home   If SNF or IPR: Date FOC offered:   Date FOC received:   Accepting facility:   Date authorization started with reference number:   Date authorization received and expires: Follow up appointments: PCP, Specialist  DME needed: none- ahas home oxygen 3L   Transportation at discharge: Family to provide transportation  IM/IMM Medicare/ letter given: 2nd Im letter given on d/c  Is patient a [place] and connected with [place]? N/a   If yes, was [employer] transfer form completed and [facility] notified? Caregiver Contact: Rosie Zuleta (Spouse)                                    [phone] (Mobile)   Discharge Caregiver contacted prior to discharge? If pt desires  Care Conference needed? none  Barriers to discharge: none      CM reviewed pt chart. Pt not medically stable for d/c at this time.   CM will continue to follow pt for D/C planning and arrange services as deemed neccessary      [address]  Phone: [phone]

## 2023-12-11 NOTE — PLAN OF CARE
Problem: Discharge Planning  Goal: Discharge to home or other facility with appropriate resources  Outcome: Progressing  Flowsheets (Taken 12/11/2023 0132)  Discharge to home or other facility with appropriate resources:   Identify barriers to discharge with patient and caregiver   Arrange for needed discharge resources and transportation as appropriate   Identify discharge learning needs (meds, wound care, etc)   Refer to discharge planning if patient needs post-hospital services based on physician order or complex needs related to functional status, cognitive ability or social support system     Problem: Pain  Goal: Verbalizes/displays adequate comfort level or baseline comfort level  12/11/2023 0134 by Alfredo Carvalho RN  Outcome: Progressing  Flowsheets (Taken 12/11/2023 0132)  Verbalizes/displays adequate comfort level or baseline comfort level:   Encourage patient to monitor pain and request assistance   Assess pain using appropriate pain scale   Administer analgesics based on type and severity of pain and evaluate response   Implement non-pharmacological measures as appropriate and evaluate response   Consider cultural and social influences on pain and pain management   Notify Licensed Independent Practitioner if interventions unsuccessful or patient reports new pain  12/10/2023 1556 by Rip Gunter RN  Outcome: Progressing     Problem: Safety - Adult  Goal: Free from fall injury  12/11/2023 0134 by Alfredo Carvalho RN  Outcome: Progressing  12/10/2023 1556 by Rip Gunter RN  Outcome: Progressing     Problem: ABCDS Injury Assessment  Goal: Absence of physical injury  12/11/2023 0134 by Alfredo Carvalho RN  Outcome: Progressing  12/10/2023 1556 by Rip Gunter RN  Outcome: Progressing     Problem: Respiratory - Adult  Goal: Achieves optimal ventilation and oxygenation  12/11/2023 0134 by Alfredo Carvalho RN  Outcome: Progressing  Flowsheets (Taken 12/11/2023 0132)  Achieves optimal ventilation and

## 2023-12-12 LAB
ANION GAP SERPL CALC-SCNC: 9 MMOL/L (ref 5–15)
BASOPHILS # BLD: 0 K/UL (ref 0–0.1)
BASOPHILS NFR BLD: 1 % (ref 0–1)
BUN SERPL-MCNC: 17 MG/DL (ref 6–20)
BUN/CREAT SERPL: 14 (ref 12–20)
CALCIUM SERPL-MCNC: 9 MG/DL (ref 8.5–10.1)
CHLORIDE SERPL-SCNC: 101 MMOL/L (ref 97–108)
CO2 SERPL-SCNC: 28 MMOL/L (ref 21–32)
CREAT SERPL-MCNC: 1.2 MG/DL (ref 0.7–1.3)
DIFFERENTIAL METHOD BLD: ABNORMAL
EOSINOPHIL # BLD: 0.1 K/UL (ref 0–0.4)
EOSINOPHIL NFR BLD: 1 % (ref 0–7)
ERYTHROCYTE [DISTWIDTH] IN BLOOD BY AUTOMATED COUNT: 16.4 % (ref 11.5–14.5)
GLUCOSE SERPL-MCNC: 85 MG/DL (ref 65–100)
HCT VFR BLD AUTO: 29.3 % (ref 36.6–50.3)
HGB BLD-MCNC: 9.2 G/DL (ref 12.1–17)
IMM GRANULOCYTES # BLD AUTO: 0 K/UL (ref 0–0.04)
IMM GRANULOCYTES NFR BLD AUTO: 1 % (ref 0–0.5)
LYMPHOCYTES # BLD: 0.9 K/UL (ref 0.8–3.5)
LYMPHOCYTES NFR BLD: 12 % (ref 12–49)
MCH RBC QN AUTO: 28 PG (ref 26–34)
MCHC RBC AUTO-ENTMCNC: 31.4 G/DL (ref 30–36.5)
MCV RBC AUTO: 89.3 FL (ref 80–99)
MONOCYTES # BLD: 0.6 K/UL (ref 0–1)
MONOCYTES NFR BLD: 8 % (ref 5–13)
NEUTS SEG # BLD: 5.5 K/UL (ref 1.8–8)
NEUTS SEG NFR BLD: 77 % (ref 32–75)
NRBC # BLD: 0 K/UL (ref 0–0.01)
NRBC BLD-RTO: 0 PER 100 WBC
PLATELET # BLD AUTO: 216 K/UL (ref 150–400)
PMV BLD AUTO: 9.2 FL (ref 8.9–12.9)
POTASSIUM SERPL-SCNC: 4.2 MMOL/L (ref 3.5–5.1)
RBC # BLD AUTO: 3.28 M/UL (ref 4.1–5.7)
SODIUM SERPL-SCNC: 138 MMOL/L (ref 136–145)
WBC # BLD AUTO: 7.1 K/UL (ref 4.1–11.1)

## 2023-12-12 PROCEDURE — 1100000003 HC PRIVATE W/ TELEMETRY

## 2023-12-12 PROCEDURE — 6370000000 HC RX 637 (ALT 250 FOR IP): Performed by: INTERNAL MEDICINE

## 2023-12-12 PROCEDURE — 6370000000 HC RX 637 (ALT 250 FOR IP): Performed by: STUDENT IN AN ORGANIZED HEALTH CARE EDUCATION/TRAINING PROGRAM

## 2023-12-12 PROCEDURE — 2700000000 HC OXYGEN THERAPY PER DAY

## 2023-12-12 PROCEDURE — 6370000000 HC RX 637 (ALT 250 FOR IP): Performed by: NURSE PRACTITIONER

## 2023-12-12 PROCEDURE — 85025 COMPLETE CBC W/AUTO DIFF WBC: CPT

## 2023-12-12 PROCEDURE — 2580000003 HC RX 258: Performed by: STUDENT IN AN ORGANIZED HEALTH CARE EDUCATION/TRAINING PROGRAM

## 2023-12-12 PROCEDURE — 36415 COLL VENOUS BLD VENIPUNCTURE: CPT

## 2023-12-12 PROCEDURE — 80048 BASIC METABOLIC PNL TOTAL CA: CPT

## 2023-12-12 RX ADMIN — SODIUM CHLORIDE, PRESERVATIVE FREE 10 ML: 5 INJECTION INTRAVENOUS at 21:18

## 2023-12-12 RX ADMIN — BACITRACIN 1 EACH: 500 OINTMENT TOPICAL at 21:17

## 2023-12-12 RX ADMIN — MONTELUKAST 10 MG: 10 TABLET, FILM COATED ORAL at 21:17

## 2023-12-12 RX ADMIN — PANTOPRAZOLE SODIUM 40 MG: 40 TABLET, DELAYED RELEASE ORAL at 07:40

## 2023-12-12 RX ADMIN — GUAIFENESIN 600 MG: 600 TABLET, EXTENDED RELEASE ORAL at 21:17

## 2023-12-12 RX ADMIN — SODIUM CHLORIDE, PRESERVATIVE FREE 10 ML: 5 INJECTION INTRAVENOUS at 09:09

## 2023-12-12 RX ADMIN — BACITRACIN: 500 OINTMENT TOPICAL at 09:10

## 2023-12-12 NOTE — PLAN OF CARE
Problem: Discharge Planning  Goal: Discharge to home or other facility with appropriate resources  Outcome: Progressing  Flowsheets (Taken 12/12/2023 0025)  Discharge to home or other facility with appropriate resources:   Identify barriers to discharge with patient and caregiver   Arrange for needed discharge resources and transportation as appropriate   Identify discharge learning needs (meds, wound care, etc)   Refer to discharge planning if patient needs post-hospital services based on physician order or complex needs related to functional status, cognitive ability or social support system     Problem: Pain  Goal: Verbalizes/displays adequate comfort level or baseline comfort level  Outcome: Progressing  Flowsheets (Taken 12/12/2023 0025)  Verbalizes/displays adequate comfort level or baseline comfort level:   Encourage patient to monitor pain and request assistance   Assess pain using appropriate pain scale   Administer analgesics based on type and severity of pain and evaluate response   Implement non-pharmacological measures as appropriate and evaluate response   Consider cultural and social influences on pain and pain management   Notify Licensed Independent Practitioner if interventions unsuccessful or patient reports new pain     Problem: Safety - Adult  Goal: Free from fall injury  Outcome: Progressing     Problem: ABCDS Injury Assessment  Goal: Absence of physical injury  Outcome: Progressing     Problem: Respiratory - Adult  Goal: Achieves optimal ventilation and oxygenation  Outcome: Progressing  Flowsheets (Taken 12/12/2023 0025)  Achieves optimal ventilation and oxygenation:   Assess for changes in respiratory status   Assess for changes in mentation and behavior   Position to facilitate oxygenation and minimize respiratory effort   Oxygen supplementation based on oxygen saturation or arterial blood gases   Encourage broncho-pulmonary hygiene including cough, deep breathe, incentive spirometry

## 2023-12-12 NOTE — ANESTHESIA POSTPROCEDURE EVALUATION
Department of Anesthesiology  Postprocedure Note    Patient: Yan Yost  MRN: [id number]  YOB: 1955  Date of evaluation: 12/12/2023      Procedure Summary       Date: 12/11/23 Room / Location: [facility] ENDO 01 / [facility] ENDOSCOPY    Anesthesia Start: 1234 Anesthesia Stop: 1318    Procedure: COLONOSCOPY DIAGNOSTIC (Lower GI Region) Diagnosis:       Gastrointestinal tract imaging abnormality      (Gastrointestinal tract imaging abnormality [R93.3])    Surgeons: Pau Hardwick MD Responsible Provider: Alyson Alberto MD    Anesthesia Type: MAC ASA Status: 3            Anesthesia Type: MAC    Yoni Phase I: Yoni Score: 10    Yoni Phase II: Yoni Score: 10      Anesthesia Post Evaluation    Patient location during evaluation: PACU  Patient participation: complete - patient participated  Level of consciousness: awake and alert  Airway patency: patent  Nausea & Vomiting: no nausea and no vomiting  Complications: no  Cardiovascular status: hemodynamically stable  Respiratory status: acceptable  Hydration status: stable

## 2023-12-13 VITALS
SYSTOLIC BLOOD PRESSURE: 140 MMHG | OXYGEN SATURATION: 99 % | HEIGHT: 70 IN | DIASTOLIC BLOOD PRESSURE: 80 MMHG | TEMPERATURE: 98 F | BODY MASS INDEX: 19.19 KG/M2 | RESPIRATION RATE: 20 BRPM | HEART RATE: 79 BPM | WEIGHT: 134.04 LBS

## 2023-12-13 LAB
BASOPHILS # BLD: 0 K/UL (ref 0–0.1)
BASOPHILS NFR BLD: 1 % (ref 0–1)
DIFFERENTIAL METHOD BLD: ABNORMAL
EOSINOPHIL # BLD: 0.1 K/UL (ref 0–0.4)
EOSINOPHIL NFR BLD: 3 % (ref 0–7)
ERYTHROCYTE [DISTWIDTH] IN BLOOD BY AUTOMATED COUNT: 16.4 % (ref 11.5–14.5)
HCT VFR BLD AUTO: 26.7 % (ref 36.6–50.3)
HGB BLD-MCNC: 8.4 G/DL (ref 12.1–17)
IMM GRANULOCYTES # BLD AUTO: 0 K/UL (ref 0–0.04)
IMM GRANULOCYTES NFR BLD AUTO: 0 % (ref 0–0.5)
LYMPHOCYTES # BLD: 0.8 K/UL (ref 0.8–3.5)
LYMPHOCYTES NFR BLD: 15 % (ref 12–49)
MCH RBC QN AUTO: 27.5 PG (ref 26–34)
MCHC RBC AUTO-ENTMCNC: 31.5 G/DL (ref 30–36.5)
MCV RBC AUTO: 87.5 FL (ref 80–99)
MONOCYTES # BLD: 0.7 K/UL (ref 0–1)
MONOCYTES NFR BLD: 12 % (ref 5–13)
NEUTS SEG # BLD: 4 K/UL (ref 1.8–8)
NEUTS SEG NFR BLD: 69 % (ref 32–75)
NRBC # BLD: 0 K/UL (ref 0–0.01)
NRBC BLD-RTO: 0 PER 100 WBC
PLATELET # BLD AUTO: 193 K/UL (ref 150–400)
PMV BLD AUTO: 9.2 FL (ref 8.9–12.9)
RBC # BLD AUTO: 3.05 M/UL (ref 4.1–5.7)
WBC # BLD AUTO: 5.7 K/UL (ref 4.1–11.1)

## 2023-12-13 PROCEDURE — 2580000003 HC RX 258: Performed by: STUDENT IN AN ORGANIZED HEALTH CARE EDUCATION/TRAINING PROGRAM

## 2023-12-13 PROCEDURE — 36415 COLL VENOUS BLD VENIPUNCTURE: CPT

## 2023-12-13 PROCEDURE — 6370000000 HC RX 637 (ALT 250 FOR IP): Performed by: NURSE PRACTITIONER

## 2023-12-13 PROCEDURE — 6370000000 HC RX 637 (ALT 250 FOR IP): Performed by: STUDENT IN AN ORGANIZED HEALTH CARE EDUCATION/TRAINING PROGRAM

## 2023-12-13 PROCEDURE — 85025 COMPLETE CBC W/AUTO DIFF WBC: CPT

## 2023-12-13 RX ADMIN — PANTOPRAZOLE SODIUM 40 MG: 40 TABLET, DELAYED RELEASE ORAL at 07:19

## 2023-12-13 RX ADMIN — BACITRACIN 1 EACH: 500 OINTMENT TOPICAL at 08:32

## 2023-12-13 RX ADMIN — SODIUM CHLORIDE, PRESERVATIVE FREE 10 ML: 5 INJECTION INTRAVENOUS at 08:33

## 2023-12-13 ASSESSMENT — PAIN DESCRIPTION - LOCATION
LOCATION: RECTUM

## 2023-12-13 ASSESSMENT — PAIN DESCRIPTION - ORIENTATION
ORIENTATION: POSTERIOR

## 2023-12-13 ASSESSMENT — PAIN DESCRIPTION - DESCRIPTORS
DESCRIPTORS: ACHING

## 2023-12-13 ASSESSMENT — PAIN - FUNCTIONAL ASSESSMENT: PAIN_FUNCTIONAL_ASSESSMENT: PREVENTS OR INTERFERES SOME ACTIVE ACTIVITIES AND ADLS

## 2023-12-13 ASSESSMENT — PAIN SCALES - GENERAL
PAINLEVEL_OUTOF10: 7
PAINLEVEL_OUTOF10: 4
PAINLEVEL_OUTOF10: 5

## 2023-12-13 NOTE — PLAN OF CARE
Problem: Discharge Planning  Goal: Discharge to home or other facility with appropriate resources  Outcome: Progressing  Flowsheets (Taken 12/13/2023 0009)  Discharge to home or other facility with appropriate resources:   Identify barriers to discharge with patient and caregiver   Arrange for needed discharge resources and transportation as appropriate   Identify discharge learning needs (meds, wound care, etc)   Arrange for interpreters to assist at discharge as needed   Refer to discharge planning if patient needs post-hospital services based on physician order or complex needs related to functional status, cognitive ability or social support system     Problem: Pain  Goal: Verbalizes/displays adequate comfort level or baseline comfort level  Outcome: Progressing  Flowsheets  Taken 12/13/2023 0009 by Demetrio Neal RN  Verbalizes/displays adequate comfort level or baseline comfort level:   Encourage patient to monitor pain and request assistance   Assess pain using appropriate pain scale   Administer analgesics based on type and severity of pain and evaluate response   Implement non-pharmacological measures as appropriate and evaluate response   Consider cultural and social influences on pain and pain management   Notify Licensed Independent Practitioner if interventions unsuccessful or patient reports new pain  Taken 12/12/2023 1034 by Dragan Teran RN  Verbalizes/displays adequate comfort level or baseline comfort level:   Encourage patient to monitor pain and request assistance   Administer analgesics based on type and severity of pain and evaluate response     Problem: Safety - Adult  Goal: Free from fall injury  Outcome: Progressing     Problem: ABCDS Injury Assessment  Goal: Absence of physical injury  Outcome: Progressing     Problem: Respiratory - Adult  Goal: Achieves optimal ventilation and oxygenation  Outcome: Progressing  Flowsheets (Taken 12/13/2023 0009)  Achieves optimal ventilation and

## 2023-12-13 NOTE — DISCHARGE SUMMARY
Discharge Summary    Name: Makayla Davis  [id number]  YOB: 1955 (Age: 79 y.o.)   Date of Admission: 12/7/2023  Date of Discharge: 12/13/2023  Attending Physician: Edmund Coronel MD    Discharge Diagnosis:     Consultations:  IP CONSULT TO UROLOGY  IP CONSULT TO COLORECTAL SURGERY  IP CONSULT TO GI      Brief Admission History/Reason for Admission Per Sharif Leong, DO:       [address] Course by Main Problems:   Hematuria  Suspect primary bladder cancer  Moderate-severe bilateral hydronephrosis  MARIA ELENA versus CKD 3, baseline creatinine unknown  Normocytic anemia  Hemoglobin stable so far  Urology consulted, greatly appreciate their expertise  Bladder management protocol  Trend renal function  Renally dose medications and avoid nephrotoxic agents  Trend CBC  Patient seen by urology , initiated bladder irrigation, might need cystoscopy, timing to be determined  Repeat renal ultrasound showed moderate bilateral hydronephrosis  Proctitis on CT Vs rectal mass   Polyps in the colon  GI consulted , plan for colonoscopy on Monday   Status post colonoscopy which showed  mpression:    Technically difficult colonoscopy that required an EGD scope to navigate a fixed, tight sigmoid colon (though I was able to successfully intubated the cecum)  Five 8-12 mm sessile polyps in ascending colon  8 mm sessile polyp in descending colon  30 mm sessile polyp in descending colon. This is not amenable to R0 endoscopic resection. Biopsied. Tattooed on 3 walls proximally  13 mm sessile polyp in sigmoid colon. Removed by cold snare polypectomy  Moderate proctitis in distal rectum (up to 6 cm proximal from anal verge). This did NOT appear malignant and numerous biopsies taken  Otherwise normal colonoscopy through to the cecum.  Note bowel preparation was suboptimal with brownish liquid stool throughout  Recs  Follow up pathology  Once pathology returns will need referral for elective

## 2023-12-13 NOTE — DISCHARGE INSTRUCTIONS
DISCHARGE DIAGNOSIS:  Hematuria  Suspect primary bladder cancer  Moderate-severe bilateral hydronephrosis  MARIA ELENA versus CKD 3, baseline creatinine unknown  Normocytic anemia  Repeat renal ultrasound showed moderate bilateral hydronephrosis  Proctitis on CT Vs rectal mass   Polyps in the colon  Lower GI bleed post colonoscopy  Constipation  Chronic respiratory failure on oxygen   COPD  GERD      MEDICATIONS:  It is important that you take the medication exactly as they are prescribed. Keep your medication in the bottles provided by the pharmacist and keep a list of the medication names, dosages, and times to be taken in your wallet. Do not take other medications without consulting your doctor. Pain Management: per above medications    What to do at Home    Recommended diet:  regular diet    Recommended activity: activity as tolerated    If you have questions regarding the hospital related prescriptions or hospital related issues please call [facility] at . You can always direct your questions to your primary care doctor if you are unable to reach your hospital physician; your PCP works as an extension of your hospital doctor just like your hospital doctor is an extension of your PCP for your time at the hospital [facility]).     If you experience any of the following symptoms then please call your primary care physician or return to the emergency room if you cannot get hold of your doctor:  Fever, chills, nausea, vomiting, diarrhea, change in mentation, falling, bleeding, shortness of breath,

## 2023-12-13 NOTE — PLAN OF CARE
Problem: Discharge Planning  Goal: Discharge to home or other facility with appropriate resources  Outcome: Adequate for Discharge     Problem: Pain  Goal: Verbalizes/displays adequate comfort level or baseline comfort level  12/13/2023 1636 by Linh Muller RN  Outcome: Adequate for Discharge  12/13/2023 1331 by Osei Mayer RN  Outcome: Progressing     Problem: Safety - Adult  Goal: Free from fall injury  12/13/2023 1636 by Linh Muller RN  Outcome: Adequate for Discharge  12/13/2023 1331 by Osei Mayer RN  Outcome: Progressing     Problem: ABCDS Injury Assessment  Goal: Absence of physical injury  12/13/2023 1636 by Linh Muller RN  Outcome: Adequate for Discharge  12/13/2023 1331 by Osei Mayer RN  Outcome: Progressing     Problem: Respiratory - Adult  Goal: Achieves optimal ventilation and oxygenation  12/13/2023 1636 by Linh Muller RN  Outcome: Adequate for Discharge  12/13/2023 1331 by Osei Mayer RN  Outcome: Progressing     Problem: Skin/Tissue Integrity - Adult  Goal: Skin integrity remains intact  12/13/2023 1636 by Linh Muller RN  Outcome: Adequate for Discharge  12/13/2023 1331 by Osei Mayer RN  Outcome: Progressing     Problem: Gastrointestinal - Adult  Goal: Minimal or absence of nausea and vomiting  Outcome: Adequate for Discharge  Goal: Maintains or returns to baseline bowel function  Outcome: Adequate for Discharge  Goal: Maintains adequate nutritional intake  Outcome: Adequate for Discharge     Problem: Genitourinary - Adult  Goal: Absence of urinary retention  Outcome: Adequate for Discharge  Goal: Urinary catheter remains patent  Outcome: Adequate for Discharge     Problem: Metabolic/Fluid and Electrolytes - Adult  Goal: Electrolytes maintained within normal limits  Outcome: Adequate for Discharge  Goal: Hemodynamic stability and optimal renal function maintained  Outcome: Adequate for Discharge     Problem: Hematologic - Adult  Goal: Maintains

## 2023-12-13 NOTE — PLAN OF CARE
Problem: Discharge Planning  Goal: Discharge to home or other facility with appropriate resources  12/13/2023 0010 by Rich Braxton RN  Outcome: Progressing  Flowsheets (Taken 12/13/2023 0009)  Discharge to home or other facility with appropriate resources:   Identify barriers to discharge with patient and caregiver   Arrange for needed discharge resources and transportation as appropriate   Identify discharge learning needs (meds, wound care, etc)   Arrange for interpreters to assist at discharge as needed   Refer to discharge planning if patient needs post-hospital services based on physician order or complex needs related to functional status, cognitive ability or social support system     Problem: Pain  Goal: Verbalizes/displays adequate comfort level or baseline comfort level  12/13/2023 1331 by Kera Man RN  Outcome: Progressing  12/13/2023 0010 by Rich Braxton RN  Outcome: Progressing  Flowsheets  Taken 12/13/2023 0009 by Rich Braxton RN  Verbalizes/displays adequate comfort level or baseline comfort level:   Encourage patient to monitor pain and request assistance   Assess pain using appropriate pain scale   Administer analgesics based on type and severity of pain and evaluate response   Implement non-pharmacological measures as appropriate and evaluate response   Consider cultural and social influences on pain and pain management   Notify Licensed Independent Practitioner if interventions unsuccessful or patient reports new pain  Taken 12/12/2023 1034 by Reyna Jefferson RN  Verbalizes/displays adequate comfort level or baseline comfort level:   Encourage patient to monitor pain and request assistance   Administer analgesics based on type and severity of pain and evaluate response     Problem: Safety - Adult  Goal: Free from fall injury  12/13/2023 1331 by Kera Man RN  Outcome: Progressing  12/13/2023 0010 by Rich Braxton RN  Outcome: Progressing     Problem: ABCDS Injury

## 2023-12-28 ENCOUNTER — APPOINTMENT (OUTPATIENT)
Facility: HOSPITAL | Age: 68
DRG: 656 | End: 2023-12-28
Payer: MEDICARE

## 2023-12-28 ENCOUNTER — HOSPITAL ENCOUNTER (INPATIENT)
Facility: HOSPITAL | Age: 68
LOS: 15 days | Discharge: HOME | DRG: 656 | End: 2024-01-13
Attending: STUDENT IN AN ORGANIZED HEALTH CARE EDUCATION/TRAINING PROGRAM | Admitting: INTERNAL MEDICINE
Payer: MEDICARE

## 2023-12-28 DIAGNOSIS — C67.6 MALIGNANT NEOPLASM OF URETERIC ORIFICE (HCC): ICD-10-CM

## 2023-12-28 DIAGNOSIS — R31.9 HEMATURIA, UNSPECIFIED TYPE: Primary | ICD-10-CM

## 2023-12-28 DIAGNOSIS — R79.89 ELEVATED TROPONIN: ICD-10-CM

## 2023-12-28 DIAGNOSIS — N32.89 BLADDER SPASMS: ICD-10-CM

## 2023-12-28 LAB
ABO + RH BLD: NORMAL
ALBUMIN SERPL-MCNC: 3.1 G/DL (ref 3.5–5)
ALBUMIN/GLOB SERPL: 0.9 (ref 1.1–2.2)
ALP SERPL-CCNC: 75 U/L (ref 45–117)
ALT SERPL-CCNC: 25 U/L (ref 12–78)
ANION GAP SERPL CALC-SCNC: 4 MMOL/L (ref 5–15)
AST SERPL-CCNC: 26 U/L (ref 15–37)
BASOPHILS # BLD: 0 K/UL (ref 0–0.1)
BASOPHILS NFR BLD: 0 % (ref 0–1)
BILIRUB SERPL-MCNC: 0.4 MG/DL (ref 0.2–1)
BLOOD GROUP ANTIBODIES SERPL: NORMAL
BUN SERPL-MCNC: 25 MG/DL (ref 6–20)
BUN/CREAT SERPL: 22 (ref 12–20)
CALCIUM SERPL-MCNC: 9.1 MG/DL (ref 8.5–10.1)
CHLORIDE SERPL-SCNC: 101 MMOL/L (ref 97–108)
CO2 SERPL-SCNC: 34 MMOL/L (ref 21–32)
COMMENT:: NORMAL
CREAT SERPL-MCNC: 1.12 MG/DL (ref 0.7–1.3)
DIFFERENTIAL METHOD BLD: ABNORMAL
EOSINOPHIL # BLD: 0 K/UL (ref 0–0.4)
EOSINOPHIL NFR BLD: 0 % (ref 0–7)
ERYTHROCYTE [DISTWIDTH] IN BLOOD BY AUTOMATED COUNT: 16.8 % (ref 11.5–14.5)
GLOBULIN SER CALC-MCNC: 3.6 G/DL (ref 2–4)
GLUCOSE SERPL-MCNC: 115 MG/DL (ref 65–100)
HCT VFR BLD AUTO: 28.8 % (ref 36.6–50.3)
HGB BLD-MCNC: 9.3 G/DL (ref 12.1–17)
IMM GRANULOCYTES # BLD AUTO: 0.1 K/UL (ref 0–0.04)
IMM GRANULOCYTES NFR BLD AUTO: 1 % (ref 0–0.5)
LYMPHOCYTES # BLD: 0.7 K/UL (ref 0.8–3.5)
LYMPHOCYTES NFR BLD: 7 % (ref 12–49)
MCH RBC QN AUTO: 28.4 PG (ref 26–34)
MCHC RBC AUTO-ENTMCNC: 32.3 G/DL (ref 30–36.5)
MCV RBC AUTO: 87.8 FL (ref 80–99)
MONOCYTES # BLD: 0.5 K/UL (ref 0–1)
MONOCYTES NFR BLD: 5 % (ref 5–13)
NEUTS SEG # BLD: 9.1 K/UL (ref 1.8–8)
NEUTS SEG NFR BLD: 87 % (ref 32–75)
NRBC # BLD: 0 K/UL (ref 0–0.01)
NRBC BLD-RTO: 0 PER 100 WBC
PLATELET # BLD AUTO: 205 K/UL (ref 150–400)
PMV BLD AUTO: 9.5 FL (ref 8.9–12.9)
POTASSIUM SERPL-SCNC: 3.9 MMOL/L (ref 3.5–5.1)
PROT SERPL-MCNC: 6.7 G/DL (ref 6.4–8.2)
RBC # BLD AUTO: 3.28 M/UL (ref 4.1–5.7)
RBC MORPH BLD: ABNORMAL
SODIUM SERPL-SCNC: 139 MMOL/L (ref 136–145)
SPECIMEN EXP DATE BLD: NORMAL
SPECIMEN HOLD: NORMAL
WBC # BLD AUTO: 10.4 K/UL (ref 4.1–11.1)

## 2023-12-28 PROCEDURE — 99285 EMERGENCY DEPT VISIT HI MDM: CPT

## 2023-12-28 PROCEDURE — 71045 X-RAY EXAM CHEST 1 VIEW: CPT

## 2023-12-28 PROCEDURE — 0TBB4ZX EXCISION OF BLADDER, PERCUTANEOUS ENDOSCOPIC APPROACH, DIAGNOSTIC: ICD-10-PCS | Performed by: UROLOGY

## 2023-12-28 PROCEDURE — 96375 TX/PRO/DX INJ NEW DRUG ADDON: CPT

## 2023-12-28 PROCEDURE — 86900 BLOOD TYPING SEROLOGIC ABO: CPT

## 2023-12-28 PROCEDURE — 85025 COMPLETE CBC W/AUTO DIFF WBC: CPT

## 2023-12-28 PROCEDURE — 6360000002 HC RX W HCPCS: Performed by: STUDENT IN AN ORGANIZED HEALTH CARE EDUCATION/TRAINING PROGRAM

## 2023-12-28 PROCEDURE — 86901 BLOOD TYPING SEROLOGIC RH(D): CPT

## 2023-12-28 PROCEDURE — 80053 COMPREHEN METABOLIC PANEL: CPT

## 2023-12-28 PROCEDURE — 51798 US URINE CAPACITY MEASURE: CPT

## 2023-12-28 PROCEDURE — 96374 THER/PROPH/DIAG INJ IV PUSH: CPT

## 2023-12-28 PROCEDURE — 81001 URINALYSIS AUTO W/SCOPE: CPT

## 2023-12-28 PROCEDURE — 0T778DZ DILATION OF LEFT URETER WITH INTRALUMINAL DEVICE, VIA NATURAL OR ARTIFICIAL OPENING ENDOSCOPIC: ICD-10-PCS | Performed by: UROLOGY

## 2023-12-28 PROCEDURE — BT1F1ZZ FLUOROSCOPY OF LEFT KIDNEY, URETER AND BLADDER USING LOW OSMOLAR CONTRAST: ICD-10-PCS | Performed by: UROLOGY

## 2023-12-28 PROCEDURE — 86850 RBC ANTIBODY SCREEN: CPT

## 2023-12-28 PROCEDURE — 36415 COLL VENOUS BLD VENIPUNCTURE: CPT

## 2023-12-28 PROCEDURE — 93005 ELECTROCARDIOGRAM TRACING: CPT | Performed by: STUDENT IN AN ORGANIZED HEALTH CARE EDUCATION/TRAINING PROGRAM

## 2023-12-28 PROCEDURE — 6370000000 HC RX 637 (ALT 250 FOR IP): Performed by: STUDENT IN AN ORGANIZED HEALTH CARE EDUCATION/TRAINING PROGRAM

## 2023-12-28 RX ORDER — MORPHINE SULFATE 2 MG/ML
2 INJECTION, SOLUTION INTRAMUSCULAR; INTRAVENOUS ONCE
Status: DISCONTINUED | OUTPATIENT
Start: 2023-12-28 | End: 2023-12-28

## 2023-12-28 RX ORDER — ONDANSETRON 2 MG/ML
4 INJECTION INTRAMUSCULAR; INTRAVENOUS ONCE
Status: COMPLETED | OUTPATIENT
Start: 2023-12-28 | End: 2023-12-28

## 2023-12-28 RX ORDER — OXYBUTYNIN CHLORIDE 5 MG/1
5 TABLET ORAL 3 TIMES DAILY PRN
Qty: 9 TABLET | Refills: 0 | Status: SHIPPED | OUTPATIENT
Start: 2023-12-28 | End: 2024-01-13 | Stop reason: HOSPADM

## 2023-12-28 RX ORDER — MORPHINE SULFATE 4 MG/ML
4 INJECTION, SOLUTION INTRAMUSCULAR; INTRAVENOUS ONCE
Status: COMPLETED | OUTPATIENT
Start: 2023-12-28 | End: 2023-12-28

## 2023-12-28 RX ORDER — ACETAMINOPHEN 500 MG
1000 TABLET ORAL
Status: COMPLETED | OUTPATIENT
Start: 2023-12-28 | End: 2023-12-28

## 2023-12-28 RX ORDER — FENTANYL CITRATE 50 UG/ML
25 INJECTION, SOLUTION INTRAMUSCULAR; INTRAVENOUS
Status: DISCONTINUED | OUTPATIENT
Start: 2023-12-28 | End: 2023-12-29

## 2023-12-28 RX ADMIN — ACETAMINOPHEN 1000 MG: 500 TABLET ORAL at 22:16

## 2023-12-28 RX ADMIN — ONDANSETRON 4 MG: 2 INJECTION INTRAMUSCULAR; INTRAVENOUS at 22:13

## 2023-12-28 RX ADMIN — MORPHINE SULFATE 4 MG: 4 INJECTION, SOLUTION INTRAMUSCULAR; INTRAVENOUS at 22:13

## 2023-12-28 ASSESSMENT — PAIN - FUNCTIONAL ASSESSMENT: PAIN_FUNCTIONAL_ASSESSMENT: 0-10

## 2023-12-28 ASSESSMENT — PAIN SCALES - GENERAL: PAINLEVEL_OUTOF10: 8

## 2023-12-29 ENCOUNTER — APPOINTMENT (OUTPATIENT)
Facility: HOSPITAL | Age: 68
DRG: 656 | End: 2023-12-29
Attending: INTERNAL MEDICINE
Payer: MEDICARE

## 2023-12-29 ENCOUNTER — APPOINTMENT (OUTPATIENT)
Facility: HOSPITAL | Age: 68
DRG: 656 | End: 2023-12-29
Payer: MEDICARE

## 2023-12-29 PROBLEM — R79.89 ELEVATED TROPONIN: Status: ACTIVE | Noted: 2023-12-29

## 2023-12-29 LAB
APPEARANCE UR: CLEAR
BACTERIA URNS QL MICRO: NEGATIVE /HPF
BILIRUB UR QL CFM: NEGATIVE
COLOR UR: ABNORMAL
EPITH CASTS URNS QL MICRO: ABNORMAL /LPF
GLUCOSE UR STRIP.AUTO-MCNC: 100 MG/DL
HCT VFR BLD AUTO: 24.7 % (ref 36.6–50.3)
HGB BLD-MCNC: 7.9 G/DL (ref 12.1–17)
HGB UR QL STRIP: ABNORMAL
KETONES UR QL STRIP.AUTO: 15 MG/DL
LEUKOCYTE ESTERASE UR QL STRIP.AUTO: ABNORMAL
NITRITE UR QL STRIP.AUTO: POSITIVE
PH UR STRIP: 7 (ref 5–8)
PROT UR STRIP-MCNC: >300 MG/DL
RBC #/AREA URNS HPF: >100 /HPF (ref 0–5)
SP GR UR REFRACTOMETRY: 1.01 (ref 1–1.03)
TROPONIN I SERPL HS-MCNC: 384 NG/L (ref 0–76)
TROPONIN I SERPL HS-MCNC: 396 NG/L (ref 0–76)
URINE CULTURE IF INDICATED: ABNORMAL
UROBILINOGEN UR QL STRIP.AUTO: >8 EU/DL (ref 0.2–1)
WBC URNS QL MICRO: ABNORMAL /HPF (ref 0–4)
YEAST URNS QL MICRO: PRESENT

## 2023-12-29 PROCEDURE — 6360000002 HC RX W HCPCS: Performed by: INTERNAL MEDICINE

## 2023-12-29 PROCEDURE — 93308 TTE F-UP OR LMTD: CPT

## 2023-12-29 PROCEDURE — 1100000003 HC PRIVATE W/ TELEMETRY

## 2023-12-29 PROCEDURE — 6370000000 HC RX 637 (ALT 250 FOR IP): Performed by: INTERNAL MEDICINE

## 2023-12-29 PROCEDURE — 2580000003 HC RX 258: Performed by: INTERNAL MEDICINE

## 2023-12-29 PROCEDURE — 2060000000 HC ICU INTERMEDIATE R&B

## 2023-12-29 PROCEDURE — 85018 HEMOGLOBIN: CPT

## 2023-12-29 PROCEDURE — 85014 HEMATOCRIT: CPT

## 2023-12-29 PROCEDURE — 36415 COLL VENOUS BLD VENIPUNCTURE: CPT

## 2023-12-29 PROCEDURE — 93005 ELECTROCARDIOGRAM TRACING: CPT | Performed by: STUDENT IN AN ORGANIZED HEALTH CARE EDUCATION/TRAINING PROGRAM

## 2023-12-29 PROCEDURE — 84484 ASSAY OF TROPONIN QUANT: CPT

## 2023-12-29 PROCEDURE — 94640 AIRWAY INHALATION TREATMENT: CPT

## 2023-12-29 PROCEDURE — 93970 EXTREMITY STUDY: CPT

## 2023-12-29 RX ORDER — ONDANSETRON 4 MG/1
4 TABLET, ORALLY DISINTEGRATING ORAL EVERY 8 HOURS PRN
Status: DISCONTINUED | OUTPATIENT
Start: 2023-12-29 | End: 2024-01-13 | Stop reason: HOSPADM

## 2023-12-29 RX ORDER — POLYETHYLENE GLYCOL 3350 17 G/17G
17 POWDER, FOR SOLUTION ORAL DAILY
Status: DISCONTINUED | OUTPATIENT
Start: 2023-12-29 | End: 2024-01-09

## 2023-12-29 RX ORDER — ONDANSETRON 2 MG/ML
4 INJECTION INTRAMUSCULAR; INTRAVENOUS EVERY 6 HOURS PRN
Status: DISCONTINUED | OUTPATIENT
Start: 2023-12-29 | End: 2024-01-13 | Stop reason: HOSPADM

## 2023-12-29 RX ORDER — SODIUM CHLORIDE 9 MG/ML
INJECTION, SOLUTION INTRAVENOUS PRN
Status: DISCONTINUED | OUTPATIENT
Start: 2023-12-29 | End: 2024-01-13 | Stop reason: HOSPADM

## 2023-12-29 RX ORDER — BISACODYL 10 MG
10 SUPPOSITORY, RECTAL RECTAL DAILY PRN
Status: DISCONTINUED | OUTPATIENT
Start: 2023-12-29 | End: 2024-01-11

## 2023-12-29 RX ORDER — ARFORMOTEROL TARTRATE 15 UG/2ML
15 SOLUTION RESPIRATORY (INHALATION)
Status: DISCONTINUED | OUTPATIENT
Start: 2023-12-29 | End: 2023-12-30

## 2023-12-29 RX ORDER — SODIUM CHLORIDE 0.9 % (FLUSH) 0.9 %
5-40 SYRINGE (ML) INJECTION PRN
Status: DISCONTINUED | OUTPATIENT
Start: 2023-12-29 | End: 2024-01-13 | Stop reason: HOSPADM

## 2023-12-29 RX ORDER — IPRATROPIUM BROMIDE AND ALBUTEROL SULFATE 2.5; .5 MG/3ML; MG/3ML
1 SOLUTION RESPIRATORY (INHALATION) EVERY 4 HOURS PRN
Status: DISCONTINUED | OUTPATIENT
Start: 2023-12-29 | End: 2024-01-13 | Stop reason: HOSPADM

## 2023-12-29 RX ORDER — OXYCODONE HYDROCHLORIDE 5 MG/1
5 TABLET ORAL EVERY 4 HOURS PRN
Status: DISCONTINUED | OUTPATIENT
Start: 2023-12-29 | End: 2024-01-13

## 2023-12-29 RX ORDER — ACETAMINOPHEN 325 MG/1
650 TABLET ORAL EVERY 6 HOURS PRN
Status: DISCONTINUED | OUTPATIENT
Start: 2023-12-29 | End: 2024-01-13 | Stop reason: HOSPADM

## 2023-12-29 RX ORDER — PANTOPRAZOLE SODIUM 40 MG/1
40 TABLET, DELAYED RELEASE ORAL
Status: DISCONTINUED | OUTPATIENT
Start: 2023-12-29 | End: 2024-01-13 | Stop reason: HOSPADM

## 2023-12-29 RX ORDER — SODIUM CHLORIDE 0.9 % (FLUSH) 0.9 %
5-40 SYRINGE (ML) INJECTION EVERY 12 HOURS SCHEDULED
Status: DISCONTINUED | OUTPATIENT
Start: 2023-12-29 | End: 2024-01-13 | Stop reason: HOSPADM

## 2023-12-29 RX ORDER — LANOLIN ALCOHOL/MO/W.PET/CERES
6 CREAM (GRAM) TOPICAL NIGHTLY PRN
Status: DISCONTINUED | OUTPATIENT
Start: 2023-12-29 | End: 2024-01-13 | Stop reason: HOSPADM

## 2023-12-29 RX ORDER — MORPHINE SULFATE 2 MG/ML
2 INJECTION, SOLUTION INTRAMUSCULAR; INTRAVENOUS EVERY 4 HOURS PRN
Status: DISCONTINUED | OUTPATIENT
Start: 2023-12-29 | End: 2024-01-13 | Stop reason: HOSPADM

## 2023-12-29 RX ORDER — BUDESONIDE 0.5 MG/2ML
0.5 INHALANT ORAL
Status: DISCONTINUED | OUTPATIENT
Start: 2023-12-29 | End: 2023-12-30

## 2023-12-29 RX ORDER — ACETAMINOPHEN 650 MG/1
650 SUPPOSITORY RECTAL EVERY 6 HOURS PRN
Status: DISCONTINUED | OUTPATIENT
Start: 2023-12-29 | End: 2024-01-13 | Stop reason: HOSPADM

## 2023-12-29 RX ORDER — MONTELUKAST SODIUM 10 MG/1
10 TABLET ORAL NIGHTLY
Status: DISCONTINUED | OUTPATIENT
Start: 2023-12-29 | End: 2024-01-13 | Stop reason: HOSPADM

## 2023-12-29 RX ADMIN — ONDANSETRON 4 MG: 2 INJECTION INTRAMUSCULAR; INTRAVENOUS at 08:16

## 2023-12-29 RX ADMIN — ONDANSETRON 4 MG: 4 TABLET, ORALLY DISINTEGRATING ORAL at 23:03

## 2023-12-29 RX ADMIN — MORPHINE SULFATE 2 MG: 2 INJECTION, SOLUTION INTRAMUSCULAR; INTRAVENOUS at 13:26

## 2023-12-29 RX ADMIN — OXYCODONE HYDROCHLORIDE 5 MG: 5 TABLET ORAL at 20:14

## 2023-12-29 RX ADMIN — SODIUM CHLORIDE, PRESERVATIVE FREE 10 ML: 5 INJECTION INTRAVENOUS at 20:16

## 2023-12-29 RX ADMIN — MONTELUKAST 10 MG: 10 TABLET, FILM COATED ORAL at 20:14

## 2023-12-29 RX ADMIN — MORPHINE SULFATE 2 MG: 2 INJECTION, SOLUTION INTRAMUSCULAR; INTRAVENOUS at 08:16

## 2023-12-29 RX ADMIN — ACETAMINOPHEN 650 MG: 325 TABLET ORAL at 20:14

## 2023-12-29 RX ADMIN — ONDANSETRON 4 MG: 2 INJECTION INTRAMUSCULAR; INTRAVENOUS at 17:51

## 2023-12-29 RX ADMIN — OXYCODONE HYDROCHLORIDE 5 MG: 5 TABLET ORAL at 11:42

## 2023-12-29 RX ADMIN — IPRATROPIUM BROMIDE AND ALBUTEROL SULFATE 1 DOSE: 2.5; .5 SOLUTION RESPIRATORY (INHALATION) at 20:34

## 2023-12-29 RX ADMIN — IPRATROPIUM BROMIDE AND ALBUTEROL SULFATE 1 DOSE: 2.5; .5 SOLUTION RESPIRATORY (INHALATION) at 08:38

## 2023-12-29 ASSESSMENT — PAIN DESCRIPTION - ORIENTATION: ORIENTATION: LOWER

## 2023-12-29 ASSESSMENT — PAIN DESCRIPTION - LOCATION: LOCATION: ABDOMEN;GROIN

## 2023-12-29 ASSESSMENT — PAIN SCALES - GENERAL
PAINLEVEL_OUTOF10: 6
PAINLEVEL_OUTOF10: 10
PAINLEVEL_OUTOF10: 5
PAINLEVEL_OUTOF10: 0
PAINLEVEL_OUTOF10: 6

## 2023-12-29 ASSESSMENT — PAIN DESCRIPTION - DESCRIPTORS: DESCRIPTORS: BURNING

## 2023-12-29 NOTE — CONSULTS
Urology Consult    Patient: Jamel Hazel MRN: [id number]  SSN: [id number]    YOB: 1955  Age: 68 y.o.  Sex: male          Date of Consultation:  December 29, 2023  Requesting Physician: Chris Guzman*  Reason for Consultation: hematuria            Assessment/Plan:  Gross hematuria 2/2 possible bladder cancer scheduled for inpatient cystoscopy with possible biopsy and retrogrades next week for abnormal bladder wall thickening  Elevated troponins  BPH with LUTs- managed with gibbons  History of hydronephrosis- managed with gibbons  Severe COPD  CAD  Chronic dysphagia    -Significant amount of clots irrigated at bedside requiring exchange of chronic Gibbons with a 24 [place]hi three-way Gibbons and initiating CBI.  CBI currently running at slow/moderate drip rate  -Discussed with nurses parameters to stop CBI and manually irrigate if patient should have Gibbons leakage, visualized clots, severe abdominal pain, worsened hematuria  -Nursing aware to titrate CBI drip rate to a clear pink urine, if able they can attempt to stop CBI later tonight once he has been on CBI for around 12 hours if urine remains a light clear color on slow drip  -Plan to keep on schedule with next week cystoscopy, biopsy, retrogrades  -rec repeat abdominal imagining if pt should develop renal insufficiency   -Recommend repeat urinalysis with culture reflex newly exchanged Gibbons  -N.p.o. after midnight in case hematuria/clot retention worsens  -Urology will follow-up in a.m.    Discussed with supervising MD Dr. Villa     History of Present Illness:  Patient is a 68 y.o. male admitted 12/28/2023 to the hospital for Elevated troponin [R79.89].  He presents with gross hematuria coming out of his Gibbons catheter and mild abdominal pain.  He was found in the ED to have elevated troponins.  He has a past medical history of known cardiomyopathy, severe COPD, hematuria, BPH, recent colonic polyps found on colonoscopy  Authorizing Provider   oxybutynin (DITROPAN) 5 MG tablet Take 1 tablet by mouth 3 times daily as needed (bladder spasms) 12/28/23 12/31/23 Yes Best, Chencho Cross MD   albuterol sulfate HFA (PROVENTIL;VENTOLIN;PROAIR) 108 (90 Base) MCG/ACT inhaler Inhale 2 puffs into the lungs every 6 hours as needed    Automatic Reconciliation, Ar   budesonide-formoterol (SYMBICORT) 160-4.5 MCG/ACT AERO Inhale 2 puffs into the lungs 2 times daily    Automatic Reconciliation, Ar   furosemide (LASIX) 20 MG tablet Take 20 mg by mouth daily as needed 5/11/18   Automatic Reconciliation, Ar   montelukast (SINGULAIR) 10 MG tablet Take 10 mg by mouth 5/11/18   Automatic Reconciliation, Ar   omeprazole (PRILOSEC) 20 MG delayed release capsule Take 20 mg by mouth daily    Automatic Reconciliation, Ar   umeclidinium-vilanterol (ANORO ELLIPTA) 62.5-25 MCG/ACT inhaler Inhale 1 puff into the lungs daily 5/11/18   Automatic Reconciliation, Ar      PMHx:  has a past medical history of Arthritis, Asthma, Cancer (HCC), Cancer of esophagus (HCC), Chronic pain, Essential hypertension, GERD (gastroesophageal reflux disease), Hypertension, Nonischemic cardiomyopathy (HCC), Other ill-defined conditions(799.89), Other ill-defined conditions(799.89), Other ill-defined conditions(799.89), Psychiatric disorder, Seizures (HCC), Thyroid disease, and Unspecified sleep apnea.   PSurgHx:  has a past surgical history that includes Cholecystectomy; egd balloon dilation esophagus <30 mm diam (4/26/2013); upper gi endoscopy,stent placement (4/23/2013); egd balloon dilation esophagus <30 mm diam (3/4/2013); egd transoral biopsy single/multiple (3/4/2013); egd balloon dilation esophagus <30 mm diam (7/31/2013); egd balloon dilation esophagus <30 mm diam (9/25/2013); upper gi endoscopy,biopsy (5/29/2015); upper gi endoscopy,ball dil,30mm (5/29/2015); other surgical history; upper gi endoscopy,biopsy (1/11/2017); colorectal scrn; hi risk ind (1/11/2017);

## 2023-12-29 NOTE — ED PROVIDER NOTES
[facility] EMERGENCY DEPT  EMERGENCY DEPARTMENT ENCOUNTER       Pt Name: Jamel Hazel  MRN: [id number]  Birthdate 1955  Date of evaluation: 12/28/2023  Provider: Chencho Morales MD   PCP: Aj Loya DO  Note Started: 11:40 PM EST 12/28/23     CHIEF COMPLAINT       Chief Complaint   Patient presents with    Hematuria     Pt presents to ER from home via EMS w/ complaints of blood in his gibbons intermittently x1 week. Pt has now noticed that he is leaking bloody urine around his gibbons site. Pt reports \"bladder spasms\" that are also causing him pain. Currently A&Ox4, pt is on 3L NC at baseline.         HISTORY OF PRESENT ILLNESS: 1 or more elements      History From: patient, History limited by: none     Jamel Hazel is a 68 y.o. male with past medical history of of BPH and COPD (on home O2) who presents to the emergency department for hematuria.  He had a Gibbons catheter placed about 2 weeks ago in the emergency department when he presented for hematuria.  CT scan was obtained at that time which showed proctitis versus neoplasm.  There was asymmetric bladder wall thickening concerning for neoplasm.  He was discharged with a Gibbons catheter in place that has had intermittent hematuria, but today he had significant replacement of his bladder.  He described the pain as spasms.  He noticed blood around his Gibbons site.    Please See MDM for Additional Details of the HPI/PMH  Nursing Notes were all reviewed and agreed with or any disagreements were addressed in the HPI.     REVIEW OF SYSTEMS        Positives and Pertinent negatives as per HPI.    PAST HISTORY     Past Medical History:  Past Medical History:   Diagnosis Date    Arthritis     osteoarthritis    Asthma     Cancer (HCC)     esophagus    Cancer of esophagus (HCC) 4/2012    adenocarcinoma; Dr. Murillo    Chronic pain     lower back, has crushed vertebrae    Essential hypertension     GERD (gastroesophageal reflux disease)     Hypertension     not  the lungs 2 times daily    FUROSEMIDE (LASIX) 20 MG TABLET    Take 20 mg by mouth daily as needed    MONTELUKAST (SINGULAIR) 10 MG TABLET    Take 10 mg by mouth    OMEPRAZOLE (PRILOSEC) 20 MG DELAYED RELEASE CAPSULE    Take 20 mg by mouth daily    UMECLIDINIUM-VILANTEROL (ANORO ELLIPTA) 62.5-25 MCG/ACT INHALER    Inhale 1 puff into the lungs daily       SCREENINGS               No data recorded         PHYSICAL EXAM      ED Triage Vitals [12/28/23 2036]   Enc Vitals Group      BP (!) 144/79      Pulse 86      Respirations 18      Temp 98.6 °F (37 °C)      Temp Source Oral      SpO2 99 %      Weight - Scale 54.4 kg (120 lb)      Height       Head Circumference       Peak Flow       Pain Score       Pain Loc       Pain Edu?       Excl. in GC?       Physical Exam  Vital signs reviewed and documented in EMR  Nontoxic, no acute distress, chronically ill appearing  No tachycardia  Abdomen nondistended, suprapubic tenderness without distention; no other tenderness  No focal neuro deficits  Gibbons in place with dried blood around meatus  Blood in gibbons catheter and bag     DIAGNOSTIC RESULTS   LABS:     Recent Results (from the past 24 hour(s))   TYPE AND SCREEN    Collection Time: 12/28/23 10:01 PM   Result Value Ref Range    Crossmatch expiration date 12/31/2023,2359     ABO/Rh A NEGATIVE     Antibody Screen NEG    Extra Tubes Hold    Collection Time: 12/28/23 10:01 PM   Result Value Ref Range    Specimen HOld LAV,PST     Comment:        Add-on orders for these samples will be processed based on acceptable specimen integrity and analyte stability, which may vary by analyte.   CBC with Auto Differential    Collection Time: 12/28/23 10:01 PM   Result Value Ref Range    WBC 10.4 4.1 - 11.1 K/uL    RBC 3.28 (L) 4.10 - 5.70 M/uL    Hemoglobin 9.3 (L) 12.1 - 17.0 g/dL    Hematocrit 28.8 (L) 36.6 - 50.3 %    MCV 87.8 80.0 - 99.0 FL    MCH 28.4 26.0 - 34.0 PG    MCHC 32.3 30.0 - 36.5 g/dL    RDW 16.8 (H) 11.5 - 14.5 %    Platelets

## 2023-12-29 NOTE — DISCHARGE INSTRUCTIONS
Please make a followup with your primary care physician and urologist.  If you have any new or worsening concerning medical symptoms please return to the emergency department.    s/p Cystoscopy, Left Retrograde Pyelography and L Stent Placement, Bladder Biopsy and Fulguration 1/5/24. Findings: Normal urethra, non obstructing prostate, large nodular tumor right lateral, anterior wall, unable to find right UO. Path pending   -Continue Levofloxacin for 10 more days    -continue L PCNT capped   - continue gibbons catheter  -will discuss results of path once resulted, likely will be done OP by urologist

## 2023-12-29 NOTE — H&P
Hospitalist Admission Note    NAME:   Jamel Hazel   : 1955   MRN: [id number]     Date/Time: 2023 4:28 AM    Patient PCP: Aj Loya, DO    ______________________________________________________________________  Given the patient's current clinical presentation, I have a high level of concern for decompensation if discharged from the emergency department.  Complex decision making was performed, which includes reviewing the patient's available past medical records, laboratory results, and x-ray films.       My assessment of this patient's clinical condition and my plan of care is as follows.    Assessment / Plan:    Elevated troponin 384 --> 396 POA  Prior cardiomyopathy followed by Dr Reid   Has not seen cardiology in years  Presents now with urinary difficulties, hematuria  Transient CP upon arrival in ED, now resolved  EKG reviewed, no acute ST-T changes  No New SOB or pleuritic CP or calf pain/swelling  Frankly bloody urine  Hold ASA and AC with the active bleeding  Lipitor   Check echo  Check LE dopplers  Consult Dr Reid    Acute urinary retention 2023 with gross hematuria  Suspect primary bladder cancer 2023 POA scheduled for cystoscopy next week  Moderate-severe bilateral hydronephrosis POA ? chronic  Last admit with acute urinary Retention, gross hematuria,new diagnosis bilateral hydronephrosis  Asymmetric bladder wall thickening on imaging  Gibbons placed last admit, plan to leave in place till urology follow up next week  Hydronephrosis persistent despite gibbons, urology felt likely chronic  Planning for cystoscopy as outpatient next week   Rule out bladder mass as cause of bleeding  Back with bloody urine leaking around the gibbons, suprapubic abdominal pain   ? Gibbons transiently clotted  Gibbons flushed and advanced in ED, urine flowing and patient feels better  Urine in gibbons remains grossly bloody  Watch UO  Urology consult in AM    Multiple colonic polyps in the colon

## 2023-12-29 NOTE — PROGRESS NOTES
Physical Therapy    Received PT order. Start time is 12/30 at 0000. Will follow up tomorrow as appropriate per order time.    Jordana Rogers PT

## 2023-12-29 NOTE — CARE COORDINATION
Care Management Initial Assessment       RUR: 18% moderate  Readmission? Yes - 12/7-12/13/23 at [facility] for hematuria   1st IM letter given? Yes - 12/29/23  1st  letter given: No      Initial note: CM reviewed chart. CM completed assessment with pt at bedside. CM introduced self, role of CM, verified demographics, and discussed transition of care planning. Pt resides with spouse, reports independence with ADLs. Pt drives, but spouse is anticipated to transport home at d/c and transport to outpatient follow up appts. Pt reports decreased activity recently, but denies weakness or falls. Pt wears 3L O2 at home supplied by [employer]. Pt to return home once medically stable. Pharmacy preference confirmed of [facility] on [address] Care management will continue to be available to assist as transition of care planning needs arise. Full readmission assessment below:         12/29/23 0584   Service Assessment   Patient Orientation Alert and Oriented   Cognition Alert   History Provided By Patient   Primary Caregiver Self   Support Systems Spouse/Significant Other   Patient's Healthcare Decision Maker is: Legal Next of Kin  (Spouse, Emily Hazel, is legal next of kin)   PCP Verified by CM Yes  (Dr. Jordan Loya)   Last Visit to PCP Within last 3 months   Prior Functional Level Independent in ADLs/IADLs   Current Functional Level Independent in ADLs/IADLs  (Reports reduced activity since gibbons placement, has been spending more time in bed)   Can patient return to prior living arrangement Yes   Ability to make needs known: Good   Family able to assist with home care needs: Yes   Would you like for me to discuss the discharge plan with any other family members/significant others, and if so, who? Yes  (Spouse)   Financial Resources Medicare   Community Resources None   Social/Functional History   Lives With Spouse   Type of Home House   Home Layout One level   Home Access Stairs to enter with rails   Entrance Stairs -

## 2023-12-29 NOTE — ED NOTES
0000: Patients gibbons irrigated at this time, patients gibbons producing a lot of blood and clots, patient also having bladder spasms causing drainage around gibbons. Patients gibbons was advanced and re flushed at this time with a new bag attached. Patient reports a lot of relief.    0700: Bedside and Verbal shift change report given to Ye (oncoming nurse) by Mandy VAUGHN (offgoing nurse). Report included the following information Nurse Handoff Report.

## 2023-12-29 NOTE — ED NOTES
0740- verbal orders for manually irrigate gibbons catheter at this time.    0816- Pt medicated prior to bladder irrigation for pain.    0845- Bladder irrigated manually. Large clots aspirated. Pt tolerated procedure well. Gibbons draining blood tinged urine.

## 2023-12-29 NOTE — ED NOTES
Assumed care of pt at this time. Pt a+o. Rr even and somewhat labored. Pt states this is normal for him with his copd. Remains on 4l o2 92-94%. Pt sinus tachy 1teens 120's on bedside monitor. Gonzalez catheter noted to have clots and not draining properly. Will reach out to MD

## 2023-12-29 NOTE — ED NOTES
Gibbons switched out with 3 way gibbons and CBI set up by urology NP. Pt tolerating procedure well. Pink tinged urine noted in tubing.   Pt denies any pain at this time.

## 2023-12-30 ENCOUNTER — APPOINTMENT (OUTPATIENT)
Facility: HOSPITAL | Age: 68
DRG: 656 | End: 2023-12-30
Payer: MEDICARE

## 2023-12-30 PROBLEM — E43 SEVERE PROTEIN-CALORIE MALNUTRITION (HCC): Status: ACTIVE | Noted: 2023-12-30

## 2023-12-30 LAB
ALBUMIN SERPL-MCNC: 2.5 G/DL (ref 3.5–5)
ALBUMIN/GLOB SERPL: 0.8 (ref 1.1–2.2)
ALP SERPL-CCNC: 199 U/L (ref 45–117)
ALT SERPL-CCNC: 77 U/L (ref 12–78)
ANION GAP SERPL CALC-SCNC: 6 MMOL/L (ref 5–15)
ANION GAP SERPL CALC-SCNC: 9 MMOL/L (ref 5–15)
AST SERPL-CCNC: 74 U/L (ref 15–37)
BASOPHILS # BLD: 0 K/UL (ref 0–0.1)
BASOPHILS NFR BLD: 0 % (ref 0–1)
BILIRUB SERPL-MCNC: 0.7 MG/DL (ref 0.2–1)
BUN SERPL-MCNC: 39 MG/DL (ref 6–20)
BUN SERPL-MCNC: 52 MG/DL (ref 6–20)
BUN/CREAT SERPL: 17 (ref 12–20)
BUN/CREAT SERPL: 21 (ref 12–20)
CALCIUM SERPL-MCNC: 8.4 MG/DL (ref 8.5–10.1)
CALCIUM SERPL-MCNC: 8.4 MG/DL (ref 8.5–10.1)
CHLORIDE SERPL-SCNC: 95 MMOL/L (ref 97–108)
CHLORIDE SERPL-SCNC: 96 MMOL/L (ref 97–108)
CO2 SERPL-SCNC: 30 MMOL/L (ref 21–32)
CO2 SERPL-SCNC: 31 MMOL/L (ref 21–32)
CREAT SERPL-MCNC: 2.23 MG/DL (ref 0.7–1.3)
CREAT SERPL-MCNC: 2.53 MG/DL (ref 0.7–1.3)
DIFFERENTIAL METHOD BLD: ABNORMAL
ECHO AO ROOT DIAM: 3.3 CM
ECHO AO ROOT INDEX: 1.96 CM/M2
ECHO BSA: 1.64 M2
ECHO BSA: 1.64 M2
ECHO LA DIAMETER INDEX: 2.08 CM/M2
ECHO LA DIAMETER: 3.5 CM
ECHO LA TO AORTIC ROOT RATIO: 1.06
ECHO LV FRACTIONAL SHORTENING: 27 % (ref 28–44)
ECHO LV INTERNAL DIMENSION DIASTOLE INDEX: 3.33 CM/M2
ECHO LV INTERNAL DIMENSION DIASTOLIC: 5.6 CM (ref 4.2–5.9)
ECHO LV INTERNAL DIMENSION SYSTOLIC INDEX: 2.44 CM/M2
ECHO LV INTERNAL DIMENSION SYSTOLIC: 4.1 CM
ECHO LV IVSD: 1.3 CM (ref 0.6–1)
ECHO LV MASS 2D: 313.2 G (ref 88–224)
ECHO LV MASS INDEX 2D: 186.4 G/M2 (ref 49–115)
ECHO LV POSTERIOR WALL DIASTOLIC: 1.3 CM (ref 0.6–1)
ECHO LV RELATIVE WALL THICKNESS RATIO: 0.46
ECHO LVOT AREA: 5.7 CM2
ECHO LVOT DIAM: 2.7 CM
ECHO PULMONARY ARTERY END DIASTOLIC PRESSURE: 10 MMHG
ECHO PV MAX VELOCITY: 1.1 M/S
ECHO PV PEAK GRADIENT: 5 MMHG
ECHO PV REGURGITANT MAX VELOCITY: 1.6 M/S
ECHO TV REGURGITANT MAX VELOCITY: 2.39 M/S
ECHO TV REGURGITANT PEAK GRADIENT: 23 MMHG
EKG ATRIAL RATE: 89 BPM
EKG ATRIAL RATE: 89 BPM
EKG DIAGNOSIS: NORMAL
EKG DIAGNOSIS: NORMAL
EKG P AXIS: 74 DEGREES
EKG P AXIS: 79 DEGREES
EKG P-R INTERVAL: 134 MS
EKG P-R INTERVAL: 144 MS
EKG Q-T INTERVAL: 400 MS
EKG Q-T INTERVAL: 412 MS
EKG QRS DURATION: 126 MS
EKG QRS DURATION: 128 MS
EKG QTC CALCULATION (BAZETT): 486 MS
EKG QTC CALCULATION (BAZETT): 501 MS
EKG R AXIS: -74 DEGREES
EKG R AXIS: -79 DEGREES
EKG T AXIS: 55 DEGREES
EKG T AXIS: 58 DEGREES
EKG VENTRICULAR RATE: 89 BPM
EKG VENTRICULAR RATE: 89 BPM
EOSINOPHIL # BLD: 0 K/UL (ref 0–0.4)
EOSINOPHIL NFR BLD: 0 % (ref 0–7)
ERYTHROCYTE [DISTWIDTH] IN BLOOD BY AUTOMATED COUNT: 17.2 % (ref 11.5–14.5)
GLOBULIN SER CALC-MCNC: 3.2 G/DL (ref 2–4)
GLUCOSE SERPL-MCNC: 130 MG/DL (ref 65–100)
GLUCOSE SERPL-MCNC: 98 MG/DL (ref 65–100)
HCT VFR BLD AUTO: 22.9 % (ref 36.6–50.3)
HCT VFR BLD AUTO: 23.8 % (ref 36.6–50.3)
HCT VFR BLD AUTO: 24.9 % (ref 36.6–50.3)
HGB BLD-MCNC: 7.6 G/DL (ref 12.1–17)
HGB BLD-MCNC: 7.6 G/DL (ref 12.1–17)
HGB BLD-MCNC: 8.2 G/DL (ref 12.1–17)
IMM GRANULOCYTES # BLD AUTO: 0 K/UL (ref 0–0.04)
IMM GRANULOCYTES NFR BLD AUTO: 0 % (ref 0–0.5)
LYMPHOCYTES # BLD: 0.2 K/UL (ref 0.8–3.5)
LYMPHOCYTES NFR BLD: 1 % (ref 12–49)
MAGNESIUM SERPL-MCNC: 1.6 MG/DL (ref 1.6–2.4)
MCH RBC QN AUTO: 27.5 PG (ref 26–34)
MCHC RBC AUTO-ENTMCNC: 31.9 G/DL (ref 30–36.5)
MCV RBC AUTO: 86.2 FL (ref 80–99)
MONOCYTES # BLD: 0.4 K/UL (ref 0–1)
MONOCYTES NFR BLD: 2 % (ref 5–13)
NEUTS BAND NFR BLD MANUAL: 16 %
NEUTS SEG # BLD: 17.8 K/UL (ref 1.8–8)
NEUTS SEG NFR BLD: 81 % (ref 32–75)
NRBC # BLD: 0 K/UL (ref 0–0.01)
NRBC BLD-RTO: 0 PER 100 WBC
PLATELET # BLD AUTO: 170 K/UL (ref 150–400)
PMV BLD AUTO: 9.8 FL (ref 8.9–12.9)
POTASSIUM SERPL-SCNC: 4.2 MMOL/L (ref 3.5–5.1)
POTASSIUM SERPL-SCNC: 4.5 MMOL/L (ref 3.5–5.1)
PROT SERPL-MCNC: 5.7 G/DL (ref 6.4–8.2)
RBC # BLD AUTO: 2.76 M/UL (ref 4.1–5.7)
RBC MORPH BLD: ABNORMAL
SODIUM SERPL-SCNC: 133 MMOL/L (ref 136–145)
SODIUM SERPL-SCNC: 134 MMOL/L (ref 136–145)
WBC # BLD AUTO: 18.4 K/UL (ref 4.1–11.1)

## 2023-12-30 PROCEDURE — 87186 SC STD MICRODIL/AGAR DIL: CPT

## 2023-12-30 PROCEDURE — 2580000003 HC RX 258: Performed by: NURSE PRACTITIONER

## 2023-12-30 PROCEDURE — 80053 COMPREHEN METABOLIC PANEL: CPT

## 2023-12-30 PROCEDURE — 87040 BLOOD CULTURE FOR BACTERIA: CPT

## 2023-12-30 PROCEDURE — 6360000002 HC RX W HCPCS: Performed by: NURSE PRACTITIONER

## 2023-12-30 PROCEDURE — 2060000000 HC ICU INTERMEDIATE R&B

## 2023-12-30 PROCEDURE — 97162 PT EVAL MOD COMPLEX 30 MIN: CPT

## 2023-12-30 PROCEDURE — 36415 COLL VENOUS BLD VENIPUNCTURE: CPT

## 2023-12-30 PROCEDURE — 85018 HEMOGLOBIN: CPT

## 2023-12-30 PROCEDURE — 87077 CULTURE AEROBIC IDENTIFY: CPT

## 2023-12-30 PROCEDURE — 71045 X-RAY EXAM CHEST 1 VIEW: CPT

## 2023-12-30 PROCEDURE — 94640 AIRWAY INHALATION TREATMENT: CPT

## 2023-12-30 PROCEDURE — 51702 INSERT TEMP BLADDER CATH: CPT

## 2023-12-30 PROCEDURE — 74176 CT ABD & PELVIS W/O CONTRAST: CPT

## 2023-12-30 PROCEDURE — 6370000000 HC RX 637 (ALT 250 FOR IP): Performed by: INTERNAL MEDICINE

## 2023-12-30 PROCEDURE — 2580000003 HC RX 258: Performed by: INTERNAL MEDICINE

## 2023-12-30 PROCEDURE — 1100000003 HC PRIVATE W/ TELEMETRY

## 2023-12-30 PROCEDURE — 85025 COMPLETE CBC W/AUTO DIFF WBC: CPT

## 2023-12-30 PROCEDURE — 6370000000 HC RX 637 (ALT 250 FOR IP): Performed by: NURSE PRACTITIONER

## 2023-12-30 PROCEDURE — 97530 THERAPEUTIC ACTIVITIES: CPT

## 2023-12-30 PROCEDURE — 83735 ASSAY OF MAGNESIUM: CPT

## 2023-12-30 PROCEDURE — 2700000000 HC OXYGEN THERAPY PER DAY

## 2023-12-30 PROCEDURE — 6360000002 HC RX W HCPCS: Performed by: INTERNAL MEDICINE

## 2023-12-30 PROCEDURE — 85014 HEMATOCRIT: CPT

## 2023-12-30 RX ORDER — FLUCONAZOLE 2 MG/ML
200 INJECTION, SOLUTION INTRAVENOUS EVERY 24 HOURS
Status: DISPENSED | OUTPATIENT
Start: 2023-12-30 | End: 2024-01-06

## 2023-12-30 RX ORDER — SODIUM CHLORIDE 9 MG/ML
INJECTION, SOLUTION INTRAVENOUS CONTINUOUS
Status: DISCONTINUED | OUTPATIENT
Start: 2023-12-30 | End: 2023-12-31

## 2023-12-30 RX ORDER — SODIUM CHLORIDE 9 MG/ML
INJECTION, SOLUTION INTRAVENOUS CONTINUOUS
Status: DISCONTINUED | OUTPATIENT
Start: 2023-12-30 | End: 2023-12-30

## 2023-12-30 RX ORDER — DOCUSATE SODIUM 100 MG/1
100 CAPSULE, LIQUID FILLED ORAL DAILY
Status: DISCONTINUED | OUTPATIENT
Start: 2023-12-30 | End: 2024-01-09

## 2023-12-30 RX ORDER — BUDESONIDE AND FORMOTEROL FUMARATE DIHYDRATE 160; 4.5 UG/1; UG/1
2 AEROSOL RESPIRATORY (INHALATION)
Status: DISCONTINUED | OUTPATIENT
Start: 2023-12-30 | End: 2024-01-13 | Stop reason: HOSPADM

## 2023-12-30 RX ORDER — TROSPIUM CHLORIDE 20 MG/1
20 TABLET, FILM COATED ORAL DAILY
Status: DISCONTINUED | OUTPATIENT
Start: 2023-12-30 | End: 2024-01-13 | Stop reason: HOSPADM

## 2023-12-30 RX ORDER — CASTOR OIL AND BALSAM, PERU 788; 87 MG/G; MG/G
OINTMENT TOPICAL 2 TIMES DAILY
Status: DISCONTINUED | OUTPATIENT
Start: 2023-12-30 | End: 2024-01-13 | Stop reason: HOSPADM

## 2023-12-30 RX ADMIN — SODIUM CHLORIDE 1000 MG: 900 INJECTION INTRAVENOUS at 12:02

## 2023-12-30 RX ADMIN — TROSPIUM CHLORIDE 20 MG: 20 TABLET, FILM COATED ORAL at 11:49

## 2023-12-30 RX ADMIN — CASTOR OIL AND BALSAM, PERU: 788; 87 OINTMENT TOPICAL at 17:47

## 2023-12-30 RX ADMIN — MORPHINE SULFATE 2 MG: 2 INJECTION, SOLUTION INTRAMUSCULAR; INTRAVENOUS at 08:15

## 2023-12-30 RX ADMIN — MONTELUKAST 10 MG: 10 TABLET, FILM COATED ORAL at 21:28

## 2023-12-30 RX ADMIN — SODIUM CHLORIDE: 9 INJECTION, SOLUTION INTRAVENOUS at 17:51

## 2023-12-30 RX ADMIN — ACETAMINOPHEN 650 MG: 325 TABLET ORAL at 17:45

## 2023-12-30 RX ADMIN — FLUCONAZOLE 200 MG: 200 INJECTION, SOLUTION INTRAVENOUS at 17:57

## 2023-12-30 RX ADMIN — CASTOR OIL AND BALSAM, PERU: 788; 87 OINTMENT TOPICAL at 21:29

## 2023-12-30 RX ADMIN — SODIUM CHLORIDE, PRESERVATIVE FREE 10 ML: 5 INJECTION INTRAVENOUS at 08:27

## 2023-12-30 RX ADMIN — BUDESONIDE 500 MCG: 0.5 SUSPENSION RESPIRATORY (INHALATION) at 07:43

## 2023-12-30 RX ADMIN — PANTOPRAZOLE SODIUM 40 MG: 40 TABLET, DELAYED RELEASE ORAL at 08:19

## 2023-12-30 RX ADMIN — ARFORMOTEROL TARTRATE 15 MCG: 15 SOLUTION RESPIRATORY (INHALATION) at 07:43

## 2023-12-30 RX ADMIN — SODIUM CHLORIDE, PRESERVATIVE FREE 10 ML: 5 INJECTION INTRAVENOUS at 22:00

## 2023-12-30 RX ADMIN — ONDANSETRON 4 MG: 2 INJECTION INTRAMUSCULAR; INTRAVENOUS at 08:16

## 2023-12-30 RX ADMIN — BUDESONIDE AND FORMOTEROL FUMARATE DIHYDRATE 2 PUFF: 160; 4.5 AEROSOL RESPIRATORY (INHALATION) at 20:57

## 2023-12-30 ASSESSMENT — PAIN DESCRIPTION - DESCRIPTORS
DESCRIPTORS: ACHING;CRAMPING
DESCRIPTORS: ACHING;SPASM
DESCRIPTORS: ACHING;CRAMPING

## 2023-12-30 ASSESSMENT — PAIN DESCRIPTION - LOCATION
LOCATION: BACK;ABDOMEN
LOCATION: BACK
LOCATION: BACK

## 2023-12-30 ASSESSMENT — PAIN SCALES - GENERAL
PAINLEVEL_OUTOF10: 5
PAINLEVEL_OUTOF10: 8
PAINLEVEL_OUTOF10: 5
PAINLEVEL_OUTOF10: 0

## 2023-12-30 ASSESSMENT — PAIN DESCRIPTION - ORIENTATION
ORIENTATION: LOWER;POSTERIOR;ANTERIOR
ORIENTATION: POSTERIOR
ORIENTATION: POSTERIOR

## 2023-12-30 NOTE — PROGRESS NOTES
Patient: Jamel Hazel MRN: [id number]  SSN: [id number]    YOB: 1955  Age: 68 y.o.  Sex: male        ADMITTED: 2023 to [facility]* by Chencho Crawford Jr., MD for Bladder spasms [N32.89]  Elevated troponin [R79.89]  Hematuria, unspecified type [R31.9]    Jamel Hazel is doing poorly. Complains of chills, bladder spasms and flank pain, L>R. Fever of 101.4 last night. He is intermittently tachycardic with episodic hypotension.   Labs notable for acute rise in WBC to 18.4 from 10.4, Hgb down to 7.6 from 9.3 on arrival, serum cr up to 2.23 from 1.12.   CBI running at moderate gtt with clear urine in tubing. He is unable to tolerate manual irrigation at the bedside due to severe bladder spasms, even after administration of IV morphine. Only able to evacuate 2 clots before stopping due to patient intolerance.     Vitals: Temp (24hrs), Av.7 °F (37.6 °C), Min:98 °F (36.7 °C), Max:101.4 °F (38.6 °C)    Blood pressure 126/80, pulse (!) 110, temperature 99.9 °F (37.7 °C), temperature source Oral, resp. rate 20, height 1.778 m (5' 10\"), weight 54.4 kg (120 lb), SpO2 100 %.    Intake and Output:  1901 - 700  In: -   Out: 5000 [Urine:5000]  701 - 1900  In: -   Out: 3000 [Urine:3000]  FARHAD Output lats 24 hrs: No data found.   FARHAD Output last 8 hrs: No data found.  BM over last 24 hrs: No data found.    Physical Exam  General: NAD, appear uncomfortable   Respiratory: no distress, NC  Abdomen: soft, no distention; non-tender to palpation  : bilateral CVA tenderness L>R, 20 F 3 way gibbons with bright red urine with irrigation   Neuro: Appropriate, no focal neurological deficits  Skin: warm, dry  Extremities: moves all, full ROM    Labs:  CBC:   Lab Results   Component Value Date/Time    WBC 18.4 2023 04:59 AM    HCT 23.8 2023 04:59 AM     2023 04:59 AM     BMP:   Lab Results   Component Value Date/Time     2023 04:59 AM    K

## 2023-12-30 NOTE — PROGRESS NOTES
I have reviewed and am in agreement with the assessment and documentation as performed by my orientee, JOHANA Ewing. Please refer to Kaylin's progress note for update on pt's daily events.     0845 Patient JACKI to CT    1114 First blood culture set drawn by Sandi VAUGHN on left forearm on 12/30/2023 at 1112. Sterile gloves applied. The venipuncture site was cleaned with CHG: ChloraPrep. The site was allowed to air dry.     Second blood culture set drawn by Kaylin VAUGHN on right forearm on 12/30/2023 at 1114. Sterile gloves applied. The venipuncture site was cleaned with CHG: ChloraPrep. The site was allowed to air dry.      1348 Patient is tachycardic 120s-130s. /58. Paired bcx in progress. Megan DOS SANTOS updated    1617 Received call from telemetry stating patient had 13 beats of VTach. VSS and pt asymptomatic. Notified Megan DOS SANTOS    3919 Paging Eleanor DOS SANTOS from cardiology regarding patient's run of VTach - no new orders received    9121 RN was concerned about drawing the urine culture and urine studies from the gibbons with the CBI due to the possibility of the samples being diluted.  RN reached out to urology and per Joanie DOS SANTOS, they recommended to hold off on the urine studies and urine culture. Megan DOS SANTOS made aware

## 2023-12-30 NOTE — PROGRESS NOTES
Bedside and Verbal shift change report given to Kaylin Calzada RN  (oncoming nurse) by JOHANA Faustin (offgoing nurse). Report included the following information Nurse Handoff Report, Index, Intake/Output, MAR, Recent Results, and Cardiac Rhythm Sinus Rhythm/Sinus Tachy .       0845: Pt off the floor for CT.       1200: Pt returned to unit. VSS.     End of Shift Note    Bedside shift change report given to JOHANA Franklin (oncoming nurse) by Kaylin Calzada RN (offgoing nurse).  Report included the following information SBAR, Kardex, Intake/Output, MAR, Recent Results, and Cardiac Rhythm Sinus Tachy    Shift worked:  5214-0705     Shift summary and any significant changes:    See notes above and JOHANA Junior progress note.    - STAT CXR pending completion  - Q6H H&H ordered, next draw at 0000  - Q6H Na checks, next draw at 2245  - Nephrology consulted 12/30   Concerns for physician to address:       Zone phone for oncoming shift:          Activity:     Number times ambulated in hallways past shift: 0  Number of times OOB to chair past shift: 0    Cardiac:   Cardiac Monitoring: Yes           Access:  Current line(s): PIV     Genitourinary:   Urinary status: gibbons    Respiratory:      Chronic home O2 use?: YES  Incentive spirometer at bedside: NO       GI:     Current diet:  ADULT DIET; Dysphagia - Soft and Bite Sized; soft cooked veggies please  ADULT ORAL NUTRITION SUPPLEMENT; Breakfast, Lunch, Dinner; Clear Liquid Oral Supplement  Passing flatus: YES  Tolerating current diet: YES       Pain Management:   Patient states pain is manageable on current regimen: YES    Skin:     Interventions: internal/external urinary devices    Patient Safety:  Fall Score:    Interventions: bed/chair alarm and gripper socks       Length of Stay:  Expected LOS: 3  Actual LOS: 1      Kaylin Calzada RN

## 2023-12-30 NOTE — PROGRESS NOTES
Appearance: Well developed, cachectic, alert & oriented x 3,    no acute distress.  Ears/Nose/Mouth/Throat: Hearing grossly normal.  Neck: Supple.  Chest: Lungs clear to auscultation bilaterally.  Cardiovascular: Regular rate and rhythm, S1S2 normal, no murmur.  Abdomen: Soft, non-tender, bowel sounds are active.  Extremities: No edema bilaterally.  Skin: Warm and dry.    []         Post-cath site without hematoma, bruit, tenderness, or thrill.  Distal pulses intact.    PMH/SH reviewed - no change compared to H&P    Data Review    Telemetry: SR    EKG: NSR, Lt Axis, RBBB  []  No new EKG for review    Lab Data Personally Reviewed:    Recent Labs     12/28/23 2201 12/29/23 2140 12/30/23 0459 12/30/23  1114   WBC 10.4  --  18.4*  --    HGB 9.3*   < > 7.6* 7.6*   HCT 28.8*   < > 23.8* 22.9*     --  170  --     < > = values in this interval not displayed.     No results for input(s): \"INR\", \"APTT\" in the last 72 hours.    Invalid input(s): \"PTP\"   Recent Labs     12/28/23 2201 12/30/23 0459    133*   K 3.9 4.2    96*   CO2 34* 31   BUN 25* 39*     No results for input(s): \"CPK\" in the last 72 hours.    Invalid input(s): \"CPKMB\", \"CKNDX\", \"TROIQ\"  No results found for: \"CHOL\", \"CHOLX\", \"CHLST\", \"CHOLV\", \"HDL\", \"HDLC\", \"LDL\", \"LDLC\", \"TGLX\", \"TRIGL\"    Recent Labs     12/28/23 2201 12/30/23  0459   GLOB 3.6 3.2     No results for input(s): \"PH\", \"PCO2\", \"PO2\" in the last 72 hours.    Medications Personally Reviewed:    Current Facility-Administered Medications   Medication Dose Route Frequency    budesonide-formoterol (SYMBICORT) 160-4.5 MCG/ACT inhaler 2 puff (Patient Supplied)  2 puff Inhalation BID RT    cefTRIAXone (ROCEPHIN) 1,000 mg in sodium chloride 0.9 % 50 mL IVPB (mini-bag)  1,000 mg IntraVENous Q24H    docusate sodium (COLACE) capsule 100 mg  100 mg Oral Daily    trospium (SANCTURA) tablet 20 mg  20 mg Oral Daily    umeclidinium-vilanterol (ANORO ELLIPTA) inhaler 1 puff (Patient

## 2023-12-30 NOTE — PLAN OF CARE
Problem: Physical Therapy - Adult  Goal: By Discharge: Performs mobility at highest level of function for planned discharge setting.  See evaluation for individualized goals.  Description: FUNCTIONAL STATUS PRIOR TO ADMISSION: Patient was independent and active with use of supplemental of oxygen.    HOME SUPPORT PRIOR TO ADMISSION: The patient lived with spouse.    Physical Therapy Goals  Initiated 12/30/2023  1.  Patient will move from supine to sit and sit to supine in bed with modified independence within 7 day(s).    2.  Patient will perform sit to stand with modified independence within 7 day(s).  3.  Patient will transfer from bed to chair and chair to bed with supervision/set-up using the least restrictive device within 7 day(s).  4.  Patient will ambulate with supervision/set-up for 100 feet with the least restrictive device within 7 day(s).   5.  Patient will ascend/descend 5 stairs with B handrail(s) with supervision/set-up within 7 day(s).   Outcome: Progressing     PHYSICAL THERAPY EVALUATION    Patient: Jamle Hazel (68 y.o. male)  Date: 12/30/2023  Primary Diagnosis: Bladder spasms [N32.89]  Elevated troponin [R79.89]  Hematuria, unspecified type [R31.9]       Precautions: Fall Risk                    ASSESSMENT :   DEFICITS/IMPAIRMENTS:   The patient is limited by decreased functional mobility, strength, activity tolerance, endurance, safety awareness, balance, posture. Patient is a 67 y/o male who was admitted to the hospital due to hematuria after recent gibbons placement. Pt is being treated for sepsis. Patient educated on the purpose and benefits of skilled PT and goals to be addressed with pt verbalizing good understanding.  Pt engaged in bed mobility training consisting of rolling bilaterally with min A with use of bed rails. Pt reports pain in bladder an back with movement. Pt not able to participate in further mobility at this time. Discussion with pt regarding possible rehab placement

## 2023-12-30 NOTE — PLAN OF CARE
Problem: Discharge Planning  Goal: Discharge to home or other facility with appropriate resources  Outcome: Progressing     Problem: Pain  Goal: Verbalizes/displays adequate comfort level or baseline comfort level  Outcome: Progressing     Problem: Respiratory - Adult  Goal: Achieves optimal ventilation and oxygenation  Outcome: Progressing

## 2023-12-30 NOTE — PROGRESS NOTES
Comprehensive Nutrition Assessment    Type and Reason for Visit:  Initial (low BMI)    Nutrition Recommendations/Plan:   RD to adjust diet to Soft and Bite Sized per pt request  RD to add Ensure Clear TID  Please document % meals and supplements consumed in flowsheet I/O's under intake      Malnutrition Assessment:  Malnutrition Status:  Severe malnutrition (12/30/23 1538)    Context:  Chronic Illness     Findings of the 6 clinical characteristics of malnutrition:  Energy Intake:  No significant decrease in energy intake  Weight Loss:  10-15lb wt loss per EMR     Body Fat Loss:  Severe body fat loss Fat Overlying Ribs, Triceps   Muscle Mass Loss:  Severe muscle mass loss Clavicles (pectoralis & deltoids), Thigh (quadraceps), Scapula (trapezius)  Fluid Accumulation:        Strength:  Not Performed    Nutrition Assessment:  Pt admitted with bladder spasms + hematuria.  PMH: COPD, GERD, esophageal CA, chronic dysphagia, chronic malnutrition.  Chart reviewed for low BMI.  Pt sitting up in bed awake and alert.  He report poor appetite currently 2' nausea.  Pt admits that some items are hard to chew and is agreeable to a softer texture diet.  Of note most recent SLP notes from previous admissions cleared pt for a regular texture diet, pt denies any issues related to this at home.  He prefers ensure clear 2' lactose intolerance so will order TID.  Noted recent wt loss per EMR if stated wt is accurate.  Urology is working pt up for possible bladder CA.    Wt Readings from Last 10 Encounters:   12/29/23 54.4 kg (120 lb)   12/10/23 60.8 kg (134 lb 0.6 oz)            Nutrition Related Findings:    Meds: rocephin, colace, protonix, glycolax.    BM: PTA   Wound Type: None       Current Nutrition Intake & Therapies:          ADULT DIET; Dysphagia - Soft and Bite Sized; soft cooked veggies please  ADULT ORAL NUTRITION SUPPLEMENT; Breakfast, Lunch, Dinner; Clear Liquid Oral Supplement    Anthropometric Measures:  Height: 177.8  cm (5' 10\")  Ideal Body Weight (IBW): 166 lbs (75 kg)       Current Body Weight: 54.4 kg (119 lb 14.9 oz), 72.2 % IBW. Weight Source: Stated  Current BMI (kg/m2): 17.2                          BMI Categories: Underweight (BMI less than 22) age over 65    Estimated Daily Nutrient Needs:  Energy Requirements Based On: Formula  Weight Used for Energy Requirements: Current  Energy (kcal/day): MSJ 2000 (1331 x 1.3 +250 for wt gain)  Weight Used for Protein Requirements: Current  Protein (g/day): 81g (1.5gPro/kg)  Method Used for Fluid Requirements: 1 ml/kcal  Fluid (ml/day): 2000mL    Nutrition Diagnosis:   Severe malnutrition related to catabolic illness, inadequate protein-energy intake as evidenced by BMI, severe muscle loss, severe loss of subcutaneous fat    Nutrition Interventions:   Food and/or Nutrient Delivery: Modify Current Diet, Start Oral Nutrition Supplement  Nutrition Education/Counseling: No recommendation at this time  Coordination of Nutrition Care: Continue to monitor while inpatient       Goals:     Goals: PO intake 50% or greater, PO intake 75% or greater, by next RD assessment       Nutrition Monitoring and Evaluation:   Behavioral-Environmental Outcomes: None Identified  Food/Nutrient Intake Outcomes: Food and Nutrient Intake, Supplement Intake  Physical Signs/Symptoms Outcomes: Biochemical Data, Nutrition Focused Physical Findings, Skin, Weight    Discharge Planning:    Continue current diet, Continue Oral Nutrition Supplement     Юлия Solorzano RD, [facility]  Contact: ext [phone]

## 2023-12-30 NOTE — PROGRESS NOTES
Hospitalist Progress Note    NAME: Jamel Hazel   :  1955   MRN:  [id number]            Subjective:     Chief Complaint / Reason for Physician Visit Hematuria  Patient seen and evaluated at bedside, overnight events reviewed, patient feels better today, clots are improving after manual bladder irrigation by urology team this a.m.  Discussed with RN events overnight.     Review of Systems:  Symptom Y/N Comments  Symptom Y/N Comments   Fever/Chills N   Chest Pain N    Poor Appetite Y   Edema N    Cough N   Abdominal Pain Y    Sputum N   Joint Pain N    SOB/HALL N   Pruritis/Rash N    Nausea/vomit N   Tolerating PT/OT NA    Diarrhea N   Tolerating Diet Y    Constipation N   Other       Could NOT obtain due to:      Objective:     VITALS:   Last 24hrs VS reviewed since prior progress note. Most recent are:  [unfilled]    Intake/Output Summary (Last 24 hours) at 2023 1602  Last data filed at 2023 1543  Gross per 24 hour   Intake --   Output 81944 ml   Net -46601 ml        PHYSICAL EXAM:  General: Patient appears comfortable   EENT:  EOMI. Anicteric sclerae. MMM  Resp:  CTA bilaterally, no wheezing or rales.  No accessory muscle use  CV:  Regular  rhythm, s1/s2 no m/r/g No edema  GI:  Soft, Non distended, Non tender.  +Bowel sounds  Neurologic:  Alert and oriented X 3, normal speech,   Psych:   Good insight. Not anxious nor agitated  Skin:  No rashes.  No jaundice    Procedures: see electronic medical records for all procedures/Xrays and details which were not copied into this note but were reviewed prior to creation of Plan.      LABS:  I reviewed today's most current labs and imaging studies.  Pertinent labs include:  Recent Labs     239 23  1114   WBC 10.4  --  18.4*  --    HGB 9.3* 7.9* 7.6* 7.6*   HCT 28.8* 24.7* 23.8* 22.9*     --  170  --      Recent Labs     23  0459    133*   K 3.9 4.2    96*   CO2 34* 31

## 2023-12-30 NOTE — CONSULTS
Consultation Note    NAME: Jamel Hazel   :  1955   MRN:  [id number]     Date/Time:  2023 5:15 PM    I have been asked to see this patient by Dr. Guzman  for advice/opinion re: MARIA ELENA.     Assessment :    Plan:  MARIA ELENA  Bilateral hydro  Hyponatremia  Possible bladder cancer   UA with blood and protein.  MARIA ELENA possibly related to bilateral hydro.  Urology is following.  PO intake has been poor and he may have some element of volume depletion.  Continue with IVF.       Subjective:   CHIEF COMPLAINT:  \"My bladder hurts.\"    HISTORY OF PRESENT ILLNESS:     Jamel is a 68 y.o.   male who is admitted with MARIA ELENA.  He was recently admitted with urinary retention and hematuria.  HE had a Gonzalez placed and is to have a cysto next week.  Creatinine was normal on DC.  He came back to the ER as he had blood leaking around his Gonzalez.  He says that his po intake has been poor for the past several days.    Past Medical History:   Diagnosis Date    Arthritis     osteoarthritis    Asthma     Cancer (HCC)     esophagus    Cancer of esophagus (HCC) 2012    adenocarcinoma; Dr. Murillo    Chronic pain     lower back, has crushed vertebrae    Essential hypertension     GERD (gastroesophageal reflux disease)     Hypertension     not on medication    Nonischemic cardiomyopathy (HCC)      EF 35-50%; negative cath, 2012 EF 55%; Dr. 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    Thyroid disease     Unspecified sleep apnea     c-pap-doesnt wear now/apnea resolved after surgery/lost weight and no sleep apnea      Past Surgical History:   Procedure Laterality Date    CARDIAC CATHETERIZATION      CHOLECYSTECTOMY      COLONOSCOPY N/A 2017       ________________________________________________________________________       ___________________________________________________  Consulting Physician: Charles Keightley, MD

## 2023-12-30 NOTE — PLAN OF CARE
Problem: Pain  Goal: Verbalizes/displays adequate comfort level or baseline comfort level  12/30/2023 1323 by Kaylin Calazda RN  Outcome: Progressing  12/30/2023 0446 by Roxie Zhang RN  Outcome: Progressing     Problem: Respiratory - Adult  Goal: Achieves optimal ventilation and oxygenation  12/30/2023 1323 by Kaylin Calzada RN  Outcome: Progressing  12/30/2023 0745 by Randolph Peters, RT  Outcome: Progressing  12/30/2023 0446 by Roxie Zhang RN  Outcome: Progressing     Problem: Safety - Adult  Goal: Free from fall injury  Outcome: Progressing

## 2023-12-30 NOTE — CONSULTS
[facility]  CONSULTATION    Name:  JAZ GARCIA  MR#:  [id number]  :  1955  ACCOUNT #:  [id number]  DATE OF SERVICE:  2023    HISTORY OF PRESENT ILLNESS:  The patient is a 68-year-old gentleman who was seen in reference to chest pain and rise in troponin.  The patient has bladder issues with hematuria.  He has bilateral hydronephrosis and possible suspected bladder cancer.  He had chest discomfort in the ER lasting about, he states, 15-20 minutes and on a scale of 1-10, he states \"it was up there.\"  It was substernal discomfort.  EKG done soon thereafter did not reveal any significant change.  He currently is pain free and the troponins have been 384 and 386.  He has had dyspnea on exertion and a prior history of chronic obstructive pulmonary disease.  He is on 3 L of oxygen at home continuously.    PAST MEDICAL HISTORY:  He has a history of gastroesophageal reflux disorder, history of prior cancer of the esophagus in  with resection.  He had chronic dysphagia and significant chronic weight loss.    SOCIAL HISTORY:  He is .  Tobacco, former smoker.    In addition, the patient had an echo in 2018 indicating ejection fraction of approximately 40% and anterolateral hypokinesis.  The patient had cardiac catheterization in .  At that time, no significant coronary artery disease was present, but he had reduced ejection fraction.  He has been disabled since .    REVIEW OF SYSTEMS:  Essentially positive for difficulty swallowing.  He has had esophageal dilatation a number of times.  His weight is stabilized, but he lost over 100 pounds.  His appetite has been poor.  He has shortness of breath chronically.  No prior chest pain or peripheral edema.  No orthopnea.  His appetite is poor.    PHYSICAL EXAMINATION:  VITAL SIGNS:  His weight a number of years ago was 142, previously he weighed more than 100 pounds over that.  Blood pressure and vital signs per [facility]

## 2023-12-31 ENCOUNTER — APPOINTMENT (OUTPATIENT)
Facility: HOSPITAL | Age: 68
DRG: 656 | End: 2023-12-31
Payer: MEDICARE

## 2023-12-31 LAB
ANION GAP SERPL CALC-SCNC: 2 MMOL/L (ref 5–15)
BUN SERPL-MCNC: 57 MG/DL (ref 6–20)
BUN/CREAT SERPL: 21 (ref 12–20)
CALCIUM SERPL-MCNC: 8 MG/DL (ref 8.5–10.1)
CHLORIDE SERPL-SCNC: 98 MMOL/L (ref 97–108)
CO2 SERPL-SCNC: 28 MMOL/L (ref 21–32)
CREAT SERPL-MCNC: 2.69 MG/DL (ref 0.7–1.3)
GLUCOSE SERPL-MCNC: 111 MG/DL (ref 65–100)
HCT VFR BLD AUTO: 22 % (ref 36.6–50.3)
HCT VFR BLD AUTO: 22.9 % (ref 36.6–50.3)
HGB BLD-MCNC: 7.4 G/DL (ref 12.1–17)
HGB BLD-MCNC: 7.6 G/DL (ref 12.1–17)
POTASSIUM SERPL-SCNC: 4.2 MMOL/L (ref 3.5–5.1)
SODIUM SERPL-SCNC: 128 MMOL/L (ref 136–145)
SODIUM SERPL-SCNC: 131 MMOL/L (ref 136–145)

## 2023-12-31 PROCEDURE — 6360000002 HC RX W HCPCS: Performed by: STUDENT IN AN ORGANIZED HEALTH CARE EDUCATION/TRAINING PROGRAM

## 2023-12-31 PROCEDURE — 85014 HEMATOCRIT: CPT

## 2023-12-31 PROCEDURE — 6370000000 HC RX 637 (ALT 250 FOR IP): Performed by: INTERNAL MEDICINE

## 2023-12-31 PROCEDURE — 36415 COLL VENOUS BLD VENIPUNCTURE: CPT

## 2023-12-31 PROCEDURE — 6360000002 HC RX W HCPCS: Performed by: INTERNAL MEDICINE

## 2023-12-31 PROCEDURE — 6370000000 HC RX 637 (ALT 250 FOR IP): Performed by: NURSE PRACTITIONER

## 2023-12-31 PROCEDURE — 80048 BASIC METABOLIC PNL TOTAL CA: CPT

## 2023-12-31 PROCEDURE — 2709999900 IR GUIDED NEPHROSTOMY CATH PLACEMENT RIGHT

## 2023-12-31 PROCEDURE — 6360000004 HC RX CONTRAST MEDICATION: Performed by: STUDENT IN AN ORGANIZED HEALTH CARE EDUCATION/TRAINING PROGRAM

## 2023-12-31 PROCEDURE — 1100000003 HC PRIVATE W/ TELEMETRY

## 2023-12-31 PROCEDURE — 97530 THERAPEUTIC ACTIVITIES: CPT

## 2023-12-31 PROCEDURE — 6360000002 HC RX W HCPCS: Performed by: NURSE PRACTITIONER

## 2023-12-31 PROCEDURE — 97166 OT EVAL MOD COMPLEX 45 MIN: CPT

## 2023-12-31 PROCEDURE — 97535 SELF CARE MNGMENT TRAINING: CPT

## 2023-12-31 PROCEDURE — 2580000003 HC RX 258: Performed by: NURSE PRACTITIONER

## 2023-12-31 PROCEDURE — 2580000003 HC RX 258: Performed by: INTERNAL MEDICINE

## 2023-12-31 PROCEDURE — 94640 AIRWAY INHALATION TREATMENT: CPT

## 2023-12-31 PROCEDURE — 84295 ASSAY OF SERUM SODIUM: CPT

## 2023-12-31 PROCEDURE — 2580000003 HC RX 258

## 2023-12-31 PROCEDURE — 50432 PLMT NEPHROSTOMY CATHETER: CPT

## 2023-12-31 PROCEDURE — 2500000003 HC RX 250 WO HCPCS: Performed by: STUDENT IN AN ORGANIZED HEALTH CARE EDUCATION/TRAINING PROGRAM

## 2023-12-31 PROCEDURE — 2700000000 HC OXYGEN THERAPY PER DAY

## 2023-12-31 PROCEDURE — 97165 OT EVAL LOW COMPLEX 30 MIN: CPT

## 2023-12-31 PROCEDURE — 85018 HEMOGLOBIN: CPT

## 2023-12-31 RX ORDER — HEPARIN SODIUM 200 [USP'U]/100ML
200 INJECTION, SOLUTION INTRAVENOUS ONCE
Status: COMPLETED | OUTPATIENT
Start: 2023-12-31 | End: 2023-12-31

## 2023-12-31 RX ORDER — FENTANYL CITRATE 50 UG/ML
100 INJECTION, SOLUTION INTRAMUSCULAR; INTRAVENOUS PRN
Status: DISCONTINUED | OUTPATIENT
Start: 2023-12-31 | End: 2023-12-31

## 2023-12-31 RX ORDER — LIDOCAINE HYDROCHLORIDE 20 MG/ML
40 INJECTION, SOLUTION INFILTRATION; PERINEURAL ONCE
Status: COMPLETED | OUTPATIENT
Start: 2023-12-31 | End: 2023-12-31

## 2023-12-31 RX ORDER — WATER 10 ML/10ML
INJECTION INTRAMUSCULAR; INTRAVENOUS; SUBCUTANEOUS
Status: COMPLETED
Start: 2023-12-31 | End: 2023-12-31

## 2023-12-31 RX ADMIN — BUDESONIDE AND FORMOTEROL FUMARATE DIHYDRATE 2 PUFF: 160; 4.5 AEROSOL RESPIRATORY (INHALATION) at 19:45

## 2023-12-31 RX ADMIN — MONTELUKAST 10 MG: 10 TABLET, FILM COATED ORAL at 20:22

## 2023-12-31 RX ADMIN — WATER 2000 MG: 1 INJECTION INTRAMUSCULAR; INTRAVENOUS; SUBCUTANEOUS at 15:30

## 2023-12-31 RX ADMIN — MORPHINE SULFATE 2 MG: 2 INJECTION, SOLUTION INTRAMUSCULAR; INTRAVENOUS at 20:22

## 2023-12-31 RX ADMIN — SODIUM CHLORIDE: 9 INJECTION, SOLUTION INTRAVENOUS at 05:42

## 2023-12-31 RX ADMIN — CEFEPIME 2000 MG: 2 INJECTION, POWDER, FOR SOLUTION INTRAVENOUS at 22:12

## 2023-12-31 RX ADMIN — IOPAMIDOL 10 ML: 755 INJECTION, SOLUTION INTRAVENOUS at 17:36

## 2023-12-31 RX ADMIN — WATER: 1 INJECTION INTRAMUSCULAR; INTRAVENOUS; SUBCUTANEOUS at 16:45

## 2023-12-31 RX ADMIN — OXYCODONE HYDROCHLORIDE 5 MG: 5 TABLET ORAL at 08:09

## 2023-12-31 RX ADMIN — SODIUM CHLORIDE 25 ML/HR: 9 INJECTION, SOLUTION INTRAVENOUS at 22:12

## 2023-12-31 RX ADMIN — CASTOR OIL AND BALSAM, PERU: 788; 87 OINTMENT TOPICAL at 20:30

## 2023-12-31 RX ADMIN — SODIUM CHLORIDE, PRESERVATIVE FREE 10 ML: 5 INJECTION INTRAVENOUS at 20:30

## 2023-12-31 RX ADMIN — LIDOCAINE HYDROCHLORIDE 10 ML: 20 INJECTION, SOLUTION INFILTRATION; PERINEURAL at 18:28

## 2023-12-31 RX ADMIN — FENTANYL CITRATE 50 MCG: 50 INJECTION INTRAMUSCULAR; INTRAVENOUS at 17:45

## 2023-12-31 RX ADMIN — CASTOR OIL AND BALSAM, PERU 1 APPLICATION: 788; 87 OINTMENT TOPICAL at 10:01

## 2023-12-31 RX ADMIN — SODIUM CHLORIDE 1000 MG: 900 INJECTION INTRAVENOUS at 10:03

## 2023-12-31 RX ADMIN — SODIUM CHLORIDE, PRESERVATIVE FREE 10 ML: 5 INJECTION INTRAVENOUS at 10:06

## 2023-12-31 RX ADMIN — PANTOPRAZOLE SODIUM 40 MG: 40 TABLET, DELAYED RELEASE ORAL at 06:57

## 2023-12-31 RX ADMIN — ONDANSETRON 4 MG: 2 INJECTION INTRAMUSCULAR; INTRAVENOUS at 20:29

## 2023-12-31 RX ADMIN — HEPARIN SODIUM 400 UNITS: 200 INJECTION, SOLUTION INTRAVENOUS at 18:30

## 2023-12-31 RX ADMIN — BUDESONIDE AND FORMOTEROL FUMARATE DIHYDRATE 2 PUFF: 160; 4.5 AEROSOL RESPIRATORY (INHALATION) at 07:53

## 2023-12-31 RX ADMIN — TROSPIUM CHLORIDE 20 MG: 20 TABLET, FILM COATED ORAL at 10:01

## 2023-12-31 RX ADMIN — ACETAMINOPHEN 650 MG: 325 TABLET ORAL at 20:22

## 2023-12-31 ASSESSMENT — PAIN SCALES - GENERAL
PAINLEVEL_OUTOF10: 5
PAINLEVEL_OUTOF10: 0
PAINLEVEL_OUTOF10: 7
PAINLEVEL_OUTOF10: 0
PAINLEVEL_OUTOF10: 5
PAINLEVEL_OUTOF10: 0

## 2023-12-31 ASSESSMENT — PAIN DESCRIPTION - ORIENTATION
ORIENTATION: RIGHT
ORIENTATION: RIGHT
ORIENTATION: LEFT;RIGHT

## 2023-12-31 ASSESSMENT — PAIN DESCRIPTION - DESCRIPTORS
DESCRIPTORS: ACHING;CRAMPING
DESCRIPTORS: ACHING;CRAMPING

## 2023-12-31 ASSESSMENT — PAIN DESCRIPTION - LOCATION
LOCATION: HIP
LOCATION: HIP
LOCATION: FLANK

## 2023-12-31 NOTE — PROCEDURES
Interventional Radiology  Procedure Note        12/31/2023 6:08 PM    Patient: Jamel Hazel     Informed consent obtained    Diagnosis: Bilateral hydronephrosis    Procedure(s): Bilateral percutaneous nephrostomy tubes, 10Fr    Estimated blood loss: less than 5cc    Anesthesia: local    Specimens removed:  none    Complications: None    Implants: None    Primary Physician: Leo Hernandez MD    Recommendations: N/A    Full dictated report to follow    Leo Hernandez MD  Interventional Radiology  [facility]  6:08 PM, 12/31/2023

## 2023-12-31 NOTE — PLAN OF CARE
Problem: Pain  Goal: Verbalizes/displays adequate comfort level or baseline comfort level  12/30/2023 1911 by Kaylin Calzada RN  Outcome: Progressing  12/30/2023 1323 by Kaylin Calzada RN  Outcome: Progressing     Problem: Respiratory - Adult  Goal: Achieves optimal ventilation and oxygenation  12/30/2023 1911 by Kaylin Calzada RN  Outcome: Progressing  12/30/2023 1323 by Kaylin Calzada RN  Outcome: Progressing  12/30/2023 0745 by Randolph Peters, RT  Outcome: Progressing     Problem: Safety - Adult  Goal: Free from fall injury  12/30/2023 1911 by Kaylin Calzada RN  Outcome: Progressing  12/30/2023 1323 by Kaylin Calzada RN  Outcome: Progressing     Problem: Physical Therapy - Adult  Goal: By Discharge: Performs mobility at highest level of function for planned discharge setting.  See evaluation for individualized goals.  Description: FUNCTIONAL STATUS PRIOR TO ADMISSION: Patient was independent and active with use of supplemental of oxygen.    HOME SUPPORT PRIOR TO ADMISSION: The patient lived with spouse.    Physical Therapy Goals  Initiated 12/30/2023  1.  Patient will move from supine to sit and sit to supine in bed with modified independence within 7 day(s).    2.  Patient will perform sit to stand with modified independence within 7 day(s).  3.  Patient will transfer from bed to chair and chair to bed with supervision/set-up using the least restrictive device within 7 day(s).  4.  Patient will ambulate with supervision/set-up for 100 feet with the least restrictive device within 7 day(s).   5.  Patient will ascend/descend 5 stairs with B handrail(s) with supervision/set-up within 7 day(s).   12/30/2023 1316 by Leann Bianchi, PT  Outcome: Progressing

## 2023-12-31 NOTE — PLAN OF CARE
Problem: Discharge Planning  Goal: Discharge to home or other facility with appropriate resources  Outcome: Progressing     Problem: Pain  Goal: Verbalizes/displays adequate comfort level or baseline comfort level  Outcome: Progressing     Problem: Respiratory - Adult  Goal: Achieves optimal ventilation and oxygenation  12/31/2023 1424 by Nataliia Castro, RN  Outcome: Progressing  Flowsheets (Taken 12/31/2023 0807)  Achieves optimal ventilation and oxygenation:   Assess for changes in respiratory status   Assess for changes in mentation and behavior   Position to facilitate oxygenation and minimize respiratory effort  12/31/2023 0800 by Ellyn Celaya, RT  Outcome: Progressing  12/31/2023 0758 by Ellyn Celaya, RT  Outcome: Progressing     Problem: Safety - Adult  Goal: Free from fall injury  Outcome: Progressing     Problem: Occupational Therapy - Adult  Goal: By Discharge: Performs self-care activities at highest level of function for planned discharge setting.  See evaluation for individualized goals.  12/31/2023 1417 by Beti Feng, OT  Outcome: Progressing

## 2023-12-31 NOTE — PROGRESS NOTES
Urology Progress Note    Patient: Jamel Hazel MRN: [id number]  SSN: [id number]    YOB: 1955  Age: 68 y.o.  Sex: male            Assessment:     Jamel Hazel is a 68 y.o. male with a history of retentiom s/p gibbons on cbi. Now with pos blood cx. High risk surgical candidate. Will discuss with anesthesia cysto B rpg and stents..    Plan:     As above  Alternative is B neph tubes. Maedical optimization and cysto in future.      Subjective:     C/o bladder pain    Objective:     /81   Pulse (!) 118   Temp 98.4 °F (36.9 °C) (Oral)   Resp 13   Ht 1.778 m (5' 10\")   Wt 54.4 kg (120 lb)   SpO2 96%   BMI 17.22 kg/m²       Intake/Output Summary (Last 24 hours) at 12/31/2023 1116  Last data filed at 12/31/2023 1006  Gross per 24 hour   Intake 443.33 ml   Output 99756 ml   Net -92103.67 ml       Physical Exam  General: NAD  HEENT: NC/AT, EOMI, MMM  Abdomen:   : urine clear.   Neuro: Appropriate, no focal neurological deficits  Mood/Affect: normal    [unfilled]    Imaging:  XR CHEST PORTABLE    Result Date: 12/30/2023  No acute process on portable chest.     CT ABDOMEN PELVIS WO CONTRAST Additional Contrast? None    Result Date: 12/30/2023  Bilateral hydroureteronephrosis without obstructive stone. Decompressed urinary bladder containing a Gibbons catheter. Dilated esophagus with nonvisualization of the stomach suggestive of esophagectomy and gastric pull-through.       Signed By: Ernst Villa MD - December 31, 2023

## 2023-12-31 NOTE — PROGRESS NOTES
Bedside shift change report given to JOHANA William  (oncoming nurse) by JOHANA Franklin  (offgoing nurse). Report included the following information Nurse Handoff Report, Adult Overview, Intake/Output, MAR, Quality Measures, and Neuro Assessment.     07:00 Maintain continuous bladder irrigation; output clear and transparent     08:10 Oxycodone PRN given for R hip pain.     10:20 OT at bedside, urine output changed to cloudy/pinkish with patient's movement. Dr. Villa informed.     11:30 NPO started per order    17:15 Patient left the unit for the procedure.     18:45 Patient is back to the unit from bilateral nephrostomy tube placement. Report no complains.

## 2023-12-31 NOTE — PROGRESS NOTES
Hospitalist Progress Note    NAME: Jamel Hazel   :  1955   MRN:  [id number]            Subjective:     Chief Complaint / Reason for Physician Visit Hematuria  Patient seen and evaluated at bedside, overnight events reviewed, patient currently has no new complaints.  Discussed with RN events overnight.     Review of Systems:  Symptom Y/N Comments  Symptom Y/N Comments   Fever/Chills N   Chest Pain N    Poor Appetite Y   Edema N    Cough N   Abdominal Pain Y    Sputum N   Joint Pain N    SOB/HALL N   Pruritis/Rash N    Nausea/vomit N   Tolerating PT/OT NA    Diarrhea N   Tolerating Diet Y    Constipation N   Other       Could NOT obtain due to:      Objective:     VITALS:   Last 24hrs VS reviewed since prior progress note. Most recent are:  [unfilled]    Intake/Output Summary (Last 24 hours) at 2023 1613  Last data filed at 2023 1546  Gross per 24 hour   Intake 683.33 ml   Output 24080 ml   Net -84766.67 ml          PHYSICAL EXAM:  General: Patient appears comfortable   EENT:  EOMI. Anicteric sclerae. MMM  Resp:  CTA bilaterally, no wheezing or rales.  No accessory muscle use  CV:  Regular  rhythm, s1/s2 no m/r/g No edema  GI:  Soft, Non distended, Non tender.  +Bowel sounds  Neurologic:  Alert and oriented X 3, normal speech,   Psych:   Good insight. Not anxious nor agitated  Skin:  No rashes.  No jaundice    Procedures: see electronic medical records for all procedures/Xrays and details which were not copied into this note but were reviewed prior to creation of Plan.      LABS:  I reviewed today's most current labs and imaging studies.  Pertinent labs include:  Recent Labs     23  2201 23  2140 23  0459 23  1114 23  1809 23  0043 23  0704   WBC 10.4  --  18.4*  --   --   --   --    HGB 9.3*   < > 7.6*   < > 8.2* 7.4* 7.6*   HCT 28.8*   < > 23.8*   < > 24.9* 22.0* 22.9*     --  170  --   --   --   --     < > = values in this interval not displayed.

## 2023-12-31 NOTE — H&P
Radiology History and Physical    Patient: Jamel Hazel 68 y.o. male       Chief Complaint: Hematuria (Pt presents to ER from home via EMS w/ complaints of blood in his gibbons intermittently x1 week. Pt has now noticed that he is leaking bloody urine around his gibbons site. Pt reports \"bladder spasms\" that are also causing him pain. Currently A&Ox4, pt is on 3L NC at baseline. )      History of Present Illness: 68 year old male with bilateral hydronephrosis and sepsis, suspect urinary origin.    History:    Past Medical History:   Diagnosis Date    Arthritis     osteoarthritis    Asthma     Cancer (HCC)     esophagus    Cancer of esophagus (HCC) 4/2012    adenocarcinoma; Dr. Murillo    Chronic pain     lower back, has crushed vertebrae    Essential hypertension     GERD (gastroesophageal reflux disease)     Hypertension     not on medication    Nonischemic cardiomyopathy (HCC)     2003 EF 35-50%; negative cath, 2/2012 EF 55%; Dr. 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    Thyroid disease     Unspecified sleep apnea     c-pap-doesnt wear now/apnea resolved after surgery/lost weight and no sleep apnea     Family History   Problem Relation Age of Onset    Heart Attack Mother     Asthma Mother     Cancer Mother         colon    Other Mother         food allergies/Forrest's disease/Shea's esophagus    Colon Cancer Mother     COPD Mother     Heart Disease Mother     Other Father         abestos/food allergies/degernative disc disease    Colon Cancer Brother     Colon Cancer Maternal Grandmother      Social History     Socioeconomic History    Marital status:      Spouse name: Not on file    Number of children: Not on file    Years of  (!) 104, temperature 98.4 °F (36.9 °C), temperature source Oral, resp. rate 18, height 1.778 m (5' 10\"), weight 54.4 kg (120 lb), SpO2 98 %.  GENERAL: alert, cooperative, no distress, appears stated age,   LUNG: Nonlabored respiration on 3L O2  HEART: borderline sinus tachycardia    ASA 3  Mallampati 2    Alerts:      Laboratory:      Recent Labs     12/30/23  0459 12/30/23  1114 12/31/23  0704   HGB 7.6*   < > 7.6*   HCT 23.8*   < > 22.9*   WBC 18.4*  --   --      --   --    BUN 39*   < > 57*   K 4.2   < > 4.2    < > = values in this interval not displayed.         Plan of Care/Planned Procedure:  Risks, benefits, and alternatives reviewed with patient and he agrees to proceed with the procedure.     Bilateral percutaneous nephrostomy tubes    Leo Hernandez MD  Interventional Radiology  [facility]  5:28 PM, 12/31/2023

## 2023-12-31 NOTE — PROGRESS NOTES
Bedside and Verbal shift change report given to Nataliia RN (oncoming nurse) by Noemi RN (offgoing nurse). Report included the following information Intake/Output, MAR, and Recent Results.

## 2023-12-31 NOTE — PLAN OF CARE
Problem: Occupational Therapy - Adult  Goal: By Discharge: Performs self-care activities at highest level of function for planned discharge setting.  See evaluation for individualized goals.  Description: FUNCTIONAL STATUS PRIOR TO ADMISSION:  Patient was ambulatory without AD and mod I for ADLs, working on cars/outside as able with increased time due to COPD/breathing. Was sponge bathing as able due to step over tub.    Receives Help From: Family, ADL Assistance: Independent,  ,  ,  ,  ,  , Homemaking Assistance: Independent, Ambulation Assistance: Independent, Transfer Assistance: Independent, Active : Yes     HOME SUPPORT: Patient lived with wife but didn't require assistance, PRN ADL assist as needed, wife drives.     Occupational Therapy Goals:  Initiated 12/31/2023  1.  Patient will perform self-feeding with [place] within 7 day(s).  2.  Patient will perform grooming with Set-up within 7 day(s).  3.  Patient will perform lower body dressing with Minimal Assist within 7 day(s).  4.  Patient will perform toilet transfers with Minimal Assist  within 7 day(s).  5.  Patient will perform all aspects of toileting with Minimal Assist within 7 day(s).  6.  Patient will participate in upper extremity therapeutic exercise/activities with Supervision for 10 minutes within 7 day(s).    7.  Patient will utilize energy conservation techniques during functional activities with verbal cues within 7 day(s).   Outcome: Progressing   OCCUPATIONAL THERAPY EVALUATION    Patient: Jamel Hazel (68 y.o. male)  Date: 12/31/2023  Primary Diagnosis: Bladder spasms [N32.89]  Elevated troponin [R79.89]  Hematuria, unspecified type [R31.9]         Precautions: Fall Risk                  ASSESSMENT :  The patient is limited by decreased functional mobility, independence in ADLs, strength, activity tolerance, endurance, balance, increased pain levels s/p admission with readmit with blood in gibbons, noted change in color of  ADLs while in hospital. Good understanding noted.      Feeding: Setup       Grooming: Moderate assistance       UE Bathing: Moderate assistance            LE Bathing: Dependent/Total       UE Dressing: Minimal assistance       LE Dressing: Dependent/Total       Toileting: Dependent/Total       Additional Comments: tolerated EOB only, with UE support for balance    Functional Mobility: Dependent/Total  Functional Mobility Skilled Clinical Factors: unable to tolerate activity after seated EOB  Additional Comments: tolerated EOB only, with UE support for balance      ADL Intervention and task modifications:        Patient instructed and indicated understanding the benefits of maintaining activity tolerance, functional mobility, and independence with self care tasks during acute stay  to ensure safe return home and to baseline. Encouraged patient to increase frequency and duration OOB, be out of bed for all meals, perform daily ADLs (as approved by RN/MD regarding bathing etc), and performing functional mobility to/from bathroom.     Sat EOB for taking of pills and nursing needs. High guard due to posterior pelvic tilt.         [facility] \"6 Clicks\"                                                       Daily Activity Inpatient Short Form  AM-PAC Daily Activity - Inpatient   How much help is needed for putting on and taking off regular lower body clothing?: Total  How much help is needed for bathing (which includes washing, rinsing, drying)?: A Lot  How much help is needed for toileting (which includes using toilet, bedpan, or urinal)?: Total  How much help is needed for putting on and taking off regular upper body clothing?: A Lot  How much help is needed for taking care of personal grooming?: A Lot  How much help for eating meals?: A Little  [facility]: 11  AM-PAC Inpatient ADL T-Scale Score : 29.04  ADL Inpatient CMS 0-100% Score: 70.42  ADL Inpatient CMS G-Code Modifier

## 2023-12-31 NOTE — PROGRESS NOTES
1715- Patient arrived to Angio via stretcher with transport. Patient is AxOx4. Continuous bladder irrigation in place.     1720- Dr. Hernandez to Angio to obtain consent for bilateral nephrostomy tube placement.    1745- Patient on Angio table. Bilateral nephrostomy tube placement begun.    1805- Angio procedure complete. Bilateral nephrostomy tubes successfully placed. Patient tolerated procedure very well.    1810- Verbal report called to JOHANA William. Patient has no restrictions post nephrostomy tube placement.    1830- Patient back to room 2120 via transport. Patient is AxOx4 with baseline VS.

## 2023-12-31 NOTE — PROGRESS NOTES
NAME: Jamel Hazel        :  1955        MRN:  [id number]                     Assessment   :                                               Plan:  MARIA ELENA  Bilateral hydro  Hyponatremia  Possible bladder cancer    UA with blood and protein.  MARIA ELENA possibly related to bilateral hydro.  Urology is following.   Creatinine continues to worsen despite IVF.  I will stop IVF.    Sodium down.  Start fluid restriction.         Subjective:     Chief Complaint:  \" My pain is a little better.\"  No N/V.  No dyspnea.      Review of Systems:    Symptom Y/N Comments  Symptom Y/N Comments   Fever/Chills    Chest Pain     Poor Appetite    Edema     Cough    Abdominal Pain     Sputum    Joint Pain     SOB/HALL    Pruritis/Rash     Nausea/vomit    Tolerating PT/OT     Diarrhea    Tolerating Diet     Constipation    Other       Could not obtain due to:      Objective:     VITALS:   Last 24hrs VS reviewed since prior progress note. Most recent are:  Vitals:    23 0807   BP: 133/81   Pulse: (!) 116   Resp: 13   Temp:    SpO2: 96%       Intake/Output Summary (Last 24 hours) at 2023 0947  Last data filed at 2023 0600  Gross per 24 hour   Intake 433.33 ml   Output 69965 ml   Net -78023.67 ml      Telemetry Reviewed:     PHYSICAL EXAM:  General: NAD  No edema      Lab Data Reviewed: (see below)    Medications Reviewed: (see below)    PMH/SH reviewed - no change compared to H&P  ________________________________________________________________________  Care Plan discussed with:  Patient     Family      RN     Care Manager                    Consultant:          Comments   >50% of visit spent in counseling and coordination of care       ________________________________________________________________________  Charles Marcus MD     Procedures: see electronic medical records for all procedures/Xrays and details which  were not copied into this

## 2023-12-31 NOTE — PROGRESS NOTES
Progress Note      12/31/2023 11:29 AM  NAME: Jamel Hazel   MRN:  [id number]   Admit Diagnosis: Bladder spasms [N32.89]  Elevated troponin [R79.89]  Hematuria, unspecified type [R31.9]      Problem List:     NSTEMI; TnI 400  Febrile illness 101.4  Leukocytosis  Hyponatremia  Bacteremia  MARIA ELENA   Gross hematuria w/ anemia (7s)  Bilateral hydronephrosis  Cardiomyopathy.  Pt states prior EF 35-40%.  COPD  Chronic hypoxic respiratory failure  Esophageal CA s/p resection w/ chronic dysphagia    In: 443.3   Out: 59673 [Urine:51988]       Assessment/Plan:   sCr up to 2.7  HgB 7s    BCx growing Klebsiella    Echo with EF 55-60%    He has had zero anginal symptoms.  TnI elevated likely 2/2 Type II MI and not true ACS.    No ASA  No beta blockers with intermittent hypotension  Infectious workup underway  Discussed with Urology.  Gen anesthesia for retrogrades and stenting vs IR for PNTs w/ local anesthesia.  I think given his overall status, frailty, worsening metabolic derangements the less invasive of the two may be preferred upfront strategy.  He is not low risk and is at least intermediate risk for a cardiovascular complication from intermediate risk procedures.  Proceed as clinically indicated.           [x]       High complexity decision making was performed in this patient at high risk for decompensation with multiple organ involvement.    Subjective:     Jamel Hazel denies chest pain, dyspnea.  Discussed with RN events overnight.     Review of Systems:    Symptom Y/N Comments  Symptom Y/N Comments   Fever/Chills N   Chest Pain N    Poor Appetite N   Edema N    Cough N   Abdominal Pain N    Sputum N   Joint Pain N    SOB/HALL N   Pruritis/Rash N    Nausea/vomit N   Tolerating PT/OT Y    Diarrhea N   Tolerating Diet Y    Constipation N   Other       Could NOT obtain due to:      Objective:      Physical Exam:    Last 24hrs VS reviewed since prior progress note. Most recent are:    /81   Pulse (!) 118    Temp 98.4 °F (36.9 °C) (Oral)   Resp 13   Ht 1.778 m (5' 10\")   Wt 54.4 kg (120 lb)   SpO2 96%   BMI 17.22 kg/m²     Intake/Output Summary (Last 24 hours) at 12/31/2023 1129  Last data filed at 12/31/2023 1006  Gross per 24 hour   Intake 443.33 ml   Output 21387 ml   Net -47456.67 ml          General Appearance: Well developed, cachectic, alert & oriented x 3,    no acute distress.  Ears/Nose/Mouth/Throat: Hearing grossly normal.  Neck: Supple.  Chest: Lungs clear to auscultation bilaterally.  Cardiovascular: Regular rate and rhythm, S1S2 normal, no murmur.  Abdomen: Soft, non-tender, bowel sounds are active.  Extremities: No edema bilaterally.  Skin: Warm and dry.    []         Post-cath site without hematoma, bruit, tenderness, or thrill.  Distal pulses intact.    PMH/ reviewed - no change compared to H&P    Data Review    Telemetry: SR    EKG: NSR, Lt Axis, RBBB  []  No new EKG for review    Lab Data Personally Reviewed:    Recent Labs     12/28/23  2201 12/29/23  2140 12/30/23  0459 12/30/23  1114 12/31/23  0043 12/31/23  0704   WBC 10.4  --  18.4*  --   --   --    HGB 9.3*   < > 7.6*   < > 7.4* 7.6*   HCT 28.8*   < > 23.8*   < > 22.0* 22.9*     --  170  --   --   --     < > = values in this interval not displayed.       No results for input(s): \"INR\", \"APTT\" in the last 72 hours.    Invalid input(s): \"PTP\"   Recent Labs     12/30/23  0459 12/30/23  1809 12/31/23  0043 12/31/23  0704   * 134* 131* 128*   K 4.2 4.5  --  4.2   CL 96* 95*  --  98   CO2 31 30  --  28   BUN 39* 52*  --  57*   MG  --  1.6  --   --        No results for input(s): \"CPK\" in the last 72 hours.    Invalid input(s): \"CPKMB\", \"CKNDX\", \"TROIQ\"  No results found for: \"CHOL\", \"CHOLX\", \"CHLST\", \"CHOLV\", \"HDL\", \"HDLC\", \"LDL\", \"LDLC\", \"TGLX\", \"TRIGL\"    Recent Labs     12/28/23  2201 12/30/23  0459   [facility] 3.6 3.2       No results for input(s): \"PH\", \"PCO2\", \"PO2\" in the last 72 hours.    Medications Personally

## 2024-01-01 ENCOUNTER — HOSPITAL ENCOUNTER (OUTPATIENT)
Facility: HOSPITAL | Age: 69
Setting detail: INFUSION SERIES
End: 2024-01-01

## 2024-01-01 ENCOUNTER — TELEPHONE (OUTPATIENT)
Age: 69
End: 2024-01-01

## 2024-01-01 DIAGNOSIS — C67.9 STAGE IV BLADDER CANCER (HCC): Primary | ICD-10-CM

## 2024-01-01 LAB
ANION GAP SERPL CALC-SCNC: 2 MMOL/L (ref 5–15)
BASOPHILS # BLD: 0 K/UL (ref 0–0.1)
BASOPHILS NFR BLD: 0 % (ref 0–1)
BUN SERPL-MCNC: 58 MG/DL (ref 6–20)
BUN/CREAT SERPL: 26 (ref 12–20)
CALCIUM SERPL-MCNC: 8.3 MG/DL (ref 8.5–10.1)
CHLORIDE SERPL-SCNC: 104 MMOL/L (ref 97–108)
CO2 SERPL-SCNC: 28 MMOL/L (ref 21–32)
CREAT SERPL-MCNC: 2.25 MG/DL (ref 0.7–1.3)
DIFFERENTIAL METHOD BLD: ABNORMAL
EOSINOPHIL # BLD: 0 K/UL (ref 0–0.4)
EOSINOPHIL NFR BLD: 0 % (ref 0–7)
ERYTHROCYTE [DISTWIDTH] IN BLOOD BY AUTOMATED COUNT: 17.1 % (ref 11.5–14.5)
GLUCOSE SERPL-MCNC: 122 MG/DL (ref 65–100)
HCT VFR BLD AUTO: 23.5 % (ref 36.6–50.3)
HGB BLD-MCNC: 7.8 G/DL (ref 12.1–17)
IMM GRANULOCYTES # BLD AUTO: 0 K/UL (ref 0–0.04)
IMM GRANULOCYTES NFR BLD AUTO: 0 % (ref 0–0.5)
LYMPHOCYTES # BLD: 0.3 K/UL (ref 0.8–3.5)
LYMPHOCYTES NFR BLD: 2 % (ref 12–49)
MCH RBC QN AUTO: 27.9 PG (ref 26–34)
MCHC RBC AUTO-ENTMCNC: 33.2 G/DL (ref 30–36.5)
MCV RBC AUTO: 83.9 FL (ref 80–99)
MONOCYTES # BLD: 0.3 K/UL (ref 0–1)
MONOCYTES NFR BLD: 2 % (ref 5–13)
NEUTS BAND NFR BLD MANUAL: 4 %
NEUTS SEG # BLD: 12.6 K/UL (ref 1.8–8)
NEUTS SEG NFR BLD: 92 % (ref 32–75)
NRBC # BLD: 0 K/UL (ref 0–0.01)
NRBC BLD-RTO: 0 PER 100 WBC
PLATELET # BLD AUTO: 103 K/UL (ref 150–400)
PMV BLD AUTO: 10.7 FL (ref 8.9–12.9)
POTASSIUM SERPL-SCNC: 3.6 MMOL/L (ref 3.5–5.1)
RBC # BLD AUTO: 2.8 M/UL (ref 4.1–5.7)
RBC MORPH BLD: ABNORMAL
RBC MORPH BLD: ABNORMAL
SODIUM SERPL-SCNC: 134 MMOL/L (ref 136–145)
WBC # BLD AUTO: 13.2 K/UL (ref 4.1–11.1)

## 2024-01-01 PROCEDURE — 6360000002 HC RX W HCPCS: Performed by: INTERNAL MEDICINE

## 2024-01-01 PROCEDURE — 1100000003 HC PRIVATE W/ TELEMETRY

## 2024-01-01 PROCEDURE — 85025 COMPLETE CBC W/AUTO DIFF WBC: CPT

## 2024-01-01 PROCEDURE — 36415 COLL VENOUS BLD VENIPUNCTURE: CPT

## 2024-01-01 PROCEDURE — 94640 AIRWAY INHALATION TREATMENT: CPT

## 2024-01-01 PROCEDURE — 2580000003 HC RX 258: Performed by: INTERNAL MEDICINE

## 2024-01-01 PROCEDURE — 6370000000 HC RX 637 (ALT 250 FOR IP): Performed by: NURSE PRACTITIONER

## 2024-01-01 PROCEDURE — 51700 IRRIGATION OF BLADDER: CPT

## 2024-01-01 PROCEDURE — 6370000000 HC RX 637 (ALT 250 FOR IP): Performed by: INTERNAL MEDICINE

## 2024-01-01 PROCEDURE — 2700000000 HC OXYGEN THERAPY PER DAY

## 2024-01-01 PROCEDURE — 80048 BASIC METABOLIC PNL TOTAL CA: CPT

## 2024-01-01 RX ORDER — ONDANSETRON 4 MG/1
TABLET, ORALLY DISINTEGRATING ORAL
Qty: 21 TABLET | Refills: 0 | Status: SHIPPED | OUTPATIENT
Start: 2024-01-01 | End: 2024-09-04

## 2024-01-01 RX ORDER — ACETAMINOPHEN 325 MG/1
650 TABLET ORAL
Status: CANCELLED | OUTPATIENT
Start: 2024-01-01

## 2024-01-01 RX ORDER — ONDANSETRON 2 MG/ML
8 INJECTION INTRAMUSCULAR; INTRAVENOUS ONCE
Status: CANCELLED | OUTPATIENT
Start: 2024-01-01 | End: 2024-01-01

## 2024-01-01 RX ORDER — ALBUTEROL SULFATE 90 UG/1
4 AEROSOL, METERED RESPIRATORY (INHALATION) PRN
Status: CANCELLED | OUTPATIENT
Start: 2024-01-01

## 2024-01-01 RX ORDER — DIPHENHYDRAMINE HYDROCHLORIDE 50 MG/ML
50 INJECTION INTRAMUSCULAR; INTRAVENOUS
Status: CANCELLED | OUTPATIENT
Start: 2024-01-01

## 2024-01-01 RX ORDER — ONDANSETRON 2 MG/ML
8 INJECTION INTRAMUSCULAR; INTRAVENOUS
Status: CANCELLED | OUTPATIENT
Start: 2024-01-01

## 2024-01-01 RX ORDER — SODIUM CHLORIDE 9 MG/ML
5-250 INJECTION, SOLUTION INTRAVENOUS PRN
Status: CANCELLED | OUTPATIENT
Start: 2024-01-01

## 2024-01-01 RX ORDER — EPINEPHRINE 1 MG/ML
0.3 INJECTION, SOLUTION INTRAMUSCULAR; SUBCUTANEOUS PRN
Status: CANCELLED | OUTPATIENT
Start: 2024-01-01

## 2024-01-01 RX ORDER — HEPARIN 100 UNIT/ML
500 SYRINGE INTRAVENOUS PRN
Status: CANCELLED | OUTPATIENT
Start: 2024-01-01

## 2024-01-01 RX ORDER — SODIUM CHLORIDE 0.9 % (FLUSH) 0.9 %
5-40 SYRINGE (ML) INJECTION PRN
Status: CANCELLED | OUTPATIENT
Start: 2024-01-01

## 2024-01-01 RX ORDER — SODIUM CHLORIDE 9 MG/ML
INJECTION, SOLUTION INTRAVENOUS CONTINUOUS
Status: CANCELLED | OUTPATIENT
Start: 2024-01-01

## 2024-01-01 RX ORDER — PROCHLORPERAZINE EDISYLATE 5 MG/ML
10 INJECTION INTRAMUSCULAR; INTRAVENOUS
Status: CANCELLED | OUTPATIENT
Start: 2024-01-01

## 2024-01-01 RX ADMIN — OXYCODONE HYDROCHLORIDE 5 MG: 5 TABLET ORAL at 23:40

## 2024-01-01 RX ADMIN — ONDANSETRON 4 MG: 4 TABLET, ORALLY DISINTEGRATING ORAL at 20:56

## 2024-01-01 RX ADMIN — SODIUM CHLORIDE, PRESERVATIVE FREE 10 ML: 5 INJECTION INTRAVENOUS at 20:38

## 2024-01-01 RX ADMIN — CASTOR OIL AND BALSAM, PERU: 788; 87 OINTMENT TOPICAL at 20:38

## 2024-01-01 RX ADMIN — TROSPIUM CHLORIDE 20 MG: 20 TABLET, FILM COATED ORAL at 09:46

## 2024-01-01 RX ADMIN — ONDANSETRON 4 MG: 2 INJECTION INTRAMUSCULAR; INTRAVENOUS at 02:17

## 2024-01-01 RX ADMIN — CASTOR OIL AND BALSAM, PERU: 788; 87 OINTMENT TOPICAL at 09:45

## 2024-01-01 RX ADMIN — DOCUSATE SODIUM 100 MG: 100 CAPSULE, LIQUID FILLED ORAL at 09:46

## 2024-01-01 RX ADMIN — MONTELUKAST 10 MG: 10 TABLET, FILM COATED ORAL at 20:38

## 2024-01-01 RX ADMIN — ONDANSETRON 4 MG: 2 INJECTION INTRAMUSCULAR; INTRAVENOUS at 16:05

## 2024-01-01 RX ADMIN — ONDANSETRON 4 MG: 2 INJECTION INTRAMUSCULAR; INTRAVENOUS at 09:46

## 2024-01-01 RX ADMIN — ONDANSETRON 4 MG: 2 INJECTION INTRAMUSCULAR; INTRAVENOUS at 23:41

## 2024-01-01 RX ADMIN — BUDESONIDE AND FORMOTEROL FUMARATE DIHYDRATE 2 PUFF: 160; 4.5 AEROSOL RESPIRATORY (INHALATION) at 20:39

## 2024-01-01 RX ADMIN — FLUCONAZOLE 200 MG: 200 INJECTION, SOLUTION INTRAVENOUS at 16:16

## 2024-01-01 RX ADMIN — SODIUM CHLORIDE, PRESERVATIVE FREE 10 ML: 5 INJECTION INTRAVENOUS at 09:51

## 2024-01-01 RX ADMIN — CEFEPIME 2000 MG: 2 INJECTION, POWDER, FOR SOLUTION INTRAVENOUS at 06:37

## 2024-01-01 RX ADMIN — OXYCODONE HYDROCHLORIDE 5 MG: 5 TABLET ORAL at 09:46

## 2024-01-01 RX ADMIN — BUDESONIDE AND FORMOTEROL FUMARATE DIHYDRATE 2 PUFF: 160; 4.5 AEROSOL RESPIRATORY (INHALATION) at 09:53

## 2024-01-01 RX ADMIN — OXYCODONE HYDROCHLORIDE 5 MG: 5 TABLET ORAL at 02:14

## 2024-01-01 RX ADMIN — CEFEPIME 1000 MG: 1 INJECTION, POWDER, FOR SOLUTION INTRAMUSCULAR; INTRAVENOUS at 20:15

## 2024-01-01 RX ADMIN — Medication 1 LOZENGE: at 04:44

## 2024-01-01 RX ADMIN — OXYCODONE HYDROCHLORIDE 5 MG: 5 TABLET ORAL at 16:05

## 2024-01-01 ASSESSMENT — PAIN DESCRIPTION - LOCATION
LOCATION: BACK
LOCATION: BACK
LOCATION: FLANK
LOCATION: BACK
LOCATION: BACK
LOCATION: FLANK
LOCATION: BACK

## 2024-01-01 ASSESSMENT — PAIN DESCRIPTION - ORIENTATION
ORIENTATION: LOWER
ORIENTATION: LOWER
ORIENTATION: RIGHT;LEFT
ORIENTATION: MID
ORIENTATION: LOWER
ORIENTATION: LOWER
ORIENTATION: RIGHT;LEFT

## 2024-01-01 ASSESSMENT — PAIN SCALES - GENERAL
PAINLEVEL_OUTOF10: 8
PAINLEVEL_OUTOF10: 8
PAINLEVEL_OUTOF10: 9
PAINLEVEL_OUTOF10: 8
PAINLEVEL_OUTOF10: 8

## 2024-01-01 ASSESSMENT — PAIN DESCRIPTION - DESCRIPTORS
DESCRIPTORS: SHARP
DESCRIPTORS: ACHING

## 2024-01-01 ASSESSMENT — PAIN DESCRIPTION - ONSET
ONSET: ON-GOING
ONSET: ON-GOING

## 2024-01-01 ASSESSMENT — PAIN - FUNCTIONAL ASSESSMENT
PAIN_FUNCTIONAL_ASSESSMENT: PREVENTS OR INTERFERES SOME ACTIVE ACTIVITIES AND ADLS
PAIN_FUNCTIONAL_ASSESSMENT: ACTIVITIES ARE NOT PREVENTED
PAIN_FUNCTIONAL_ASSESSMENT: PREVENTS OR INTERFERES SOME ACTIVE ACTIVITIES AND ADLS

## 2024-01-01 ASSESSMENT — PAIN DESCRIPTION - PAIN TYPE
TYPE: ACUTE PAIN
TYPE: ACUTE PAIN

## 2024-01-01 ASSESSMENT — PAIN DESCRIPTION - FREQUENCY
FREQUENCY: CONTINUOUS
FREQUENCY: CONTINUOUS

## 2024-01-01 NOTE — PROGRESS NOTES
Urology Progress Note    Patient: Jamel Hazel MRN: [id number]  SSN: [id number]    YOB: 1955  Age: 68 y.o.  Sex: male        Assessment:     Jamel Hazel is a 68 y.o. male admitted to the hospital on 12/28/2023 persistent hydro, now with b pcn    Wbc down, cr improved, good uop    Plan:     Cont cbi, rx klebsiella sepsis. To or when medically optimized for cysto and bx. Likely will need turp at some point    Reason For Visit:     Follow up for hydro / hematuria    Interval History:     B pcn lat night    ROS:     Feels weak  Denies fevers  Denies abdominal pain  Denies hematuria, frequency    Objective:     /81   Pulse 96   Temp 98.3 °F (36.8 °C) (Oral)   Resp 14   Ht 1.778 m (5' 10\")   Wt 63.3 kg (139 lb 8.8 oz)   SpO2 98%   BMI 20.02 kg/m²       Intake/Output Summary (Last 24 hours) at 1/1/2024 1318  Last data filed at 1/1/2024 1054  Gross per 24 hour   Intake 10 ml   Output 3950 ml   Net -3940 ml       Physical Exam  General: NAD  Respiratory: no distress, room air  Abdomen: soft, no distention  : urine pink in nt  Clear in gibbons  Neuro: Appropriate, no focal neurological deficits    Labs reviewed, January 1, 2024  Recent Results (from the past 24 hour(s))   Basic Metabolic Panel w/ Reflex to MG    Collection Time: 01/01/24  4:57 AM   Result Value Ref Range    Sodium 134 (L) 136 - 145 mmol/L    Potassium 3.6 3.5 - 5.1 mmol/L    Chloride 104 97 - 108 mmol/L    CO2 28 21 - 32 mmol/L    Anion Gap 2 (L) 5 - 15 mmol/L    Glucose 122 (H) 65 - 100 mg/dL    BUN 58 (H) 6 - 20 MG/DL    Creatinine 2.25 (H) 0.70 - 1.30 MG/DL    Bun/Cre Ratio 26 (H) 12 - 20      Est, Glom Filt Rate 31 (L) >60 ml/min/1.73m2    Calcium 8.3 (L) 8.5 - 10.1 MG/DL   CBC with Auto Differential    Collection Time: 01/01/24  6:26 AM   Result Value Ref Range    WBC 13.2 (H) 4.1 - 11.1 K/uL    RBC 2.80 (L) 4.10 - 5.70 M/uL    Hemoglobin 7.8 (L) 12.1 - 17.0 g/dL    Hematocrit

## 2024-01-01 NOTE — CARE COORDINATION
Transition of Care Plan:    RUR: 19% (moderate RUR, readmission)  Prior Level of Functioning: Independent  Disposition: SNF recommended by OT,   If SNF or IPR: Date FOC offered: TBD  Date FOC received: N/A  Accepting facility: N/A  Date authorization started with reference number: N/A  Date authorization received and expires: N/A  Follow up appointments: PCP/specialists if needed.  DME needed: None.  Patient has 3L O2 NC at home through [employer].  Transportation at discharge: Family to transport on discharge.  IM/IMM Medicare/ letter given: 2nd IM needed prior to discharge.  Is patient a  and connected with [facility]? No.   If yes, was [facility] transfer form completed and [facility] notified? N/A  Caregiver Contact: Emily Hazel - spouse - [phone]  Discharge Caregiver contacted prior to discharge? Patient to contact.  Care Conference needed? No.  Barriers to discharge: uro/cards/nephro clearance, SNF placement + auth      KAREN Blackburn, RN    Care Management  [phone]

## 2024-01-01 NOTE — PROGRESS NOTES
Progress Note      1/1/2024 11:13 AM  NAME: Jamel Hazel   MRN:  [id number]   Admit Diagnosis: Bladder spasms [N32.89]  Elevated troponin [R79.89]  Hematuria, unspecified type [R31.9]      Problem List:     NSTEMI; TnI 400  Febrile illness 101.4  Leukocytosis  Hyponatremia  Bacteremia  MARIA ELENA   Gross hematuria w/ anemia (7s)  Bilateral hydronephrosis  Cardiomyopathy.  Pt states prior EF 35-40%.  COPD  Chronic hypoxic respiratory failure  Esophageal CA s/p resection w/ chronic dysphagia    In: 260   Out: 8850 [Urine:8850]       Assessment/Plan:   sCr up to 2.7 --> 2.3  HgB 7s    BCx growing Klebsiella    Echo with EF 55-60%    He has had zero anginal symptoms.  TnI elevated likely 2/2 Type II MI and not true ACS.    Bilateral PCNT yesterday, better UOP, sCr better    No ASA  No beta blockers with intermittent hypotension  Infectious workup underway           [x]       High complexity decision making was performed in this patient at high risk for decompensation with multiple organ involvement.    Subjective:     Jamel Hazel denies chest pain, dyspnea.  Discussed with RN events overnight.     Review of Systems:    Symptom Y/N Comments  Symptom Y/N Comments   Fever/Chills N   Chest Pain N    Poor Appetite N   Edema N    Cough N   Abdominal Pain N    Sputum N   Joint Pain N    SOB/HALL N   Pruritis/Rash N    Nausea/vomit N   Tolerating PT/OT Y    Diarrhea N   Tolerating Diet Y    Constipation N   Other       Could NOT obtain due to:      Objective:      Physical Exam:    Last 24hrs VS reviewed since prior progress note. Most recent are:    /77   Pulse 98   Temp 97.9 °F (36.6 °C) (Oral)   Resp 16   Ht 1.778 m (5' 10\")   Wt 63.3 kg (139 lb 8.8 oz)   SpO2 99%   BMI 20.02 kg/m²     Intake/Output Summary (Last 24 hours) at 1/1/2024 1113  Last data filed at 1/1/2024 0946  Gross per 24 hour   Intake 10 ml   Output 6350 ml   Net -6340 ml          General Appearance: Well developed, cachectic, alert &  oriented x 3,    no acute distress.  Ears/Nose/Mouth/Throat: Hearing grossly normal.  Neck: Supple.  Chest: Lungs clear to auscultation bilaterally.  Cardiovascular: Regular rate and rhythm, S1S2 normal, no murmur.  Abdomen: Soft, non-tender, bowel sounds are active.  Extremities: No edema bilaterally.  Skin: Warm and dry.    []         Post-cath site without hematoma, bruit, tenderness, or thrill.  Distal pulses intact.    PMH/SH reviewed - no change compared to H&P    Data Review    Telemetry: SR    EKG: NSR, Lt Axis, RBBB  []  No new EKG for review    Lab Data Personally Reviewed:    Recent Labs     12/30/23  0459 12/30/23  1114 12/31/23  0704 01/01/24  0626   WBC 18.4*  --   --  13.2*   HGB 7.6*   < > 7.6* 7.8*   HCT 23.8*   < > 22.9* 23.5*     --   --  103*    < > = values in this interval not displayed.       No results for input(s): \"INR\", \"APTT\" in the last 72 hours.    Invalid input(s): \"PTP\"   Recent Labs     12/30/23  1809 12/31/23  0043 12/31/23  0704 01/01/24  0457   * 131* 128* 134*   K 4.5  --  4.2 3.6   CL 95*  --  98 104   CO2 30  --  28 28   BUN 52*  --  57* 58*   MG 1.6  --   --   --        No results for input(s): \"CPK\" in the last 72 hours.    Invalid input(s): \"CPKMB\", \"CKNDX\", \"TROIQ\"  No results found for: \"CHOL\", \"CHOLX\", \"CHLST\", \"CHOLV\", \"HDL\", \"HDLC\", \"LDL\", \"LDLC\", \"TGLX\", \"TRIGL\"    Recent Labs     12/30/23  0459   GLOB 3.2       No results for input(s): \"PH\", \"PCO2\", \"PO2\" in the last 72 hours.    Medications Personally Reviewed:    Current Facility-Administered Medications   Medication Dose Route Frequency    Benzocaine-Menthol (CEPACOL MAX SORE THROAT) lozenge 1 lozenge  1 lozenge Oral Q2H PRN    ceFEPIme (MAXIPIME) 2,000 mg in sodium chloride 0.9 % 100 mL IVPB (mini-bag)  2,000 mg IntraVENous Q8H    budesonide-formoterol (SYMBICORT) 160-4.5 MCG/ACT inhaler 2 puff (Patient Supplied)  2 puff Inhalation BID RT    docusate sodium (COLACE) capsule 100 mg  100 mg Oral Daily

## 2024-01-01 NOTE — PROGRESS NOTES
Hospitalist Progress Note    NAME: Jamel Hazel   :  1955   MRN:  [id number]          Assessment / Plan:  Hematuria/possible bladder cancer/bladder wall thickening POA-of note patient currently has three-way Gibbons catheter in, hematuria/blood clots are improving    Continue continuous bladder irrigation per urology  Maintain active type and screen  Continue to trend hemoglobin and hematocrit  Continue IV abx Cefepime - cover Klebsiella Bacteremia  noted on initial Blood cx  Cont Diflucan for Candida in urine  S/p IR Perc B/L Nephrostomy tube  PM by IR noted  IP Urology consult appreciated, will continue to follow recommendations    Elevated serum troponins POA- likely due to sepsis, no ACS  TTE reviewed  Continue current medications  Cardiology consult appreciated- pt at moderate risk for intermediate risk procedure    Acute kidney injury POA-improved Cr today at 2.2  patient found to have elevated serum creatinine, likely prerenal in etiology + post renal component now relieved by perc nephrostomy B/L    Cont management of gibbons as per urology  Continue current medications  Continue to trend creatinine  IP Nephrology consult appreciated- following, off IVF    Hyponatremia-improved at 134 today    S/p Normal saline at 100 cc/h-- Dcd by nephrology, cont Fluid restrictions  IP Nephrology consult appreciated,- following    History of COPD-  continue home medications        less than 18.5 Underweight / Body mass index is 20.02 kg/m².    Code status: Full code  Prophylaxis: SCD's  Recommended Disposition: SNF/LTC once medically cleared, urology cleared, Cr improved, hematuria resolved, repeat Blood Cx cleared       Subjective:     Chief Complaint / Reason for Physician Visit Hematuria  Patient seen and evaluated at bedside, overnight events reviewed, patient currently has no new complaints.  Discussed with RN events overnight.   Hematuria imrpoved on CBI  S/p B/L perc nephrostomy tubes by IR

## 2024-01-01 NOTE — PROGRESS NOTES
0730  Change of shift report received at this time.  Patient readmitted for suprapubic pain and bleeding around catheter.  On CBI at this time.  Bilateral nephrostomy tubes placed 12/31.      0808  Gibbons and bilateral nephrostomy tubes patent and draining.  Continuous bladder irrigation ongoing.  Complaints of lower back pain from \"bruised spine with back board. Slight redness mid back noted blanchable no bruisng noted over back upon assessment.  Shift assessment done.  Complaints of continued back pain and nausea at this time.  Will medicate for both with medication pass.      1054  Continued back pain at this time but nausea improved after zofran.    1200  Reassessment.    1605  Medicated for back pain and nausea medication at this time.  Reassessment.    1700  Pain down to 8/10 after medication.  Nausea better.    1730  Bathed patient at this time.  Continuous bladder irrigation on-going.     2015  End of Shift Note    Bedside shift change report given to Kurt VAUGHN (oncoming nurse) by Sofía Dobbins RN (offgoing nurse).  Report included the following information SBAR, Kardex, Procedure Summary, Intake/Output, MAR, Recent Results, and Cardiac Rhythm NSR    Shift worked:  7A-7P     Shift summary and any significant changes:     Continuous bladder irrigation ongoing.   Continued complaints of lower back pain from \"backboard bruised spine\" given 2 doses PO pain medication this shift. blanchable redness noted mid back but no other color changes noted.  Continued nausea given 2 doses Zofran this shift.     Concerns for physician to address:  Continued pain/nausea.     Zone phone for oncoming shift:   [phone]       Activity:     Number times ambulated in hallways past shift: 0  Number of times OOB to chair past shift: 0    Cardiac:   Cardiac Monitoring: Yes           Access:  Current line(s): PIV     Genitourinary:   Urinary status: gibbons; bilateral nephrostomy tubes    Respiratory:      Chronic home O2 use?:

## 2024-01-01 NOTE — PROGRESS NOTES
NAME: Jamel Hazel        :  1955        MRN:  [id number]                     Assessment   :                                               Plan:  MARIA ELENA  Bilateral hydro  Hyponatremia  Possible bladder cancer    UA with blood and protein.  MARIA ELENA probably related to bilateral hydro.  S/P bilateral PCN .  Excellent output.  Creatinine better.       Sodium better.  Stop fluid restriction.         Subjective:     Chief Complaint:  \" I have bad pain and nausea.\"    No dyspnea.      Review of Systems:    Symptom Y/N Comments  Symptom Y/N Comments   Fever/Chills    Chest Pain     Poor Appetite    Edema     Cough    Abdominal Pain     Sputum    Joint Pain     SOB/HALL    Pruritis/Rash     Nausea/vomit    Tolerating PT/OT     Diarrhea    Tolerating Diet     Constipation    Other       Could not obtain due to:      Objective:     VITALS:   Last 24hrs VS reviewed since prior progress note. Most recent are:  Vitals:    24 0946   BP:    Pulse:    Resp: 16   Temp:    SpO2:        Intake/Output Summary (Last 24 hours) at 2024 1019  Last data filed at 2024 0946  Gross per 24 hour   Intake 250 ml   Output 6350 ml   Net -6100 ml        Telemetry Reviewed:     PHYSICAL EXAM:  General: NAD  No edema      Lab Data Reviewed: (see below)    Medications Reviewed: (see below)    PMH/SH reviewed - no change compared to H&P  ________________________________________________________________________  Care Plan discussed with:  Patient     Family      RN     Care Manager                    Consultant:          Comments   >50% of visit spent in counseling and coordination of care       ________________________________________________________________________  Charles Marcus MD     Procedures: see electronic medical records for all procedures/Xrays and details which  were not copied into this note but were reviewed prior to creation of Plan.       LABS:  Recent Labs     12/30/23  0459 12/30/23  1114 12/31/23  0704 01/01/24  0626   WBC 18.4*  --   --  13.2*   HGB 7.6*   < > 7.6* 7.8*   HCT 23.8*   < > 22.9* 23.5*     --   --  103*    < > = values in this interval not displayed.       Recent Labs     12/30/23  1809 12/31/23  0043 12/31/23  0704 01/01/24  0457   * 131* 128* 134*   K 4.5  --  4.2 3.6   CL 95*  --  98 104   CO2 30  --  28 28   BUN 52*  --  57* 58*   MG 1.6  --   --   --        Recent Labs     12/30/23  0459   GLOB 3.2       No results for input(s): \"INR\", \"APTT\" in the last 72 hours.    Invalid input(s): \"PTP\"   No results for input(s): \"TIBC\", \"FERR\" in the last 72 hours.    Invalid input(s): \"FE\", \"PSAT\"   No results found for: \"FOL\", \"RBCF\"   No results for input(s): \"PH\", \"PCO2\", \"PO2\" in the last 72 hours.  No results for input(s): \"CPK\", \"CKMB\", \"TROPONINI\" in the last 72 hours.  No components found for: \"GLPOC\"  @labua@    MEDICATIONS:  Current Facility-Administered Medications   Medication Dose Route Frequency    Benzocaine-Menthol (CEPACOL MAX SORE THROAT) lozenge 1 lozenge  1 lozenge Oral Q2H PRN    ceFEPIme (MAXIPIME) 2,000 mg in sodium chloride 0.9 % 100 mL IVPB (mini-bag)  2,000 mg IntraVENous Q8H    budesonide-formoterol (SYMBICORT) 160-4.5 MCG/ACT inhaler 2 puff (Patient Supplied)  2 puff Inhalation BID RT    docusate sodium (COLACE) capsule 100 mg  100 mg Oral Daily    trospium (SANCTURA) tablet 20 mg  20 mg Oral Daily    balsum peru-castor oil (VENELEX) ointment   Topical BID    fluconazole (DIFLUCAN) in 0.9 % sodium chloride IVPB 200 mg  200 mg IntraVENous Q24H    umeclidinium-vilanterol (ANORO ELLIPTA) inhaler 1 puff (Patient Supplied)  1 puff Inhalation QPM    sodium chloride flush 0.9 % injection 5-40 mL  5-40 mL IntraVENous 2 times per day    sodium chloride flush 0.9 % injection 5-40 mL  5-40 mL IntraVENous PRN    0.9 % sodium chloride infusion   IntraVENous PRN    ondansetron (ZOFRAN-ODT) disintegrating

## 2024-01-02 LAB
ANION GAP SERPL CALC-SCNC: ABNORMAL MMOL/L (ref 5–15)
BACTERIA SPEC CULT: ABNORMAL
BASOPHILS # BLD: 0 K/UL (ref 0–0.1)
BASOPHILS NFR BLD: 0 % (ref 0–1)
BUN SERPL-MCNC: 48 MG/DL (ref 6–20)
BUN/CREAT SERPL: 27 (ref 12–20)
CALCIUM SERPL-MCNC: 8.4 MG/DL (ref 8.5–10.1)
CHLORIDE SERPL-SCNC: 105 MMOL/L (ref 97–108)
CO2 SERPL-SCNC: 31 MMOL/L (ref 21–32)
CREAT SERPL-MCNC: 1.76 MG/DL (ref 0.7–1.3)
DIFFERENTIAL METHOD BLD: ABNORMAL
EOSINOPHIL # BLD: 0.1 K/UL (ref 0–0.4)
EOSINOPHIL NFR BLD: 1 % (ref 0–7)
ERYTHROCYTE [DISTWIDTH] IN BLOOD BY AUTOMATED COUNT: 17.2 % (ref 11.5–14.5)
GLUCOSE SERPL-MCNC: 114 MG/DL (ref 65–100)
HCT VFR BLD AUTO: 25.5 % (ref 36.6–50.3)
HGB BLD-MCNC: 8.3 G/DL (ref 12.1–17)
IMM GRANULOCYTES # BLD AUTO: 0 K/UL (ref 0–0.04)
IMM GRANULOCYTES NFR BLD AUTO: 0 % (ref 0–0.5)
LYMPHOCYTES # BLD: 0.6 K/UL (ref 0.8–3.5)
LYMPHOCYTES NFR BLD: 6 % (ref 12–49)
MCH RBC QN AUTO: 27.2 PG (ref 26–34)
MCHC RBC AUTO-ENTMCNC: 32.5 G/DL (ref 30–36.5)
MCV RBC AUTO: 83.6 FL (ref 80–99)
MONOCYTES # BLD: 0.9 K/UL (ref 0–1)
MONOCYTES NFR BLD: 9 % (ref 5–13)
NEUTS SEG # BLD: 8.8 K/UL (ref 1.8–8)
NEUTS SEG NFR BLD: 84 % (ref 32–75)
NRBC # BLD: 0 K/UL (ref 0–0.01)
NRBC BLD-RTO: 0 PER 100 WBC
PLATELET # BLD AUTO: 73 K/UL (ref 150–400)
PLATELET COMMENT: ABNORMAL
PMV BLD AUTO: 11.1 FL (ref 8.9–12.9)
POTASSIUM SERPL-SCNC: 3.7 MMOL/L (ref 3.5–5.1)
RBC # BLD AUTO: 3.05 M/UL (ref 4.1–5.7)
RBC MORPH BLD: ABNORMAL
SERVICE CMNT-IMP: ABNORMAL
SERVICE CMNT-IMP: ABNORMAL
SODIUM SERPL-SCNC: 131 MMOL/L (ref 136–145)
WBC # BLD AUTO: 10.4 K/UL (ref 4.1–11.1)
WBC MORPH BLD: ABNORMAL

## 2024-01-02 PROCEDURE — 87040 BLOOD CULTURE FOR BACTERIA: CPT

## 2024-01-02 PROCEDURE — 97110 THERAPEUTIC EXERCISES: CPT

## 2024-01-02 PROCEDURE — 94640 AIRWAY INHALATION TREATMENT: CPT

## 2024-01-02 PROCEDURE — 1100000003 HC PRIVATE W/ TELEMETRY

## 2024-01-02 PROCEDURE — 0T9330Z DRAINAGE OF RIGHT KIDNEY PELVIS WITH DRAINAGE DEVICE, PERCUTANEOUS APPROACH: ICD-10-PCS | Performed by: STUDENT IN AN ORGANIZED HEALTH CARE EDUCATION/TRAINING PROGRAM

## 2024-01-02 PROCEDURE — 2700000000 HC OXYGEN THERAPY PER DAY

## 2024-01-02 PROCEDURE — 0T9430Z DRAINAGE OF LEFT KIDNEY PELVIS WITH DRAINAGE DEVICE, PERCUTANEOUS APPROACH: ICD-10-PCS | Performed by: STUDENT IN AN ORGANIZED HEALTH CARE EDUCATION/TRAINING PROGRAM

## 2024-01-02 PROCEDURE — 97116 GAIT TRAINING THERAPY: CPT

## 2024-01-02 PROCEDURE — 2580000003 HC RX 258: Performed by: INTERNAL MEDICINE

## 2024-01-02 PROCEDURE — 80048 BASIC METABOLIC PNL TOTAL CA: CPT

## 2024-01-02 PROCEDURE — 36415 COLL VENOUS BLD VENIPUNCTURE: CPT

## 2024-01-02 PROCEDURE — 6370000000 HC RX 637 (ALT 250 FOR IP): Performed by: INTERNAL MEDICINE

## 2024-01-02 PROCEDURE — 6360000002 HC RX W HCPCS: Performed by: INTERNAL MEDICINE

## 2024-01-02 PROCEDURE — 6370000000 HC RX 637 (ALT 250 FOR IP): Performed by: NURSE PRACTITIONER

## 2024-01-02 PROCEDURE — 85025 COMPLETE CBC W/AUTO DIFF WBC: CPT

## 2024-01-02 RX ADMIN — BUDESONIDE AND FORMOTEROL FUMARATE DIHYDRATE 2 PUFF: 160; 4.5 AEROSOL RESPIRATORY (INHALATION) at 07:26

## 2024-01-02 RX ADMIN — TROSPIUM CHLORIDE 20 MG: 20 TABLET, FILM COATED ORAL at 08:33

## 2024-01-02 RX ADMIN — CASTOR OIL AND BALSAM, PERU: 788; 87 OINTMENT TOPICAL at 08:44

## 2024-01-02 RX ADMIN — FLUCONAZOLE 200 MG: 200 INJECTION, SOLUTION INTRAVENOUS at 17:37

## 2024-01-02 RX ADMIN — ONDANSETRON 4 MG: 2 INJECTION INTRAMUSCULAR; INTRAVENOUS at 08:49

## 2024-01-02 RX ADMIN — SODIUM CHLORIDE, PRESERVATIVE FREE 10 ML: 5 INJECTION INTRAVENOUS at 21:07

## 2024-01-02 RX ADMIN — CASTOR OIL AND BALSAM, PERU: 788; 87 OINTMENT TOPICAL at 21:07

## 2024-01-02 RX ADMIN — OXYCODONE HYDROCHLORIDE 5 MG: 5 TABLET ORAL at 18:20

## 2024-01-02 RX ADMIN — DOCUSATE SODIUM 100 MG: 100 CAPSULE, LIQUID FILLED ORAL at 08:33

## 2024-01-02 RX ADMIN — MONTELUKAST 10 MG: 10 TABLET, FILM COATED ORAL at 21:06

## 2024-01-02 RX ADMIN — BUDESONIDE AND FORMOTEROL FUMARATE DIHYDRATE 2 PUFF: 160; 4.5 AEROSOL RESPIRATORY (INHALATION) at 20:34

## 2024-01-02 RX ADMIN — CEFEPIME 2000 MG: 2 INJECTION, POWDER, FOR SOLUTION INTRAVENOUS at 18:22

## 2024-01-02 RX ADMIN — PANTOPRAZOLE SODIUM 40 MG: 40 TABLET, DELAYED RELEASE ORAL at 06:02

## 2024-01-02 RX ADMIN — ONDANSETRON 4 MG: 4 TABLET, ORALLY DISINTEGRATING ORAL at 18:20

## 2024-01-02 RX ADMIN — CEFEPIME 1000 MG: 1 INJECTION, POWDER, FOR SOLUTION INTRAMUSCULAR; INTRAVENOUS at 08:36

## 2024-01-02 RX ADMIN — SODIUM CHLORIDE, PRESERVATIVE FREE 10 ML: 5 INJECTION INTRAVENOUS at 08:43

## 2024-01-02 ASSESSMENT — PAIN SCALES - GENERAL
PAINLEVEL_OUTOF10: 0
PAINLEVEL_OUTOF10: 7
PAINLEVEL_OUTOF10: 0

## 2024-01-02 ASSESSMENT — PAIN DESCRIPTION - LOCATION: LOCATION: OTHER (COMMENT)

## 2024-01-02 ASSESSMENT — PAIN DESCRIPTION - DESCRIPTORS: DESCRIPTORS: ACHING

## 2024-01-02 NOTE — PROGRESS NOTES
Cardiology Progress Note  1/2/2024 9:29 AM  Admit Date: 12/28/2023  Admit Diagnosis/CC: Bladder spasms [N32.89]  Elevated troponin [R79.89]  Hematuria, unspecified type [R31.9]  Subjective:   Patient reports:  Chest Pain:  [x] none,  consistent with [] non-cardiac  [] atypical  []  anginal chest pain             [x] none now    []  on-going  Dyspnea: [x] none  [] at rest  [] with exertion  [] improved  [] unchanged [] worsening  PND:       [x] none  [] overnight   [] current  Orthopnea: [x] none  [] improved  [] unchanged  [] worsening  Presyncope: [x] none  [] improved  [] unchanged  [] worsening  Ambulated in hallway without symptoms  [] Yes  Ambulated in room without symptoms  [x] Yes  ROS(2+other systems)   Hematuria: [] Yes  [x] No.   Dysuria: [] Yes  [x] No                                           Cough:       [] Yes  [x] No.   Sputum: [] Yes  [x] No                                            Hematochezia: [] Yes  [x] No.   Melena: [] Yes  [x] No                                            No change in family and social history from H&P/Consult note.    Current Facility-Administered Medications   Medication Dose Route Frequency    ceFEPIme (MAXIPIME) 2,000 mg in sodium chloride 0.9 % 100 mL IVPB (mini-bag)  2,000 mg IntraVENous Q12H    Benzocaine-Menthol (CEPACOL MAX SORE THROAT) lozenge 1 lozenge  1 lozenge Oral Q2H PRN    budesonide-formoterol (SYMBICORT) 160-4.5 MCG/ACT inhaler 2 puff (Patient Supplied)  2 puff Inhalation BID RT    docusate sodium (COLACE) capsule 100 mg  100 mg Oral Daily    trospium (SANCTURA) tablet 20 mg  20 mg Oral Daily    balsum peru-castor oil (VENELEX) ointment   Topical BID    fluconazole (DIFLUCAN) in 0.9 % sodium chloride IVPB 200 mg  200 mg IntraVENous Q24H    umeclidinium-vilanterol (ANORO ELLIPTA) inhaler 1 puff (Patient Supplied)  1 puff Inhalation QPM    sodium chloride flush 0.9 % injection 5-40 mL  5-40 mL IntraVENous 2 times per day    sodium chloride flush 0.9 %

## 2024-01-02 NOTE — CARE COORDINATION
Transition of Care Plan:     RUR: 19% (moderate RUR, readmission)  Prior Level of Functioning: Independent  Disposition: SNF recommended by OT, PT to see.  If SNF or IPR: Date FOC offered: TBD  Date FOC received: N/A  Accepting facility: N/A  Date authorization started with reference number: N/A  Date authorization received and expires: N/A  Follow up appointments: PCP/specialists if needed.  DME needed: None.  Patient has 3L O2 NC at home through [employer].  Transportation at discharge: Family to transport on discharge.  IM/IMM Medicare/ letter given: 2nd IM needed prior to discharge.  Is patient a  and connected with [facility]? No.              If yes, was [place] transfer form completed and [facility] notified? N/A  Caregiver Contact: Emily Hazel - spouse - [phone]  Discharge Caregiver contacted prior to discharge? Patient to contact.  Care Conference needed? No.  Barriers to discharge: uro/cards/nephro clearance, SNF placement + auth        DIVYA BlackburnN, RN     Care Management  [phone]

## 2024-01-02 NOTE — PLAN OF CARE
Problem: Discharge Planning  Goal: Discharge to home or other facility with appropriate resources  Outcome: Progressing  Flowsheets (Taken 1/2/2024 0800)  Discharge to home or other facility with appropriate resources:   Identify barriers to discharge with patient and caregiver   Arrange for needed discharge resources and transportation as appropriate   Identify discharge learning needs (meds, wound care, etc)     Problem: Pain  Goal: Verbalizes/displays adequate comfort level or baseline comfort level  Outcome: Progressing     Problem: Respiratory - Adult  Goal: Achieves optimal ventilation and oxygenation  Outcome: Progressing     Problem: Safety - Adult  Goal: Free from fall injury  Outcome: Progressing     Problem: Skin/Tissue Integrity  Goal: Absence of new skin breakdown  Description: 1.  Monitor for areas of redness and/or skin breakdown  2.  Assess vascular access sites hourly  3.  Every 4-6 hours minimum:  Change oxygen saturation probe site  4.  Every 4-6 hours:  If on nasal continuous positive airway pressure, respiratory therapy assess nares and determine need for appliance change or resting period.  Outcome: Progressing     Problem: ABCDS Injury Assessment  Goal: Absence of physical injury  Outcome: Progressing     1100- CBC and 2 pairs of blood culture set sent down to the lab

## 2024-01-02 NOTE — PLAN OF CARE
Problem: Physical Therapy - Adult  Goal: By Discharge: Performs mobility at highest level of function for planned discharge setting.  See evaluation for individualized goals.  Description: FUNCTIONAL STATUS PRIOR TO ADMISSION: Patient was independent and active with use of supplemental of oxygen.    HOME SUPPORT PRIOR TO ADMISSION: The patient lived with spouse.    Physical Therapy Goals  Initiated 12/30/2023  1.  Patient will move from supine to sit and sit to supine in bed with modified independence within 7 day(s).    2.  Patient will perform sit to stand with modified independence within 7 day(s).  3.  Patient will transfer from bed to chair and chair to bed with supervision/set-up using the least restrictive device within 7 day(s).  4.  Patient will ambulate with supervision/set-up for 100 feet with the least restrictive device within 7 day(s).   5.  Patient will ascend/descend 5 stairs with B handrail(s) with supervision/set-up within 7 day(s).   Outcome: Progressing     PHYSICAL THERAPY TREATMENT    Patient: Jamel Hazel (68 y.o. male)  Date: 1/2/2024  Diagnosis: Bladder spasms [N32.89]  Elevated troponin [R79.89]  Hematuria, unspecified type [R31.9] Elevated troponin      Precautions: Fall Risk                    ASSESSMENT:  Patient continues to benefit from skilled PT services and is progressing towards goals. Patient continues to present with generalized weakness/debility, impaired activity tolerance, safety awareness, balance, and gait. With encouragement, patient participated in bed mobility, seated exercise, standing, and sidestepping with Min A and assistance with managing lines/leads (patient anxious regarding these getting disturbed). Stable vitals with activity. Continuing to recommend SNF vs HHPT pending progress.     Patient reports having stayed in bed for days. Educated on the importance of mobilizing to chair for meals and to C for toileting to maintain strength, tolerance, and

## 2024-01-02 NOTE — PROGRESS NOTES
Comprehensive Nutrition Assessment    Type and Reason for Visit:  Reassess    Nutrition Recommendations/Plan:   Regular diet to maximize options. Noted soft cooked foods as able in diet order.   Ensure clear BID  Please document % meals and supplements consumed in flowsheet I/O's under intake      Malnutrition Assessment:  Malnutrition Status:  Severe malnutrition (12/30/23 1538)    Context:  Chronic Illness     Findings of the 6 clinical characteristics of malnutrition:  Energy Intake:  No significant decrease in energy intake  Weight Loss:  No significant weight loss     Body Fat Loss:  Severe body fat loss Fat Overlying Ribs, Triceps   Muscle Mass Loss:  Severe muscle mass loss Clavicles (pectoralis & deltoids), Thigh (quadraceps), Scapula (trapezius)  Fluid Accumulation:        Strength:  Not Performed    Nutrition Assessment:     Chart reviewed and case discussed during IDR. Pt reports not eating much today, but his wife went out to get him something to eat. Diet liberalized to regular textures to maximize his options d/t limitations on the soft & bite sized menu. Will have kitchen soft cook foods as able. Pt would like to resume the Ensure clear supplements that fell off his orders when he went NPO. Will continue monitoring.     Patient Vitals for the past 120 hrs:   PO Meals Eaten (%)   01/02/24 1148 26 - 50%   01/02/24 0800 1 - 25%     Wt Readings from Last 5 Encounters:   01/01/24 63.3 kg (139 lb 8.8 oz)   12/10/23 60.8 kg (134 lb 0.6 oz)   ]    Nutrition Related Findings:    Labs: Na 131, Cr 1.76, Hgb 8.3.   Meds: cefepime, colace, diflucan, protonix, miralax, zofran.   BM 12/30.   Wound Type: None       Current Nutrition Intake & Therapies:    Average Meal Intake: 1-25%  Average Supplements Intake: None Ordered  DIET ONE TIME MESSAGE;  ADULT ORAL NUTRITION SUPPLEMENT; Breakfast, Dinner; Clear Liquid Oral Supplement  ADULT DIET; Regular; Lactaid milk only please    Anthropometric Measures:  Height:  177.8 cm (5' 10\")  Ideal Body Weight (IBW): 166 lbs (75 kg)       Current Body Weight: 63.3 kg (139 lb 8.8 oz), 72.2 % IBW. Weight Source: Bed Scale  Current BMI (kg/m2): 20                          BMI Categories: Underweight (BMI less than 22) age over 65    Estimated Daily Nutrient Needs:  Energy Requirements Based On: Formula  Weight Used for Energy Requirements: Current  Energy (kcal/day): MSJ 2000 (1331 x 1.3 +250 for wt gain)  Weight Used for Protein Requirements: Current  Protein (g/day): 81g (1.5gPro/kg)  Method Used for Fluid Requirements: 1 ml/kcal  Fluid (ml/day): 2000mL    Nutrition Diagnosis:   Severe malnutrition related to catabolic illness, inadequate protein-energy intake as evidenced by BMI, severe muscle loss, severe loss of subcutaneous fat    Nutrition Interventions:   Food and/or Nutrient Delivery: Modify Current Diet, Start Oral Nutrition Supplement  Nutrition Education/Counseling: No recommendation at this time  Coordination of Nutrition Care: Continue to monitor while inpatient       Goals:  Previous Goal Met: No Progress toward Goal(s)  Goals: PO intake 50% or greater, PO intake 75% or greater, by next RD assessment       Nutrition Monitoring and Evaluation:   Behavioral-Environmental Outcomes: None Identified  Food/Nutrient Intake Outcomes: Food and Nutrient Intake, Supplement Intake  Physical Signs/Symptoms Outcomes: Biochemical Data, Nutrition Focused Physical Findings, Skin, Weight    Discharge Planning:    Continue current diet, Continue Oral Nutrition Supplement     Marliee Mccurdy RD  Contact: [id number]

## 2024-01-02 NOTE — PROGRESS NOTES
Patient: Jamel Hazel MRN: [id number]  SSN: [id number]    YOB: 1955  Age: 68 y.o.  Sex: male        ADMITTED: 2023 to Dalia Calzada MD by Chencho Crawford Jr., MD for Bladder spasms [N32.89]  Elevated troponin [R79.89]  Hematuria, unspecified type [R31.9]  POD# * No surgery found *     Jamel Hazel is doing fair this morning. Tolerating bilateral PCNTs. Has some soreness in back. Admitted 23 for persistent hydro. Creatinine 1.76 on 24 from 2.25 on 24. WBC 10.4 on 24 from 13.2 on 24. No nausea or vomiting. Tolerating indwelling gibbons catheter with clear light yellow urine with CBI to slow drip. Left PCNT has clear read output. Right PCNT has clear yellow output. Placed 23. Hx asymmetric wall thickening. Has not been tolerating manual bladder irrigation at bedside very well.    CT A/P w/o Cont 23 noting bilateral hydroureteronephrosis without obstructive stone. Decompressed urinary  bladder containing a Gibbons catheter.    Vitals: Temp (24hrs), Av.2 °F (36.8 °C), Min:97.8 °F (36.6 °C), Max:98.4 °F (36.9 °C)    Blood pressure (!) 158/89, pulse (!) 104, temperature 98.4 °F (36.9 °C), temperature source Oral, resp. rate 18, height 1.778 m (5' 10\"), weight 63.3 kg (139 lb 8.8 oz), SpO2 98 %.    Intake and Output:   1901 -  0700  In: 10 [I.V.:10]  Out: 5040 [Urine:5040]  No intake/output data recorded.  FARHAD Output lats 24 hrs: No data found.   FARHAD Output last 8 hrs: No data found.  BM over last 24 hrs: No data found.    Physical Exam  General: NAD, pleasant  Respiratory: no distress, breathing easily with 3 lpm via NC  Abdomen: soft, no distention; non-tender to palpation  : no CVA tenderness, some soreness around new PCNT sites. Urine is clear and light yellow in urethral gibbons bag, clear yellow urine in right PCNT, clear red output in left PCNT.    Manual Bladder irrigation returned clear yellow urine, slow CBI resumed.     Drained left  PCNT to bag and flushed with clear yellow return.    Neuro: Appropriate, no focal neurological deficits  Skin: warm, dry  Extremities: moves all, full ROM    Labs:  CBC:   Lab Results   Component Value Date/Time    WBC 13.2 01/01/2024 06:26 AM    HCT 23.5 01/01/2024 06:26 AM     01/01/2024 06:26 AM     BMP:   Lab Results   Component Value Date/Time     01/02/2024 03:53 AM    K 3.7 01/02/2024 03:53 AM     01/02/2024 03:53 AM    CO2 31 01/02/2024 03:53 AM    BUN 48 01/02/2024 03:53 AM     Assessment/Plan:   Hx Urinary Retention s/p Gonzalez on CBI  Gross Hematuria, asymmetric bladder wall thickening   Sepsis likely 2/2 urinary source (UA on admission taken from existing catheter) - Urine Cx 12/30/23 + Klebsiella and Enterobacteriaceae  MARIA ELENA  Anemia   - Tentative cystoscopy w/ possible biopsy later this week pending patient clinical status/respiratory/infection; pending his progression this week may not be safely feasible.  - Keep Gonzalez cather - Titrate CBI drip rate to clear pink to clear yellow urine color. Stop CBI and manually irrigate if patient should have suprapubic pain, Gonzalez leakage, visualized clots in tubing, worsened hematuria.  Only resume CBI once all clots have been expelled  - Trend Creat  - Manual Bladder irrigation this AM returned clear yellow urine, slow CBI resumed  - Drained left PCNT to bag and flushed with clear yellow return on pull back.  - Continue CBI to keep urine clear and draining.  - Monitor H/H and transfuse as needed Hgb <7   - Agree with IV abx  - Following    Supervising Dr. Anibal DOS SANTOS  Signed By: JULIEN MONSON, APRN - NP - January 2, 2024

## 2024-01-02 NOTE — PROGRESS NOTES
NAME: Jamel Hazel        :  1955        MRN:  [id number]                     Assessment   :                                               Plan:  MARIA ELENA  Bilateral hydro  Hyponatremia  Possible bladder cancer    UA with blood and protein.  MARIA ELENA probably related to bilateral hydro.  S/P bilateral PCN .  Excellent output.  Creatinine better.       Sodium down a little.  Start fluid restriction.         Subjective:     Chief Complaint:  No complaint.      Review of Systems:    Symptom Y/N Comments  Symptom Y/N Comments   Fever/Chills    Chest Pain     Poor Appetite    Edema     Cough    Abdominal Pain     Sputum    Joint Pain     SOB/HALL    Pruritis/Rash     Nausea/vomit    Tolerating PT/OT     Diarrhea    Tolerating Diet     Constipation    Other       Could not obtain due to:      Objective:     VITALS:   Last 24hrs VS reviewed since prior progress note. Most recent are:  Vitals:    24 0800   BP: (!) 158/89   Pulse: (!) 104   Resp: 18   Temp: 98.4 °F (36.9 °C)   SpO2:        Intake/Output Summary (Last 24 hours) at 2024 1021  Last data filed at 2024 0603  Gross per 24 hour   Intake --   Output 1940 ml   Net -1940 ml        Telemetry Reviewed:     PHYSICAL EXAM:  General: NAD  No edema      Lab Data Reviewed: (see below)    Medications Reviewed: (see below)    PMH/SH reviewed - no change compared to H&P  ________________________________________________________________________  Care Plan discussed with:  Patient     Family      RN     Care Manager                    Consultant:          Comments   >50% of visit spent in counseling and coordination of care       ________________________________________________________________________  Charles Marcus MD     Procedures: see electronic medical records for all procedures/Xrays and details which  were not copied into this note but were reviewed prior to creation of

## 2024-01-02 NOTE — PROGRESS NOTES
Hospitalist Progress Note    NAME: Jamel Hazel   :  1955   MRN:  [id number]          Assessment / Plan:  Hematuria/possible bladder cancer/bladder wall thickening POA-of note patient currently has three-way Gibbons catheter in, hematuria/blood clots are improving  WBC improved down to 10.4    Continue continuous bladder irrigation per urology-decreased today  Maintain active type and screen  Continue to trend hemoglobin and hematocrit  Continue IV abx Cefepime - cover Klebsiella Bacteremia  noted on initial Blood cx  Cont Diflucan for Candida in urine  S/p IR Perc B/L Nephrostomy tube  PM by IR noted  IP Urology consult appreciated, will continue to follow recommendations  Repeat blood culture today for clearance  Tentative urology plans of cystoscopy w/ possible biopsy later this week noted pending patient clinical status/respiratory/infection   Elevated serum troponins POA- likely due to sepsis, no ACS  TTE reviewed  Continue current medications  Cardiology consult appreciated- pt at moderate risk for intermediate risk procedure    Acute kidney injury POA-improved Cr today at 1.7  patient found to have elevated serum creatinine, likely prerenal in etiology + post renal component now relieved by perc nephrostomy B/L    Cont management of gibbons as per urology  Continue current medications  Continue to trend creatinine  IP Nephrology consult appreciated- following, off IVF    Hyponatremia-slightly down at 131 today    S/p Normal saline at 100 cc/h-- Dcd by nephrology  resumed fluid restrictions today  IP Nephrology consult appreciated,- following    History of COPD-  continue home medications        less than 18.5 Underweight / Body mass index is 20.02 kg/m².    Code status: Full code  Prophylaxis: SCD's  Recommended Disposition: SNF/LTC once medically cleared, urology cleared, Cr improved, hematuria resolved, repeat Blood Cx cleared       Subjective:     Chief Complaint / Reason for Physician Visit

## 2024-01-03 ENCOUNTER — ANESTHESIA EVENT (OUTPATIENT)
Facility: HOSPITAL | Age: 69
End: 2024-01-03
Payer: MEDICARE

## 2024-01-03 LAB
ANION GAP SERPL CALC-SCNC: 7 MMOL/L (ref 5–15)
BASOPHILS # BLD: 0 K/UL (ref 0–0.1)
BASOPHILS NFR BLD: 0 % (ref 0–1)
BUN SERPL-MCNC: 37 MG/DL (ref 6–20)
BUN/CREAT SERPL: 27 (ref 12–20)
CALCIUM SERPL-MCNC: 8.7 MG/DL (ref 8.5–10.1)
CHLORIDE SERPL-SCNC: 99 MMOL/L (ref 97–108)
CO2 SERPL-SCNC: 30 MMOL/L (ref 21–32)
CREAT SERPL-MCNC: 1.35 MG/DL (ref 0.7–1.3)
DIFFERENTIAL METHOD BLD: ABNORMAL
EOSINOPHIL # BLD: 0 K/UL (ref 0–0.4)
EOSINOPHIL NFR BLD: 0 % (ref 0–7)
ERYTHROCYTE [DISTWIDTH] IN BLOOD BY AUTOMATED COUNT: 17.2 % (ref 11.5–14.5)
GLUCOSE SERPL-MCNC: 116 MG/DL (ref 65–100)
HCT VFR BLD AUTO: 29.9 % (ref 36.6–50.3)
HGB BLD-MCNC: 9.3 G/DL (ref 12.1–17)
IMM GRANULOCYTES # BLD AUTO: 0 K/UL (ref 0–0.04)
IMM GRANULOCYTES NFR BLD AUTO: 0 % (ref 0–0.5)
LYMPHOCYTES # BLD: 1.1 K/UL (ref 0.8–3.5)
LYMPHOCYTES NFR BLD: 10 % (ref 12–49)
MCH RBC QN AUTO: 26.7 PG (ref 26–34)
MCHC RBC AUTO-ENTMCNC: 31.1 G/DL (ref 30–36.5)
MCV RBC AUTO: 85.9 FL (ref 80–99)
MONOCYTES # BLD: 0.8 K/UL (ref 0–1)
MONOCYTES NFR BLD: 7 % (ref 5–13)
NEUTS SEG # BLD: 8.9 K/UL (ref 1.8–8)
NEUTS SEG NFR BLD: 83 % (ref 32–75)
NRBC # BLD: 0 K/UL (ref 0–0.01)
NRBC BLD-RTO: 0 PER 100 WBC
PLATELET # BLD AUTO: 74 K/UL (ref 150–400)
PMV BLD AUTO: 12.6 FL (ref 8.9–12.9)
POTASSIUM SERPL-SCNC: 3.5 MMOL/L (ref 3.5–5.1)
RBC # BLD AUTO: 3.48 M/UL (ref 4.1–5.7)
RBC MORPH BLD: ABNORMAL
SODIUM SERPL-SCNC: 136 MMOL/L (ref 136–145)
WBC # BLD AUTO: 10.8 K/UL (ref 4.1–11.1)
WBC MORPH BLD: ABNORMAL

## 2024-01-03 PROCEDURE — 85025 COMPLETE CBC W/AUTO DIFF WBC: CPT

## 2024-01-03 PROCEDURE — 6360000002 HC RX W HCPCS: Performed by: INTERNAL MEDICINE

## 2024-01-03 PROCEDURE — 80048 BASIC METABOLIC PNL TOTAL CA: CPT

## 2024-01-03 PROCEDURE — 97116 GAIT TRAINING THERAPY: CPT

## 2024-01-03 PROCEDURE — 1100000003 HC PRIVATE W/ TELEMETRY

## 2024-01-03 PROCEDURE — 2580000003 HC RX 258: Performed by: INTERNAL MEDICINE

## 2024-01-03 PROCEDURE — 36415 COLL VENOUS BLD VENIPUNCTURE: CPT

## 2024-01-03 PROCEDURE — 2700000000 HC OXYGEN THERAPY PER DAY

## 2024-01-03 PROCEDURE — 94640 AIRWAY INHALATION TREATMENT: CPT

## 2024-01-03 PROCEDURE — 6370000000 HC RX 637 (ALT 250 FOR IP): Performed by: INTERNAL MEDICINE

## 2024-01-03 PROCEDURE — 97530 THERAPEUTIC ACTIVITIES: CPT

## 2024-01-03 PROCEDURE — 97110 THERAPEUTIC EXERCISES: CPT

## 2024-01-03 PROCEDURE — 6370000000 HC RX 637 (ALT 250 FOR IP): Performed by: NURSE PRACTITIONER

## 2024-01-03 RX ADMIN — CEFEPIME 2000 MG: 2 INJECTION, POWDER, FOR SOLUTION INTRAVENOUS at 06:04

## 2024-01-03 RX ADMIN — CEFEPIME 2000 MG: 2 INJECTION, POWDER, FOR SOLUTION INTRAVENOUS at 17:23

## 2024-01-03 RX ADMIN — OXYCODONE HYDROCHLORIDE 5 MG: 5 TABLET ORAL at 20:42

## 2024-01-03 RX ADMIN — MORPHINE SULFATE 2 MG: 2 INJECTION, SOLUTION INTRAMUSCULAR; INTRAVENOUS at 23:14

## 2024-01-03 RX ADMIN — SODIUM CHLORIDE 5 ML: 9 INJECTION, SOLUTION INTRAVENOUS at 17:19

## 2024-01-03 RX ADMIN — TROSPIUM CHLORIDE 20 MG: 20 TABLET, FILM COATED ORAL at 09:37

## 2024-01-03 RX ADMIN — ONDANSETRON 4 MG: 4 TABLET, ORALLY DISINTEGRATING ORAL at 18:25

## 2024-01-03 RX ADMIN — CASTOR OIL AND BALSAM, PERU: 788; 87 OINTMENT TOPICAL at 21:02

## 2024-01-03 RX ADMIN — DOCUSATE SODIUM 100 MG: 100 CAPSULE, LIQUID FILLED ORAL at 09:37

## 2024-01-03 RX ADMIN — SODIUM CHLORIDE, PRESERVATIVE FREE 10 ML: 5 INJECTION INTRAVENOUS at 09:46

## 2024-01-03 RX ADMIN — POLYETHYLENE GLYCOL 3350 17 G: 17 POWDER, FOR SOLUTION ORAL at 09:37

## 2024-01-03 RX ADMIN — FLUCONAZOLE 200 MG: 200 INJECTION, SOLUTION INTRAVENOUS at 18:21

## 2024-01-03 RX ADMIN — CASTOR OIL AND BALSAM, PERU: 788; 87 OINTMENT TOPICAL at 09:44

## 2024-01-03 RX ADMIN — ONDANSETRON 4 MG: 4 TABLET, ORALLY DISINTEGRATING ORAL at 09:37

## 2024-01-03 RX ADMIN — SODIUM CHLORIDE, PRESERVATIVE FREE 10 ML: 5 INJECTION INTRAVENOUS at 20:43

## 2024-01-03 RX ADMIN — BUDESONIDE AND FORMOTEROL FUMARATE DIHYDRATE 2 PUFF: 160; 4.5 AEROSOL RESPIRATORY (INHALATION) at 07:10

## 2024-01-03 RX ADMIN — MORPHINE SULFATE 2 MG: 2 INJECTION, SOLUTION INTRAMUSCULAR; INTRAVENOUS at 09:38

## 2024-01-03 RX ADMIN — MONTELUKAST 10 MG: 10 TABLET, FILM COATED ORAL at 20:42

## 2024-01-03 RX ADMIN — PANTOPRAZOLE SODIUM 40 MG: 40 TABLET, DELAYED RELEASE ORAL at 05:59

## 2024-01-03 RX ADMIN — ONDANSETRON 4 MG: 2 INJECTION INTRAMUSCULAR; INTRAVENOUS at 00:02

## 2024-01-03 ASSESSMENT — PAIN SCALES - GENERAL
PAINLEVEL_OUTOF10: 0
PAINLEVEL_OUTOF10: 0
PAINLEVEL_OUTOF10: 9
PAINLEVEL_OUTOF10: 7
PAINLEVEL_OUTOF10: 8
PAINLEVEL_OUTOF10: 0
PAINLEVEL_OUTOF10: 8

## 2024-01-03 ASSESSMENT — PAIN DESCRIPTION - LOCATION
LOCATION: BACK;FLANK
LOCATION: BACK;BUTTOCKS
LOCATION: BACK;BUTTOCKS
LOCATION: BACK;OTHER (COMMENT)

## 2024-01-03 ASSESSMENT — PAIN DESCRIPTION - DESCRIPTORS: DESCRIPTORS: ACHING;SQUEEZING

## 2024-01-03 ASSESSMENT — COPD QUESTIONNAIRES: CAT_SEVERITY: SEVERE

## 2024-01-03 NOTE — ANESTHESIA PRE PROCEDURE
AM    K 3.5 01/03/2024 05:00 AM    CL 99 01/03/2024 05:00 AM    CO2 30 01/03/2024 05:00 AM    BUN 37 01/03/2024 05:00 AM    CREATININE 1.35 01/03/2024 05:00 AM    LABGLOM 58 01/03/2024 05:00 AM    GLUCOSE 116 01/03/2024 05:00 AM    PROT 5.7 12/30/2023 04:59 AM    CALCIUM 8.7 01/03/2024 05:00 AM    BILITOT 0.7 12/30/2023 04:59 AM    ALKPHOS 199 12/30/2023 04:59 AM    AST 74 12/30/2023 04:59 AM    ALT 77 12/30/2023 04:59 AM       POC Tests: No results for input(s): \"POCGLU\", \"POCNA\", \"POCK\", \"POCCL\", \"POCBUN\", \"POCHEMO\", \"POCHCT\" in the last 72 hours.    Coags: No results found for: \"PROTIME\", \"INR\", \"APTT\"    HCG (If Applicable): No results found for: \"PREGTESTUR\", \"PREGSERUM\", \"HCG\", \"HCGQUANT\"     ABGs: No results found for: \"PHART\", \"PO2ART\", \"DSP6BBR\", \"WQM3LPU\", \"BEART\", \"[id number]\"     Type & Screen (If Applicable):  No results found for: \"LABABO\", \"LABRH\"    Drug/Infectious Status (If Applicable):  No results found for: \"HIV\", \"HEPCAB\"    COVID-19 Screening (If Applicable): No results found for: \"COVID19\"        Anesthesia Evaluation  Patient summary reviewed and Nursing notes reviewed   no history of anesthetic complications:   Airway: Mallampati: II  TM distance: >3 FB   Neck ROM: full  Mouth opening: > = 3 FB   Dental:      Comment: Missing several teeth, denies any loose    Pulmonary:normal exam  breath sounds clear to auscultation  (+)  COPD (on 3 L NC): severe,          asthma:     (-) sleep apnea (resolved 2/2 weight loss)                           Cardiovascular:  Exercise tolerance: poor (<4 METS)  (+) hypertension:, CHF:        Rhythm: regular  Rate: normal                 ROS comment: EF 40% per patient     Neuro/Psych:   (+) psychiatric history:            GI/Hepatic/Renal:   (+) GERD:         ROS comment: Esophageal CA  Shea's esophagus.   Endo/Other:                     Abdominal: normal exam            Vascular:          Other Findings:         Anesthesia Plan      general     ASA 4

## 2024-01-03 NOTE — PROGRESS NOTES
1900 Bedside and Verbal shift change report given to Yariel VAUGHN (oncoming nurse) by Yogi RN (offgoing nurse). Report included the following information Nurse Handoff Report, MAR, Recent Results, and Cardiac Rhythm NSR/Tachy .     End of Shift Note    Bedside shift change report given to Yogi VAUGHN (oncoming nurse) by Yariel Brock RN (offgoing nurse).  Report included the following information SBAR, MAR, Recent Results, and Cardiac Rhythm NSR/Tachy    Shift worked:  7pm-7am     Shift summary and any significant changes:     Morning bloods collected and sent to lab      Concerns for physician to address:        Zone phone for oncoming shift:           Activity:     Number times ambulated in hallways past shift: 0  Number of times OOB to chair past shift: 0    Cardiac:   Cardiac Monitoring: Yes           Access:  Current line(s): PIV     Genitourinary:   Urinary status: gibbons(CBI) & Bilateral nephrostomy tube    Respiratory:      Chronic home O2 use?: YES  Incentive spirometer at bedside: YES       GI:     Current diet:  DIET ONE TIME MESSAGE;  ADULT ORAL NUTRITION SUPPLEMENT; Breakfast, Dinner; Clear Liquid Oral Supplement  ADULT DIET; Regular; Lactaid milk only please  Passing flatus: YES  Tolerating current diet: YES       Pain Management:   Patient states pain is manageable on current regimen: YES    Skin:     Interventions: specialty bed, float heels, increase time out of bed, foam dressing, PT/OT consult, limit briefs, internal/external urinary devices, and nutritional support    Patient Safety:  Fall Score:    Interventions: bed/chair alarm, assistive device (walker, cane. etc), gripper socks, pt to call before getting OOB, stay with me (per policy), and gait belt       Length of Stay:  Expected LOS: 8  Actual LOS: 4      Yariel Brock, RN

## 2024-01-03 NOTE — PROGRESS NOTES
Occupational Therapy  Medical record reviewed and nurse cleared pt for therapy.  Aborted treatment session as pt declined (despite education and encouragement)  to participate due to feeling too tired after his PT session earlier this date.  Continue to recommend rehab at discharge.  Nurse and  aware.  Will continue to follow.

## 2024-01-03 NOTE — PROGRESS NOTES
Hospitalist Progress Note    NAME: Jamel Hazel   :  1955   MRN:  [id number]          Assessment / Plan:  Hematuria/possible bladder cancer/bladder wall thickening POA-of note patient currently has three-way Gibbons catheter in, hematuria/blood clots are improving  WBC improved down to 10.8    Continue continuous bladder irrigation per urology-decreased today  Maintain active type and screen  Continue to trend hemoglobin and hematocrit  Continue IV abx Cefepime - cover Klebsiella Bacteremia  noted on initial Blood cx  Cont Diflucan for Candida in urine  S/p IR Perc B/L Nephrostomy tube  PM by IR noted  IP Urology consult appreciated, will continue to follow recommendations  Repeat blood culture ()- neg x 19 hrs noted  Tentative urology plans of cystoscopy w/ possible biopsy FRIDAY/later this week noted pending patient clinical status/respiratory/infection   Elevated serum troponins POA- likely due to sepsis, no ACS  TTE reviewed  Continue current medications  Cardiology consult appreciated- pt at moderate risk for intermediate risk procedure    Acute kidney injury POA-improved Cr  at 1.3 today  patient found to have elevated serum creatinine, likely prerenal in etiology + post renal component now relieved by perc nephrostomy B/L    Cont management of gibbons as per urology  Continue current medications  Continue to trend creatinine  IP Nephrology consult appreciated- following, off IVF    Hyponatremia-improved    S/p Normal saline at 100 cc/h-- Dcd by nephrology  resumed fluid restrictions   IP Nephrology consult appreciated,- following    History of COPD-  continue home medications        less than 18.5 Underweight / Body mass index is 20.02 kg/m².    Code status: Full code  Prophylaxis: SCD's  Recommended Disposition: SNF/LTC once medically cleared, urology cleared, Cr improved, hematuria resolved, repeat Blood Cx cleared       Subjective:     Chief Complaint / Reason for Physician Visit

## 2024-01-03 NOTE — CARE COORDINATION
Transition of Care Plan:     RUR: 18% (moderate RUR, readmission)  Prior Level of Functioning: Independent  Disposition: home with home health; pt declining SNF  If SNF or IPR: Date FOC offered: TBD  Date FOC received: N/A  Accepting facility: N/A  Date authorization started with reference number: N/A  Date authorization received and expires: N/A  Follow up appointments: PCP/specialists if needed.  DME needed: None.  Patient has 3L O2 NC at home through [employer].  Transportation at discharge: Family to transport on discharge.  IM/IMM Medicare/ letter given: 2nd IM needed prior to discharge.  Is patient a [facility] and connected with [facility]? No.              If yes, was [facility] transfer form completed and [facility] notified? N/A  Caregiver Contact: Emily Hazel - spouse - [phone]  Discharge Caregiver contacted prior to discharge? Patient to contact.  Care Conference needed? No.  Barriers to discharge: uro/cards/nephro clearance, SNF placement + auth    CM met with pt to discuss SNF.  Pt stated he is not interested in going to SNF as he will not get any sleep.  Pt is agreeable to going home with home health.  Pt open to mass referral being sent out.     Susanna Foy LMSW  Supervisee in Social Work  Care Management, [facility]  [phone]

## 2024-01-03 NOTE — PROGRESS NOTES
Cardiology Progress Note  1/3/2024 9:15 AM  Admit Date: 12/28/2023  Admit Diagnosis/CC: Bladder spasms [N32.89]  Elevated troponin [R79.89]  Hematuria, unspecified type [R31.9]  Subjective:   Patient reports:  Chest Pain:  [x] none,  consistent with [] non-cardiac  [] atypical  []  anginal chest pain             [x] none now    []  on-going  Dyspnea: [x] none  [] at rest  [] with exertion  [] improved  [] unchanged [] worsening  PND:       [x] none  [] overnight   [] current  Orthopnea: [x] none  [] improved  [] unchanged  [] worsening  Presyncope: [x] none  [] improved  [] unchanged  [] worsening  Ambulated in hallway without symptoms  [] Yes  Ambulated in room without symptoms  [x] Yes  ROS(2+other systems)   Hematuria: [] Yes  [x] No.   Dysuria: [] Yes  [x] No                                           Cough:       [] Yes  [x] No.   Sputum: [] Yes  [x] No                                            Hematochezia: [] Yes  [x] No.   Melena: [] Yes  [x] No                                            No change in family and social history from H&P/Consult note.    Current Facility-Administered Medications   Medication Dose Route Frequency    ceFEPIme (MAXIPIME) 2,000 mg in sodium chloride 0.9 % 100 mL IVPB (mini-bag)  2,000 mg IntraVENous Q12H    Benzocaine-Menthol (CEPACOL MAX SORE THROAT) lozenge 1 lozenge  1 lozenge Oral Q2H PRN    budesonide-formoterol (SYMBICORT) 160-4.5 MCG/ACT inhaler 2 puff (Patient Supplied)  2 puff Inhalation BID RT    docusate sodium (COLACE) capsule 100 mg  100 mg Oral Daily    trospium (SANCTURA) tablet 20 mg  20 mg Oral Daily    balsum peru-castor oil (VENELEX) ointment   Topical BID    fluconazole (DIFLUCAN) in 0.9 % sodium chloride IVPB 200 mg  200 mg IntraVENous Q24H    umeclidinium-vilanterol (ANORO ELLIPTA) inhaler 1 puff (Patient Supplied)  1 puff Inhalation QPM    sodium chloride flush 0.9 % injection 5-40 mL  5-40 mL IntraVENous 2 times per day    sodium chloride flush 0.9 %

## 2024-01-03 NOTE — PROGRESS NOTES
NAME: Jamel Hazel        :  1955        MRN:  [id number]                     Assessment   :                                               Plan:  MARIA ELENA  Bilateral hydro  Hyponatremia  Possible bladder cancer    MARIA ELENA likely related to bilateral hydro.  S/P bilateral PCN .  Excellent output.  Creatinine improving, peaked at 2.7, now 1.8 to 1.35.       Gonzalez per Urology. For cystoscopy this week?    Sodium diaz 128, now 131 to 136, continue on fluid restriction.    Changed to low salt diet         Subjective:     Chief Complaint:  No complaint other than salty foods are burning his mouth. Gonzalez with irrigation in progress. We discussed the above.      Review of Systems:    Symptom Y/N Comments  Symptom Y/N Comments   Fever/Chills    Chest Pain     Poor Appetite    Edema     Cough    Abdominal Pain     Sputum    Joint Pain     SOB/HALL    Pruritis/Rash     Nausea/vomit    Tolerating PT/OT     Diarrhea    Tolerating Diet     Constipation    Other       Could not obtain due to:      Objective:     VITALS:   Last 24hrs VS reviewed since prior progress note. Most recent are:  Vitals:    24 0300   BP: (!) 150/83   Pulse: 99   Resp: 20   Temp: 98.5 °F (36.9 °C)   SpO2: 97%       Intake/Output Summary (Last 24 hours) at 1/3/2024 0540  Last data filed at 1/3/2024 0514  Gross per 24 hour   Intake 750 ml   Output 2540 ml   Net -1790 ml        Telemetry Reviewed:     PHYSICAL EXAM:  General: NAD  No edema      Lab Data Reviewed: (see below)    Medications Reviewed: (see below)    PMH/SH reviewed - no change compared to H&P  ________________________________________________________________________  Care Plan discussed with:  Patient     Family      RN     Care Manager                    Consultant:          Comments   >50% of visit spent in counseling and coordination of care

## 2024-01-03 NOTE — PLAN OF CARE
Problem: Discharge Planning  Goal: Discharge to home or other facility with appropriate resources  1/3/2024 0041 by Yariel Brock RN  Outcome: Progressing  1/2/2024 1233 by Bud Duffy RN  Outcome: Progressing  Flowsheets (Taken 1/2/2024 0800)  Discharge to home or other facility with appropriate resources:   Identify barriers to discharge with patient and caregiver   Arrange for needed discharge resources and transportation as appropriate   Identify discharge learning needs (meds, wound care, etc)     Problem: Pain  Goal: Verbalizes/displays adequate comfort level or baseline comfort level  1/3/2024 0041 by Yariel Brock RN  Outcome: Progressing  1/2/2024 1233 by Bud Duffy RN  Outcome: Progressing     Problem: Respiratory - Adult  Goal: Achieves optimal ventilation and oxygenation  1/3/2024 0041 by Yariel Brock RN  Outcome: Progressing  1/2/2024 1233 by Bud Duffy RN  Outcome: Progressing     Problem: Safety - Adult  Goal: Free from fall injury  1/3/2024 0041 by Yariel Brock RN  Outcome: Progressing  1/2/2024 1233 by Bud Duffy RN  Outcome: Progressing     Problem: Physical Therapy - Adult  Goal: By Discharge: Performs mobility at highest level of function for planned discharge setting.  See evaluation for individualized goals.  Description: FUNCTIONAL STATUS PRIOR TO ADMISSION: Patient was independent and active with use of supplemental of oxygen.    HOME SUPPORT PRIOR TO ADMISSION: The patient lived with spouse.    Physical Therapy Goals  Initiated 12/30/2023  1.  Patient will move from supine to sit and sit to supine in bed with modified independence within 7 day(s).    2.  Patient will perform sit to stand with modified independence within 7 day(s).  3.  Patient will transfer from bed to chair and chair to bed with supervision/set-up using the least restrictive device within 7 day(s).  4.  Patient will ambulate with supervision/set-up for 100 feet with the least restrictive device

## 2024-01-03 NOTE — PROGRESS NOTES
Patient: Jamel Hazel MRN: [id number]  SSN: [id number]    YOB: 1955  Age: 68 y.o.  Sex: male        ADMITTED: 2023 to Dalia Calzada MD by Checnho Crawford Jr., MD for Bladder spasms [N32.89]  Elevated troponin [R79.89]  Hematuria, unspecified type [R31.9]  POD# * No surgery found *     Jamel Hazel is doing fair today. Again, tolerating bilateral PCNTs. Has some soreness in left back. Admitted 23 for persistent hydro. Creatinine 1.35 on 1/3/24 from 1.76 on 24 from 2.25 on 24. WBC 10.8 on 1/3/24 from 13.2 on 24. No nausea or vomiting overnight. Tolerating indwelling gibbons catheter with clear light yellow to clear pink urine with CBI to slow drip.     Left PCNT has clear pink output. Right PCNT has clear yellow output. Placed 23. Hx asymmetric bladder wall thickening. Has not been tolerating manual bladder irrigation at bedside very well. See below PE.     CT A/P w/o Cont 23 noting bilateral hydroureteronephrosis without obstructive stone. Decompressed urinary  bladder containing a Gibbons catheter.    Vitals: Temp (24hrs), Av.5 °F (36.9 °C), Min:98.1 °F (36.7 °C), Max:99 °F (37.2 °C)    Blood pressure 124/74, pulse 98, temperature 98.5 °F (36.9 °C), temperature source Oral, resp. rate 18, height 1.778 m (5' 10\"), weight 63.3 kg (139 lb 8.8 oz), SpO2 98 %.    Intake and Output:   1901 -  0700  In: 750 [P.O.:750]  Out: 3630 [Urine:3580]  No intake/output data recorded.  FARHAD Output lats 24 hrs: No data found.   FARHAD Output last 8 hrs: No data found.  BM over last 24 hrs: No data found.    Physical Exam  General: NAD, pleasant  Respiratory: no distress, breathing easily with 3 lpm via NC  Abdomen: soft, no distention; non-tender to palpation  : no CVA tenderness, some soreness around new PCNT sites. Urine is clear and light yellow in urethral gibbons bag, clear yellow urine in right PCNT, clear pink output in left PCNT, less red than yesterday  1/2/24    Manual bladder irrigation this AM with dark yellow return no blood or blood clots. Flushed left PCNT with clear yellow return. CBI to slow drip.    Labs:  CBC:   Lab Results   Component Value Date/Time    WBC 10.8 01/03/2024 05:00 AM    HCT 29.9 01/03/2024 05:00 AM    PLT 74 01/03/2024 05:00 AM     BMP:   Lab Results   Component Value Date/Time     01/03/2024 05:00 AM    K 3.5 01/03/2024 05:00 AM    CL 99 01/03/2024 05:00 AM    CO2 30 01/03/2024 05:00 AM    BUN 37 01/03/2024 05:00 AM     Assessment/Plan:   Hx Urinary Retention s/p Gonzalez on CBI  Gross Hematuria, asymmetric bladder wall thickening   Sepsis likely 2/2 urinary source (UA on admission taken from existing catheter) - Urine Cx 12/30/23 + Klebsiella and Enterobacteriaceae  MARIA ELENA  Anemia   - Continue plan for tentative cystoscopy w/ possible biopsy scheduled Friday 1/5/24 pending patient clinical status/respiratory/infection; however pending his ongoing  progression this week may not be safely feasible. This has been discussed with patient and he verbalizes understanding.  - Keep Gonzalez cather - Titrate CBI drip rate to clear pink to clear yellow urine color.   - Nursing staff to stop CBI and manually irrigate bladder if patient should have suprapubic pain, Gonzalez leakage, visualized clots in tubing, worsened hematuria.  Only resume CBI once all clots have been expelled.  - Trend Creat  - Manual Bladder irrigation this AM returned clear dark yellow urine, slow CBI resumed  - Again, drained left PCNT to bag and flushed with clear yellow return on pull back.  - Continue CBI to keep urine clear and draining.  - Monitor H/H and transfuse as needed Hgb <7   - Agree with IV abx  - Following    D/w Dr. Anibal Beaver    Supervising MD, Dr. Anibal Beaver  Signed By: JULIEN MONSON, APRN - NP - January 3, 2024

## 2024-01-03 NOTE — PLAN OF CARE
Problem: Physical Therapy - Adult  Goal: By Discharge: Performs mobility at highest level of function for planned discharge setting.  See evaluation for individualized goals.  Description: FUNCTIONAL STATUS PRIOR TO ADMISSION: Patient was independent and active with use of supplemental of oxygen.    HOME SUPPORT PRIOR TO ADMISSION: The patient lived with spouse.    Physical Therapy Goals  Initiated 12/30/2023  1.  Patient will move from supine to sit and sit to supine in bed with modified independence within 7 day(s).    2.  Patient will perform sit to stand with modified independence within 7 day(s).  3.  Patient will transfer from bed to chair and chair to bed with supervision/set-up using the least restrictive device within 7 day(s).  4.  Patient will ambulate with supervision/set-up for 100 feet with the least restrictive device within 7 day(s).   5.  Patient will ascend/descend 5 stairs with B handrail(s) with supervision/set-up within 7 day(s).   Outcome: Progressing     PHYSICAL THERAPY TREATMENT    Patient: Jamel Hazel (68 y.o. male)  Date: 1/3/2024  Diagnosis: Bladder spasms [N32.89]  Elevated troponin [R79.89]  Hematuria, unspecified type [R31.9] Elevated troponin  Procedure(s) (LRB):  CYSTOSCOPY, POSSIBLE BLADDER BIOPSY, POSSIBLE RETROGRADE PYELOGRAMS (N/A)    Precautions: Fall Risk                    ASSESSMENT:  Patient continues to benefit from skilled PT services and is slowly progressing towards goals. Patient performed bed mobility and sit <> stand transfers with stand-by assist, able to ambulate to bedside chair without UE support. While seated in chair, pt able to recall seated therex (kicks, marches, glute sets) with min cueing. Pt demos minimally improved standing balance, generalized weakness and decreased endurance.          PLAN:  Patient continues to benefit from skilled intervention to address the above impairments.  Continue treatment per established plan of care.    Recommendation for  discharge: (in order for the patient to meet his/her long term goals): Therapy up to 5 days/week in Skilled nursing facility    Other factors to consider for discharge: concern for safely navigating or managing the home environment    IF patient discharges home will need the following DME: none       SUBJECTIVE:   Patient stated, \"I did too much yesterday.\"    OBJECTIVE DATA SUMMARY:   Critical Behavior:          Functional Mobility Training:  Bed Mobility:  Bed Mobility Training  Bed Mobility Training: Yes  Rolling: Stand-by assistance  Supine to Sit: Stand-by assistance  Scooting: Stand-by assistance  Transfers:  Transfer Training  Transfer Training: Yes  Sit to Stand: Stand-by assistance  Stand to Sit: Stand-by assistance  Bed to Chair: Stand-by assistance  Balance:  Balance  Sitting: Impaired  Sitting - Static: Good (unsupported)  Sitting - Dynamic: Fair (occasional)  Standing: Impaired  Standing - Static: Unsupported;Good  Standing - Dynamic: Unsupported;Fair   Ambulation/Gait Training:     Gait  Overall Level of Assistance: Contact-guard assistance  Distance (ft): 10 Feet  Interventions: Verbal cues  Base of Support: Narrowed  Speed/Merna: Pace decreased (< 100 feet/min)  Step Length: Left shortened;Right shortened  Neuro Re-Education:                    Pain Rating:  Pt denies pain, all activities performed within pt's tolerance       Activity Tolerance:   Good    After treatment:   Patient left in no apparent distress sitting up in chair, Call bell within reach, and Bed/ chair alarm activated      COMMUNICATION/EDUCATION:   The patient's plan of care was discussed with: registered nurse    Patient Education  Education Given To: Patient  Education Provided: Role of Therapy;Plan of Care;Home Exercise Program  Education Provided Comments: seated/supine LE exercises  Education Method: Verbal  Barriers to Learning: None  Education Outcome: Verbalized understanding      VANCE VO PT  Minutes: 23

## 2024-01-04 ENCOUNTER — APPOINTMENT (OUTPATIENT)
Facility: HOSPITAL | Age: 69
DRG: 656 | End: 2024-01-04
Payer: MEDICARE

## 2024-01-04 LAB
ANION GAP SERPL CALC-SCNC: 5 MMOL/L (ref 5–15)
BASOPHILS # BLD: 0 K/UL (ref 0–0.1)
BASOPHILS NFR BLD: 0 % (ref 0–1)
BUN SERPL-MCNC: 32 MG/DL (ref 6–20)
BUN/CREAT SERPL: 30 (ref 12–20)
CALCIUM SERPL-MCNC: 8.3 MG/DL (ref 8.5–10.1)
CHLORIDE SERPL-SCNC: 101 MMOL/L (ref 97–108)
CO2 SERPL-SCNC: 31 MMOL/L (ref 21–32)
CREAT SERPL-MCNC: 1.05 MG/DL (ref 0.7–1.3)
DIFFERENTIAL METHOD BLD: ABNORMAL
EOSINOPHIL # BLD: 0 K/UL (ref 0–0.4)
EOSINOPHIL NFR BLD: 0 % (ref 0–7)
ERYTHROCYTE [DISTWIDTH] IN BLOOD BY AUTOMATED COUNT: 17.1 % (ref 11.5–14.5)
GLUCOSE SERPL-MCNC: 118 MG/DL (ref 65–100)
HCT VFR BLD AUTO: 24 % (ref 36.6–50.3)
HGB BLD-MCNC: 7.9 G/DL (ref 12.1–17)
IMM GRANULOCYTES # BLD AUTO: 0 K/UL (ref 0–0.04)
IMM GRANULOCYTES NFR BLD AUTO: 0 % (ref 0–0.5)
LYMPHOCYTES # BLD: 0.4 K/UL (ref 0.8–3.5)
LYMPHOCYTES NFR BLD: 4 % (ref 12–49)
MCH RBC QN AUTO: 27.6 PG (ref 26–34)
MCHC RBC AUTO-ENTMCNC: 32.9 G/DL (ref 30–36.5)
MCV RBC AUTO: 83.9 FL (ref 80–99)
MONOCYTES # BLD: 0.6 K/UL (ref 0–1)
MONOCYTES NFR BLD: 6 % (ref 5–13)
NEUTS SEG # BLD: 9.2 K/UL (ref 1.8–8)
NEUTS SEG NFR BLD: 90 % (ref 32–75)
NRBC # BLD: 0 K/UL (ref 0–0.01)
NRBC BLD-RTO: 0 PER 100 WBC
PLATELET # BLD AUTO: 76 K/UL (ref 150–400)
PMV BLD AUTO: 10.9 FL (ref 8.9–12.9)
POTASSIUM SERPL-SCNC: 3.4 MMOL/L (ref 3.5–5.1)
RBC # BLD AUTO: 2.86 M/UL (ref 4.1–5.7)
RBC MORPH BLD: ABNORMAL
SODIUM SERPL-SCNC: 137 MMOL/L (ref 136–145)
WBC # BLD AUTO: 10.2 K/UL (ref 4.1–11.1)
WBC MORPH BLD: ABNORMAL

## 2024-01-04 PROCEDURE — 74018 RADEX ABDOMEN 1 VIEW: CPT

## 2024-01-04 PROCEDURE — 51700 IRRIGATION OF BLADDER: CPT

## 2024-01-04 PROCEDURE — 6370000000 HC RX 637 (ALT 250 FOR IP): Performed by: STUDENT IN AN ORGANIZED HEALTH CARE EDUCATION/TRAINING PROGRAM

## 2024-01-04 PROCEDURE — 51702 INSERT TEMP BLADDER CATH: CPT

## 2024-01-04 PROCEDURE — 2580000003 HC RX 258: Performed by: INTERNAL MEDICINE

## 2024-01-04 PROCEDURE — 80048 BASIC METABOLIC PNL TOTAL CA: CPT

## 2024-01-04 PROCEDURE — 6370000000 HC RX 637 (ALT 250 FOR IP): Performed by: INTERNAL MEDICINE

## 2024-01-04 PROCEDURE — 94760 N-INVAS EAR/PLS OXIMETRY 1: CPT

## 2024-01-04 PROCEDURE — 94640 AIRWAY INHALATION TREATMENT: CPT

## 2024-01-04 PROCEDURE — 6360000002 HC RX W HCPCS: Performed by: INTERNAL MEDICINE

## 2024-01-04 PROCEDURE — 6370000000 HC RX 637 (ALT 250 FOR IP): Performed by: NURSE PRACTITIONER

## 2024-01-04 PROCEDURE — 97530 THERAPEUTIC ACTIVITIES: CPT

## 2024-01-04 PROCEDURE — 1100000003 HC PRIVATE W/ TELEMETRY

## 2024-01-04 PROCEDURE — 2700000000 HC OXYGEN THERAPY PER DAY

## 2024-01-04 PROCEDURE — 85025 COMPLETE CBC W/AUTO DIFF WBC: CPT

## 2024-01-04 PROCEDURE — 36415 COLL VENOUS BLD VENIPUNCTURE: CPT

## 2024-01-04 RX ORDER — POTASSIUM CHLORIDE 20 MEQ/1
40 TABLET, EXTENDED RELEASE ORAL 2 TIMES DAILY WITH MEALS
Status: DISCONTINUED | OUTPATIENT
Start: 2024-01-04 | End: 2024-01-04

## 2024-01-04 RX ADMIN — DOCUSATE SODIUM 100 MG: 100 CAPSULE, LIQUID FILLED ORAL at 08:15

## 2024-01-04 RX ADMIN — CEFEPIME 2000 MG: 2 INJECTION, POWDER, FOR SOLUTION INTRAVENOUS at 06:17

## 2024-01-04 RX ADMIN — CEFEPIME 2000 MG: 2 INJECTION, POWDER, FOR SOLUTION INTRAVENOUS at 18:02

## 2024-01-04 RX ADMIN — SODIUM CHLORIDE, PRESERVATIVE FREE 10 ML: 5 INJECTION INTRAVENOUS at 20:49

## 2024-01-04 RX ADMIN — ONDANSETRON 4 MG: 2 INJECTION INTRAMUSCULAR; INTRAVENOUS at 11:47

## 2024-01-04 RX ADMIN — POLYETHYLENE GLYCOL 3350 17 G: 17 POWDER, FOR SOLUTION ORAL at 08:15

## 2024-01-04 RX ADMIN — POTASSIUM BICARBONATE 40 MEQ: 782 TABLET, EFFERVESCENT ORAL at 11:47

## 2024-01-04 RX ADMIN — POTASSIUM BICARBONATE 40 MEQ: 782 TABLET, EFFERVESCENT ORAL at 16:56

## 2024-01-04 RX ADMIN — BUDESONIDE AND FORMOTEROL FUMARATE DIHYDRATE 2 PUFF: 160; 4.5 AEROSOL RESPIRATORY (INHALATION) at 20:41

## 2024-01-04 RX ADMIN — MONTELUKAST 10 MG: 10 TABLET, FILM COATED ORAL at 20:48

## 2024-01-04 RX ADMIN — OXYCODONE HYDROCHLORIDE 5 MG: 5 TABLET ORAL at 11:47

## 2024-01-04 RX ADMIN — CASTOR OIL AND BALSAM, PERU: 788; 87 OINTMENT TOPICAL at 08:16

## 2024-01-04 RX ADMIN — TROSPIUM CHLORIDE 20 MG: 20 TABLET, FILM COATED ORAL at 08:15

## 2024-01-04 RX ADMIN — SODIUM CHLORIDE, PRESERVATIVE FREE 10 ML: 5 INJECTION INTRAVENOUS at 08:16

## 2024-01-04 RX ADMIN — CASTOR OIL AND BALSAM, PERU: 788; 87 OINTMENT TOPICAL at 20:49

## 2024-01-04 RX ADMIN — ONDANSETRON 4 MG: 4 TABLET, ORALLY DISINTEGRATING ORAL at 17:33

## 2024-01-04 RX ADMIN — FLUCONAZOLE 200 MG: 200 INJECTION, SOLUTION INTRAVENOUS at 18:05

## 2024-01-04 RX ADMIN — BUDESONIDE AND FORMOTEROL FUMARATE DIHYDRATE 2 PUFF: 160; 4.5 AEROSOL RESPIRATORY (INHALATION) at 08:52

## 2024-01-04 RX ADMIN — OXYCODONE HYDROCHLORIDE 5 MG: 5 TABLET ORAL at 17:33

## 2024-01-04 RX ADMIN — PANTOPRAZOLE SODIUM 40 MG: 40 TABLET, DELAYED RELEASE ORAL at 06:20

## 2024-01-04 ASSESSMENT — PAIN SCALES - GENERAL
PAINLEVEL_OUTOF10: 8
PAINLEVEL_OUTOF10: 0
PAINLEVEL_OUTOF10: 0

## 2024-01-04 ASSESSMENT — PAIN DESCRIPTION - LOCATION
LOCATION: ABDOMEN
LOCATION: ABDOMEN

## 2024-01-04 ASSESSMENT — PAIN DESCRIPTION - DESCRIPTORS
DESCRIPTORS: SPASM
DESCRIPTORS: SPASM

## 2024-01-04 NOTE — PROGRESS NOTES
Nursing notified patient left nephrostomy tube is leaking, no s/sx infection, is leaking urine, no oozing any blood, right nephrostomy tube is reported to be working appropriate, no c/o pain reported at the site. No other concerns reported. VSS. I would recommend dayshift discuss with IR, was placed this admit, for troubleshooting and possible exchange if necessary. Patient denies any further complaints or concerns. No acute distress reported. Nursing to notify Hospitalist for further/continued concerns. Will remain available overnight for further concerns if nursing/patient needs. Will defer further evaluation/management to the day shift team.    Non-billable note.

## 2024-01-04 NOTE — PROGRESS NOTES
NAME: Jamel Hazel        :  1955        MRN:  [id number]                     Assessment   :                                               Plan:  MARIA ELENA  Bilateral hydro  Hyponatremia  Possible bladder cancer    MARIA ELENA likely related to bilateral hydro.  S/P bilateral PCN .  Excellent output.  Creatinine improving, peaked at 2.7, now 1.8 to 1.35 to 1.1.       Gonzalez per Urology. For cystoscopy this week?    Sodium diaz 128, now 137, continue on fluid restriction.    I will sign off, but please call with questions/changes           Subjective:     Chief Complaint:  No complaint  We discussed the above.      Review of Systems:    Symptom Y/N Comments  Symptom Y/N Comments   Fever/Chills    Chest Pain     Poor Appetite    Edema     Cough    Abdominal Pain     Sputum    Joint Pain     SOB/HALL    Pruritis/Rash     Nausea/vomit    Tolerating PT/OT     Diarrhea    Tolerating Diet     Constipation    Other       Could not obtain due to:      Objective:     VITALS:   Last 24hrs VS reviewed since prior progress note. Most recent are:  Vitals:    24 2314   BP: 133/80   Pulse: 95   Resp: 18   Temp: 98.5 °F (36.9 °C)   SpO2: 98%       Intake/Output Summary (Last 24 hours) at 2024 0552  Last data filed at 1/3/2024 2241  Gross per 24 hour   Intake --   Output 1640 ml   Net -1640 ml        Telemetry Reviewed:     PHYSICAL EXAM:  General: NAD  No edema      Lab Data Reviewed: (see below)    Medications Reviewed: (see below)    PMH/SH reviewed - no change compared to H&P  ________________________________________________________________________  Care Plan discussed with:  Patient     Family      RN     Care Manager                    Consultant:          Comments   >50% of visit spent in counseling and coordination of care       ________________________________________________________________________  Jessy Sol MD     Procedures: see

## 2024-01-04 NOTE — PLAN OF CARE
Problem: Pain  Goal: Verbalizes/displays adequate comfort level or baseline comfort level  1/4/2024 1240 by Ravi Diaz, RN  Outcome: Progressing     Problem: Safety - Adult  Goal: Free from fall injury  1/4/2024 1240 by Ravi Diaz, RN  Outcome: Progressing     Problem: Skin/Tissue Integrity  Goal: Absence of new skin breakdown  Description: 1.  Monitor for areas of redness and/or skin breakdown  2.  Assess vascular access sites hourly  3.  Every 4-6 hours minimum:  Change oxygen saturation probe site  4.  Every 4-6 hours:  If on nasal continuous positive airway pressure, respiratory therapy assess nares and determine need for appliance change or resting period.  1/4/2024 1240 by Ravi Diaz, RN  Outcome: Progressing

## 2024-01-04 NOTE — PROGRESS NOTES
Chart reviewed. Attempted to see pt for PT intervention however pt refusing participation with therapy/OOB mobility at this time secondary to fatigue and desire to eat lunch. Offered to assist pt to bedside chair for meal and educated regarding benefits of OOB mobility however pt continued to decline, requesting therapy follow back at a later time. Will defer however continue to follow.    Margie Rivera, PT , DPT

## 2024-01-04 NOTE — PROGRESS NOTES
Patient: Jamel Hazel MRN: [id number]  SSN: [id number]    YOB: 1955  Age: 68 y.o.  Sex: male        ADMITTED: 2023 to [facility] MD by Chencho Crawford Jr., MD for Bladder spasms [N32.89]  Elevated troponin [R79.89]  Hematuria, unspecified type [R31.9]  POD# * No surgery date entered * Procedure(s):  CYSTOSCOPY, POSSIBLE BLADDER BIOPSY, POSSIBLE RETROGRADE PYELOGRAMS    Jamel Hazel is doing fair seen in bed eating breakfast. Notes L perc tube leaking overnight pt reports leaking has resolved . Pink tinged urine noted in bag .     Vitals: Temp (24hrs), Av.5 °F (36.9 °C), Min:98.1 °F (36.7 °C), Max:98.6 °F (37 °C)    Blood pressure 109/68, pulse 93, temperature 98.5 °F (36.9 °C), temperature source Oral, resp. rate 23, height 1.778 m (5' 10\"), weight 63.3 kg (139 lb 8.8 oz), SpO2 100 %.    Intake and Output:   1901 -  0700  In: -   Out: 3590 [Urine:3540]  No intake/output data recorded.  FARHAD Output lats 24 hrs: No data found.   FARHAD Output last 8 hrs: No data found.  BM over last 24 hrs: No data found.    Exam:   Gen: NAD  CV: extremities well perfused  Lungs: nonlabored respirations. Symmetric chest expansion  Ext: no edema  Abdomen: soft NTND no cvat   : AGATHA PERC TUBE      Labs:  CBC:   Lab Results   Component Value Date/Time    WBC 10.2 2024 06:03 AM    HCT 24.0 2024 06:03 AM    PLT 76 2024 06:03 AM     BMP:   Lab Results   Component Value Date/Time     2024 06:03 AM    K 3.4 2024 06:03 AM     2024 06:03 AM    CO2 31 2024 06:03 AM    BUN 32 2024 06:03 AM     Cultures:   Results       Procedure Component Value Units Date/Time    Culture, Blood 1 [[id number]] Collected: 24 1135    Order Status: Completed Specimen: Blood Updated: 24 0728     Special Requests --        NO SPECIAL REQUESTS  LEFT  RIGHT       Culture NO GROWTH AFTER 19 HOURS       Culture, Urine [[id number]] Collected: 23  Enterobacteriaceae  MARIA ELENA  Chet perc tubes in place - Cr improved to 1.05  - check KUB today to asess L perc placement   - Cr Now normalized   - GH resolved urine clear on slow CBI  Pt scheduled for cystoscopy 1/5 but has severe COPD   Discussed with anesthesia concerning intubation  - pt will likely get spinal anesthesia   - NPO after MN   Signed By: FRITZ Silva - NP - January 4, 2024

## 2024-01-04 NOTE — PLAN OF CARE
Problem: Occupational Therapy - Adult  Goal: By Discharge: Performs self-care activities at highest level of function for planned discharge setting.  See evaluation for individualized goals.  Description: FUNCTIONAL STATUS PRIOR TO ADMISSION:  Patient was ambulatory without AD and mod I for ADLs, working on cars/outside as able with increased time due to COPD/breathing. Was sponge bathing as able due to step over tub.    Receives Help From: Family, ADL Assistance: Independent,  ,  ,  ,  ,  , Homemaking Assistance: Independent, Ambulation Assistance: Independent, Transfer Assistance: Independent, Active : Yes     HOME SUPPORT: Patient lived with wife but didn't require assistance, PRN ADL assist as needed, wife drives.     Occupational Therapy Goals:  Initiated 12/31/2023  1.  Patient will perform self-feeding with [place] within 7 day(s).  2.  Patient will perform grooming with Set-up within 7 day(s).  3.  Patient will perform lower body dressing with Minimal Assist within 7 day(s).  4.  Patient will perform toilet transfers with Minimal Assist  within 7 day(s).  5.  Patient will perform all aspects of toileting with Minimal Assist within 7 day(s).  6.  Patient will participate in upper extremity therapeutic exercise/activities with Supervision for 10 minutes within 7 day(s).    7.  Patient will utilize energy conservation techniques during functional activities with verbal cues within 7 day(s).   Outcome: Progressing    OCCUPATIONAL THERAPY TREATMENT  Patient: Jamel Hazel (68 y.o. male)  Date: 1/4/2024  Primary Diagnosis: Bladder spasms [N32.89]  Elevated troponin [R79.89]  Hematuria, unspecified type [R31.9]  Procedure(s) (LRB):  CYSTOSCOPY, POSSIBLE BLADDER BIOPSY, POSSIBLE RETROGRADE PYELOGRAMS (N/A)     Precautions: Fall Risk                Chart, occupational therapy assessment, plan of care, and goals were reviewed.    ASSESSMENT  Patient continues to benefit from skilled OT services and is  Impaired  Balance  Sitting: Intact  Sitting - Static: Good (unsupported)  Sitting - Dynamic: Fair (occasional)  Standing: Impaired  Standing - Static: Good;Unsupported  Standing - Dynamic: Fair;Good;Unsupported      ADL Intervention:         Feeding: Setup         Grooming: Setup   Grooming Skilled Clinical Factors: seated                      Pain Rating:  Pt stated discomfort around catheter and drainage site   Pain Intervention(s):         Activity Tolerance:   Fair   Please refer to the flowsheet for vital signs taken during this treatment.    After treatment:   Patient left in no apparent distress sitting up in chair, Call bell within reach, and Bed/ chair alarm activated    COMMUNICATION/EDUCATION:   The patient's plan of care was discussed with: registered nurse         Thank you for this referral.  Ben Segal OT  Minutes: 27

## 2024-01-04 NOTE — PROGRESS NOTES
Occupational Therapy     Chart reviewed pt and cleared to work with therapy per RN. Attempted to see pt for OT intervention, however, pt refusing participation with therapy/OOB mobility at this time secondary to fatigue and desire to eat lunch. Offered to assist pt to bedside chair for meal and educated regarding benefits of OOB mobility, however, pt continued to decline, requesting therapy follow back at a later time. Will defer however continue to follow.     Ben Segal, OTMIKAEL, OTR/L   162.56

## 2024-01-04 NOTE — PROGRESS NOTES
1900 Bedside and Verbal shift change report given to Yariel RN (oncoming nurse) by Yogi RN (offgoing nurse). Report included the following information Nurse Handoff Report, MAR, Recent Results, and Cardiac Rhythm NSR/Tachy .     0537 Pt left nephrostomy tube was leaking (found bed soak with fluid - urine smell). Informed personally to Ivonne SHAHRAM earlier @ 0355 but message sent only during this time.      End of Shift Note    Bedside shift change report given to Ravi VAUGHN (oncoming nurse) by Yariel Brock RN (offgoing nurse).  Report included the following information SBAR, MAR, Recent Results, and Cardiac Rhythm NSR/Tachy    Shift worked:  7pm-7am     Shift summary and any significant changes:     Morning bloods collected and sent to lab     Concerns for physician to address:        Zone phone for oncoming shift:           Activity:     Number times ambulated in hallways past shift: 0  Number of times OOB to chair past shift: 0    Cardiac:   Cardiac Monitoring: Yes           Access:  Current line(s): PIV     Genitourinary:   Urinary status: gibbons    Respiratory:      Chronic home O2 use?: YES  Incentive spirometer at bedside: YES       GI:     Current diet:  DIET ONE TIME MESSAGE;  ADULT ORAL NUTRITION SUPPLEMENT; Breakfast, Dinner; Clear Liquid Oral Supplement  ADULT DIET; Regular; Low Sodium (2 gm); Lactaid milk only please  Passing flatus: YES  Tolerating current diet: YES       Pain Management:   Patient states pain is manageable on current regimen: YES    Skin:     Interventions: specialty bed, float heels, increase time out of bed, foam dressing, PT/OT consult, limit briefs, internal/external urinary devices, and nutritional support    Patient Safety:  Fall Score:    Interventions: bed/chair alarm, assistive device (walker, cane. etc), gripper socks, pt to call before getting OOB, stay with me (per policy), and gait belt       Length of Stay:  Expected LOS: 8  Actual LOS: 5      Yariel Brock,

## 2024-01-04 NOTE — PROGRESS NOTES
Hospitalist Progress Note    NAME:   Jamel Hazel   : 1955   MRN: [id number]     Date/Time: 2024 8:39 AM  Patient PCP: Aj Loya DO    Estimated discharge date:  Barriers:       Assessment / Plan:  Hematuria /possible bladder cancer/ bladder wall thickening   Currently has three way gibbons catheter -no blood clots /hematuria noted   Continue with continuous bladder irrigation   Urology following   Continue with IV Cefepime to cover Klebsiella bacteremia noted in blood culture  Continue with Diflucan for Candida in urine   S/p IR percutaneous Nephrostomy tube on - currently draining clear urine   Repeat Blood culture on - negative   Scheduled for cystoscopy with possible biopsy on     Cardiology consult - patient at moderate risk for intermediate risk procedure    MARIA ELENA due to bilateral hydronephrosis   -Renal team following  -Continue to trend   -Continue with gibbons   -S/p bilateral nephrostomy tube    Hyponatremia -improved   Hypokalemia -replenished     Anemia   Hb -7.9 on    Trend     Thrombocytopenia  -trend      H/o Severe COPD   Not on exacerbations   Continue with home medications               Medical Decision Making:   I personally reviewed labs: yes   I personally reviewed imaging: yes   I personally reviewed EKG:  Toxic drug monitoring:   Discussed case with:         Code Status: Full   DVT Prophylaxis:   GI Prophylaxis:    Subjective:     Chief Complaint / Reason for Physician Visit  \"Currently on 3l NC. No acute complaints \".  Discussed with RN events overnight.       Objective:     VITALS:   Last 24hrs VS reviewed since prior progress note. Most recent are:  Patient Vitals for the past 24 hrs:   BP Temp Temp src Pulse Resp SpO2   24 0730 109/68 -- -- 93 23 100 %   24 0619 118/73 98.5 °F (36.9 °C) Oral 90 19 --   24 2314 133/80 98.5 °F (36.9 °C) Oral 95 18 98 %   246 -- -- -- -- -- 98 %   24 -- -- -- -- -- 98 %   24  2037 134/83 98.6 °F (37 °C) Oral 98 19 98 %   01/03/24 1557 120/79 98.6 °F (37 °C) Oral 92 18 --   01/03/24 1210 134/86 98.1 °F (36.7 °C) Oral (!) 102 15 --   01/03/24 1008 -- -- -- -- 18 --         Intake/Output Summary (Last 24 hours) at 1/4/2024 0839  Last data filed at 1/4/2024 0633  Gross per 24 hour   Intake --   Output 1850 ml   Net -1850 ml        I had a face to face encounter and independently examined this patient on 1/4/2024, as outlined below:  PHYSICAL EXAM:  General: Alert, cooperative  EENT:  EOMI. Anicteric sclerae.  Resp:  CTA bilaterally, no wheezing or rales.  No accessory muscle use  CV:  Regular  rhythm,  No edema  GI:  Bilateral nephrostomy tube  Neurologic:  Alert and oriented X 3, normal speech,   Psych:   Good insight. Not anxious nor agitated  Skin:  No rashes.  No jaundice    Reviewed most current lab test results and cultures  YES  Reviewed most current radiology test results   YES  Review and summation of old records today    NO  Reviewed patient's current orders and MAR    YES  PMH/SH reviewed - no change compared to H&P    Procedures: see electronic medical records for all procedures/Xrays and details which were not copied into this note but were reviewed prior to creation of Plan.      LABS:  I reviewed today's most current labs and imaging studies.  Pertinent labs include:  Recent Labs     01/02/24  1135 01/03/24  0500 01/04/24  0603   WBC 10.4 10.8 10.2   HGB 8.3* 9.3* 7.9*   HCT 25.5* 29.9* 24.0*   PLT 73* 74* 76*     Recent Labs     01/02/24  0353 01/03/24  0500 01/04/24  0603   * 136 137   K 3.7 3.5 3.4*    99 101   CO2 31 30 31   GLUCOSE 114* 116* 118*   BUN 48* 37* 32*   CREATININE 1.76* 1.35* 1.05   CALCIUM 8.4* 8.7 8.3*       Signed: Mary Grace Odom MD

## 2024-01-04 NOTE — PROGRESS NOTES
NSR on tele. 1-assist up to chair. IV abx per MAR. PRN oxy and zofran given per MAR. K+ replaced as ordered. CBI via gibbons without complication. Abd XR completed. Plan for NPO at midnight for cystoscopy/biopsy tomorrow. JOHANA Newell.

## 2024-01-04 NOTE — PLAN OF CARE
Problem: Discharge Planning  Goal: Discharge to home or other facility with appropriate resources  Outcome: Progressing     Problem: Pain  Goal: Verbalizes/displays adequate comfort level or baseline comfort level  Outcome: Progressing     Problem: Respiratory - Adult  Goal: Achieves optimal ventilation and oxygenation  1/3/2024 2243 by Yariel Brock RN  Outcome: Progressing  1/3/2024 1214 by Daphne Chinchilla, RT  Outcome: Progressing     Problem: Safety - Adult  Goal: Free from fall injury  Outcome: Progressing     Problem: Physical Therapy - Adult  Goal: By Discharge: Performs mobility at highest level of function for planned discharge setting.  See evaluation for individualized goals.  Description: FUNCTIONAL STATUS PRIOR TO ADMISSION: Patient was independent and active with use of supplemental of oxygen.    HOME SUPPORT PRIOR TO ADMISSION: The patient lived with spouse.    Physical Therapy Goals  Initiated 12/30/2023  1.  Patient will move from supine to sit and sit to supine in bed with modified independence within 7 day(s).    2.  Patient will perform sit to stand with modified independence within 7 day(s).  3.  Patient will transfer from bed to chair and chair to bed with supervision/set-up using the least restrictive device within 7 day(s).  4.  Patient will ambulate with supervision/set-up for 100 feet with the least restrictive device within 7 day(s).   5.  Patient will ascend/descend 5 stairs with B handrail(s) with supervision/set-up within 7 day(s).   1/3/2024 1352 by Reva Monsivais, PT  Outcome: Progressing     Problem: Skin/Tissue Integrity  Goal: Absence of new skin breakdown  Description: 1.  Monitor for areas of redness and/or skin breakdown  2.  Assess vascular access sites hourly  3.  Every 4-6 hours minimum:  Change oxygen saturation probe site  4.  Every 4-6 hours:  If on nasal continuous positive airway pressure, respiratory therapy assess nares and determine need for appliance change or

## 2024-01-04 NOTE — PROGRESS NOTES
Cardiology Progress Note  1/4/2024 9:10 AM  Admit Date: 12/28/2023  Admit Diagnosis/CC: Bladder spasms [N32.89]  Elevated troponin [R79.89]  Hematuria, unspecified type [R31.9]  Subjective:   Patient reports:  Chest Pain:  [x] none,  consistent with [] non-cardiac  [] atypical  []  anginal chest pain             [x] none now    []  on-going  Dyspnea: [x] none  [] at rest  [] with exertion  [] improved  [] unchanged [] worsening  PND:       [x] none  [] overnight   [] current  Orthopnea: [x] none  [] improved  [] unchanged  [] worsening  Presyncope: [x] none  [] improved  [] unchanged  [] worsening  Ambulated in hallway without symptoms  [] Yes  Ambulated in room without symptoms  [x] Yes  ROS(2+other systems)   Hematuria: [] Yes  [x] No.   Dysuria: [] Yes  [x] No                                           Cough:       [] Yes  [x] No.   Sputum: [] Yes  [x] No                                            Hematochezia: [] Yes  [x] No.   Melena: [] Yes  [x] No                                            No change in family and social history from H&P/Consult note.    Current Facility-Administered Medications   Medication Dose Route Frequency    potassium chloride (KLOR-CON M) extended release tablet 40 mEq  40 mEq Oral BID WC    umeclidinium-vilanterol (ANORO ELLIPTA) inhaler 1 puff  1 puff Inhalation QPM    ceFEPIme (MAXIPIME) 2,000 mg in sodium chloride 0.9 % 100 mL IVPB (mini-bag)  2,000 mg IntraVENous Q12H    Benzocaine-Menthol (CEPACOL MAX SORE THROAT) lozenge 1 lozenge  1 lozenge Oral Q2H PRN    budesonide-formoterol (SYMBICORT) 160-4.5 MCG/ACT inhaler 2 puff (Patient Supplied)  2 puff Inhalation BID RT    docusate sodium (COLACE) capsule 100 mg  100 mg Oral Daily    trospium (SANCTURA) tablet 20 mg  20 mg Oral Daily    balsum peru-castor oil (VENELEX) ointment   Topical BID    fluconazole (DIFLUCAN) in 0.9 % sodium chloride IVPB 200 mg  200 mg IntraVENous Q24H    sodium chloride flush 0.9 % injection 5-40 mL  5-40 mL

## 2024-01-05 ENCOUNTER — APPOINTMENT (OUTPATIENT)
Facility: HOSPITAL | Age: 69
DRG: 656 | End: 2024-01-05
Payer: MEDICARE

## 2024-01-05 ENCOUNTER — ANESTHESIA (OUTPATIENT)
Facility: HOSPITAL | Age: 69
End: 2024-01-05
Payer: MEDICARE

## 2024-01-05 LAB
ANION GAP SERPL CALC-SCNC: 2 MMOL/L (ref 5–15)
BUN SERPL-MCNC: 26 MG/DL (ref 6–20)
BUN/CREAT SERPL: 27 (ref 12–20)
CALCIUM SERPL-MCNC: 8.4 MG/DL (ref 8.5–10.1)
CHLORIDE SERPL-SCNC: 99 MMOL/L (ref 97–108)
CO2 SERPL-SCNC: 33 MMOL/L (ref 21–32)
CREAT SERPL-MCNC: 0.98 MG/DL (ref 0.7–1.3)
GLUCOSE SERPL-MCNC: 116 MG/DL (ref 65–100)
POTASSIUM SERPL-SCNC: 3.8 MMOL/L (ref 3.5–5.1)
SODIUM SERPL-SCNC: 134 MMOL/L (ref 136–145)

## 2024-01-05 PROCEDURE — 2580000003 HC RX 258: Performed by: NURSE ANESTHETIST, CERTIFIED REGISTERED

## 2024-01-05 PROCEDURE — 6360000002 HC RX W HCPCS: Performed by: INTERNAL MEDICINE

## 2024-01-05 PROCEDURE — 2580000003 HC RX 258: Performed by: INTERNAL MEDICINE

## 2024-01-05 PROCEDURE — 88342 IMHCHEM/IMCYTCHM 1ST ANTB: CPT

## 2024-01-05 PROCEDURE — 3700000001 HC ADD 15 MINUTES (ANESTHESIA): Performed by: UROLOGY

## 2024-01-05 PROCEDURE — 6370000000 HC RX 637 (ALT 250 FOR IP): Performed by: NURSE PRACTITIONER

## 2024-01-05 PROCEDURE — 2700000000 HC OXYGEN THERAPY PER DAY

## 2024-01-05 PROCEDURE — 6370000000 HC RX 637 (ALT 250 FOR IP): Performed by: STUDENT IN AN ORGANIZED HEALTH CARE EDUCATION/TRAINING PROGRAM

## 2024-01-05 PROCEDURE — 94640 AIRWAY INHALATION TREATMENT: CPT

## 2024-01-05 PROCEDURE — 2580000003 HC RX 258: Performed by: UROLOGY

## 2024-01-05 PROCEDURE — 6370000000 HC RX 637 (ALT 250 FOR IP)

## 2024-01-05 PROCEDURE — C1769 GUIDE WIRE: HCPCS | Performed by: UROLOGY

## 2024-01-05 PROCEDURE — 36415 COLL VENOUS BLD VENIPUNCTURE: CPT

## 2024-01-05 PROCEDURE — C2617 STENT, NON-COR, TEM W/O DEL: HCPCS | Performed by: UROLOGY

## 2024-01-05 PROCEDURE — 3700000000 HC ANESTHESIA ATTENDED CARE: Performed by: UROLOGY

## 2024-01-05 PROCEDURE — 2500000003 HC RX 250 WO HCPCS: Performed by: NURSE ANESTHETIST, CERTIFIED REGISTERED

## 2024-01-05 PROCEDURE — 6360000002 HC RX W HCPCS: Performed by: NURSE ANESTHETIST, CERTIFIED REGISTERED

## 2024-01-05 PROCEDURE — 3600000001 HC SURGERY LEVEL 1  BASE: Performed by: UROLOGY

## 2024-01-05 PROCEDURE — 2060000000 HC ICU INTERMEDIATE R&B

## 2024-01-05 PROCEDURE — 6360000002 HC RX W HCPCS: Performed by: ANESTHESIOLOGY

## 2024-01-05 PROCEDURE — 80048 BASIC METABOLIC PNL TOTAL CA: CPT

## 2024-01-05 PROCEDURE — 7100000001 HC PACU RECOVERY - ADDTL 15 MIN: Performed by: UROLOGY

## 2024-01-05 PROCEDURE — C1758 CATHETER, URETERAL: HCPCS | Performed by: UROLOGY

## 2024-01-05 PROCEDURE — 2580000003 HC RX 258: Performed by: ANESTHESIOLOGY

## 2024-01-05 PROCEDURE — 7100000000 HC PACU RECOVERY - FIRST 15 MIN: Performed by: UROLOGY

## 2024-01-05 PROCEDURE — 6370000000 HC RX 637 (ALT 250 FOR IP): Performed by: INTERNAL MEDICINE

## 2024-01-05 PROCEDURE — 6360000004 HC RX CONTRAST MEDICATION: Performed by: UROLOGY

## 2024-01-05 PROCEDURE — 88305 TISSUE EXAM BY PATHOLOGIST: CPT

## 2024-01-05 PROCEDURE — 2709999900 HC NON-CHARGEABLE SUPPLY: Performed by: UROLOGY

## 2024-01-05 PROCEDURE — 3600000011 HC SURGERY LEVEL 1  ADDTL 15MIN: Performed by: UROLOGY

## 2024-01-05 PROCEDURE — 2500000003 HC RX 250 WO HCPCS: Performed by: ANESTHESIOLOGY

## 2024-01-05 PROCEDURE — 88341 IMHCHEM/IMCYTCHM EA ADD ANTB: CPT

## 2024-01-05 DEVICE — URETERAL STENT
Type: IMPLANTABLE DEVICE | Site: URETER | Status: FUNCTIONAL
Brand: POLARIS™ ULTRA

## 2024-01-05 RX ORDER — PHENYLEPHRINE HCL IN 0.9% NACL 0.4MG/10ML
SYRINGE (ML) INTRAVENOUS PRN
Status: DISCONTINUED | OUTPATIENT
Start: 2024-01-05 | End: 2024-01-05 | Stop reason: SDUPTHER

## 2024-01-05 RX ORDER — SODIUM CHLORIDE 9 MG/ML
INJECTION, SOLUTION INTRAVENOUS PRN
Status: DISCONTINUED | OUTPATIENT
Start: 2024-01-05 | End: 2024-01-13 | Stop reason: HOSPADM

## 2024-01-05 RX ORDER — CALCIUM CARBONATE 500 MG/1
TABLET, CHEWABLE ORAL
Status: COMPLETED
Start: 2024-01-05 | End: 2024-01-05

## 2024-01-05 RX ORDER — PROPOFOL 10 MG/ML
INJECTION, EMULSION INTRAVENOUS PRN
Status: DISCONTINUED | OUTPATIENT
Start: 2024-01-05 | End: 2024-01-05 | Stop reason: SDUPTHER

## 2024-01-05 RX ORDER — DEXMEDETOMIDINE HYDROCHLORIDE 100 UG/ML
INJECTION, SOLUTION INTRAVENOUS PRN
Status: DISCONTINUED | OUTPATIENT
Start: 2024-01-05 | End: 2024-01-05 | Stop reason: SDUPTHER

## 2024-01-05 RX ORDER — LIDOCAINE HYDROCHLORIDE 20 MG/ML
INJECTION, SOLUTION EPIDURAL; INFILTRATION; INTRACAUDAL; PERINEURAL PRN
Status: DISCONTINUED | OUTPATIENT
Start: 2024-01-05 | End: 2024-01-05 | Stop reason: SDUPTHER

## 2024-01-05 RX ORDER — SODIUM CHLORIDE 0.9 % (FLUSH) 0.9 %
5-40 SYRINGE (ML) INJECTION PRN
Status: DISCONTINUED | OUTPATIENT
Start: 2024-01-05 | End: 2024-01-07

## 2024-01-05 RX ORDER — PROCHLORPERAZINE EDISYLATE 5 MG/ML
5 INJECTION INTRAMUSCULAR; INTRAVENOUS
Status: ACTIVE | OUTPATIENT
Start: 2024-01-05 | End: 2024-01-06

## 2024-01-05 RX ORDER — OXYCODONE HYDROCHLORIDE 5 MG/1
5 TABLET ORAL
Status: ACTIVE | OUTPATIENT
Start: 2024-01-05 | End: 2024-01-06

## 2024-01-05 RX ORDER — SODIUM CHLORIDE 0.9 % (FLUSH) 0.9 %
5-40 SYRINGE (ML) INJECTION EVERY 12 HOURS SCHEDULED
Status: DISCONTINUED | OUTPATIENT
Start: 2024-01-05 | End: 2024-01-07

## 2024-01-05 RX ORDER — DEXAMETHASONE SODIUM PHOSPHATE 4 MG/ML
INJECTION, SOLUTION INTRA-ARTICULAR; INTRALESIONAL; INTRAMUSCULAR; INTRAVENOUS; SOFT TISSUE PRN
Status: DISCONTINUED | OUTPATIENT
Start: 2024-01-05 | End: 2024-01-05 | Stop reason: SDUPTHER

## 2024-01-05 RX ORDER — ONDANSETRON 2 MG/ML
4 INJECTION INTRAMUSCULAR; INTRAVENOUS
Status: DISPENSED | OUTPATIENT
Start: 2024-01-05 | End: 2024-01-06

## 2024-01-05 RX ORDER — MIDAZOLAM HYDROCHLORIDE 1 MG/ML
INJECTION INTRAMUSCULAR; INTRAVENOUS PRN
Status: DISCONTINUED | OUTPATIENT
Start: 2024-01-05 | End: 2024-01-05 | Stop reason: SDUPTHER

## 2024-01-05 RX ORDER — HYDROMORPHONE HYDROCHLORIDE 1 MG/ML
0.5 INJECTION, SOLUTION INTRAMUSCULAR; INTRAVENOUS; SUBCUTANEOUS EVERY 5 MIN PRN
Status: COMPLETED | OUTPATIENT
Start: 2024-01-05 | End: 2024-01-05

## 2024-01-05 RX ORDER — FENTANYL CITRATE 50 UG/ML
25 INJECTION, SOLUTION INTRAMUSCULAR; INTRAVENOUS EVERY 5 MIN PRN
Status: COMPLETED | OUTPATIENT
Start: 2024-01-05 | End: 2024-01-05

## 2024-01-05 RX ORDER — ONDANSETRON 2 MG/ML
INJECTION INTRAMUSCULAR; INTRAVENOUS PRN
Status: DISCONTINUED | OUTPATIENT
Start: 2024-01-05 | End: 2024-01-05 | Stop reason: SDUPTHER

## 2024-01-05 RX ORDER — CALCIUM CARBONATE 500 MG/1
500 TABLET, CHEWABLE ORAL DAILY PRN
Status: DISCONTINUED | OUTPATIENT
Start: 2024-01-05 | End: 2024-01-09

## 2024-01-05 RX ORDER — FENTANYL CITRATE 50 UG/ML
INJECTION, SOLUTION INTRAMUSCULAR; INTRAVENOUS PRN
Status: DISCONTINUED | OUTPATIENT
Start: 2024-01-05 | End: 2024-01-05 | Stop reason: SDUPTHER

## 2024-01-05 RX ORDER — SODIUM CHLORIDE, SODIUM LACTATE, POTASSIUM CHLORIDE, CALCIUM CHLORIDE 600; 310; 30; 20 MG/100ML; MG/100ML; MG/100ML; MG/100ML
INJECTION, SOLUTION INTRAVENOUS CONTINUOUS
Status: DISCONTINUED | OUTPATIENT
Start: 2024-01-05 | End: 2024-01-09

## 2024-01-05 RX ADMIN — FENTANYL CITRATE 25 MCG: 50 INJECTION INTRAMUSCULAR; INTRAVENOUS at 11:28

## 2024-01-05 RX ADMIN — BUDESONIDE AND FORMOTEROL FUMARATE DIHYDRATE 2 PUFF: 160; 4.5 AEROSOL RESPIRATORY (INHALATION) at 20:11

## 2024-01-05 RX ADMIN — FENTANYL CITRATE 50 MCG: 50 INJECTION, SOLUTION INTRAMUSCULAR; INTRAVENOUS at 09:39

## 2024-01-05 RX ADMIN — DEXMEDETOMIDINE HYDROCHLORIDE 10 MCG: 100 INJECTION, SOLUTION, CONCENTRATE INTRAVENOUS at 09:22

## 2024-01-05 RX ADMIN — SODIUM CHLORIDE, POTASSIUM CHLORIDE, SODIUM LACTATE AND CALCIUM CHLORIDE: 600; 310; 30; 20 INJECTION, SOLUTION INTRAVENOUS at 08:48

## 2024-01-05 RX ADMIN — SODIUM CHLORIDE, PRESERVATIVE FREE 10 ML: 5 INJECTION INTRAVENOUS at 12:11

## 2024-01-05 RX ADMIN — TROSPIUM CHLORIDE 20 MG: 20 TABLET, FILM COATED ORAL at 12:10

## 2024-01-05 RX ADMIN — DEXAMETHASONE SODIUM PHOSPHATE 4 MG: 4 INJECTION, SOLUTION INTRAMUSCULAR; INTRAVENOUS at 09:40

## 2024-01-05 RX ADMIN — FENTANYL CITRATE 50 MCG: 50 INJECTION, SOLUTION INTRAMUSCULAR; INTRAVENOUS at 09:13

## 2024-01-05 RX ADMIN — FENTANYL CITRATE 25 MCG: 50 INJECTION INTRAMUSCULAR; INTRAVENOUS at 11:05

## 2024-01-05 RX ADMIN — SODIUM CHLORIDE, POTASSIUM CHLORIDE, SODIUM LACTATE AND CALCIUM CHLORIDE: 600; 310; 30; 20 INJECTION, SOLUTION INTRAVENOUS at 09:07

## 2024-01-05 RX ADMIN — FENTANYL CITRATE 25 MCG: 50 INJECTION INTRAMUSCULAR; INTRAVENOUS at 11:00

## 2024-01-05 RX ADMIN — PROPOFOL 130 MG: 10 INJECTION, EMULSION INTRAVENOUS at 09:16

## 2024-01-05 RX ADMIN — Medication 80 MCG: at 09:44

## 2024-01-05 RX ADMIN — POLYETHYLENE GLYCOL 3350 17 G: 17 POWDER, FOR SOLUTION ORAL at 12:10

## 2024-01-05 RX ADMIN — Medication 160 MCG: at 09:19

## 2024-01-05 RX ADMIN — MORPHINE SULFATE 2 MG: 2 INJECTION, SOLUTION INTRAMUSCULAR; INTRAVENOUS at 16:35

## 2024-01-05 RX ADMIN — ONDANSETRON HYDROCHLORIDE 4 MG: 2 INJECTION, SOLUTION INTRAMUSCULAR; INTRAVENOUS at 09:40

## 2024-01-05 RX ADMIN — Medication 200 MCG: at 09:29

## 2024-01-05 RX ADMIN — PHENYLEPHRINE HYDROCHLORIDE 40 MCG/MIN: 10 INJECTION INTRAVENOUS at 09:30

## 2024-01-05 RX ADMIN — DOCUSATE SODIUM 100 MG: 100 CAPSULE, LIQUID FILLED ORAL at 12:10

## 2024-01-05 RX ADMIN — OXYCODONE HYDROCHLORIDE 5 MG: 5 TABLET ORAL at 20:51

## 2024-01-05 RX ADMIN — MORPHINE SULFATE 2 MG: 2 INJECTION, SOLUTION INTRAMUSCULAR; INTRAVENOUS at 01:14

## 2024-01-05 RX ADMIN — MONTELUKAST 10 MG: 10 TABLET, FILM COATED ORAL at 20:51

## 2024-01-05 RX ADMIN — HYDROMORPHONE HYDROCHLORIDE 0.5 MG: 1 INJECTION, SOLUTION INTRAMUSCULAR; INTRAVENOUS; SUBCUTANEOUS at 11:20

## 2024-01-05 RX ADMIN — HYDROMORPHONE HYDROCHLORIDE 0.5 MG: 1 INJECTION, SOLUTION INTRAMUSCULAR; INTRAVENOUS; SUBCUTANEOUS at 11:39

## 2024-01-05 RX ADMIN — MIDAZOLAM HYDROCHLORIDE 2 MG: 1 INJECTION, SOLUTION INTRAMUSCULAR; INTRAVENOUS at 09:07

## 2024-01-05 RX ADMIN — FENTANYL CITRATE 25 MCG: 50 INJECTION INTRAMUSCULAR; INTRAVENOUS at 11:14

## 2024-01-05 RX ADMIN — ONDANSETRON 4 MG: 2 INJECTION INTRAMUSCULAR; INTRAVENOUS at 12:19

## 2024-01-05 RX ADMIN — CASTOR OIL AND BALSAM, PERU: 788; 87 OINTMENT TOPICAL at 12:12

## 2024-01-05 RX ADMIN — SODIUM CHLORIDE, PRESERVATIVE FREE 10 ML: 5 INJECTION INTRAVENOUS at 12:13

## 2024-01-05 RX ADMIN — CEFEPIME 2000 MG: 2 INJECTION, POWDER, FOR SOLUTION INTRAVENOUS at 18:21

## 2024-01-05 RX ADMIN — CALCIUM CARBONATE (ANTACID) CHEW TAB 500 MG 500 MG: 500 CHEW TAB at 21:18

## 2024-01-05 RX ADMIN — CASTOR OIL AND BALSAM, PERU: 788; 87 OINTMENT TOPICAL at 20:55

## 2024-01-05 RX ADMIN — BUDESONIDE AND FORMOTEROL FUMARATE DIHYDRATE 2 PUFF: 160; 4.5 AEROSOL RESPIRATORY (INHALATION) at 07:45

## 2024-01-05 RX ADMIN — FLUCONAZOLE 200 MG: 200 INJECTION, SOLUTION INTRAVENOUS at 18:08

## 2024-01-05 RX ADMIN — LIDOCAINE HYDROCHLORIDE 80 MG: 20 INJECTION, SOLUTION EPIDURAL; INFILTRATION; INTRACAUDAL; PERINEURAL at 09:16

## 2024-01-05 RX ADMIN — CEFEPIME 2000 MG: 2 INJECTION, POWDER, FOR SOLUTION INTRAVENOUS at 05:52

## 2024-01-05 RX ADMIN — MORPHINE SULFATE 2 MG: 2 INJECTION, SOLUTION INTRAMUSCULAR; INTRAVENOUS at 07:44

## 2024-01-05 RX ADMIN — ONDANSETRON 4 MG: 2 INJECTION INTRAMUSCULAR; INTRAVENOUS at 20:50

## 2024-01-05 RX ADMIN — POTASSIUM BICARBONATE 40 MEQ: 782 TABLET, EFFERVESCENT ORAL at 16:38

## 2024-01-05 RX ADMIN — SODIUM CHLORIDE, PRESERVATIVE FREE 10 ML: 5 INJECTION INTRAVENOUS at 20:53

## 2024-01-05 ASSESSMENT — PAIN SCALES - GENERAL
PAINLEVEL_OUTOF10: 0
PAINLEVEL_OUTOF10: 8
PAINLEVEL_OUTOF10: 10
PAINLEVEL_OUTOF10: 4
PAINLEVEL_OUTOF10: 4
PAINLEVEL_OUTOF10: 0
PAINLEVEL_OUTOF10: 7
PAINLEVEL_OUTOF10: 10
PAINLEVEL_OUTOF10: 7
PAINLEVEL_OUTOF10: 8

## 2024-01-05 ASSESSMENT — PAIN DESCRIPTION - DESCRIPTORS
DESCRIPTORS: ACHING
DESCRIPTORS: SPASM

## 2024-01-05 ASSESSMENT — PAIN DESCRIPTION - LOCATION
LOCATION: ABDOMEN
LOCATION: PERINEUM
LOCATION: OTHER (COMMENT)
LOCATION: ABDOMEN
LOCATION: PENIS

## 2024-01-05 ASSESSMENT — PAIN DESCRIPTION - ORIENTATION
ORIENTATION: MID;LOWER
ORIENTATION: INNER

## 2024-01-05 ASSESSMENT — PAIN - FUNCTIONAL ASSESSMENT: PAIN_FUNCTIONAL_ASSESSMENT: 0-10

## 2024-01-05 NOTE — PERIOP NOTE
08:26= brought to Pre Op via bed from room 2120; A&Ox4, consents verified (2); currently on 3LO2NC; declined warming blanket; mouth swab given to moisten oral cavity due to dryness from O2.     08:44= Dr. Alexander in to discuss surgery.    08:55= erythema noted on mid-upper back from \"back board that had no padding on it when they moved me from my bed\"; no open areas; mepilex dsg applied for comfort; mepilex also applied to sacrum; no erythema or skin breakdown noted; skin CDI. Pt now comfortable repositioned on left side; covered with blanket and adjusted pillow. States \"this is the most comfortable I've been in awhile in this bed.\"    09:00= Dr. Campbell in to discuss Anesthesia.

## 2024-01-05 NOTE — PROGRESS NOTES
NSR/ST on tele. 3L O2 NC at baseline. 1-assist up to chair. IV abx per MAR. PRN morphine, peggy, and zofran given per MAR. CBI clamped per urology. Gonzalez and bilateral neph tubes without complication. Cystoscopy/biopsy/L stent completed. JOHANA Newell.

## 2024-01-05 NOTE — PROGRESS NOTES
Hospitalist Progress Note    NAME:   Jamel Hazel   : 1955   MRN: [id number]     Date/Time: 2024 8:29 AM  Patient PCP: Aj Loya DO    Estimated discharge date:   Barriers: Uro clearance       Assessment / Plan:  Hematuria /possible bladder cancer/ bladder wall thickening   Currently has three way gibbons catheter -no blood clots /hematuria noted   Continue with continuous bladder irrigation   Urology following   Continue with IV Cefepime to cover Klebsiella bacteremia noted in blood culture  Continue with Diflucan for Candida in urine   S/p IR percutaneous Nephrostomy tube on - currently draining clear urine   Repeat Blood culture on - negative   Scheduled for cystoscopy with possible biopsy on     Cardiology consult - patient at moderate risk for intermediate risk procedure  : S/p  Cystoscopy ,left Retrograde Pyelography and Stent Placement, Bladder Biopsy and Fulguration   - CBI   - Leave urethral gibbons in; leave bilateral PCNT in and open to drain.  - If doing well will consider cap left PCNT 1-2 days  - Advise strict I&O  -Urology recommendations appreciated         MARIA ELENA due to bilateral hydronephrosis   -Renal team following  -Continue to trend   -Continue with gibbons   -S/p bilateral nephrostomy tube  : BUN/Cr improving     Hyponatremia -mild  Hypokalemia -improved     Anemia   Hb -7.9 on    Trend     Thrombocytopenia  -trend      H/o Severe COPD   Not on exacerbations   Continue with home medications               Medical Decision Making:   I personally reviewed labs: yes   I personally reviewed imaging: yes   I personally reviewed EKG:  Toxic drug monitoring:   Discussed case with:         Code Status: Full   DVT Prophylaxis:   GI Prophylaxis:    Subjective:     Chief Complaint / Reason for Physician Visit  \"Currently on 3l NC. No acute complaints Pain post procedure. \".  Discussed with RN events overnight.       Objective:     VITALS:   Last 24hrs VS

## 2024-01-05 NOTE — PROGRESS NOTES
Urology Clinical Note 1/5/24 PM update:   Hx Urinary Retention s/p Gibbons on CBI  Gross Hematuria, asymmetric bladder wall thickening   Sepsis likely 2/2 urinary source (UA on admission taken from existing catheter) - Urine Cx 12/30/23 + Klebsiella and Enterobacteriaceae  MARIA ELENA  Chet perc tubes in place - Cr improved to 0.98 on 1/5/24    Now s/p Cystoscopy, Left Retrograde Pyelography and Stent Placement, Bladder Biopsy and Fulguration 1/5/24. Discussed findings of Cystoscopy with patient at bedside this afternoon: Findings: Normal urethra, non obstructing prostate, large nodular tumor right lateral, anterior wall, unable to find right UO.   - Gibbons bag with clear urine output this afternoon   - Right PCNT with clear yellow output  - Left PCNT with clear pink output  - Some suprapubic discomfort similar to how he was feeling this morning prior to cystoscopy today.    - No fevers, chills, N/V     Plan   - If urine clear and pink no need for manual bladder irrigation or resumption of CBI. - see other instructions.  - Manually bladder irrigate for suprapubic pain, dark red blood in urethral gibbons tubing, clots in urethral gibbons tubing.   - CBI should be resumed for dark red urine in urethral gibbons bag; adjust drip to the result of clear pink urine in gibbons tubing.  - Follow bladder biopsy pathology  - Leave urethral gibbons in; leave bilateral PCNT in and open to drain.  - If doing well will consider cap left PCNT 1-2 days  - Advise strict I&O  - Trend Creatinine  - Following    D/w Dr. Rahat Alexander post op Cystoscopy.

## 2024-01-05 NOTE — FLOWSHEET NOTE
01/05/24 1035   Handoff   Communication Given Periop Handoff/Relief   Handoff phase Phase I receiving   Handoff Given To [facility] PACU RN   Handoff Received From OR RN/ Aditi SYED   Handoff Communication Face to Face;At bedside   Time Handoff Given 1035        01/05/24 1150   Handoff   Communication Given Transfer Handoff   Handoff phase Phase I transferring   Handoff Given To Ravi RN   [facility]   Handoff Communication Telephone   Time Handoff Given 1150     Sign out received from Dr. Garcia

## 2024-01-05 NOTE — PERIOP NOTE
TRANSFER - IN REPORT:    Verbal report received from Ravi VAUGHN on Jamel Hazel  being received from Room 2120 for ordered procedure      Report consisted of patient's Situation, Background, Assessment and   Recommendations(SBAR).     Information from the following report(s) Nurse Handoff Report, Intake/Output, MAR, Recent Results, Med Rec Status, Cardiac Rhythm NSR, Pre Procedure Checklist, and Procedure Verification was reviewed with the receiving nurse.    Opportunity for questions and clarification was provided.      Assessment completed upon patient's arrival to unit and care assumed.

## 2024-01-05 NOTE — PROGRESS NOTES
PCP hospital follow-up transitional care appointment has been scheduled with SHAHRAM Castaneda on 1/10/24 at 1100. Pending patient discharge. Portia Rouse, Care Management Assistant

## 2024-01-05 NOTE — PROGRESS NOTES
PT Note    Chart reviewed and noted pt is currently JACKI for a second cystoscopy.  Will defer PT and continue to follow.

## 2024-01-05 NOTE — PLAN OF CARE
Problem: Discharge Planning  Goal: Discharge to home or other facility with appropriate resources  Outcome: Progressing     Problem: Pain  Goal: Verbalizes/displays adequate comfort level or baseline comfort level  1/4/2024 2328 by Yariel Brock RN  Outcome: Progressing  1/4/2024 1240 by Ravi Diaz RN  Outcome: Progressing     Problem: Respiratory - Adult  Goal: Achieves optimal ventilation and oxygenation  Outcome: Progressing     Problem: Safety - Adult  Goal: Free from fall injury  1/4/2024 2328 by Yariel Brock RN  Outcome: Progressing  1/4/2024 1240 by Ravi Diaz RN  Outcome: Progressing     Problem: Occupational Therapy - Adult  Goal: By Discharge: Performs self-care activities at highest level of function for planned discharge setting.  See evaluation for individualized goals.  Description: FUNCTIONAL STATUS PRIOR TO ADMISSION:  Patient was ambulatory without AD and mod I for ADLs, working on cars/outside as able with increased time due to COPD/breathing. Was sponge bathing as able due to step over tub.    Receives Help From: Family, ADL Assistance: Independent,  ,  ,  ,  ,  , Homemaking Assistance: Independent, Ambulation Assistance: Independent, Transfer Assistance: Independent, Active : Yes     HOME SUPPORT: Patient lived with wife but didn't require assistance, PRN ADL assist as needed, wife drives.     Occupational Therapy Goals:  Initiated 12/31/2023  1.  Patient will perform self-feeding with Standish within 7 day(s).  2.  Patient will perform grooming with Set-up within 7 day(s).  3.  Patient will perform lower body dressing with Minimal Assist within 7 day(s).  4.  Patient will perform toilet transfers with Minimal Assist  within 7 day(s).  5.  Patient will perform all aspects of toileting with Minimal Assist within 7 day(s).  6.  Patient will participate in upper extremity therapeutic exercise/activities with Supervision for 10 minutes within 7 day(s).    7.  Patient will utilize  energy conservation techniques during functional activities with verbal cues within 7 day(s).   1/4/2024 1502 by Ben Segal, OT  Outcome: Progressing     Problem: Skin/Tissue Integrity  Goal: Absence of new skin breakdown  Description: 1.  Monitor for areas of redness and/or skin breakdown  2.  Assess vascular access sites hourly  3.  Every 4-6 hours minimum:  Change oxygen saturation probe site  4.  Every 4-6 hours:  If on nasal continuous positive airway pressure, respiratory therapy assess nares and determine need for appliance change or resting period.  1/4/2024 2328 by Yariel Brock, RN  Outcome: Progressing  1/4/2024 1240 by Ravi Diaz, RN  Outcome: Progressing     Problem: ABCDS Injury Assessment  Goal: Absence of physical injury  Outcome: Progressing

## 2024-01-05 NOTE — PROGRESS NOTES
Patient: Jamel Hazel MRN: [id number]  SSN: [id number]    YOB: 1955  Age: 68 y.o.  Sex: male        ADMITTED: 2023 to [facility] MD by Chencho Crawford Jr., MD for Bladder spasms [N32.89]  Elevated troponin [R79.89]  Hematuria, unspecified type [R31.9]  POD# Day of Surgery Procedure(s):  CYSTOSCOPY, POSSIBLE BLADDER BIOPSY, POSSIBLE RETROGRADE PYELOGRAMS    Jamel Hazel is doing fair this AM. Seen in bed he is NPO. No PCNT leakage overnight reported. Pink tinged urine noted in left PCNT bag but improved in color from past two days.      Vitals: Temp (24hrs), Av.1 °F (36.7 °C), Min:97.8 °F (36.6 °C), Max:98.3 °F (36.8 °C)    Blood pressure 109/67, pulse 93, temperature 98.1 °F (36.7 °C), temperature source Oral, resp. rate 21, height 1.778 m (5' 10\"), weight 63.3 kg (139 lb 8.8 oz), SpO2 99 %.    Intake and Output:   1901 -  0700  In: -   Out: 3625 [Urine:3625]  No intake/output data recorded.  FARHAD Output lats 24 hrs: No data found.   FARHAD Output last 8 hrs: No data found.  BM over last 24 hrs: No data found.    Physical Exam  General: NAD, pleasant  Respiratory: no distress, breathing easily, 3 lpm nc  Abdomen: soft, no distention; non-tender to palpation  : no CVA tenderness, clear yellow urine in gibbons bag, leeanna/pink clear urin left PCNT, yellow clear urine right PCNT. CBI very slow drip.   Neuro: Appropriate, no focal neurological deficits  Skin: warm, dry  Extremities: moves all, full ROM    Labs:  CBC:   Lab Results   Component Value Date/Time    WBC 10.2 2024 06:03 AM    HCT 24.0 2024 06:03 AM    PLT 76 2024 06:03 AM     BMP:   Lab Results   Component Value Date/Time     2024 01:20 AM    K 3.8 2024 01:20 AM    CL 99 2024 01:20 AM    CO2 33 2024 01:20 AM    BUN 26 2024 01:20 AM     Assessment/Plan:   Hx Urinary Retention s/p Gibbons on CBI  Gross Hematuria, asymmetric bladder wall thickening   Sepsis likely

## 2024-01-05 NOTE — PROGRESS NOTES
1552: Bedside and Verbal shift change report given to Uzma Toussaint RN  (oncoming nurse) by JOHANA Rodrigues (offgoing nurse). Report included the following information Nurse Handoff Report, Index, ED Encounter Summary, Intake/Output, MAR, Recent Results, and Cardiac Rhythm NSR/Simus tach .      1645: PRN Morphine given    1940: L nephrostomy tube irrigated with 10 mL, no clots seen. Drainage bright red.

## 2024-01-05 NOTE — PROGRESS NOTES
1900 Bedside and Verbal shift change report given to Yariel RN (oncoming nurse) by Ravi RN (offgoing nurse). Report included the following information Nurse Handoff Report, MAR, Recent Results, and Cardiac Rhythm NSR/Tachy .     0645 Small amount of leak(irrigating solution) in the penile area from CBI.Ivonne, NP informed).    End of Shift Note    Bedside shift change report given to Ravi VAUGHN (oncoming nurse) by Yariel Brock RN (offgoing nurse).  Report included the following information SBAR, MAR, Recent Results, and Cardiac Rhythm NSR/Tachy    Shift worked:  7pm-7am     Shift summary and any significant changes:     Kept NPO since midnight     Morning bloods collected and sent to lab   Concerns for physician to address:        Zone phone for oncoming shift:           Activity:     Number times ambulated in hallways past shift: 0  Number of times OOB to chair past shift: 0    Cardiac:   Cardiac Monitoring: Yes           Access:  Current line(s): PIV     Genitourinary:   Urinary status: gibbons    Respiratory:      Chronic home O2 use?: YES  Incentive spirometer at bedside: YES       GI:     Current diet:  DIET ONE TIME MESSAGE;  ADULT ORAL NUTRITION SUPPLEMENT; Breakfast, Dinner; Clear Liquid Oral Supplement  ADULT DIET; Regular; Low Sodium (2 gm); Lactaid milk only please  Diet NPO  Passing flatus: YES  Tolerating current diet: YES       Pain Management:   Patient states pain is manageable on current regimen: YES    Skin:     Interventions: specialty bed, float heels, increase time out of bed, foam dressing, PT/OT consult, limit briefs, internal/external urinary devices, and nutritional support    Patient Safety:  Fall Score:    Interventions: bed/chair alarm, assistive device (walker, cane. etc), gripper socks, pt to call before getting OOB, stay with me (per policy), and gait belt       Length of Stay:  Expected LOS: 10  Actual LOS: 6      Yariel Brock, RN

## 2024-01-05 NOTE — PROGRESS NOTES
Occupational Therapy     Chart reviewed and attempted to see pt, however, pt is currently JACKI for a second cystoscopy. Will defer OT treatment session and continue to follow pt.

## 2024-01-05 NOTE — OP NOTE
[facility]  OPERATIVE REPORT    Name:  JAZ GARCIA  MR#:  [id number]  :  1955  ACCOUNT #:  [id number]  DATE OF SERVICE:  2024    PREOPERATIVE DIAGNOSES:  1.  Bladder lesion - rule out bladder cancer.  2.  Bilateral hydronephrosis.  3.  Klebsiella and Enterobacter bacteremia.  4.  Funguria.    POSTOPERATIVE DIAGNOSES:  1.  Bladder lesion - rule out bladder cancer.  2.  Bilateral hydronephrosis.  3.  Klebsiella and Enterobacter bacteremia.  4.  Funguria.    PROCEDURE PERFORMED:  1.  Cystoscopy.  2.  Left retrograde pyelography.  3.  Left ureteral stent placement (6-Marshallese x 24 cm double-J).  4.  Attempted right retrograde pyelography - failed.  5.  Bladder biopsy with fulguration.    SURGEON:  Rahat Alexander II, MD    ASSISTANT:  None.    ANESTHESIA:  General.    COMPLICATIONS:  None.    SPECIMENS REMOVED:  Bladder biopsy - right anterior wall.    IMPLANTS:  None.    ESTIMATED BLOOD LOSS:  Minimal.    DRAINS:  Left ureteral stent and 20-Marshallese three-way Gonzalez catheter.    INDICATIONS:  The patient is a very pleasant 68-year-old with a long history of tobacco use, severe COPD, and recent history of gross hematuria.  Initial evaluation included a CT scan with IV contrast last month that demonstrated a thickened bladder wall suggesting underlying bladder cancer as well as bilateral hydronephrosis.  On 2023, the patient had bilateral nephrostomy tubes placed for ongoing acute kidney injury that has now normalized.  He also underwent recent colonoscopy that demonstrated a very tight anus up to the anal verge requiring a small caliber colonoscope to bypass with multiple colon polyps identified.  The stenotic lesion in the rectum was felt to be inflammatory rather than a malignancy.  More recently, the patient had two positive blood cultures with both Klebsiella and Enterobacter and he was currently on scheduled cefepime started 2024.  He was also noted on urinalysis only to  have yeast and is currently on his 6th of 7-day course of Diflucan.  I have been asked to perform cystoscopy with bladder biopsy and the patient understood this does put him at risk for gross hematuria once again as well as a worsening infection in light of his recent infection felt likely to be of a urinary source, although unfortunately it does not appear that his urine was ever cultured.    FINDINGS:  The anterior urethra was widely patent without evidence of stricture disease.  His prostatic urethra is 2 cm in length with no obstruction from BPH.  Upon entering the bladder, the left side of the bladder demonstrates a smooth wall without trabeculation.  The left ureteral orifice is in normal position on the interureteric ridge.  There are a few old blood clots noted.  Starting right where the expected location of the right ureteral orifice is, there is a nodular mass that extends up the right lateral wall anteriorly including the dome.  There is an area of increased inflammatory changes in the midline likely secondary to Gonzalez catheter irritation.    Left retrograde pyelography demonstrates moderate hydronephrosis with an indwelling left nephrostomy tube in the renal pelvis.  I was able to place a sensor guidewire without resistance followed by a 6-Guinean x 24 cm double-J ureteral stent.  Unfortunately, the right UO was never clearly visualized, and in probing with a 5-Guinean catheter, I was unable to identify the right UO.  Biopsies were taken of the mass.    OPERATION:  The patient was brought to the operating room, placed in a supine position, and placed under general anesthesia by the anesthesia service.  He was placed in the dorsal lithotomy position, prepped and draped in the standard fashion.  He was already on scheduled cefepime.  Cystoscopy was performed with a 21-Guinean rigid cystoscope with the above findings noted.  An open ended 5-Guinean ureteral catheter was used to perform left retrograde

## 2024-01-05 NOTE — PLAN OF CARE
Problem: Pain  Goal: Verbalizes/displays adequate comfort level or baseline comfort level  1/5/2024 1306 by Ravi Diaz, RN  Outcome: Progressing     Problem: Safety - Adult  Goal: Free from fall injury  1/5/2024 1306 by Ravi Diaz, RN  Outcome: Progressing     Problem: Skin/Tissue Integrity  Goal: Absence of new skin breakdown  Description: 1.  Monitor for areas of redness and/or skin breakdown  2.  Assess vascular access sites hourly  3.  Every 4-6 hours minimum:  Change oxygen saturation probe site  4.  Every 4-6 hours:  If on nasal continuous positive airway pressure, respiratory therapy assess nares and determine need for appliance change or resting period.  1/5/2024 1306 by Ravi Diaz, RN  Outcome: Progressing

## 2024-01-05 NOTE — ANESTHESIA POSTPROCEDURE EVALUATION
Department of Anesthesiology  Postprocedure Note    Patient: Jamel Hazel  MRN: [id number]  YOB: 1955  Date of evaluation: 1/5/2024    Procedure Summary       Date: 01/05/24 Room / Location: [facility] / [facility]    Anesthesia Start: 0907 Anesthesia Stop: 1037    Procedure: CYSTOSCOPY, LEFT RETROGRADE PYELOGRAM WITH STENT, BLADDER  BIOPSY AND FULGURATION (Bladder) Diagnosis:       Lesion of bladder      (Lesion of bladder [N32.9])    Providers: Rahat Alexander II, MD Responsible Provider: Adrian Garcia MD    Anesthesia Type: General ASA Status: 4            Anesthesia Type: General    Oyni Phase I: Yoni Score: 9    Yoni Phase II:      Anesthesia Post Evaluation    Patient location during evaluation: PACU  Patient participation: complete - patient participated  Level of consciousness: awake  Pain score: 0  Airway patency: patent  Nausea & Vomiting: no nausea and no vomiting  Cardiovascular status: hemodynamically stable  Respiratory status: acceptable  Hydration status: stable  Multimodal analgesia pain management approach  Pain management: adequate    No notable events documented.

## 2024-01-05 NOTE — PROGRESS NOTES
Per Urology NP, clamp CBI. Ok if gibbons urine turns pink, if urine turns red or has clots, manually irrigate gibbons and monitor output. Do not restart CBI unless told to do so by urology, they will follow up in the morning.

## 2024-01-05 NOTE — CARE COORDINATION
Transition of Care Plan:     RUR: 17% (moderate RUR, readmission)  Prior Level of Functioning: Independent  Disposition: Home with HH pending; pt declining SNF  If SNF or IPR: Date FOC offered: 1/3 SNF  Date FOC received: N/A  Accepting facility: N/A  Date authorization started with reference number: N/A  Date authorization received and expires: N/A  Follow up appointments: PCP/specialists if needed.  DME needed: None.  Patient has 3L O2 NC at home through [employer].  Transportation at discharge: Family to transport on discharge.  IM/IMM Medicare/ letter given: 2nd IM needed prior to discharge.  Is patient a [facility] and connected with [facility]? No.              If yes, was  transfer form completed and [facility] notified? N/A  Caregiver Contact: Emily Hazel - spouse - [phone]  Discharge Caregiver contacted prior to discharge? Patient to contact.  Care Conference needed? No.  Barriers to discharge: uro/cards/nephro clearance, uro procedure and CBI      Beti Romero, DIVYAN, RN    Care Management  [phone]

## 2024-01-05 NOTE — BRIEF OP NOTE
Brief Postoperative Note      Patient: Jamel Hazel  YOB: 1955  MRN: [id number]    Date of Procedure: 1/5/2024    Pre-Op Diagnosis Codes:     * Lesion of bladder [N32.9], Bilateral hydronephrosis, UTI    Post-Op Diagnosis: Same       Procedure: Cysto, L Retrograde Pyelography and stent placement, Bladder biopsy with fulguration    Surgeon(s):  Rahat Alexander II, MD    Assistant:  * No surgical staff found *    Anesthesia: General    Estimated Blood Loss (mL): Minimal    Complications: None    Specimens:   ID Type Source Tests Collected by Time Destination   1 : Right Anterior Bladder Wall Tissue Bladder SURGICAL PATHOLOGY Rahat Alexander II, MD 1/5/2024 0958        Implants:  Implant Name Type Inv. Item Serial No.  Lot No. LRB No. Used Action   STENT URET 6FR L24CM PERCFLX HYDR+ DBL PGTL THRD 2 - SNA  STENT URET 6FR L24CM PERCFLX HYDR+ DBL PGTL THRD 2 NA [employer] [facility]- [id number] Left 1 Implanted         Drains:   Nephrostomy Tube 1 Left Flank 10.2 fr (Active)   Site Assessment TEVIN;Other (Comment) 01/01/24 0808   Dressing Status Clean, dry & intact 01/02/24 1256   Dressing Type Other (Comment) 01/01/24 0808   Collection Container Standard 01/02/24 1256   Securement Method Other (Comment) 01/01/24 0808   Status Patent;Draining 01/04/24 1047   Urine Color Other (Comment) 01/04/24 1047   Urine Appearance Red flecks 01/04/24 1047   Urine Odor Other (Comment) 01/01/24 0808   Output (mL) 200 mL 01/05/24 0557       Nephrostomy Tube 2 Right Flank 10.2 fr (Active)   Site Assessment Other (Comment) 01/01/24 0808   Dressing Status Clean, dry & intact 01/02/24 1256   Dressing Type Other (Comment) 01/01/24 0808   Collection Container Standard 01/02/24 1256   Securement Method Other (Comment) 01/01/24 0808   Status Patent;Draining 01/04/24 1047   Urine Color [place] 01/04/24 1047   Urine Appearance Clear 01/04/24 1047   Urine Odor Other (Comment) 01/01/24 0808   Output (mL) 280 mL 01/05/24 0557

## 2024-01-05 NOTE — PROGRESS NOTES
Comprehensive Nutrition Assessment    Type and Reason for Visit:  Reassess    Nutrition Recommendations/Plan:   Continue current diet and supplements  Please document % meals and supplements consumed in flowsheet I/O's under intake      Malnutrition Assessment:  Malnutrition Status:  Severe malnutrition (12/30/23 1538)    Context:  Chronic Illness     Findings of the 6 clinical characteristics of malnutrition:  Energy Intake:  No significant decrease in energy intake  Weight Loss:  No significant weight loss     Body Fat Loss:  Severe body fat loss Fat Overlying Ribs, Triceps   Muscle Mass Loss:  Severe muscle mass loss Clavicles (pectoralis & deltoids), Thigh (quadraceps), Scapula (trapezius)  Fluid Accumulation:        Strength:  Not Performed    Nutrition Assessment:     Chart reviewed and case discussed during IDR. Pt off the floor during RD assessment today. NPO for procedure today. Fair PO intake documented recently since diet was adjusted. Will continue supplements per pt's previous request for Ensure clear. Will continue monitoring.     Patient Vitals for the past 120 hrs:   PO Meals Eaten (%)   01/02/24 1518 51 - 75%   01/02/24 1148 26 - 50%   01/02/24 0800 1 - 25%     Wt Readings from Last 5 Encounters:   01/01/24 63.3 kg (139 lb 8.8 oz)   12/10/23 60.8 kg (134 lb 0.6 oz)   ]    Nutrition Related Findings:    Labs: Na 134.   Meds: cefepime, colace, diflucan, protonix, miralax, effer-K, zofran.   Edema: 2+ BLE.   BM 1/3.   Wound Type: None       Current Nutrition Intake & Therapies:    Average Meal Intake: 51-75%  Average Supplements Intake: Unable to assess  ADULT DIET; Regular; Low Sodium (2 gm)  ADULT ORAL NUTRITION SUPPLEMENT; Breakfast, Dinner; Clear Liquid Oral Supplement    Anthropometric Measures:  Height: 177.8 cm (5' 10\")  Ideal Body Weight (IBW): 166 lbs (75 kg)       Current Body Weight: 63.3 kg (139 lb 8.8 oz), 72.2 % IBW. Weight Source: Bed Scale  Current BMI (kg/m2): 20                           BMI Categories: Underweight (BMI less than 22) age over 65    Estimated Daily Nutrient Needs:  Energy Requirements Based On: Formula  Weight Used for Energy Requirements: Current  Energy (kcal/day): MSJ 2000 (1331 x 1.3 +250 for wt gain)  Weight Used for Protein Requirements: Current  Protein (g/day): 81g (1.5gPro/kg)  Method Used for Fluid Requirements: 1 ml/kcal  Fluid (ml/day): 2000mL    Nutrition Diagnosis:   Severe malnutrition related to catabolic illness, inadequate protein-energy intake as evidenced by BMI, severe muscle loss, severe loss of subcutaneous fat  Dx continues.    Nutrition Interventions:   Food and/or Nutrient Delivery: Continue Current Diet, Continue Oral Nutrition Supplement  Nutrition Education/Counseling: No recommendation at this time  Coordination of Nutrition Care: Continue to monitor while inpatient       Goals:  Previous Goal Met: Progressing toward Goal(s)  Goals: PO intake 50% or greater, PO intake 75% or greater, by next RD assessment       Nutrition Monitoring and Evaluation:   Behavioral-Environmental Outcomes: None Identified  Food/Nutrient Intake Outcomes: Food and Nutrient Intake, Supplement Intake  Physical Signs/Symptoms Outcomes: Biochemical Data, Nutrition Focused Physical Findings, Skin, Weight    Discharge Planning:    Continue current diet, Continue Oral Nutrition Supplement     Marilee Mccurdy RD  Contact: [phone]

## 2024-01-06 ENCOUNTER — APPOINTMENT (OUTPATIENT)
Facility: HOSPITAL | Age: 69
DRG: 656 | End: 2024-01-06
Payer: MEDICARE

## 2024-01-06 LAB
ANION GAP SERPL CALC-SCNC: 2 MMOL/L (ref 5–15)
BASOPHILS # BLD: 0 K/UL (ref 0–0.1)
BASOPHILS NFR BLD: 0 % (ref 0–1)
BUN SERPL-MCNC: 21 MG/DL (ref 6–20)
BUN/CREAT SERPL: 18 (ref 12–20)
CALCIUM SERPL-MCNC: 9.1 MG/DL (ref 8.5–10.1)
CHLORIDE SERPL-SCNC: 98 MMOL/L (ref 97–108)
CO2 SERPL-SCNC: 34 MMOL/L (ref 21–32)
CREAT SERPL-MCNC: 1.18 MG/DL (ref 0.7–1.3)
DIFFERENTIAL METHOD BLD: ABNORMAL
EOSINOPHIL # BLD: 0.1 K/UL (ref 0–0.4)
EOSINOPHIL NFR BLD: 1 % (ref 0–7)
ERYTHROCYTE [DISTWIDTH] IN BLOOD BY AUTOMATED COUNT: 17 % (ref 11.5–14.5)
GLUCOSE SERPL-MCNC: 108 MG/DL (ref 65–100)
HCT VFR BLD AUTO: 26.6 % (ref 36.6–50.3)
HGB BLD-MCNC: 8.5 G/DL (ref 12.1–17)
IMM GRANULOCYTES # BLD AUTO: 0.1 K/UL (ref 0–0.04)
IMM GRANULOCYTES NFR BLD AUTO: 1 % (ref 0–0.5)
LYMPHOCYTES # BLD: 1.1 K/UL (ref 0.8–3.5)
LYMPHOCYTES NFR BLD: 12 % (ref 12–49)
MCH RBC QN AUTO: 27.7 PG (ref 26–34)
MCHC RBC AUTO-ENTMCNC: 32 G/DL (ref 30–36.5)
MCV RBC AUTO: 86.6 FL (ref 80–99)
MONOCYTES # BLD: 0.7 K/UL (ref 0–1)
MONOCYTES NFR BLD: 8 % (ref 5–13)
NEUTS SEG # BLD: 6.8 K/UL (ref 1.8–8)
NEUTS SEG NFR BLD: 78 % (ref 32–75)
NRBC # BLD: 0 K/UL (ref 0–0.01)
NRBC BLD-RTO: 0 PER 100 WBC
PLATELET # BLD AUTO: 199 K/UL (ref 150–400)
PMV BLD AUTO: 10 FL (ref 8.9–12.9)
POTASSIUM SERPL-SCNC: 5.1 MMOL/L (ref 3.5–5.1)
RBC # BLD AUTO: 3.07 M/UL (ref 4.1–5.7)
SODIUM SERPL-SCNC: 134 MMOL/L (ref 136–145)
TROPONIN I SERPL HS-MCNC: 158 NG/L (ref 0–76)
WBC # BLD AUTO: 8.8 K/UL (ref 4.1–11.1)

## 2024-01-06 PROCEDURE — 2060000000 HC ICU INTERMEDIATE R&B

## 2024-01-06 PROCEDURE — 2580000003 HC RX 258: Performed by: ANESTHESIOLOGY

## 2024-01-06 PROCEDURE — 2580000003 HC RX 258: Performed by: STUDENT IN AN ORGANIZED HEALTH CARE EDUCATION/TRAINING PROGRAM

## 2024-01-06 PROCEDURE — 94640 AIRWAY INHALATION TREATMENT: CPT

## 2024-01-06 PROCEDURE — 6360000002 HC RX W HCPCS: Performed by: INTERNAL MEDICINE

## 2024-01-06 PROCEDURE — 80048 BASIC METABOLIC PNL TOTAL CA: CPT

## 2024-01-06 PROCEDURE — 84484 ASSAY OF TROPONIN QUANT: CPT

## 2024-01-06 PROCEDURE — 6370000000 HC RX 637 (ALT 250 FOR IP): Performed by: STUDENT IN AN ORGANIZED HEALTH CARE EDUCATION/TRAINING PROGRAM

## 2024-01-06 PROCEDURE — 85025 COMPLETE CBC W/AUTO DIFF WBC: CPT

## 2024-01-06 PROCEDURE — 2700000000 HC OXYGEN THERAPY PER DAY

## 2024-01-06 PROCEDURE — 2580000003 HC RX 258: Performed by: INTERNAL MEDICINE

## 2024-01-06 PROCEDURE — 6360000002 HC RX W HCPCS: Performed by: STUDENT IN AN ORGANIZED HEALTH CARE EDUCATION/TRAINING PROGRAM

## 2024-01-06 PROCEDURE — 71045 X-RAY EXAM CHEST 1 VIEW: CPT

## 2024-01-06 PROCEDURE — 6370000000 HC RX 637 (ALT 250 FOR IP): Performed by: NURSE PRACTITIONER

## 2024-01-06 PROCEDURE — 6370000000 HC RX 637 (ALT 250 FOR IP): Performed by: INTERNAL MEDICINE

## 2024-01-06 PROCEDURE — 36415 COLL VENOUS BLD VENIPUNCTURE: CPT

## 2024-01-06 RX ORDER — SIMETHICONE 80 MG
80 TABLET,CHEWABLE ORAL EVERY 6 HOURS PRN
Status: DISCONTINUED | OUTPATIENT
Start: 2024-01-06 | End: 2024-01-13 | Stop reason: HOSPADM

## 2024-01-06 RX ORDER — GUAIFENESIN 600 MG/1
600 TABLET, EXTENDED RELEASE ORAL 2 TIMES DAILY
Status: DISCONTINUED | OUTPATIENT
Start: 2024-01-06 | End: 2024-01-13 | Stop reason: HOSPADM

## 2024-01-06 RX ADMIN — CEFEPIME 2000 MG: 2 INJECTION, POWDER, FOR SOLUTION INTRAVENOUS at 21:58

## 2024-01-06 RX ADMIN — POLYETHYLENE GLYCOL 3350 17 G: 17 POWDER, FOR SOLUTION ORAL at 09:19

## 2024-01-06 RX ADMIN — DOCUSATE SODIUM 100 MG: 100 CAPSULE, LIQUID FILLED ORAL at 09:19

## 2024-01-06 RX ADMIN — ONDANSETRON 4 MG: 2 INJECTION INTRAMUSCULAR; INTRAVENOUS at 06:42

## 2024-01-06 RX ADMIN — SODIUM CHLORIDE, PRESERVATIVE FREE 10 ML: 5 INJECTION INTRAVENOUS at 09:20

## 2024-01-06 RX ADMIN — CASTOR OIL AND BALSAM, PERU: 788; 87 OINTMENT TOPICAL at 09:21

## 2024-01-06 RX ADMIN — PANTOPRAZOLE SODIUM 40 MG: 40 TABLET, DELAYED RELEASE ORAL at 06:39

## 2024-01-06 RX ADMIN — ONDANSETRON 4 MG: 2 INJECTION INTRAMUSCULAR; INTRAVENOUS at 22:02

## 2024-01-06 RX ADMIN — TROSPIUM CHLORIDE 20 MG: 20 TABLET, FILM COATED ORAL at 09:19

## 2024-01-06 RX ADMIN — CEFEPIME 2000 MG: 2 INJECTION, POWDER, FOR SOLUTION INTRAVENOUS at 14:35

## 2024-01-06 RX ADMIN — CASTOR OIL AND BALSAM, PERU: 788; 87 OINTMENT TOPICAL at 22:03

## 2024-01-06 RX ADMIN — BUDESONIDE AND FORMOTEROL FUMARATE DIHYDRATE 2 PUFF: 160; 4.5 AEROSOL RESPIRATORY (INHALATION) at 07:51

## 2024-01-06 RX ADMIN — CEFEPIME 2000 MG: 2 INJECTION, POWDER, FOR SOLUTION INTRAVENOUS at 06:33

## 2024-01-06 RX ADMIN — CALCIUM CARBONATE (ANTACID) CHEW TAB 500 MG 500 MG: 500 CHEW TAB at 11:19

## 2024-01-06 RX ADMIN — OXYCODONE HYDROCHLORIDE 5 MG: 5 TABLET ORAL at 06:40

## 2024-01-06 RX ADMIN — SODIUM CHLORIDE, PRESERVATIVE FREE 10 ML: 5 INJECTION INTRAVENOUS at 22:15

## 2024-01-06 RX ADMIN — GUAIFENESIN 600 MG: 600 TABLET, EXTENDED RELEASE ORAL at 22:03

## 2024-01-06 RX ADMIN — MONTELUKAST 10 MG: 10 TABLET, FILM COATED ORAL at 22:03

## 2024-01-06 RX ADMIN — ONDANSETRON 4 MG: 2 INJECTION INTRAMUSCULAR; INTRAVENOUS at 11:19

## 2024-01-06 RX ADMIN — BUDESONIDE AND FORMOTEROL FUMARATE DIHYDRATE 2 PUFF: 160; 4.5 AEROSOL RESPIRATORY (INHALATION) at 20:12

## 2024-01-06 RX ADMIN — OXYCODONE HYDROCHLORIDE 5 MG: 5 TABLET ORAL at 17:18

## 2024-01-06 RX ADMIN — MORPHINE SULFATE 2 MG: 2 INJECTION, SOLUTION INTRAMUSCULAR; INTRAVENOUS at 02:48

## 2024-01-06 RX ADMIN — MORPHINE SULFATE 2 MG: 2 INJECTION, SOLUTION INTRAMUSCULAR; INTRAVENOUS at 22:02

## 2024-01-06 ASSESSMENT — PAIN DESCRIPTION - LOCATION
LOCATION: PENIS
LOCATION: OTHER (COMMENT)
LOCATION: PENIS;ABDOMEN
LOCATION: PENIS

## 2024-01-06 ASSESSMENT — PAIN SCALES - GENERAL
PAINLEVEL_OUTOF10: 7
PAINLEVEL_OUTOF10: 8
PAINLEVEL_OUTOF10: 8
PAINLEVEL_OUTOF10: 9
PAINLEVEL_OUTOF10: 4

## 2024-01-06 NOTE — PLAN OF CARE
Problem: Discharge Planning  Goal: Discharge to home or other facility with appropriate resources  Outcome: Progressing     Problem: Pain  Goal: Verbalizes/displays adequate comfort level or baseline comfort level  Outcome: Progressing  Flowsheets  Taken 1/5/2024 1635 by Uzma Toussaint RN  Verbalizes/displays adequate comfort level or baseline comfort level: Encourage patient to monitor pain and request assistance  Taken 1/5/2024 1552 by Uzma Toussaint RN  Verbalizes/displays adequate comfort level or baseline comfort level: Encourage patient to monitor pain and request assistance     Problem: Respiratory - Adult  Goal: Achieves optimal ventilation and oxygenation  1/6/2024 0348 by Carolin Londono RN  Outcome: Progressing  1/5/2024 2236 by Luis Lopez RCP  Outcome: Progressing  Flowsheets (Taken 1/5/2024 1552 by Uzma Toussaint RN)  Achieves optimal ventilation and oxygenation: Assess for changes in respiratory status     Problem: Safety - Adult  Goal: Free from fall injury  Outcome: Progressing     Problem: Skin/Tissue Integrity  Goal: Absence of new skin breakdown  Description: 1.  Monitor for areas of redness and/or skin breakdown  2.  Assess vascular access sites hourly  3.  Every 4-6 hours minimum:  Change oxygen saturation probe site  4.  Every 4-6 hours:  If on nasal continuous positive airway pressure, respiratory therapy assess nares and determine need for appliance change or resting period.  Outcome: Progressing     Problem: ABCDS Injury Assessment  Goal: Absence of physical injury  Outcome: Progressing

## 2024-01-06 NOTE — PROGRESS NOTES
Spiritual Care Assessment/Progress Note  [facility]    Name: Jamel Hazel MRN: [id number]    Age: 68 y.o.     Sex: male   Language: English     Date: 1/6/2024            Total Time Calculated: 36 min              Spiritual Assessment begun in [facility] 2 CARDIAC MEDICAL STEP DOWN  Service Provided For:: Patient  Referral/Consult From:: Rounding  Encounter Overview/Reason : Initial Encounter    Spiritual beliefs:      [] Involved in a tuan tradition/spiritual practice:      [] Supported by a tuan community:      [] Claims no spiritual orientation:      [] Seeking spiritual identity:           [x] Adheres to an individual form of spirituality:      [] Not able to assess:                Identified resources for coping and support system:   Support System: Spouse, Family members       [x] Prayer                  [] Devotional reading               [] Music                  [] Guided Imagery     [] Pet visits                                        [] Other: (COMMENT)     Specific area/focus of visit   Encounter:    Crisis:    Spiritual/Emotional needs: Type: Spiritual Support  Ritual, Rites and Sacraments:    Grief, Loss, and Adjustments:    Ethics/Mediation:    Behavioral Health:    Palliative Care:    Advance Care Planning:      Plan/Referrals: Continue Support (comment)    Narrative:   Reviewed chart prior to visit on [facility] unit for spiritual assessment and support.  No family/friends present. Patient shared that he has had a lot going on and currently his wife is downstairs in the emergency room.  His wife has children from a previous marriage; very limited support aside from what they can offer.  He shared their daughter-in-law has really stepped up.  Went down to ER to check on his wife.  She sends her love.  Mr Hazel indicated she has been dealing with a lot in trying to take care of him and she has been neglecting herself.  His tuan is source of strength and coping.  He is not active in a

## 2024-01-06 NOTE — PROGRESS NOTES
Bedside and Verbal shift change report given to Carolin VAUGHN (oncoming nurse) by Uzma VAUGHN (offgoing nurse). Report included the following information Nurse Handoff Report, Recent Results, and Cardiac Rhythm NSR, sinus tachy .     2050: Administered prn zofran for nausea and prn oxycodone for pain    2118: One time dose of tums administered    2250: PIV in right forearm removed due to redness, pain with flushing, and slight edema    0248: Administered prn morphine for pain    0458: No orders for blood work this morning per [employer] and Epic    0640: Administered prn zofran for nausea and prn oxycodone for pain    End of Shift Note    Bedside shift change report given to Julita VAUGHN (oncoming nurse) by Carolin Londono RN (offgoing nurse).  Report included the following information SBAR, MAR, and Cardiac Rhythm NSR/ sinus tachy    Shift worked:  1841-6400     Shift summary and any significant changes:    See above     Concerns for physician to address:    Zone phone for oncoming shift:       Activity:     Number times ambulated in hallways past shift: 0  Number of times OOB to chair past shift: 0    Cardiac:   Cardiac Monitoring: Yes           Access:  Current line(s): PIV     Genitourinary:   Urinary status: gibbons    Respiratory:      Chronic home O2 use?: YES  Incentive spirometer at bedside: NO       GI:     Current diet:  ADULT DIET; Regular; Low Sodium (2 gm)  ADULT ORAL NUTRITION SUPPLEMENT; Breakfast, Dinner; Clear Liquid Oral Supplement  Passing flatus: YES  Tolerating current diet: YES       Pain Management:   Patient states pain is manageable on current regimen: YES    Skin:     Interventions: turn team, specialty bed, float heels, increase time out of bed, foam dressing, PT/OT consult, limit briefs, internal/external urinary devices, and nutritional support    Patient Safety:  Fall Score:    Interventions: bed/chair alarm, assistive device (walker, cane. etc), gripper socks, pt to call before getting OOB, stay with

## 2024-01-06 NOTE — PROGRESS NOTES
Urology Progress Note    Subjective:     Daily Progress Note: 2024 10:43 AM    Jamel Hazel is 1 Day Post-Op and doing good. Reports gibbons catheter discomfort  UO overnight from gibbons 150ml - He reports all urine from urethral catheter has been clear. Appears catheter bag emptied upon my exam but no traces of blood in bag/tubing   PCN:   R: 650ml- clear urine   L:510 ml - clear urine     Objective:     /73   Pulse (!) 104   Temp 98 °F (36.7 °C) (Oral)   Resp 19   Ht 1.778 m (5' 10\")   Wt 61.1 kg (134 lb 11.2 oz)   SpO2 100%   BMI 19.33 kg/m²      Temp (24hrs), Av.9 °F (36.6 °C), Min:97.6 °F (36.4 °C), Max:98.6 °F (37 °C)      Intake and Output:   1901 -  0700  In: 700 [I.V.:700]  Out: 3260 [Urine:3260]  No intake/output data recorded.    Physical Exam:   General appearance: alert, cooperative, no distress, appears stated age  Respiratory: no distress, NC in place   Abdomen: not distended, appropriately tender  : bilateral PCNS clear yellow UO, urethral gibbons in place  Extremities: moves all     Data Review:    No results found for this or any previous visit (from the past 24 hour(s)).    Urine/Blood Cultures:  Blood cultures No results found for: \"BC\"  Urine No results found for: \"LABURIN\"    I/Os    Intake/Output Summary (Last 24 hours) at 2024 1043  Last data filed at 2024 0257  Gross per 24 hour   Intake --   Output  ml   Net - ml       Assessment/Plan:     Principal Problem:    Elevated troponin  Active Problems:    Severe protein-calorie malnutrition (HCC)  Resolved Problems:    * No resolved hospital problems. *      Status Post:  Procedure(s):  CYSTOSCOPY, LEFT RETROGRADE PYELOGRAM WITH STENT, BLADDER  BIOPSY AND FULGURATION     Plan:   s/p Cystoscopy, Left Retrograde Pyelography and L Stent Placement, Bladder Biopsy and Fulguration 24. Findings: Normal urethra, non obstructing prostate, large nodular tumor right lateral, anterior wall, unable to

## 2024-01-06 NOTE — PROGRESS NOTES
Hospitalist Progress Note    NAME:   Jamel Hazel   : 1955   MRN: [id number]     Date/Time: 2024 8:05 AM  Patient PCP: Aj Loya DO    Estimated discharge date:   Barriers: Uro clearance       Assessment / Plan:  Hematuria   large nodular tumor right lateral, anterior wall   Currently has three way gibbons catheter -no blood clots /hematuria noted   Continue with continuous bladder irrigation   Urology following   Continue with IV Cefepime to cover Klebsiella bacteremia noted in blood culture  Continue with Diflucan for Candida in urine   S/p IR percutaneous Nephrostomy tube on - currently draining clear urine   Repeat Blood culture on - negative   Scheduled for cystoscopy with possible biopsy on     Cardiology consult - patient at moderate risk for intermediate risk procedure  : S/p  Cystoscopy ,left Retrograde Pyelography and Stent Placement, Bladder Biopsy and Fulguration   - CBI   - Leave urethral gibbons in; leave bilateral PCNT in and open to drain.  - If doing well will consider cap left PCNT 1-2 days  - Advise strict I&O  -Urology recommendations appreciated   :  Continue Cefepime total 14 days  -Plan to switch to levofloxacin on discharge to complete total  14 days  -Urology recs appreciated     1 Episode of sinus tachycardia in 160s  Patient felt palpitations  Denies Chest pain, SOB  Vitals stable   -Continue to monitor in tele   -Cardiology team updated       MARIA ELENA due to bilateral hydronephrosis   -Renal team following  -Continue to trend   -Continue with gibbons   -S/p bilateral nephrostomy tube  : BUN/Cr improving   : BUN/Cr -back to baseline     Hyponatremia -mild  Hypokalemia -improved     Anemia   Hb -7.9 on    Repeat CBC     Thrombocytopenia  -trend      H/o Severe COPD   Not on exacerbations   Continue with home medications               Medical Decision Making:   I personally reviewed labs: yes   I personally reviewed imaging: yes   I

## 2024-01-07 PROCEDURE — 6360000002 HC RX W HCPCS: Performed by: INTERNAL MEDICINE

## 2024-01-07 PROCEDURE — 2580000003 HC RX 258: Performed by: INTERNAL MEDICINE

## 2024-01-07 PROCEDURE — 6370000000 HC RX 637 (ALT 250 FOR IP): Performed by: NURSE PRACTITIONER

## 2024-01-07 PROCEDURE — 6370000000 HC RX 637 (ALT 250 FOR IP): Performed by: INTERNAL MEDICINE

## 2024-01-07 PROCEDURE — 2700000000 HC OXYGEN THERAPY PER DAY

## 2024-01-07 PROCEDURE — 6360000002 HC RX W HCPCS: Performed by: STUDENT IN AN ORGANIZED HEALTH CARE EDUCATION/TRAINING PROGRAM

## 2024-01-07 PROCEDURE — 6370000000 HC RX 637 (ALT 250 FOR IP): Performed by: STUDENT IN AN ORGANIZED HEALTH CARE EDUCATION/TRAINING PROGRAM

## 2024-01-07 PROCEDURE — 2060000000 HC ICU INTERMEDIATE R&B

## 2024-01-07 PROCEDURE — 2580000003 HC RX 258: Performed by: STUDENT IN AN ORGANIZED HEALTH CARE EDUCATION/TRAINING PROGRAM

## 2024-01-07 PROCEDURE — 94640 AIRWAY INHALATION TREATMENT: CPT

## 2024-01-07 RX ORDER — BISACODYL 5 MG/1
5 TABLET, DELAYED RELEASE ORAL DAILY PRN
Status: DISCONTINUED | OUTPATIENT
Start: 2024-01-07 | End: 2024-01-10

## 2024-01-07 RX ADMIN — CASTOR OIL AND BALSAM, PERU: 788; 87 OINTMENT TOPICAL at 08:34

## 2024-01-07 RX ADMIN — BISACODYL 5 MG: 5 TABLET, COATED ORAL at 14:52

## 2024-01-07 RX ADMIN — BUDESONIDE AND FORMOTEROL FUMARATE DIHYDRATE 2 PUFF: 160; 4.5 AEROSOL RESPIRATORY (INHALATION) at 10:10

## 2024-01-07 RX ADMIN — MORPHINE SULFATE 2 MG: 2 INJECTION, SOLUTION INTRAMUSCULAR; INTRAVENOUS at 14:32

## 2024-01-07 RX ADMIN — CEFEPIME 2000 MG: 2 INJECTION, POWDER, FOR SOLUTION INTRAVENOUS at 14:38

## 2024-01-07 RX ADMIN — MORPHINE SULFATE 2 MG: 2 INJECTION, SOLUTION INTRAMUSCULAR; INTRAVENOUS at 20:25

## 2024-01-07 RX ADMIN — TROSPIUM CHLORIDE 20 MG: 20 TABLET, FILM COATED ORAL at 08:35

## 2024-01-07 RX ADMIN — SIMETHICONE 80 MG: 80 TABLET, CHEWABLE ORAL at 14:51

## 2024-01-07 RX ADMIN — ONDANSETRON 4 MG: 2 INJECTION INTRAMUSCULAR; INTRAVENOUS at 03:40

## 2024-01-07 RX ADMIN — CEFEPIME 2000 MG: 2 INJECTION, POWDER, FOR SOLUTION INTRAVENOUS at 06:50

## 2024-01-07 RX ADMIN — OXYCODONE HYDROCHLORIDE 5 MG: 5 TABLET ORAL at 16:57

## 2024-01-07 RX ADMIN — DOCUSATE SODIUM 100 MG: 100 CAPSULE, LIQUID FILLED ORAL at 08:35

## 2024-01-07 RX ADMIN — CEFEPIME 2000 MG: 2 INJECTION, POWDER, FOR SOLUTION INTRAVENOUS at 22:16

## 2024-01-07 RX ADMIN — PANTOPRAZOLE SODIUM 40 MG: 40 TABLET, DELAYED RELEASE ORAL at 06:54

## 2024-01-07 RX ADMIN — GUAIFENESIN 600 MG: 600 TABLET, EXTENDED RELEASE ORAL at 08:35

## 2024-01-07 RX ADMIN — SODIUM CHLORIDE, PRESERVATIVE FREE 10 ML: 5 INJECTION INTRAVENOUS at 10:12

## 2024-01-07 RX ADMIN — GUAIFENESIN 600 MG: 600 TABLET, EXTENDED RELEASE ORAL at 20:25

## 2024-01-07 RX ADMIN — CASTOR OIL AND BALSAM, PERU: 788; 87 OINTMENT TOPICAL at 20:26

## 2024-01-07 RX ADMIN — CALCIUM CARBONATE (ANTACID) CHEW TAB 500 MG 500 MG: 500 CHEW TAB at 14:51

## 2024-01-07 RX ADMIN — OXYCODONE HYDROCHLORIDE 5 MG: 5 TABLET ORAL at 11:43

## 2024-01-07 RX ADMIN — SODIUM CHLORIDE, PRESERVATIVE FREE 10 ML: 5 INJECTION INTRAVENOUS at 10:11

## 2024-01-07 RX ADMIN — SODIUM CHLORIDE, PRESERVATIVE FREE 10 ML: 5 INJECTION INTRAVENOUS at 10:09

## 2024-01-07 RX ADMIN — OXYCODONE HYDROCHLORIDE 5 MG: 5 TABLET ORAL at 03:40

## 2024-01-07 RX ADMIN — POLYETHYLENE GLYCOL 3350 17 G: 17 POWDER, FOR SOLUTION ORAL at 08:34

## 2024-01-07 RX ADMIN — MONTELUKAST 10 MG: 10 TABLET, FILM COATED ORAL at 20:25

## 2024-01-07 RX ADMIN — SODIUM CHLORIDE, PRESERVATIVE FREE 10 ML: 5 INJECTION INTRAVENOUS at 20:26

## 2024-01-07 RX ADMIN — BUDESONIDE AND FORMOTEROL FUMARATE DIHYDRATE 2 PUFF: 160; 4.5 AEROSOL RESPIRATORY (INHALATION) at 20:38

## 2024-01-07 ASSESSMENT — PAIN - FUNCTIONAL ASSESSMENT
PAIN_FUNCTIONAL_ASSESSMENT: PREVENTS OR INTERFERES WITH MANY ACTIVE NOT PASSIVE ACTIVITIES
PAIN_FUNCTIONAL_ASSESSMENT: ACTIVITIES ARE NOT PREVENTED
PAIN_FUNCTIONAL_ASSESSMENT: PREVENTS OR INTERFERES SOME ACTIVE ACTIVITIES AND ADLS
PAIN_FUNCTIONAL_ASSESSMENT: PREVENTS OR INTERFERES SOME ACTIVE ACTIVITIES AND ADLS
PAIN_FUNCTIONAL_ASSESSMENT: ACTIVITIES ARE NOT PREVENTED
PAIN_FUNCTIONAL_ASSESSMENT: ACTIVITIES ARE NOT PREVENTED
PAIN_FUNCTIONAL_ASSESSMENT: PREVENTS OR INTERFERES SOME ACTIVE ACTIVITIES AND ADLS
PAIN_FUNCTIONAL_ASSESSMENT: ACTIVITIES ARE NOT PREVENTED
PAIN_FUNCTIONAL_ASSESSMENT: ACTIVITIES ARE NOT PREVENTED

## 2024-01-07 ASSESSMENT — PAIN DESCRIPTION - PAIN TYPE: TYPE: ACUTE PAIN

## 2024-01-07 ASSESSMENT — PAIN SCALES - GENERAL
PAINLEVEL_OUTOF10: 6
PAINLEVEL_OUTOF10: 5
PAINLEVEL_OUTOF10: 3
PAINLEVEL_OUTOF10: 6
PAINLEVEL_OUTOF10: 3
PAINLEVEL_OUTOF10: 4
PAINLEVEL_OUTOF10: 4
PAINLEVEL_OUTOF10: 3
PAINLEVEL_OUTOF10: 4
PAINLEVEL_OUTOF10: 6
PAINLEVEL_OUTOF10: 7
PAINLEVEL_OUTOF10: 7

## 2024-01-07 ASSESSMENT — PAIN DESCRIPTION - LOCATION
LOCATION: PENIS;OTHER (COMMENT)
LOCATION: PELVIS
LOCATION: PENIS
LOCATION: PENIS;BACK
LOCATION: PENIS
LOCATION: PENIS;BACK
LOCATION: PELVIS;PENIS

## 2024-01-07 ASSESSMENT — PAIN DESCRIPTION - DESCRIPTORS
DESCRIPTORS: SPASM
DESCRIPTORS: JABBING;SHARP;SHOOTING
DESCRIPTORS: SHARP
DESCRIPTORS: SPASM
DESCRIPTORS: SHOOTING;SPASM
DESCRIPTORS: SPASM
DESCRIPTORS: SPASM
DESCRIPTORS: SPASM;SHARP
DESCRIPTORS: SPASM

## 2024-01-07 ASSESSMENT — PAIN DESCRIPTION - ORIENTATION
ORIENTATION: INNER

## 2024-01-07 ASSESSMENT — PAIN DESCRIPTION - ONSET
ONSET: ON-GOING
ONSET: ON-GOING

## 2024-01-07 ASSESSMENT — PAIN DESCRIPTION - FREQUENCY: FREQUENCY: INTERMITTENT

## 2024-01-07 NOTE — PROGRESS NOTES
Urology Progress Note    Subjective:     Daily Progress Note: 2024 10:23 AM    Jamel Hazel is 2 Days Post-Op and doing well. All UOP from PCNT, no gibbons output recorded yesterday. Continues to have suprapubic pressure episodically consistent with bladder spasms    Objective:     /75   Pulse 89   Temp 98.3 °F (36.8 °C) (Oral)   Resp 20   Ht 1.778 m (5' 10\")   Wt 61.1 kg (134 lb 11.2 oz)   SpO2 100%   BMI 19.33 kg/m²      Temp (24hrs), Av.1 °F (36.7 °C), Min:97.8 °F (36.6 °C), Max:98.3 °F (36.8 °C)      Intake and Output:   1901 -  0700  In: 150   Out: 3310 [Urine:3310]  No intake/output data recorded.    Physical Exam:   General appearance: alert, cooperative, no distress, appears stated age  Respiratory: no distress, NC in place   Abdomen: not distended, appropriately tender  : bilateral PCNS clear yellow UO, urethral gibbons in place. Flushes easily  Extremities: moves all     Data Review:    Recent Results (from the past 24 hour(s))   CBC with Auto Differential    Collection Time: 24 10:20 PM   Result Value Ref Range    WBC 8.8 4.1 - 11.1 K/uL    RBC 3.07 (L) 4.10 - 5.70 M/uL    Hemoglobin 8.5 (L) 12.1 - 17.0 g/dL    Hematocrit 26.6 (L) 36.6 - 50.3 %    MCV 86.6 80.0 - 99.0 FL    MCH 27.7 26.0 - 34.0 PG    MCHC 32.0 30.0 - 36.5 g/dL    RDW 17.0 (H) 11.5 - 14.5 %    Platelets 199 150 - 400 K/uL    MPV 10.0 8.9 - 12.9 FL    Nucleated RBCs 0.0 0  WBC    nRBC 0.00 0.00 - 0.01 K/uL    Neutrophils % 78 (H) 32 - 75 %    Lymphocytes % 12 12 - 49 %    Monocytes % 8 5 - 13 %    Eosinophils % 1 0 - 7 %    Basophils % 0 0 - 1 %    Immature Granulocytes 1 (H) 0.0 - 0.5 %    Neutrophils Absolute 6.8 1.8 - 8.0 K/UL    Lymphocytes Absolute 1.1 0.8 - 3.5 K/UL    Monocytes Absolute 0.7 0.0 - 1.0 K/UL    Eosinophils Absolute 0.1 0.0 - 0.4 K/UL    Basophils Absolute 0.0 0.0 - 0.1 K/UL    Absolute Immature Granulocyte 0.1 (H) 0.00 - 0.04 K/UL    Differential Type AUTOMATED

## 2024-01-07 NOTE — PROGRESS NOTES
Bedside and Verbal shift change report given to Carolin VAUGHN (oncoming nurse) by Julita VAUGHN (offgoing nurse). Report included the following information Nurse Handoff Report, MAR, and Cardiac Rhythm NSR/ sinus tachy .     2002: Patient reporting chest tightness in center of chest but not pain, describes it as an ache with no radiation and not heartburn. Satting 100% on his baseline 3L, HR 93 NSR, temp 98, RR 15, and /77 with a map of 93. Last given oxycodone for pain at 1718.  Patient reports no sob and is in no apparent distress. Patient resting in bed watching TV. NP notified.     EKG completed. NP at bedside. Orders for blood work and portable chest xray placed. New orders obtained for mucinex and simethicone.     Chest xray completed at patient bedside.     2202: Administered prn IV morphine for pain and prn IV zofran for accompanying nausea    2220: Stat blood work drawn and sent to lab. Medications completed.     2330: Critical troponin 158 per lab. NP notified.     0202: No morning blood work orders per [employer] and Epic    0340: Administered prn oxycodone for pain and zofran for accompanying nausea    End of Shift Note    Bedside shift change report given to Liss VAUGHN (oncoming nurse) by Carolin Londono RN (offgoing nurse).  Report included the following information SBAR, MAR, Recent Results, and Cardiac Rhythm NSR/ sinus tachy    Shift worked:  1789-3471     Shift summary and any significant changes:    See above     Concerns for physician to address:    Zone phone for oncoming shift:       Activity:     Number times ambulated in hallways past shift: 0  Number of times OOB to chair past shift: 1    Cardiac:   Cardiac Monitoring: Yes           Access:  Current line(s): PIV     Genitourinary:   Urinary status: gibbons    Respiratory:      Chronic home O2 use?: YES  Incentive spirometer at bedside: YES       GI:     Current diet:  ADULT DIET; Regular; Low Sodium (2 gm)  ADULT ORAL NUTRITION SUPPLEMENT;

## 2024-01-07 NOTE — PROGRESS NOTES
Spiritual Care Assessment/Progress Note  [facility]    Name: Jamel Hazel MRN: [id number]    Age: 68 y.o.     Sex: male   Language: English     Date: 1/7/2024            Total Time Calculated: 10 min              Spiritual Assessment begun in [facility] 2 CARDIAC MEDICAL STEP DOWN  Service Provided For:: Patient  Referral/Consult From:: Clergy/  Encounter Overview/Reason : Follow-up    Spiritual beliefs:      [x] Involved in a tuan tradition/spiritual practice: Protestant     [] Supported by a tuan community:      [] Claims no spiritual orientation:      [] Seeking spiritual identity:           [] Adheres to an individual form of spirituality:      [] Not able to assess:                Identified resources for coping and support system:   Support System: Spouse       [] Prayer                  [] Devotional reading               [] Music                  [] Guided Imagery     [] Pet visits                                        [] Other: (COMMENT)     Specific area/focus of visit   Encounter:    Crisis:    Spiritual/Emotional needs: Type: Spiritual Support  Ritual, Rites and Sacraments:    Grief, Loss, and Adjustments:    Ethics/Mediation:    Behavioral Health:    Palliative Care:    Advance Care Planning:      Plan/Referrals: Continue Support (comment)    Narrative:   Stepped into room, introduced myself and inquired how the patient was doing. He said he is having a tough day (pain wise) but doing O.K. He did not seem to want a visit from his remarks. I inquired about his wife and he shared that she was able to return home. I offered that that sounded like good news. I then asked if there was anything I could do to help the patient. He declined and shared that he has it covered. I thanked him and stepped out of room.     TRACY Santamaria.  PRN    paging service [phone]

## 2024-01-07 NOTE — PROGRESS NOTES
Nursing notified patient having chest tightness in the center of the chest, but denies pain, describes as ache w/o radiation, but not burning. VSS. Last oxycodone 1718 per nursing. No dyspnea or distress reported. Nursing reported resting and in bed watching tv. No other concerns reported. On bedside exam patient lying flat, doesn't appear in distress. He tells me with his history of esophageal cancer and large hiatal hernia he gets trapped gas \"all the time\" and states it feels similar to this. Denies reflux symptomatology. Area pt is pointing on exam is epigastric more than chest, but states is chest tightness. Pt tells me he usually takes mucinex bid at home, hasn't gotten here, states he is coughing up clear to white sputum, has been afebrile, no dyspnea on exam. Pain not reproducible with palpation to chest. VSS. Ordered stat EKG- upon my initial review NSR w/BBB w/o ischemia or ectopy, awaiting final cardiology reading. Stat cxr- results pending, mucinex bid from home resumed to start this evening, simethicone prn ordered as d/w pt, and pt declines tums at this time, but already has prn tums ordered if needs. Stat trop, bmp w/reflex mag, cbc- results pending. Patient denies any further complaints or concerns. No acute distress reported. Nursing to notify Hospitalist for further/continued concerns. Will remain available overnight for further concerns if nursing/patient needs. Will defer further evaluation/management to the day shift team.    Update:  Cxr no acute processes. Lab work stable. Trop showed 158, appears trending down from was nearly 400 few days ago during this admit, with cardiology already seen pt, as no c/o sx ACS, with documented as suspected type 2 MI. No c/o \"chest pain\" per pt when seen earlier he was stating felt like gas, and is feeling better now. Per cards note : No ASA, no BB w/int hypoTN. Nursing WCM, consider for dayshift to d/w cardiology in the AM. No acute distress.     Non-billable

## 2024-01-07 NOTE — PROGRESS NOTES
Hospitalist Progress Note    NAME:   Jamel Hazel   : 1955   MRN: [id number]     Date/Time: 2024 8:07 AM  Patient PCP: Aj Loya DO    Estimated discharge date:   Barriers: Uro clearance       Assessment / Plan:  Hematuria   large nodular tumor right lateral, anterior wall   Currently has three way gibbons catheter -no blood clots /hematuria noted   Continue with continuous bladder irrigation   Urology following   Continue with IV Cefepime to cover Klebsiella bacteremia noted in blood culture  Continue with Diflucan for Candida in urine   S/p IR percutaneous Nephrostomy tube on - currently draining clear urine   Repeat Blood culture on - negative   Scheduled for cystoscopy with possible biopsy on     Cardiology consult - patient at moderate risk for intermediate risk procedure  : S/p  Cystoscopy ,left Retrograde Pyelography and Stent Placement, Bladder Biopsy and Fulguration   - CBI   - Leave urethral gibbons in; leave bilateral PCNT in and open to drain.  - If doing well will consider cap left PCNT 1-2 days  - Advise strict I&O  -Urology recommendations appreciated   :  Continue Cefepime total 14 days  -Plan to switch to levofloxacin on discharge to complete total  14 days  -Urology recs appreciated   :  Continue Cefepime total 14 days  -cap L PCNT today by  urology   - continue gibbons catheter for now for monitoring I/o    Episode of chest discomfort on  overnight   -Cxr no acute processes  -Lab work stable   -Trop showed 158, appears trending down from was nearly 400 few days ago during this admit   - suspected type 2 MI vs history of esophageal cancer and large hiatal hernia he gets trapped gas \"all the time\"   -Cardiology to follow   -He states having constipation and gas   -Ordered bowel regimen and simethicone for gas       1 Episode of sinus tachycardia in 160s  Patient felt palpitations  Denies Chest pain, SOB  Vitals stable   -Continue to monitor in tele  07/31/20 0900   Activity/Group Checklist   Group Community meeting   Attendance Attended   Attendance Duration (min) 16-30   Interactions Interacted appropriately   Affect/Mood Appropriate   Goals Achieved Able to engage in interactions     Fixated on discharge

## 2024-01-07 NOTE — PLAN OF CARE
Problem: Discharge Planning  Goal: Discharge to home or other facility with appropriate resources  Outcome: Progressing     Problem: Pain  Goal: Verbalizes/displays adequate comfort level or baseline comfort level  Outcome: Progressing     Problem: Respiratory - Adult  Goal: Achieves optimal ventilation and oxygenation  1/7/2024 0201 by Carolin Londono RN  Outcome: Progressing  1/6/2024 2249 by Luis Lopez RCP  Outcome: Progressing  1/6/2024 1615 by Denton Sharma, RT  Outcome: Progressing  Flowsheets  Taken 1/6/2024 1615 by Denton Sharma RT  Achieves optimal ventilation and oxygenation:   Assess for changes in respiratory status   Position to facilitate oxygenation and minimize respiratory effort   Respiratory therapy support as indicated   Oxygen supplementation based on oxygen saturation or arterial blood gases   Assess and instruct to report shortness of breath or any respiratory difficulty  Taken 1/6/2024 0830 by Juliat Rios RN  Achieves optimal ventilation and oxygenation: Assess for changes in respiratory status     Problem: Safety - Adult  Goal: Free from fall injury  Outcome: Progressing  Flowsheets (Taken 1/6/2024 1240 by Julita Rios, RN)  Free From Fall Injury: Instruct family/caregiver on patient safety     Problem: Skin/Tissue Integrity  Goal: Absence of new skin breakdown  Description: 1.  Monitor for areas of redness and/or skin breakdown  2.  Assess vascular access sites hourly  3.  Every 4-6 hours minimum:  Change oxygen saturation probe site  4.  Every 4-6 hours:  If on nasal continuous positive airway pressure, respiratory therapy assess nares and determine need for appliance change or resting period.  Outcome: Progressing     Problem: ABCDS Injury Assessment  Goal: Absence of physical injury  Outcome: Progressing  Flowsheets (Taken 1/6/2024 1240 by Julita Rios, RN)  Absence of Physical Injury: Implement safety measures based on patient assessment

## 2024-01-08 ENCOUNTER — APPOINTMENT (OUTPATIENT)
Facility: HOSPITAL | Age: 69
DRG: 656 | End: 2024-01-08
Payer: MEDICARE

## 2024-01-08 LAB
BACTERIA SPEC CULT: NORMAL
SERVICE CMNT-IMP: NORMAL

## 2024-01-08 PROCEDURE — 6370000000 HC RX 637 (ALT 250 FOR IP): Performed by: NURSE PRACTITIONER

## 2024-01-08 PROCEDURE — 2060000000 HC ICU INTERMEDIATE R&B

## 2024-01-08 PROCEDURE — 2580000003 HC RX 258: Performed by: STUDENT IN AN ORGANIZED HEALTH CARE EDUCATION/TRAINING PROGRAM

## 2024-01-08 PROCEDURE — 2580000003 HC RX 258: Performed by: INTERNAL MEDICINE

## 2024-01-08 PROCEDURE — 6360000002 HC RX W HCPCS: Performed by: STUDENT IN AN ORGANIZED HEALTH CARE EDUCATION/TRAINING PROGRAM

## 2024-01-08 PROCEDURE — 97116 GAIT TRAINING THERAPY: CPT

## 2024-01-08 PROCEDURE — 6360000002 HC RX W HCPCS: Performed by: INTERNAL MEDICINE

## 2024-01-08 PROCEDURE — 6370000000 HC RX 637 (ALT 250 FOR IP): Performed by: INTERNAL MEDICINE

## 2024-01-08 PROCEDURE — 97530 THERAPEUTIC ACTIVITIES: CPT

## 2024-01-08 PROCEDURE — 97535 SELF CARE MNGMENT TRAINING: CPT

## 2024-01-08 PROCEDURE — 97110 THERAPEUTIC EXERCISES: CPT

## 2024-01-08 PROCEDURE — 74018 RADEX ABDOMEN 1 VIEW: CPT

## 2024-01-08 PROCEDURE — 6370000000 HC RX 637 (ALT 250 FOR IP): Performed by: STUDENT IN AN ORGANIZED HEALTH CARE EDUCATION/TRAINING PROGRAM

## 2024-01-08 RX ORDER — RANOLAZINE 500 MG/1
500 TABLET, EXTENDED RELEASE ORAL 2 TIMES DAILY
Status: DISCONTINUED | OUTPATIENT
Start: 2024-01-08 | End: 2024-01-13 | Stop reason: HOSPADM

## 2024-01-08 RX ORDER — RANOLAZINE 500 MG/1
500 TABLET, EXTENDED RELEASE ORAL 2 TIMES DAILY
Qty: 60 TABLET | Refills: 3 | Status: SHIPPED | OUTPATIENT
Start: 2024-01-08

## 2024-01-08 RX ORDER — SIMETHICONE 80 MG
80 TABLET,CHEWABLE ORAL EVERY 6 HOURS PRN
Qty: 180 TABLET | Refills: 0 | Status: SHIPPED | OUTPATIENT
Start: 2024-01-08

## 2024-01-08 RX ORDER — LEVOFLOXACIN 500 MG/1
500 TABLET, FILM COATED ORAL DAILY
Qty: 10 TABLET | Refills: 0 | Status: SHIPPED | OUTPATIENT
Start: 2024-01-08 | End: 2024-01-13 | Stop reason: HOSPADM

## 2024-01-08 RX ORDER — TROSPIUM CHLORIDE 20 MG/1
20 TABLET, FILM COATED ORAL ONCE
Status: COMPLETED | OUTPATIENT
Start: 2024-01-08 | End: 2024-01-08

## 2024-01-08 RX ORDER — POLYETHYLENE GLYCOL 3350 17 G/17G
17 POWDER, FOR SOLUTION ORAL
Status: COMPLETED | OUTPATIENT
Start: 2024-01-08 | End: 2024-01-09

## 2024-01-08 RX ADMIN — OXYCODONE HYDROCHLORIDE 5 MG: 5 TABLET ORAL at 09:24

## 2024-01-08 RX ADMIN — POLYETHYLENE GLYCOL 3350 17 G: 17 POWDER, FOR SOLUTION ORAL at 09:32

## 2024-01-08 RX ADMIN — GUAIFENESIN 600 MG: 600 TABLET, EXTENDED RELEASE ORAL at 21:29

## 2024-01-08 RX ADMIN — GUAIFENESIN 600 MG: 600 TABLET, EXTENDED RELEASE ORAL at 09:26

## 2024-01-08 RX ADMIN — SIMETHICONE 80 MG: 80 TABLET, CHEWABLE ORAL at 12:28

## 2024-01-08 RX ADMIN — MORPHINE SULFATE 2 MG: 2 INJECTION, SOLUTION INTRAMUSCULAR; INTRAVENOUS at 12:29

## 2024-01-08 RX ADMIN — SODIUM CHLORIDE, PRESERVATIVE FREE 10 ML: 5 INJECTION INTRAVENOUS at 09:34

## 2024-01-08 RX ADMIN — CEFEPIME 2000 MG: 2 INJECTION, POWDER, FOR SOLUTION INTRAVENOUS at 15:37

## 2024-01-08 RX ADMIN — OXYCODONE HYDROCHLORIDE 5 MG: 5 TABLET ORAL at 23:01

## 2024-01-08 RX ADMIN — ONDANSETRON 4 MG: 2 INJECTION INTRAMUSCULAR; INTRAVENOUS at 02:59

## 2024-01-08 RX ADMIN — CASTOR OIL AND BALSAM, PERU: 788; 87 OINTMENT TOPICAL at 09:35

## 2024-01-08 RX ADMIN — POLYETHYLENE GLYCOL 3350 17 G: 17 POWDER, FOR SOLUTION ORAL at 21:28

## 2024-01-08 RX ADMIN — OXYCODONE HYDROCHLORIDE 5 MG: 5 TABLET ORAL at 02:58

## 2024-01-08 RX ADMIN — TROSPIUM CHLORIDE 20 MG: 20 TABLET, FILM COATED ORAL at 21:29

## 2024-01-08 RX ADMIN — MONTELUKAST 10 MG: 10 TABLET, FILM COATED ORAL at 21:29

## 2024-01-08 RX ADMIN — RANOLAZINE 500 MG: 500 TABLET, EXTENDED RELEASE ORAL at 10:43

## 2024-01-08 RX ADMIN — CEFEPIME 2000 MG: 2 INJECTION, POWDER, FOR SOLUTION INTRAVENOUS at 23:00

## 2024-01-08 RX ADMIN — TROSPIUM CHLORIDE 20 MG: 20 TABLET, FILM COATED ORAL at 09:26

## 2024-01-08 RX ADMIN — MORPHINE SULFATE 2 MG: 2 INJECTION, SOLUTION INTRAMUSCULAR; INTRAVENOUS at 06:22

## 2024-01-08 RX ADMIN — DOCUSATE SODIUM 100 MG: 100 CAPSULE, LIQUID FILLED ORAL at 09:26

## 2024-01-08 RX ADMIN — CALCIUM CARBONATE (ANTACID) CHEW TAB 500 MG 500 MG: 500 CHEW TAB at 18:10

## 2024-01-08 RX ADMIN — CASTOR OIL AND BALSAM, PERU: 788; 87 OINTMENT TOPICAL at 21:33

## 2024-01-08 RX ADMIN — MORPHINE SULFATE 2 MG: 2 INJECTION, SOLUTION INTRAMUSCULAR; INTRAVENOUS at 20:05

## 2024-01-08 RX ADMIN — RANOLAZINE 500 MG: 500 TABLET, EXTENDED RELEASE ORAL at 21:29

## 2024-01-08 RX ADMIN — ONDANSETRON 4 MG: 2 INJECTION INTRAMUSCULAR; INTRAVENOUS at 09:29

## 2024-01-08 RX ADMIN — BUDESONIDE AND FORMOTEROL FUMARATE DIHYDRATE 2 PUFF: 160; 4.5 AEROSOL RESPIRATORY (INHALATION) at 09:50

## 2024-01-08 RX ADMIN — BUDESONIDE AND FORMOTEROL FUMARATE DIHYDRATE 2 PUFF: 160; 4.5 AEROSOL RESPIRATORY (INHALATION) at 23:04

## 2024-01-08 RX ADMIN — PANTOPRAZOLE SODIUM 40 MG: 40 TABLET, DELAYED RELEASE ORAL at 06:23

## 2024-01-08 RX ADMIN — OXYCODONE HYDROCHLORIDE 5 MG: 5 TABLET ORAL at 18:09

## 2024-01-08 RX ADMIN — CEFEPIME 2000 MG: 2 INJECTION, POWDER, FOR SOLUTION INTRAVENOUS at 06:23

## 2024-01-08 RX ADMIN — SODIUM CHLORIDE, PRESERVATIVE FREE 10 ML: 5 INJECTION INTRAVENOUS at 21:29

## 2024-01-08 ASSESSMENT — PAIN SCALES - GENERAL
PAINLEVEL_OUTOF10: 6
PAINLEVEL_OUTOF10: 8
PAINLEVEL_OUTOF10: 6
PAINLEVEL_OUTOF10: 6
PAINLEVEL_OUTOF10: 5
PAINLEVEL_OUTOF10: 6
PAINLEVEL_OUTOF10: 5
PAINLEVEL_OUTOF10: 6
PAINLEVEL_OUTOF10: 5

## 2024-01-08 ASSESSMENT — PAIN DESCRIPTION - LOCATION
LOCATION: PENIS;PELVIS
LOCATION: PENIS
LOCATION: ABDOMEN;PENIS
LOCATION: PENIS;OTHER (COMMENT)
LOCATION: PELVIS
LOCATION: PENIS
LOCATION: PENIS

## 2024-01-08 ASSESSMENT — PAIN DESCRIPTION - DESCRIPTORS
DESCRIPTORS: SPASM
DESCRIPTORS: SHARP;SHOOTING
DESCRIPTORS: SPASM
DESCRIPTORS: SPASM
DESCRIPTORS: SPASM;SHOOTING
DESCRIPTORS: SHARP;SHOOTING
DESCRIPTORS: SHOOTING;SPASM
DESCRIPTORS: BURNING;SPASM;STABBING

## 2024-01-08 ASSESSMENT — PAIN - FUNCTIONAL ASSESSMENT

## 2024-01-08 ASSESSMENT — PAIN DESCRIPTION - ORIENTATION
ORIENTATION: INNER

## 2024-01-08 NOTE — PROGRESS NOTES
Urology Progress Note    Subjective:     Daily Progress Note: 2024 10:26 AM    Jamel Hazel is 3 Days Post-Op and doing well. Left PCNT was capped yesterday as ureteral stent was successfully placed. Right PCNT remains open to drain with good output and urethral gibbons with cbi off draining clear yellow UOP. No UOP recorded in flow sheets however.  Denies suprapubic discomfort today or flank pain     Await results of path from [facility] 24    On diflucan and cefepime for candida in urine and klebsiella bacteremia     Still c/o constipation     Objective:     /66   Pulse 97   Temp 98.6 °F (37 °C) (Oral)   Resp 27   Ht 1.778 m (5' 10\")   Wt 61.1 kg (134 lb 11.2 oz)   SpO2 98%   BMI 19.33 kg/m²      Temp (24hrs), Av.2 °F (36.8 °C), Min:97.4 °F (36.3 °C), Max:98.6 °F (37 °C)      Intake and Output:   1901 -  0700  In: 1100 [P.O.:1000]  Out: 3320 [Urine:3320]  No intake/output data recorded.    Physical Exam:   General appearance: alert, cooperative, no distress, appears stated age  Respiratory: no distress, NC in place   Abdomen: not distended, appropriately tender  : bilateral PCNS clear yellow UO, urethral gibbons in place. Flushes easily  Extremities: moves all     Data Review:    No results found for this or any previous visit (from the past 24 hour(s)).      Urine/Blood Cultures:  Blood cultures No results found for: \"BC\"  Urine No results found for: \"LABURIN\"    I/Os    Intake/Output Summary (Last 24 hours) at 2024 1026  Last data filed at 2024 0511  Gross per 24 hour   Intake 820 ml   Output 2320 ml   Net -1500 ml       Assessment/Plan:     Principal Problem:    Elevated troponin  Active Problems:    Severe protein-calorie malnutrition (HCC)  Resolved Problems:    * No resolved hospital problems. *      Status Post:  Procedure(s):  CYSTOSCOPY, LEFT RETROGRADE PYELOGRAM WITH STENT, BLADDER  BIOPSY AND FULGURATION     Plan:   s/p Cystoscopy, Left Retrograde Pyelography

## 2024-01-08 NOTE — PLAN OF CARE
Problem: Occupational Therapy - Adult  Goal: By Discharge: Performs self-care activities at highest level of function for planned discharge setting.  See evaluation for individualized goals.  Description: FUNCTIONAL STATUS PRIOR TO ADMISSION:  Patient was ambulatory without AD and mod I for ADLs, working on cars/outside as able with increased time due to COPD/breathing. Was sponge bathing as able due to step over tub.    Receives Help From: Family, ADL Assistance: Independent,  ,  ,  ,  ,  , Homemaking Assistance: Independent, Ambulation Assistance: Independent, Transfer Assistance: Independent, Active : Yes     HOME SUPPORT: Patient lived with wife but didn't require assistance, PRN ADL assist as needed, wife drives.     Occupational Therapy Goals:  Initiated 12/31/2023. Weekly re-assessment performed on 1/8/24. GOAL 1 has been met. GOAL 2-5  have been upgraded  1.  Patient will perform self-feeding with Balm within 7 day(s). GOAL MET and discontinued.   2.  Patient will perform grooming with Set-up within 7 day(s).. UPGRADED: standing at sink  3.  Patient will perform lower body dressing with Minimal Assist within 7 day(s). UPGRADED: supervision  4.  Patient will perform toilet transfers with Minimal Assist  within 7 day(s). UPGRADED: SBA  5.  Patient will perform all aspects of toileting with Minimal Assist within 7 day(s). UPGRADED: SBA  6.  Patient will participate in upper extremity therapeutic exercise/activities with Supervision for 10 minutes within 7 day(s).    7.  Patient will utilize energy conservation techniques during functional activities with verbal cues within 7 day(s).   Outcome: Progressing    OCCUPATIONAL THERAPY TREATMENT: WEEKLY REASSESSMENT    Patient: Jamel Hazel (68 y.o. male)  Date: 1/8/2024  Primary Diagnosis: Bladder spasms [N32.89]  Elevated troponin [R79.89]  Hematuria, unspecified type [R31.9]  Procedure(s) (LRB):  CYSTOSCOPY, LEFT RETROGRADE PYELOGRAM WITH STENT,

## 2024-01-08 NOTE — PLAN OF CARE
Problem: Discharge Planning  Goal: Discharge to home or other facility with appropriate resources  Outcome: Progressing     Problem: Pain  Goal: Verbalizes/displays adequate comfort level or baseline comfort level  Outcome: Progressing     Problem: Respiratory - Adult  Goal: Achieves optimal ventilation and oxygenation  1/7/2024 2330 by Carolin Londono, RN  Outcome: Progressing  1/7/2024 1742 by Denton Sharma, RT  Outcome: Progressing  Flowsheets (Taken 1/7/2024 1742)  Achieves optimal ventilation and oxygenation:   Assess for changes in respiratory status   Position to facilitate oxygenation and minimize respiratory effort   Respiratory therapy support as indicated   Oxygen supplementation based on oxygen saturation or arterial blood gases   Assess and instruct to report shortness of breath or any respiratory difficulty     Problem: Safety - Adult  Goal: Free from fall injury  Outcome: Progressing  Flowsheets (Taken 1/7/2024 1354 by Liss Huffman RN)  Free From Fall Injury: Instruct family/caregiver on patient safety     Problem: Skin/Tissue Integrity  Goal: Absence of new skin breakdown  Description: 1.  Monitor for areas of redness and/or skin breakdown  2.  Assess vascular access sites hourly  3.  Every 4-6 hours minimum:  Change oxygen saturation probe site  4.  Every 4-6 hours:  If on nasal continuous positive airway pressure, respiratory therapy assess nares and determine need for appliance change or resting period.  Outcome: Progressing     Problem: ABCDS Injury Assessment  Goal: Absence of physical injury  Outcome: Progressing

## 2024-01-08 NOTE — PROGRESS NOTES
Bedside and Verbal shift change report given to Carolin RN (oncoming nurse) by Liss RN (offgoing nurse). Report included the following information Nurse Handoff Report, MAR, and Cardiac Rhythm NSR/ sinus tachy .     2025: Administered prn morphine for pain    0258: Administered prn oxycodone for pain and prn zofran for nausea    0317: no orders for morning blood work per [employer] and Epic    0622: Administered prn morphine for pain    End of Shift Note    Bedside shift change report given to Liss VAUGHN (oncoming nurse) by Carolin Londono RN (offgoing nurse).  Report included the following information SBAR, MAR, and Cardiac Rhythm NSR/ sinus tachy    Shift worked:  [id number]     Shift summary and any significant changes:    See above     Concerns for physician to address:    Zone phone for oncoming shift:       Activity:     Number times ambulated in hallways past shift: 0  Number of times OOB to chair past shift: 1    Cardiac:   Cardiac Monitoring: Yes           Access:  Current line(s): PIV     Genitourinary:   Urinary status: gibbons    Respiratory:      Chronic home O2 use?: YES  Incentive spirometer at bedside: YES       GI:     Current diet:  ADULT DIET; Regular; Low Sodium (2 gm)  ADULT ORAL NUTRITION SUPPLEMENT; Breakfast, Dinner; Clear Liquid Oral Supplement  Passing flatus: YES  Tolerating current diet: YES       Pain Management:   Patient states pain is manageable on current regimen: YES    Skin:     Interventions: specialty bed, float heels, increase time out of bed, foam dressing, PT/OT consult, limit briefs, internal/external urinary devices, and nutritional support    Patient Safety:  Fall Score:    Interventions: bed/chair alarm, assistive device (walker, cane. etc), gripper socks, pt to call before getting OOB, stay with me (per policy), and gait belt       Length of Stay:  Expected LOS: 10  Actual LOS: 10      Carolin Londono RN                             [FreeTextEntry1] : 64 year old woman with history of seronegative RA, ostearthritis, chronic back pain, returns for follow up.   Patient restarted plaquenil and sulfasalazine in May 2020 with good response, feeling well in terms of joint pain and stiffness. Patient continues to follow with pain management for back pain as well.  Will repeat labs today.  Will continue plaquenil 200 mg qday and sulfasalazine 500 mg bid as well.  Patient is planning to relocate to [place] and will forward records to rheumatology practice in [place] at patient's request. Discussed covid vaccine, will place a letter in chart for patient as well.  Will repeat labs at next visit.\par \par [phone] fax\par phone: [phone]\par [facility]

## 2024-01-08 NOTE — DISCHARGE SUMMARY
Discharge Summary    Name: Jamel Hazel  [id number]  YOB: 1955 (Age: 68 y.o.)   Date of Admission: 12/28/2023  Date of Discharge: 1/8/2024  Attending Physician: Mary Grace Odom MD    Discharge Diagnosis:   Hematuria   large nodular tumor right lateral, anterior wall   Episode of chest discomfort on 1/6 overnight   1 Episode of sinus tachycardia in 160s  MARIA ELENA due to bilateral hydronephrosis   Hyponatremia -mild  Hypokalemia -improved    Anemia   Thrombocytopenia  H/o Severe COPD      Consultations:  IP CONSULT TO UROLOGY  IP CONSULT TO CARDIOLOGY  IP CONSULT TO NEPHROLOGY  IP CONSULT HOME HEALTH      Brief Admission History/Reason for Admission Per Chencho Crawford Jr., MD:   Jamel Hazel is a 68 y.o.  male   Cardiomyopathy previously followed by Dr. Reid, has not seen in years  No recent echoes or catheter reports in the computer   Prior esophageal cancer 2012 s/p resection  Chronic dysphagia since surgery  130 lb weight due to the dysphagia sustained over years   Baseline COPD  Chronic respiratory failure with hypoxia on 3 L nasal cannula oxygen at home   Recent admission at [facility] for hematuria and urinary retention 12/7/2023 to 12/13/2023  Acute urinary Retention, gross hematuria, new diagnosis of bilateral hydronephrosis  Asymmetric bladder wall thickening on imaging              ? mass  Gibbons placed last admit, plan to leave in place till urology follow up  Hydronephrosis persistent despite gibbons, urology felt likely chronic  Planning for cystoscopy as outpatient next week  Also concerned for rectal mass seen on abdominal imaging  Had a colonoscopy with Dr. Parks during that admission with multiple distal polyps   Some of the polyps could not be removed   Plan was for outpatient GI follow-up and possible surgery for the nonresectable polyps   Back with bloody urine leaking around the gibbons, suprapubic abdominal pain              ? Gibbons transiently  speech,   Psych:   Good insight. Not anxious nor agitated  Skin:                No rashes.  No jaundice    Discharge/Recent Laboratory Results:  Recent Labs     01/06/24  2220   *   K 5.1   CL 98   CO2 34*   BUN 21*   CREATININE 1.18   GLUCOSE 108*   CALCIUM 9.1     Recent Labs     01/06/24  2220   HGB 8.5*   HCT 26.6*   WBC 8.8          Discharge Medications:     Medication List        START taking these medications      levoFLOXacin 500 MG tablet  Commonly known as: LEVAQUIN  Take 1 tablet by mouth daily for 10 days     oxyBUTYnin 5 MG tablet  Commonly known as: DITROPAN  Take 1 tablet by mouth 3 times daily as needed (bladder spasms)     ranolazine 500 MG extended release tablet  Commonly known as: RANEXA  Take 1 tablet by mouth 2 times daily     simethicone 80 MG chewable tablet  Commonly known as: MYLICON  Take 1 tablet by mouth every 6 hours as needed for Flatulence            CONTINUE taking these medications      albuterol sulfate  (90 Base) MCG/ACT inhaler  Commonly known as: PROVENTIL;VENTOLIN;PROAIR     furosemide 20 MG tablet  Commonly known as: LASIX     montelukast 10 MG tablet  Commonly known as: SINGULAIR     omeprazole 20 MG delayed release capsule  Commonly known as: PRILOSEC     Symbicort 160-4.5 MCG/ACT Aero  Generic drug: budesonide-formoterol     umeclidinium-vilanterol 62.5-25 MCG/ACT inhaler  Commonly known as: ANORO ELLIPTA               Where to Get Your Medications        These medications were sent to [facility] #[id number] - [place], [place] - [address] - P [phone] - F [phone] [address] [address]      Phone: [phone]   levoFLOXacin 500 MG tablet  oxyBUTYnin 5 MG tablet  ranolazine 500 MG extended release tablet  simethicone 80 MG chewable tablet             DISPOSITION:    Home with Family:       Home with HH/PT/OT/RN: X   SNF/LTC:    JORGE:    OTHER:            Code status:   Recommended diet:

## 2024-01-08 NOTE — PROGRESS NOTES
BID RT    docusate sodium (COLACE) capsule 100 mg  100 mg Oral Daily    trospium (SANCTURA) tablet 20 mg  20 mg Oral Daily    balsum peru-castor oil (VENELEX) ointment   Topical BID    sodium chloride flush 0.9 % injection 5-40 mL  5-40 mL IntraVENous 2 times per day    sodium chloride flush 0.9 % injection 5-40 mL  5-40 mL IntraVENous PRN    0.9 % sodium chloride infusion   IntraVENous PRN    ondansetron (ZOFRAN-ODT) disintegrating tablet 4 mg  4 mg Oral Q8H PRN    Or    ondansetron (ZOFRAN) injection 4 mg  4 mg IntraVENous Q6H PRN    polyethylene glycol (GLYCOLAX) packet 17 g  17 g Oral Daily    bisacodyl (DULCOLAX) suppository 10 mg  10 mg Rectal Daily PRN    acetaminophen (TYLENOL) tablet 650 mg  650 mg Oral Q6H PRN    Or    acetaminophen (TYLENOL) suppository 650 mg  650 mg Rectal Q6H PRN    montelukast (SINGULAIR) tablet 10 mg  10 mg Oral Nightly    ipratropium 0.5 mg-albuterol 2.5 mg (DUONEB) nebulizer solution 1 Dose  1 Dose Inhalation Q4H PRN    pantoprazole (PROTONIX) tablet 40 mg  40 mg Oral QAM AC    oxyCODONE (ROXICODONE) immediate release tablet 5 mg  5 mg Oral Q4H PRN    melatonin tablet 6 mg  6 mg Oral Nightly PRN    morphine (PF) injection 2 mg  2 mg IntraVENous Q4H PRN       Objective:    Physical Exam:  Last VS: /66   Pulse 94   Temp 98.6 °F (37 °C) (Oral)   Resp 18   Ht 1.778 m (5' 10\")   Wt 61.1 kg (134 lb 11.2 oz)   SpO2 98%   BMI 19.33 kg/m²  Temp (24hrs), Av.2 °F (36.8 °C), Min:97.4 °F (36.3 °C), Max:98.6 °F (37 °C)    24 hr VS reviewed.  General Appearance:   [x] well developed, well nourished,  [x] NAD.      [] agitated   [] lethargic but arousable  [] obtunded   ENT, Palate:    [x] WNL   [] dry palate/MM        Respiratory:    [x] CTA bilateral  [] rales  [] rhonchi  [] normal resp effort      [] similar to yesterday   [] worse    [] improved   Cardiovascular:   [x] RRR   [] Irregular rate and rhythm   [x] Normal S1, S2   [x] No gallop or rub.   [] no murmur   [x] no new  murmur  [] murmur c/w:   [x] no edema  RLE: []1+ []2+ [] 3+;  LLE: []1+ []2+ []3+      [] edema similar to yesterday   [] worse    [] improved   [x] normal JVP   [] Elevated JVP    [x] JVP similar to yesterday   [] worse    [] improved   [x] carotid upstroke unchanged   [x] abd aorta not palpated   [x] no stigmata of peripheral emboli   GI:    [x] abd soft, non-distented,bowel sounds present, no                     organomegaly appreciated   Skin:  Neuro:    Cath Site:  [x] warm and dry [] cold extremities   [x] A/O x 3, grossly non-focal      [] intact w/o hematoma or bruit; distal pulse unchanged.              Data Review:   Labs:  No results found for this or any previous visit (from the past 24 hour(s)).    Provided Telemetry: [x] sinus  [] chronic afib   [] paroxysmal afib  [] NSVT      Assessment:     Principal Problem:    Elevated troponin  Active Problems:    Severe protein-calorie malnutrition (HCC)  Resolved Problems:    * No resolved hospital problems. *      Plan:     Chest Pain  improved    Continue medical therapy      For all other plans, see orders.   [x] High complexity decision making was performed    Ambulatory

## 2024-01-08 NOTE — PLAN OF CARE
Problem: Physical Therapy - Adult  Goal: By Discharge: Performs mobility at highest level of function for planned discharge setting.  See evaluation for individualized goals.  Description: FUNCTIONAL STATUS PRIOR TO ADMISSION: Patient was independent and active with use of supplemental of oxygen.    HOME SUPPORT PRIOR TO ADMISSION: The patient lived with spouse.    Physical Therapy Goals  Initiated 12/30/2023; Reassessed 1/8/24 - current goals still appropriate  1.  Patient will move from supine to sit and sit to supine in bed with modified independence within 7 day(s).    2.  Patient will perform sit to stand with modified independence within 7 day(s).  3.  Patient will transfer from bed to chair and chair to bed with supervision/set-up using the least restrictive device within 7 day(s).  4.  Patient will ambulate with supervision/set-up for 100 feet with the least restrictive device within 7 day(s).   5.  Patient will ascend/descend 5 stairs with B handrail(s) with supervision/set-up within 7 day(s).   Outcome: Progressing   PHYSICAL THERAPY TREATMENT: WEEKLY REASSESSMENT    Patient: Jamel Hazel (68 y.o. male)  Date: 1/8/2024  Primary Diagnosis: Bladder spasms [N32.89]  Elevated troponin [R79.89]  Hematuria, unspecified type [R31.9]  Procedure(s) (LRB):  CYSTOSCOPY, LEFT RETROGRADE PYELOGRAM WITH STENT, BLADDER  BIOPSY AND FULGURATION (N/A) 3 Days Post-Op   Precautions: Fall Risk, Up as Tolerated, Other (comment)                    ASSESSMENT :  Patient continues to benefit from skilled PT services and is progressing towards goals. The patient was lying in bed when PT arrived. Reviewed bed exercises with patient prior to mobilizing. Extra time needed to manage 2 posterior kidney drains, gibbons catheter and IV line. Came to sitting with sba and intact sitting balance. Stood with sba with set up of tubes and IV lines on RW. Gait training progressed to 75 feet with cg assistance using RW in the room with sitting

## 2024-01-09 LAB
COMMENT:: NORMAL
SPECIMEN HOLD: NORMAL

## 2024-01-09 PROCEDURE — 2580000003 HC RX 258: Performed by: ANESTHESIOLOGY

## 2024-01-09 PROCEDURE — 94640 AIRWAY INHALATION TREATMENT: CPT

## 2024-01-09 PROCEDURE — 6370000000 HC RX 637 (ALT 250 FOR IP): Performed by: INTERNAL MEDICINE

## 2024-01-09 PROCEDURE — 2700000000 HC OXYGEN THERAPY PER DAY

## 2024-01-09 PROCEDURE — 6360000002 HC RX W HCPCS: Performed by: INTERNAL MEDICINE

## 2024-01-09 PROCEDURE — 6370000000 HC RX 637 (ALT 250 FOR IP): Performed by: NURSE PRACTITIONER

## 2024-01-09 PROCEDURE — 6370000000 HC RX 637 (ALT 250 FOR IP): Performed by: STUDENT IN AN ORGANIZED HEALTH CARE EDUCATION/TRAINING PROGRAM

## 2024-01-09 PROCEDURE — 6360000002 HC RX W HCPCS: Performed by: STUDENT IN AN ORGANIZED HEALTH CARE EDUCATION/TRAINING PROGRAM

## 2024-01-09 PROCEDURE — 84443 ASSAY THYROID STIM HORMONE: CPT

## 2024-01-09 PROCEDURE — 2580000003 HC RX 258: Performed by: INTERNAL MEDICINE

## 2024-01-09 PROCEDURE — 51702 INSERT TEMP BLADDER CATH: CPT

## 2024-01-09 PROCEDURE — 36415 COLL VENOUS BLD VENIPUNCTURE: CPT

## 2024-01-09 PROCEDURE — 1100000000 HC RM PRIVATE

## 2024-01-09 PROCEDURE — 2580000003 HC RX 258: Performed by: STUDENT IN AN ORGANIZED HEALTH CARE EDUCATION/TRAINING PROGRAM

## 2024-01-09 RX ORDER — POLYETHYLENE GLYCOL 3350 17 G/17G
17 POWDER, FOR SOLUTION ORAL 2 TIMES DAILY
Status: DISCONTINUED | OUTPATIENT
Start: 2024-01-09 | End: 2024-01-11

## 2024-01-09 RX ORDER — CALCIUM CARBONATE 500 MG/1
500 TABLET, CHEWABLE ORAL 3 TIMES DAILY PRN
Status: DISCONTINUED | OUTPATIENT
Start: 2024-01-09 | End: 2024-01-13 | Stop reason: HOSPADM

## 2024-01-09 RX ORDER — LACTULOSE 10 G/15ML
20 SOLUTION ORAL 3 TIMES DAILY
Status: DISCONTINUED | OUTPATIENT
Start: 2024-01-09 | End: 2024-01-10

## 2024-01-09 RX ORDER — SENNA AND DOCUSATE SODIUM 50; 8.6 MG/1; MG/1
2 TABLET, FILM COATED ORAL DAILY
Status: DISCONTINUED | OUTPATIENT
Start: 2024-01-09 | End: 2024-01-11

## 2024-01-09 RX ADMIN — ONDANSETRON 4 MG: 2 INJECTION INTRAMUSCULAR; INTRAVENOUS at 19:04

## 2024-01-09 RX ADMIN — BUDESONIDE AND FORMOTEROL FUMARATE DIHYDRATE 2 PUFF: 160; 4.5 AEROSOL RESPIRATORY (INHALATION) at 09:12

## 2024-01-09 RX ADMIN — TROSPIUM CHLORIDE 20 MG: 20 TABLET, FILM COATED ORAL at 10:10

## 2024-01-09 RX ADMIN — CALCIUM CARBONATE (ANTACID) CHEW TAB 500 MG 500 MG: 500 CHEW TAB at 15:20

## 2024-01-09 RX ADMIN — DOCUSATE SODIUM 50 MG AND SENNOSIDES 8.6 MG 2 TABLET: 8.6; 5 TABLET, FILM COATED ORAL at 10:09

## 2024-01-09 RX ADMIN — POLYETHYLENE GLYCOL 3350 17 G: 17 POWDER, FOR SOLUTION ORAL at 10:11

## 2024-01-09 RX ADMIN — POLYETHYLENE GLYCOL 3350 17 G: 17 POWDER, FOR SOLUTION ORAL at 00:16

## 2024-01-09 RX ADMIN — OXYCODONE HYDROCHLORIDE 5 MG: 5 TABLET ORAL at 10:11

## 2024-01-09 RX ADMIN — POLYETHYLENE GLYCOL 3350 17 G: 17 POWDER, FOR SOLUTION ORAL at 20:43

## 2024-01-09 RX ADMIN — MORPHINE SULFATE 2 MG: 2 INJECTION, SOLUTION INTRAMUSCULAR; INTRAVENOUS at 20:40

## 2024-01-09 RX ADMIN — SODIUM CHLORIDE: 900 INJECTION INTRAVENOUS at 23:00

## 2024-01-09 RX ADMIN — CASTOR OIL AND BALSAM, PERU: 788; 87 OINTMENT TOPICAL at 10:12

## 2024-01-09 RX ADMIN — OXYCODONE HYDROCHLORIDE 5 MG: 5 TABLET ORAL at 05:52

## 2024-01-09 RX ADMIN — MONTELUKAST 10 MG: 10 TABLET, FILM COATED ORAL at 20:40

## 2024-01-09 RX ADMIN — BUDESONIDE AND FORMOTEROL FUMARATE DIHYDRATE 2 PUFF: 160; 4.5 AEROSOL RESPIRATORY (INHALATION) at 20:20

## 2024-01-09 RX ADMIN — MORPHINE SULFATE 2 MG: 2 INJECTION, SOLUTION INTRAMUSCULAR; INTRAVENOUS at 13:08

## 2024-01-09 RX ADMIN — CEFEPIME 2000 MG: 2 INJECTION, POWDER, FOR SOLUTION INTRAVENOUS at 23:01

## 2024-01-09 RX ADMIN — SODIUM CHLORIDE, PRESERVATIVE FREE 5 ML: 5 INJECTION INTRAVENOUS at 20:43

## 2024-01-09 RX ADMIN — PANTOPRAZOLE SODIUM 40 MG: 40 TABLET, DELAYED RELEASE ORAL at 05:53

## 2024-01-09 RX ADMIN — SODIUM CHLORIDE, PRESERVATIVE FREE 10 ML: 5 INJECTION INTRAVENOUS at 10:12

## 2024-01-09 RX ADMIN — RANOLAZINE 500 MG: 500 TABLET, EXTENDED RELEASE ORAL at 10:09

## 2024-01-09 RX ADMIN — LACTULOSE 20 G: 20 SOLUTION ORAL at 20:43

## 2024-01-09 RX ADMIN — SIMETHICONE 80 MG: 80 TABLET, CHEWABLE ORAL at 13:08

## 2024-01-09 RX ADMIN — LACTULOSE 20 G: 20 SOLUTION ORAL at 14:50

## 2024-01-09 RX ADMIN — LACTULOSE 20 G: 20 SOLUTION ORAL at 10:09

## 2024-01-09 RX ADMIN — CEFEPIME 2000 MG: 2 INJECTION, POWDER, FOR SOLUTION INTRAVENOUS at 14:48

## 2024-01-09 RX ADMIN — CASTOR OIL AND BALSAM, PERU: 788; 87 OINTMENT TOPICAL at 20:42

## 2024-01-09 RX ADMIN — GUAIFENESIN 600 MG: 600 TABLET, EXTENDED RELEASE ORAL at 10:11

## 2024-01-09 RX ADMIN — RANOLAZINE 500 MG: 500 TABLET, EXTENDED RELEASE ORAL at 20:40

## 2024-01-09 RX ADMIN — GUAIFENESIN 600 MG: 600 TABLET, EXTENDED RELEASE ORAL at 20:40

## 2024-01-09 RX ADMIN — CEFEPIME 2000 MG: 2 INJECTION, POWDER, FOR SOLUTION INTRAVENOUS at 06:13

## 2024-01-09 ASSESSMENT — PAIN DESCRIPTION - ORIENTATION
ORIENTATION: MID
ORIENTATION: MID

## 2024-01-09 ASSESSMENT — PAIN DESCRIPTION - ONSET: ONSET: ON-GOING

## 2024-01-09 ASSESSMENT — PAIN SCALES - GENERAL
PAINLEVEL_OUTOF10: 3
PAINLEVEL_OUTOF10: 2
PAINLEVEL_OUTOF10: 6
PAINLEVEL_OUTOF10: 6
PAINLEVEL_OUTOF10: 3
PAINLEVEL_OUTOF10: 8
PAINLEVEL_OUTOF10: 5
PAINLEVEL_OUTOF10: 6

## 2024-01-09 ASSESSMENT — PAIN DESCRIPTION - DESCRIPTORS
DESCRIPTORS: CRAMPING;SHARP
DESCRIPTORS: CRAMPING
DESCRIPTORS: ACHING;DISCOMFORT
DESCRIPTORS: ACHING;CRAMPING

## 2024-01-09 ASSESSMENT — PAIN DESCRIPTION - FREQUENCY: FREQUENCY: INTERMITTENT

## 2024-01-09 ASSESSMENT — PAIN SCALES - WONG BAKER: WONGBAKER_NUMERICALRESPONSE: 0

## 2024-01-09 ASSESSMENT — PAIN DESCRIPTION - LOCATION
LOCATION: BACK
LOCATION: ABDOMEN
LOCATION: BACK
LOCATION: ABDOMEN

## 2024-01-09 NOTE — PROGRESS NOTES
Cardiology Progress Note  1/9/2024 9:09 AM  Admit Date: 12/28/2023  Admit Diagnosis/CC: Bladder spasms [N32.89]  Elevated troponin [R79.89]  Hematuria, unspecified type [R31.9]  Subjective:   Patient reports:  Chest Pain:  [x] none,  consistent with [] non-cardiac  [] atypical  []  anginal chest pain             [x] none now    []  on-going  Dyspnea: [x] none  [] at rest  [] with exertion  [] improved  [] unchanged [] worsening  PND:       [x] none  [] overnight   [] current  Orthopnea: [x] none  [] improved  [] unchanged  [] worsening  Presyncope: [x] none  [] improved  [] unchanged  [] worsening  Ambulated in hallway without symptoms  [] Yes  Ambulated in room without symptoms  [x] Yes  ROS(2+other systems)   Hematuria: [] Yes  [x] No.   Dysuria: [] Yes  [x] No                                           Cough:       [] Yes  [x] No.   Sputum: [] Yes  [x] No                                            Hematochezia: [] Yes  [x] No.   Melena: [] Yes  [x] No                                            No change in family and social history from H&P/Consult note.    Current Facility-Administered Medications   Medication Dose Route Frequency    lactulose (CHRONULAC) 10 GM/15ML solution 20 g  20 g Oral TID    polyethylene glycol (GLYCOLAX) packet 17 g  17 g Oral BID    sennosides-docusate sodium (SENOKOT-S) 8.6-50 MG tablet 2 tablet  2 tablet Oral Daily    ranolazine (RANEXA) extended release tablet 500 mg  500 mg Oral BID    bisacodyl (DULCOLAX) EC tablet 5 mg  5 mg Oral Daily PRN    ceFEPIme (MAXIPIME) 2,000 mg in sodium chloride 0.9 % 100 mL IVPB (mini-bag)  2,000 mg IntraVENous Q8H    simethicone (MYLICON) chewable tablet 80 mg  80 mg Oral Q6H PRN    guaiFENesin (MUCINEX) extended release tablet 600 mg  600 mg Oral BID    lactated ringers IV soln infusion   IntraVENous Continuous    0.9 % sodium chloride infusion   IntraVENous PRN    calcium carbonate (TUMS) chewable tablet 500 mg  500 mg Oral Daily PRN

## 2024-01-09 NOTE — PROGRESS NOTES
Hospitalist Progress Note    NAME:   Jamel Hazel   : 1955   MRN: [id number]     Date/Time: 2024 3:13 PM  Patient PCP: Aj Loya DO    Estimated discharge date: 1/10  Barriers:  Bowel movement       Assessment / Plan:  Constipation   -Ordered Lactulose tid  -Senna scheduled   Dulcolax ordered   Tums for gas     Hematuria   large nodular tumor right lateral, anterior wall   Currently has three way gibbons catheter -no blood clots /hematuria noted   Continue with continuous bladder irrigation   Urology following   Continue with IV Cefepime to cover Klebsiella bacteremia noted in blood culture  Continue with Diflucan for Candida in urine   S/p IR percutaneous Nephrostomy tube on - currently draining clear urine   Repeat Blood culture on - negative   Scheduled for cystoscopy with possible biopsy on     Cardiology consult - patient at moderate risk for intermediate risk procedure  : S/p  Cystoscopy ,left Retrograde Pyelography and Stent Placement, Bladder Biopsy and Fulguration   - CBI   - Leave urethral gibbons in; leave bilateral PCNT in and open to drain.  - If doing well will consider cap left PCNT 1-2 days  - Advise strict I&O  -Urology recommendations appreciated   :  Continue Cefepime total 14 days  -Plan to switch to levofloxacin on discharge to complete total  14 days  -Urology recs appreciated   :  Continue Cefepime total 14 days  -cap L PCNT today by  urology   - continue gibbons catheter for now for monitoring I/o  :  Continue culture driven abx   -bowel regimen per primary team   -continue L PCNT capped   - continue gibbons catheter, take down CBI tubing and cap third port, pt will discharge home with this gibbons and bilateral PCNT (again keep L PCNT capped)  -will discuss results of path once resulted, likely will be done OP    : NO new changes in management     Episode of chest discomfort on  overnight   -Cxr no acute processes  -Lab work stable   -Trop  showed 158, appears trending down from was nearly 400 few days ago during this admit   - suspected type 2 MI vs history of esophageal cancer and large hiatal hernia he gets trapped gas \"all the time\"   -Cardiology to follow   -He states having constipation and gas   -Ordered bowel regimen and simethicone for gas       1 Episode of sinus tachycardia in 160s  Patient felt palpitations  Denies Chest pain, SOB  Vitals stable   -Continue to monitor in tele   -Cardiology team updated       MARIA ELENA due to bilateral hydronephrosis   -Renal team following  -Continue to trend   -Continue with gibbons   -S/p bilateral nephrostomy tube  1/5: BUN/Cr improving   1/6: BUN/Cr -back to baseline     Hyponatremia -mild  Hypokalemia -improved     Anemia   Hb -7.9 on 1/4   Repeat CBC   1/7: Hb-8.5    Thrombocytopenia  -trend  1/7: improved     H/o Severe COPD   Not on exacerbations   Continue with home medications               Medical Decision Making:   I personally reviewed labs: yes   I personally reviewed imaging: yes   I personally reviewed EKG:  Toxic drug monitoring:   Discussed case with:         Code Status: Full   DVT Prophylaxis:   GI Prophylaxis:    Subjective:     Chief Complaint / Reason for Physician Visit  \"Currently on 3l NC. No bowel movement since 10 days \".  Discussed with RN events overnight.       Objective:     VITALS:   Last 24hrs VS reviewed since prior progress note. Most recent are:  Patient Vitals for the past 24 hrs:   BP Temp Temp src Pulse Resp SpO2   01/09/24 1454 110/67 -- -- 86 23 --   01/09/24 1154 107/69 97.9 °F (36.6 °C) Oral 88 23 99 %   01/09/24 1011 -- -- -- -- 26 --   01/09/24 0800 -- -- -- 92 21 --   01/09/24 0735 -- -- -- (!) 101 -- --   01/09/24 0730 97/73 -- -- (!) 102 24 --   01/09/24 0552 -- -- -- -- 20 --   01/09/24 0426 97/61 98.8 °F (37.1 °C) Oral 93 23 --   01/09/24 0014 107/64 98.4 °F (36.9 °C) Oral 99 17 --   01/08/24 2301 -- -- -- -- 21 --   01/08/24 2005 -- -- -- -- 18 --   01/08/24 2000

## 2024-01-09 NOTE — PLAN OF CARE
Problem: Discharge Planning  Goal: Discharge to home or other facility with appropriate resources  1/9/2024 1752 by Leann Smith RN  Outcome: Progressing  1/9/2024 1752 by Leann Smith RN  Outcome: Progressing     Problem: Pain  Goal: Verbalizes/displays adequate comfort level or baseline comfort level  1/9/2024 1752 by Leann Smith RN  Outcome: Progressing  1/9/2024 1752 by Leann Smith RN  Outcome: Progressing  Flowsheets (Taken 1/9/2024 0730 by Haylee Ospina RN)  Verbalizes/displays adequate comfort level or baseline comfort level:   Encourage patient to monitor pain and request assistance   Assess pain using appropriate pain scale   Administer analgesics based on type and severity of pain and evaluate response   Implement non-pharmacological measures as appropriate and evaluate response   Consider cultural and social influences on pain and pain management   Notify Licensed Independent Practitioner if interventions unsuccessful or patient reports new pain     Problem: Respiratory - Adult  Goal: Achieves optimal ventilation and oxygenation  1/9/2024 1752 by Leann Smith RN  Outcome: Progressing  1/9/2024 1752 by Leann Smith RN  Outcome: Progressing  1/9/2024 0913 by Beti Caba, RT  Outcome: Progressing  Flowsheets  Taken 1/9/2024 0730 by Haylee Ospina RN  Achieves optimal ventilation and oxygenation:   Assess for changes in respiratory status   Assess for changes in mentation and behavior   Assess and instruct to report shortness of breath or any respiratory difficulty  Taken 1/8/2024 2000 by Quiana Solorio RN  Achieves optimal ventilation and oxygenation: Assess for changes in respiratory status     Problem: Safety - Adult  Goal: Free from fall injury  1/9/2024 1752 by Leann Smith RN  Outcome: Progressing  1/9/2024 1752 by Leann Smith RN  Outcome: Progressing     Problem: Skin/Tissue Integrity  Goal: Absence of new skin breakdown  Description: 1.  Monitor for areas of redness  and/or skin breakdown  2.  Assess vascular access sites hourly  3.  Every 4-6 hours minimum:  Change oxygen saturation probe site  4.  Every 4-6 hours:  If on nasal continuous positive airway pressure, respiratory therapy assess nares and determine need for appliance change or resting period.  1/9/2024 1752 by Leann Smith, RN  Outcome: Progressing  1/9/2024 1752 by Leann Smith, RN  Outcome: Progressing     Problem: ABCDS Injury Assessment  Goal: Absence of physical injury  1/9/2024 1752 by Leann Smith, RN  Outcome: Progressing  1/9/2024 1752 by Leann Smith, RN  Outcome: Progressing

## 2024-01-09 NOTE — PROGRESS NOTES
Nursing notified patient having continued abd pain, states no BM x10 days, was told to not use enemas, is concerned for constipation treatment and would like something to help alleviate constipation at this time. Did receive dulcolax without producing a BM. Also reported bladder spasms is the main pain and is asking for something additional to what he is taking as is uncomfortable. On bedside exam patient has mild increased generalized pain on palpation, just received pain meds, states he feels full when eating but able to eat, reported nausea without vomiting, tells me in the past he has had hypothyroidism, but not on meds any longer, is passing flatus. Doesn't rate pain to me on exam at scale 1-10. VSS. Ordered additional x1 dose trospium tonight, would consider increasing the dose to bid if pt responds well to this- will defer to dayshift further med mgmt for this, ordered stat kub to r/o complicating features of constipation reported x10 days, prior kub reviewed from few days ago, TSH w/reflex ordered for routine in AM r/o other causes constipation such as thyroid, however, suspect in the setting of opioid use is likely 2/2 to this, and may would consider starting on amitiza, movantik or similar if pt is to continue on opioids outpt as this may help alleviate the constipation from the opioid use- will defer adding these at this time. Will add miralax x2 doses q2h tonight, would consider additional meds if needed to help alleviate the reported constipation. Awaiting kub results- will consider further mgmt if emergent issues arise with kub results overnight. Patient denies any further complaints or concerns. No acute distress reported. Patient states his only other concern is he feels like he doesn't understand the plan of care he has, and he would like dayshift hospitalist to d/w him further because he has numerous questions on the plan of care. States colonoscopy done recently has follow-up in 6 months for large

## 2024-01-09 NOTE — PROGRESS NOTES
Urology Progress Note    Subjective:     Daily Progress Note: 2024 11:09 AM    Jamel Hazel is 4 Days Post-Op and doing well. Left PCNT was capped over weekend as ureteral stent was successfully placed. Right PCNT remains open (couldn't place right stent due to no UO visualized) to drain with good output and urethral gibbons with cbi off draining clear yellow UOP. Good UOP yesterday.  Denies suprapubic discomfort today or flank pain     Await results of path from TURBT 24- path pending     On diflucan and cefepime for candida in urine and klebsiella bacteremia     Still c/o constipation, pt was pending discharge yesterday however continues with constipation     Objective:     BP 97/73   Pulse (!) 102   Temp 98.8 °F (37.1 °C) (Oral)   Resp 26   Ht 1.778 m (5' 10\")   Wt 61.1 kg (134 lb 11.2 oz)   SpO2 99%   BMI 19.33 kg/m²      Temp (24hrs), Av.2 °F (36.8 °C), Min:97.6 °F (36.4 °C), Max:98.8 °F (37.1 °C)      Intake and Output:   1901 -  0700  In: 1660 [P.O.:1460]  Out: 1780 [Urine:1780]  No intake/output data recorded.    Physical Exam:   General appearance: alert, cooperative, no distress, appears stated age  Respiratory: no distress, NC in place   Abdomen: not distended, appropriately tender  : bilateral PCNS clear yellow UO, urethral gibbons in place. Flushes easily  Extremities: moves all     Data Review:    Recent Results (from the past 24 hour(s))   Extra Tubes Hold    Collection Time: 24  6:07 AM   Result Value Ref Range    Specimen HOld PST     Comment:        Add-on orders for these samples will be processed based on acceptable specimen integrity and analyte stability, which may vary by analyte.         Urine/Blood Cultures:  Blood cultures No results found for: \"BC\"  Urine No results found for: \"LABURIN\"    I/Os    Intake/Output Summary (Last 24 hours) at 2024 1109  Last data filed at 2024 1753  Gross per 24 hour   Intake 1200 ml   Output 880 ml   Net 320 ml

## 2024-01-09 NOTE — PROGRESS NOTES
0700 Bedside shift report received from JOHANA Araya. Report included the following information Nurse Handoff Report, MAR, Telemetry status, Recent Results, and plan of care. This RN verbalized understanding of plan of care with opportunity for clarification and questions. Care of patient assumed at this time. Dual skin check performed at this time.     1900 Bedside shift report given to  JOHANA Meadows. Report included the following information Nurse Handoff Report, MAR, Telemetry status, Recent Results, and plan of care. Oncoming RN verbalized understanding of plan of care with opportunity for clarification and questions. Dual skin check performed at this time.       I have overseen Haylee Ospina RN from 7am-7pm. During this time I have directly observed her patient care skills; medical knowledge and reasoning; and communication/ professionalism. I agree with the assessments, medication administration, and flow sheet charting provided by Haylee Ospina RN.

## 2024-01-09 NOTE — PROGRESS NOTES
Discharge Delay Information         Discharge Delay Comment Date Added Who Added Date Resolved Who Resolved Status of Delay Primary Delay?    Placement Issue Home Health Acceptance Jan 08, 2024 10:36 AM Sofya Correa, RN   Active          Patient expressed concerns about not having a bowel movement even with the medications he had receive. He continues to eat as tolerated with reported nausea earlier this morning requiring antiemetics. MD was made aware with plans for further management for bowel regimen discussed with the patient.  CM was contacted to aid patient's transition home. Patient requesting assistance for home health.

## 2024-01-09 NOTE — PROGRESS NOTES
Patient appears to have obtained greatest relief from his additional dose of [place].  No further c/o bladder spasms since administration of the medication tonight.

## 2024-01-10 ENCOUNTER — APPOINTMENT (OUTPATIENT)
Facility: HOSPITAL | Age: 69
DRG: 656 | End: 2024-01-10
Payer: MEDICARE

## 2024-01-10 PROBLEM — D50.0 IRON DEFICIENCY ANEMIA DUE TO CHRONIC BLOOD LOSS: Status: ACTIVE | Noted: 2024-01-10

## 2024-01-10 PROBLEM — R45.89 NEED FOR EMOTIONAL SUPPORT: Status: ACTIVE | Noted: 2024-01-10

## 2024-01-10 PROBLEM — Z51.5 PALLIATIVE CARE BY SPECIALIST: Status: ACTIVE | Noted: 2024-01-10

## 2024-01-10 PROBLEM — R53.81 PHYSICAL DEBILITY: Status: ACTIVE | Noted: 2024-01-10

## 2024-01-10 PROBLEM — D64.9 NORMOCYTIC ANEMIA: Status: ACTIVE | Noted: 2024-01-10

## 2024-01-10 PROBLEM — N32.89 BLADDER SPASMS: Status: ACTIVE | Noted: 2024-01-10

## 2024-01-10 PROBLEM — K59.00 CONSTIPATION: Status: ACTIVE | Noted: 2024-01-10

## 2024-01-10 PROBLEM — R63.0 DECREASED APPETITE: Status: ACTIVE | Noted: 2024-01-10

## 2024-01-10 PROBLEM — Z71.89 COUNSELING REGARDING ADVANCE CARE PLANNING AND GOALS OF CARE: Status: ACTIVE | Noted: 2024-01-10

## 2024-01-10 PROBLEM — G89.3 CANCER RELATED PAIN: Status: ACTIVE | Noted: 2024-01-10

## 2024-01-10 PROBLEM — C67.6 MALIGNANT NEOPLASM OF URETERIC ORIFICE (HCC): Status: ACTIVE | Noted: 2024-01-10

## 2024-01-10 LAB
ALBUMIN SERPL-MCNC: 2.5 G/DL (ref 3.5–5)
ALBUMIN/GLOB SERPL: 0.6 (ref 1.1–2.2)
ALP SERPL-CCNC: 102 U/L (ref 45–117)
ALT SERPL-CCNC: 25 U/L (ref 12–78)
ANION GAP SERPL CALC-SCNC: 1 MMOL/L (ref 5–15)
AST SERPL-CCNC: 15 U/L (ref 15–37)
BILIRUB SERPL-MCNC: 0.4 MG/DL (ref 0.2–1)
BUN SERPL-MCNC: 24 MG/DL (ref 6–20)
BUN/CREAT SERPL: 27 (ref 12–20)
CALCIUM SERPL-MCNC: 8.7 MG/DL (ref 8.5–10.1)
CHLORIDE SERPL-SCNC: 97 MMOL/L (ref 97–108)
CO2 SERPL-SCNC: 34 MMOL/L (ref 21–32)
COMMENT:: NORMAL
CREAT SERPL-MCNC: 0.89 MG/DL (ref 0.7–1.3)
FERRITIN SERPL-MCNC: 105 NG/ML (ref 26–388)
GLOBULIN SER CALC-MCNC: 4.2 G/DL (ref 2–4)
GLUCOSE SERPL-MCNC: 144 MG/DL (ref 65–100)
IRON SATN MFR SERPL: 9 % (ref 20–50)
IRON SERPL-MCNC: 20 UG/DL (ref 35–150)
POTASSIUM SERPL-SCNC: 4 MMOL/L (ref 3.5–5.1)
PROT SERPL-MCNC: 6.7 G/DL (ref 6.4–8.2)
SODIUM SERPL-SCNC: 132 MMOL/L (ref 136–145)
SPECIMEN HOLD: NORMAL
TIBC SERPL-MCNC: 226 UG/DL (ref 250–450)

## 2024-01-10 PROCEDURE — 6360000002 HC RX W HCPCS

## 2024-01-10 PROCEDURE — 83540 ASSAY OF IRON: CPT

## 2024-01-10 PROCEDURE — 2580000003 HC RX 258: Performed by: STUDENT IN AN ORGANIZED HEALTH CARE EDUCATION/TRAINING PROGRAM

## 2024-01-10 PROCEDURE — 36415 COLL VENOUS BLD VENIPUNCTURE: CPT

## 2024-01-10 PROCEDURE — 6360000002 HC RX W HCPCS: Performed by: INTERNAL MEDICINE

## 2024-01-10 PROCEDURE — 82728 ASSAY OF FERRITIN: CPT

## 2024-01-10 PROCEDURE — 6370000000 HC RX 637 (ALT 250 FOR IP): Performed by: INTERNAL MEDICINE

## 2024-01-10 PROCEDURE — 6370000000 HC RX 637 (ALT 250 FOR IP): Performed by: STUDENT IN AN ORGANIZED HEALTH CARE EDUCATION/TRAINING PROGRAM

## 2024-01-10 PROCEDURE — 6360000004 HC RX CONTRAST MEDICATION: Performed by: INTERNAL MEDICINE

## 2024-01-10 PROCEDURE — 71260 CT THORAX DX C+: CPT

## 2024-01-10 PROCEDURE — 2580000003 HC RX 258: Performed by: INTERNAL MEDICINE

## 2024-01-10 PROCEDURE — 6360000002 HC RX W HCPCS: Performed by: STUDENT IN AN ORGANIZED HEALTH CARE EDUCATION/TRAINING PROGRAM

## 2024-01-10 PROCEDURE — 6370000000 HC RX 637 (ALT 250 FOR IP): Performed by: NURSE PRACTITIONER

## 2024-01-10 PROCEDURE — 2700000000 HC OXYGEN THERAPY PER DAY

## 2024-01-10 PROCEDURE — 83550 IRON BINDING TEST: CPT

## 2024-01-10 PROCEDURE — 80053 COMPREHEN METABOLIC PANEL: CPT

## 2024-01-10 PROCEDURE — 51702 INSERT TEMP BLADDER CATH: CPT

## 2024-01-10 PROCEDURE — 6370000000 HC RX 637 (ALT 250 FOR IP)

## 2024-01-10 PROCEDURE — 1100000000 HC RM PRIVATE

## 2024-01-10 PROCEDURE — 99222 1ST HOSP IP/OBS MODERATE 55: CPT | Performed by: STUDENT IN AN ORGANIZED HEALTH CARE EDUCATION/TRAINING PROGRAM

## 2024-01-10 PROCEDURE — 99497 ADVNCD CARE PLAN 30 MIN: CPT | Performed by: FAMILY MEDICINE

## 2024-01-10 PROCEDURE — 99222 1ST HOSP IP/OBS MODERATE 55: CPT | Performed by: FAMILY MEDICINE

## 2024-01-10 RX ORDER — BISACODYL 5 MG/1
10 TABLET, DELAYED RELEASE ORAL DAILY
Status: DISCONTINUED | OUTPATIENT
Start: 2024-01-10 | End: 2024-01-11

## 2024-01-10 RX ADMIN — MORPHINE SULFATE 2 MG: 2 INJECTION, SOLUTION INTRAMUSCULAR; INTRAVENOUS at 09:29

## 2024-01-10 RX ADMIN — BUDESONIDE AND FORMOTEROL FUMARATE DIHYDRATE 2 PUFF: 160; 4.5 AEROSOL RESPIRATORY (INHALATION) at 22:18

## 2024-01-10 RX ADMIN — BUDESONIDE AND FORMOTEROL FUMARATE DIHYDRATE 2 PUFF: 160; 4.5 AEROSOL RESPIRATORY (INHALATION) at 10:53

## 2024-01-10 RX ADMIN — POLYETHYLENE GLYCOL 3350 17 G: 17 POWDER, FOR SOLUTION ORAL at 20:14

## 2024-01-10 RX ADMIN — CEFEPIME 2000 MG: 2 INJECTION, POWDER, FOR SOLUTION INTRAVENOUS at 06:10

## 2024-01-10 RX ADMIN — RANOLAZINE 500 MG: 500 TABLET, EXTENDED RELEASE ORAL at 20:14

## 2024-01-10 RX ADMIN — LACTULOSE 20 G: 20 SOLUTION ORAL at 09:30

## 2024-01-10 RX ADMIN — ONDANSETRON 4 MG: 2 INJECTION INTRAMUSCULAR; INTRAVENOUS at 22:43

## 2024-01-10 RX ADMIN — IRON SUCROSE 300 MG: 20 INJECTION, SOLUTION INTRAVENOUS at 17:49

## 2024-01-10 RX ADMIN — GUAIFENESIN 600 MG: 600 TABLET, EXTENDED RELEASE ORAL at 20:13

## 2024-01-10 RX ADMIN — MORPHINE SULFATE 2 MG: 2 INJECTION, SOLUTION INTRAMUSCULAR; INTRAVENOUS at 01:31

## 2024-01-10 RX ADMIN — SIMETHICONE 80 MG: 80 TABLET, CHEWABLE ORAL at 01:32

## 2024-01-10 RX ADMIN — POLYETHYLENE GLYCOL 3350 17 G: 17 POWDER, FOR SOLUTION ORAL at 09:30

## 2024-01-10 RX ADMIN — ONDANSETRON 4 MG: 2 INJECTION INTRAMUSCULAR; INTRAVENOUS at 15:32

## 2024-01-10 RX ADMIN — SIMETHICONE 80 MG: 80 TABLET, CHEWABLE ORAL at 14:27

## 2024-01-10 RX ADMIN — DOCUSATE SODIUM 50 MG AND SENNOSIDES 8.6 MG 2 TABLET: 8.6; 5 TABLET, FILM COATED ORAL at 09:30

## 2024-01-10 RX ADMIN — TROSPIUM CHLORIDE 20 MG: 20 TABLET, FILM COATED ORAL at 09:30

## 2024-01-10 RX ADMIN — PANTOPRAZOLE SODIUM 40 MG: 40 TABLET, DELAYED RELEASE ORAL at 06:06

## 2024-01-10 RX ADMIN — CEFEPIME 2000 MG: 2 INJECTION, POWDER, FOR SOLUTION INTRAVENOUS at 14:26

## 2024-01-10 RX ADMIN — SODIUM CHLORIDE, PRESERVATIVE FREE 10 ML: 5 INJECTION INTRAVENOUS at 20:14

## 2024-01-10 RX ADMIN — RANOLAZINE 500 MG: 500 TABLET, EXTENDED RELEASE ORAL at 09:30

## 2024-01-10 RX ADMIN — MONTELUKAST 10 MG: 10 TABLET, FILM COATED ORAL at 20:13

## 2024-01-10 RX ADMIN — GUAIFENESIN 600 MG: 600 TABLET, EXTENDED RELEASE ORAL at 09:30

## 2024-01-10 RX ADMIN — CASTOR OIL AND BALSAM, PERU: 788; 87 OINTMENT TOPICAL at 09:31

## 2024-01-10 RX ADMIN — CEFEPIME 2000 MG: 2 INJECTION, POWDER, FOR SOLUTION INTRAVENOUS at 22:32

## 2024-01-10 RX ADMIN — IOPAMIDOL 80 ML: 755 INJECTION, SOLUTION INTRAVENOUS at 18:42

## 2024-01-10 RX ADMIN — OXYCODONE HYDROCHLORIDE 5 MG: 5 TABLET ORAL at 20:26

## 2024-01-10 RX ADMIN — ONDANSETRON 4 MG: 2 INJECTION INTRAMUSCULAR; INTRAVENOUS at 09:30

## 2024-01-10 RX ADMIN — LACTULOSE 20 G: 20 SOLUTION ORAL at 14:24

## 2024-01-10 RX ADMIN — ONDANSETRON 4 MG: 2 INJECTION INTRAMUSCULAR; INTRAVENOUS at 01:31

## 2024-01-10 RX ADMIN — SODIUM CHLORIDE, PRESERVATIVE FREE 10 ML: 5 INJECTION INTRAVENOUS at 09:36

## 2024-01-10 RX ADMIN — METHYLNALTREXONE BROMIDE 12 MG: 12 INJECTION, SOLUTION SUBCUTANEOUS at 17:45

## 2024-01-10 RX ADMIN — BISACODYL 10 MG: 5 TABLET, COATED ORAL at 20:13

## 2024-01-10 RX ADMIN — OXYCODONE HYDROCHLORIDE 5 MG: 5 TABLET ORAL at 14:27

## 2024-01-10 ASSESSMENT — PAIN DESCRIPTION - LOCATION
LOCATION: ABDOMEN

## 2024-01-10 ASSESSMENT — PAIN DESCRIPTION - ORIENTATION
ORIENTATION: LOWER
ORIENTATION: MID

## 2024-01-10 ASSESSMENT — PAIN DESCRIPTION - DESCRIPTORS
DESCRIPTORS: CRAMPING;SHARP
DESCRIPTORS: CRAMPING
DESCRIPTORS: ACHING
DESCRIPTORS: GNAWING
DESCRIPTORS: CRAMPING;DISCOMFORT

## 2024-01-10 ASSESSMENT — PAIN SCALES - GENERAL
PAINLEVEL_OUTOF10: 8
PAINLEVEL_OUTOF10: 1
PAINLEVEL_OUTOF10: 2
PAINLEVEL_OUTOF10: 7
PAINLEVEL_OUTOF10: 2
PAINLEVEL_OUTOF10: 3
PAINLEVEL_OUTOF10: 6
PAINLEVEL_OUTOF10: 7
PAINLEVEL_OUTOF10: 3

## 2024-01-10 ASSESSMENT — PAIN SCALES - WONG BAKER: WONGBAKER_NUMERICALRESPONSE: 2

## 2024-01-10 ASSESSMENT — PAIN DESCRIPTION - FREQUENCY: FREQUENCY: INTERMITTENT

## 2024-01-10 ASSESSMENT — PAIN DESCRIPTION - ONSET: ONSET: ON-GOING

## 2024-01-10 NOTE — CONSULTS
[facility]  [facility]  [address] [facility] [address]  [place] [address] [phone]              Brief Consult Note      Consult received. Note to follow.  Muscle invasive bladder cancer.   Dr. Dubose, my colleague will see this patient.   Please call with questions.       Signed by: Jaylen Jara MD                     January 10, 2024

## 2024-01-10 NOTE — PROGRESS NOTES
Hospitalist Progress Note    NAME:   Jamel Hazel   : 1955   MRN: [id number]     Date/Time: 1/10/2024 7:46 AM  Patient PCP: Aj Loya DO    Estimated discharge date: 1/10  Barriers:  Bowel movement ,oncology evaluation       Assessment / Plan:  Constipation   -Ordered Lactulose tid  -Senna scheduled   Dulcolax ordered   Tums for gas   1/10: no improvement   Consulted gi for expert opinion     Hematuria   Invasive high grade bladder cancer   Currently has three way gibbons catheter -no blood clots /hematuria noted   Continue with continuous bladder irrigation   Urology following   Continue with IV Cefepime to cover Klebsiella bacteremia noted in blood culture  Continue with Diflucan for Candida in urine   S/p IR percutaneous Nephrostomy tube on - currently draining clear urine   Repeat Blood culture on - negative   Scheduled for cystoscopy with possible biopsy on     Cardiology consult - patient at moderate risk for intermediate risk procedure  : S/p  Cystoscopy ,left Retrograde Pyelography and Stent Placement, Bladder Biopsy and Fulguration   - CBI   - Leave urethral gibbons in; leave bilateral PCNT in and open to drain.  - If doing well will consider cap left PCNT 1-2 days  - Advise strict I&O  -Urology recommendations appreciated   :  Continue Cefepime total 14 days  -Plan to switch to levofloxacin on discharge to complete total  14 days  -Urology recs appreciated   :  Continue Cefepime total 14 days  -cap L PCNT today by  urology   - continue gibbons catheter for now for monitoring I/o  :  Continue culture driven abx   -bowel regimen per primary team   -continue L PCNT capped   - continue gibbons catheter, take down CBI tubing and cap third port, pt will discharge home with this gibbons and bilateral PCNT (again keep L PCNT capped)  -will discuss results of path once resulted, likely will be done OP    : NO new changes in management     1/10: Pathology result -Invasive    01/10/24 0131 -- -- -- -- 22 --   01/09/24 2323 138/80 -- -- 94 26 99 %   01/09/24 2300 134/80 98.1 °F (36.7 °C) Oral 98 25 98 %   01/09/24 2040 -- -- -- -- 26 --   01/09/24 2020 -- -- -- 92 19 99 %   01/09/24 1950 111/80 98.3 °F (36.8 °C) Oral 94 24 99 %   01/09/24 1523 115/73 98.2 °F (36.8 °C) Oral 90 20 100 %   01/09/24 1521 -- -- -- 89 21 100 %   01/09/24 1454 110/67 -- -- 86 23 --   01/09/24 1154 107/69 97.9 °F (36.6 °C) Oral 88 23 99 %   01/09/24 1011 -- -- -- -- 26 --   01/09/24 0800 -- -- -- 92 21 --         Intake/Output Summary (Last 24 hours) at 1/10/2024 0746  Last data filed at 1/10/2024 0610  Gross per 24 hour   Intake 538.71 ml   Output 1400 ml   Net -861.29 ml        I had a face to face encounter and independently examined this patient on 1/10/2024, as outlined below:  PHYSICAL EXAM:  General: Alert, cooperative  EENT:  EOMI. Anicteric sclerae.  Resp:  CTA bilaterally, no wheezing or rales.  No accessory muscle use  CV:  Regular  rhythm,  No edema  GI:  Bilateral nephrostomy tube intact , firm , no  tenderness   Neurologic:  Alert and oriented X 3, normal speech,   Psych:   Good insight. Not anxious nor agitated  Skin:  No rashes.  No jaundice, gibbons insitu    Reviewed most current lab test results and cultures  YES  Reviewed most current radiology test results   YES  Review and summation of old records today    NO  Reviewed patient's current orders and MAR    YES  PMH/SH reviewed - no change compared to H&P    Procedures: see electronic medical records for all procedures/Xrays and details which were not copied into this note but were reviewed prior to creation of Plan.      LABS:  I reviewed today's most current labs and imaging studies.  Pertinent labs include:  No results for input(s): \"WBC\", \"HGB\", \"HCT\", \"PLT\" in the last 72 hours.    No results for input(s): \"NA\", \"K\", \"CL\", \"CO2\", \"GLUCOSE\", \"BUN\", \"CREATININE\", \"CALCIUM\", \"MG\", \"PHOS\", \"LABALBU\", \"BILITOT\", \"AST\", \"ALT\", \"INR\" in the last

## 2024-01-10 NOTE — PROGRESS NOTES
Palliative Medicine  Per Dr. Michael:     Code Status: Full Code    [facility] Plannin/10/2024    10:25 AM   Demographics   Marital Status        Patient / Family Encounter Documentation    Participants (names): ***    Narrative: ***    Psychosocial Issues Identified/ Resilience Factors: ***    Caregiver [place]: ***  Does the caregiver feel confident administering medication? ***  Does the caregiver need any help connecting with community resources? ***  Does the caregiver feel confident assisting with activities of daily living? ***    Goals of Care / Plan: ***    Thank you for including Palliative Medicine in the care of ***    Rebecca Rodriguez LCSW  [phone])

## 2024-01-10 NOTE — PROGRESS NOTES
Chart reviewed. Attempted to see patient twice today, but he declined due to abdominal pain and nausea. Unable to change patients mind. Will follow up tomorrow.    Timbo Monzon, PT

## 2024-01-10 NOTE — PROGRESS NOTES
Josef Score 16. All of the following interventions have been implemented to prevent pressure injury:     SKIN ASSESSMENT (S)  Dual skin assessment completed at shift change: Yes  Name of second RN who completed Dual Skin Assessment: Haylee  Picture of wound uploaded to EMR: Yes  Venelex ordered and given per protocol: Yes  Wound care consulted if wounds present: Yes     SURFACE (S)  [place] air pump or specialty bed ordered: Yes  Type of bed: mariely  Only white chux used with specialty surfaces (no green chux used): Yes  Waffle cushion used for chair positioning: NA     KEEP MOVING (K)  Mobility status (Bedrest, Chairbound, UWA x 1 assist , 2 assist, Max assist): x2 assist  Q2 hour turns documented: Yes  Refusals to turn and education provided documented: NA  Device used to float heels: heels up  PT/OT consulted: Yes     INCONTINENCE (I)  Incontinence status assessed Q2 hours: Yes  External catheter in use: no  Barrier cream in use: zinc     NUTRITION (N)  I/O's documented every 8 hours: Yes  Oral supplements ordered if appropriate: Yes  Nutrition services consulted: Yes     All concerns about new DTI's must be escalated directly to attending MD, charge nurse, CCL and nurse director.

## 2024-01-10 NOTE — CONSULTS
GI CONSULTATION NOTE  Angel Platt PA-C  [phone] NP in-hospital cell phone M-F until 4:30  After 5pm or on weekends, please call  for physician on call    NAME: Jamel Hazel   :  1955   MRN:  [id number]   Attending: Lei  Primary GI: Charly  Date/Time:  1/10/2024 3:04 PM  Assessment:   Patient w/ new diagnosis (path results 1/10) of invasive bladder cancer w/ bilateral PCNT and gibbons now with 11 days of constipation despite senna, lactulose, dulcolax.   No BM in 11 days.   Last BM was using fleet enema, but states this instigated hematuria that led to bladder cancer dx  Currently on morphine 2mg PRN and oxycodone 5mg.   States constipation started before narcotic usage.  KUB  showing large stool burden w/o free air  CBC WBC 8.8, HgB 8.5 stable, Platelet 199.  BMP w/o major electrolyte derangements.     Scope Hx  CLN 2023 Impression:    Technically difficult colonoscopy that required an EGD scope to navigate a fixed, tight sigmoid colon   Five 8-12 mm sessile polyps in ascending colon 8 mm sessile polyp in descending colon  30 mm sessile polyp in descending colon. This is not amenable to R0 endoscopic resection. Biopsied. Tattooed on 3 walls proximally  13 mm sessile polyp in sigmoid colon. Removed by cold snare polypectomy  Moderate proctitis in distal rectum (up to 6 cm proximal from anal verge). This did NOT appear malignant and numerous biopsies taken  Otherwise normal colonoscopy through to the cecum. Note bowel preparation was suboptimal with brownish liquid stool throughout  Plan:     Will trial relistor for possible opioid induced constipation. Also recommend BID apple lax and once a day dulcolax (all ordered). Recommend discontinuing lactulose. Can continue senna PRN.  Given previous colonoscopy showing fixed/tight sigmoid colon that may be contributing to constipation, and large polyp unable to be removed endoscopically,recommend surgical consult for formal evaluation  ENDOSCOPY,BALL DIL,30MM  2015         UPPER GI ENDOSCOPY,BIOPSY  2015         UPPER GI ENDOSCOPY,BIOPSY  2017         UPPER GI ENDOSCOPY,STENT PLACEMENT  2013          Social History     Tobacco Use    Smoking status: Former     Current packs/day: 0.00     Types: Cigarettes     Quit date:      Years since quittin.0    Smokeless tobacco: Never    Tobacco comments:     Quit smoking: cigarettes 2-3 daily and using e cig   Substance Use Topics    Alcohol use: No      Family History   Problem Relation Age of Onset    Heart Attack Mother     Asthma Mother     Cancer Mother         colon    Other Mother         food allergies/Forrest's disease/Shea's esophagus    Colon Cancer Mother     COPD Mother     Heart Disease Mother     Other Father         abestos/food allergies/degernative disc disease    Colon Cancer Brother     Colon Cancer Maternal Grandmother       Allergies   Allergen Reactions    Naproxen Anaphylaxis     Other reaction(s): Anaphylaxis   Hives, passed out, had seizure    Naproxen Sodium Anaphylaxis     Hives, passed out, had seizure    Sulfa Antibiotics Hives and Rash     Other reaction(s): Rash      Prior to Admission medications    Medication Sig Start Date End Date Taking? Authorizing Provider   ranolazine (RANEXA) 500 MG extended release tablet Take 1 tablet by mouth 2 times daily 24  Yes Mary Grace Odom MD   levoFLOXacin (LEVAQUIN) 500 MG tablet Take 1 tablet by mouth daily for 10 days 24 Yes Mary Grace Odom MD   simethicone (MYLICON) 80 MG chewable tablet Take 1 tablet by mouth every 6 hours as needed for Flatulence 24  Yes Mary Grace Odom MD   oxybutynin (DITROPAN) 5 MG tablet Take 1 tablet by mouth 3 times daily as needed (bladder spasms) 23 Yes Chencho Morales MD   albuterol sulfate HFA (PROVENTIL;VENTOLIN;PROAIR) 108 (90 Base) MCG/ACT inhaler Inhale 2 puffs into the lungs every 6 hours as needed    Automatic

## 2024-01-10 NOTE — PROGRESS NOTES
PCP hospital follow-up transitional care appointment has been rescheduled with SHAHRAM Bean on 1/17/24 at 1100. Pending patient discharge. Portia Rouse, Care Management Assistant

## 2024-01-10 NOTE — PROGRESS NOTES
0700 Bedside shift report received from JOHANA Meadows. Report included the following information Nurse Handoff Report, MAR, Telemetry status, Recent Results, and plan of care. This RN verbalized understanding of plan of care with opportunity for clarification and questions. Care of patient assumed at this time. Dual skin check performed at this time.     TRANSFER - OUT REPORT:    Verbal report given to JOHANA Cordova on Jamel Hazel  being transferred to [facility] for routine progression of patient care       Report consisted of patient's Situation, Background, Assessment and   Recommendations(SBAR).     Information from the following report(s) Nurse Handoff Report, MAR, Recent Results, and Cardiac Rhythm sinus  was reviewed with the receiving nurse.           Lines:   Peripheral IV 01/05/24 Left Forearm (Active)   Site Assessment Clean, dry & intact 01/10/24 1528   Line Status Normal saline locked;Capped 01/10/24 1528   Line Care Cap changed;Connections checked and tightened;Ports disinfected 01/10/24 1528   Phlebitis Assessment No symptoms 01/10/24 1528   Infiltration Assessment 0 01/10/24 1528   Alcohol Cap Used Yes 01/10/24 1528   Dressing Status Clean, dry & intact 01/10/24 1528   Dressing Type Transparent 01/10/24 1528       Peripheral IV 01/09/24 Left;Proximal;Anterior Forearm (Active)   Site Assessment Clean, dry & intact 01/10/24 1528   Line Status Flushed;Normal saline locked;Capped 01/10/24 1528   Line Care Cap changed;Connections checked and tightened;Ports disinfected;Tubing changed 01/10/24 1528   Phlebitis Assessment No symptoms 01/10/24 1528   Infiltration Assessment 0 01/10/24 1528   Alcohol Cap Used Yes 01/10/24 1528   Dressing Status Clean, dry & intact 01/10/24 1528   Dressing Type Transparent 01/10/24 1528   Dressing Intervention New 01/09/24 1530        Opportunity for questions and clarification was provided.      Patient transported with:  O2 @ 3lpm

## 2024-01-10 NOTE — CARE COORDINATION
Transition of Care Plan:     RUR: 14% (moderate RUR, readmission)  Prior Level of Functioning: Independent  Disposition: Home with HH pending; pt declining SNF  If SNF or IPR: Date FOC offered: 1/3 SNF  Date FOC received: N/A  Accepting facility: N/A  Date authorization started with reference number: N/A  Date authorization received and expires: N/A  Follow up appointments: PCP/specialists if needed.  DME needed: None.  Patient has 3L O2 NC at home through [employer].  Transportation at discharge: Family to transport on discharge.  IM/IMM Medicare/ letter given: 2nd IM received 1/10/2024.  Is patient a  and connected with [facility]? No.              If yes, was  transfer form completed and [facility] notified? N/A  Caregiver Contact: Emily Hazel - spouse - [phone]  Discharge Caregiver contacted prior to discharge? Patient to contact.  Care Conference needed? No.  Barriers to discharge: uro/cards clearance     [facility]; CM asked when SOC would be.  Patient confirmed family can provide O2 on discharge.  Patient asked about RW but it looks like rollator is recommended by PT at this time; CM will need to look into this.       Beti Romero, DIVYAN, RN     Care Management  [phone]

## 2024-01-10 NOTE — ACP (ADVANCE CARE PLANNING)
Advance Care Planning     Advance Care Planning (ACP) Conversation    Date of Conversation: 1/10/24  Conducted with: Patient with Decision Making Capacity, Palliative MD and LCSW    Conversation:  Discussed the importance of having an AMD with patient. Discussed that he is able to make his own medical decisions at this time but that if he becomes unable to make his medical decisions in the future we will need to know who to call to help us with his medical decisions. He has been thinking about making an AMD for a while and agrees it is important. He discussed that he does not want his wife to have to have the responsibility of being his medical power of . He says that she has Bipolar disorder and newly-diagnosed diabetes, and that would be too much for her.     He has decided that he wants his DIL Ayleen Dior to be his medical power of . He has already asked Ayleen to serve in this capacity, and she has agreed. He has told Ayleen about his cancer diagnosis. He has not yet told his wife about his cancer diagnosis and does not want her to be informed of the results today. I told him that we will support him sharing details of his medical issues with his loved ones in his own way and his own time and that it is good he is in communication with Ayleen.    We discussed healthcare wishes. He wants to discuss his treatment options with Oncology. We discussed inpatient and outpatient role of Palliative in detail. We also discussed hospice philosophy and delivery of care. He is not ready to make final decisions regarding his next steps yet as he needs to know and consider all his options first.    We completed an AMD. He named daughter-in-law Ayleen as primary mPOA and his brothers Benny and Gil together as secondary.     We discussed his end of life wishes. He would not want life prolonging measures if he had a terminal condition, death were expected imminently and medical interventions were

## 2024-01-10 NOTE — PLAN OF CARE
Problem: Discharge Planning  Goal: Discharge to home or other facility with appropriate resources  Outcome: Progressing     Problem: Pain  Goal: Verbalizes/displays adequate comfort level or baseline comfort level  Outcome: Progressing     Problem: Respiratory - Adult  Goal: Achieves optimal ventilation and oxygenation  Outcome: Progressing     Problem: Safety - Adult  Goal: Free from fall injury  Outcome: Progressing     Problem: Skin/Tissue Integrity  Goal: Absence of new skin breakdown  Description: 1.  Monitor for areas of redness and/or skin breakdown  2.  Assess vascular access sites hourly  3.  Every 4-6 hours minimum:  Change oxygen saturation probe site  4.  Every 4-6 hours:  If on nasal continuous positive airway pressure, respiratory therapy assess nares and determine need for appliance change or resting period.  Outcome: Progressing     Problem: ABCDS Injury Assessment  Goal: Absence of physical injury  Outcome: Progressing

## 2024-01-10 NOTE — PROGRESS NOTES
Cardiology Progress Note  1/10/2024 8:07 AM  Admit Date: 12/28/2023  Admit Diagnosis/CC: Bladder spasms [N32.89]  Elevated troponin [R79.89]  Hematuria, unspecified type [R31.9]  Subjective:   Patient reports:  Chest Pain:  [x] none,  consistent with [] non-cardiac  [] atypical  []  anginal chest pain             [x] none now    []  on-going  Dyspnea: [x] none  [] at rest  [] with exertion  [] improved  [] unchanged [] worsening  PND:       [x] none  [] overnight   [] current  Orthopnea: [x] none  [] improved  [] unchanged  [] worsening  Presyncope: [x] none  [] improved  [] unchanged  [] worsening  Ambulated in hallway without symptoms  [] Yes  Ambulated in room without symptoms  [x] Yes  ROS(2+other systems)   Hematuria: [] Yes  [x] No.   Dysuria: [] Yes  [x] No                                           Cough:       [] Yes  [x] No.   Sputum: [] Yes  [x] No                                            Hematochezia: [] Yes  [x] No.   Melena: [] Yes  [x] No                                            No change in family and social history from H&P/Consult note.    Current Facility-Administered Medications   Medication Dose Route Frequency    lactulose (CHRONULAC) 10 GM/15ML solution 20 g  20 g Oral TID    polyethylene glycol (GLYCOLAX) packet 17 g  17 g Oral BID    sennosides-docusate sodium (SENOKOT-S) 8.6-50 MG tablet 2 tablet  2 tablet Oral Daily    calcium carbonate (TUMS) chewable tablet 500 mg  500 mg Oral TID PRN    ranolazine (RANEXA) extended release tablet 500 mg  500 mg Oral BID    bisacodyl (DULCOLAX) EC tablet 5 mg  5 mg Oral Daily PRN    ceFEPIme (MAXIPIME) 2,000 mg in sodium chloride 0.9 % 100 mL IVPB (mini-bag)  2,000 mg IntraVENous Q8H    simethicone (MYLICON) chewable tablet 80 mg  80 mg Oral Q6H PRN    guaiFENesin (MUCINEX) extended release tablet 600 mg  600 mg Oral BID    0.9 % sodium chloride infusion   IntraVENous PRN    umeclidinium-vilanterol (ANORO ELLIPTA) inhaler 1 puff (Patient Supplied)  1

## 2024-01-10 NOTE — PROGRESS NOTES
Patient: Jamel Hazel MRN: [id number]  SSN: [id number]    YOB: 1955  Age: 68 y.o.  Sex: male        ADMITTED: 2023 to [facility] Odom MD by Chencho Crawford Jr., MD for Bladder spasms [N32.89]  Elevated troponin [R79.89]  Hematuria, unspecified type [R31.9]  POD# 5 Days Post-Op Procedure(s):  CYSTOSCOPY, LEFT RETROGRADE PYELOGRAM WITH STENT, BLADDER  BIOPSY AND FULGURATION    Jamel Hazel is doing fair denies any abdominal or suprapubic discomfort . Discussed path report of invasive high grade urothelial  carcinoma  with pt . Discussed getting oncology on board for treatment options , Pt states not sure he wants anything done. Discussed getting palliative team involved     VSS  afebrile  No new labs to review   R PCN  300  Gibbons   700    Vitals: Temp (24hrs), Av.1 °F (36.7 °C), Min:97.9 °F (36.6 °C), Max:98.3 °F (36.8 °C)    Blood pressure 111/74, pulse 82, temperature 98 °F (36.7 °C), temperature source Oral, resp. rate 13, height 1.778 m (5' 10\"), weight 61.1 kg (134 lb 11.2 oz), SpO2 99 %.    Intake and Output:   1901 - 01/10 0700  In: 538.7 [P.O.:225]  Out: 1800 [Urine:1800]  No intake/output data recorded.  FARHAD Output lats 24 hrs: No data found.   FARHAD Output last 8 hrs: No data found.  BM over last 24 hrs: No data found.    Exam:   Gen: NAD  CV: extremities well perfused  Lungs: nonlabored respirations. Symmetric chest expansion  Ext: no edema  Abdomen: soft NTND no CVAT   : R PCN ., gibbons      Labs:  CBC:   Lab Results   Component Value Date/Time    WBC 8.8 2024 10:20 PM    HCT 26.6 2024 10:20 PM     2024 10:20 PM     BMP:   Lab Results   Component Value Date/Time     2024 10:20 PM    K 5.1 2024 10:20 PM    CL 98 2024 10:20 PM    CO2 34 2024 10:20 PM    BUN 21 2024 10:20 PM     Pathology   FINAL PATHOLOGIC DIAGNOSIS     Bladder wall, right anterior, biopsy:        Invasive high-grade urothelial carcinoma

## 2024-01-10 NOTE — PROGRESS NOTES
Occupational Therapy     Chart reviewed. Attempted to see patient twice today (1530 & 1611), but pt declined due to abdominal pain and nausea. Pt did state that he has been passing gas recently, still no BM yet. Educated pt about the importance of OOB activity to assist with having a BM. Unable to change patients mind. Will follow up tomorrow.     Ben Segal, OTD, OTR/L

## 2024-01-10 NOTE — CONSULTS
12/30/2023     CT Result (most recent):  CT ABDOMEN PELVIS WO IV CONTRAST 12/30/2023    Narrative  EXAM: CT ABDOMEN PELVIS WO CONTRAST    INDICATION: MARIA ELENA, gross hematuria    COMPARISON: 12/7/2023    IV CONTRAST: None.    ORAL CONTRAST: None    TECHNIQUE:  Thin axial images were obtained through the abdomen and pelvis. Coronal and  sagittal reformats were generated. CT dose reduction was achieved through use of  a standardized protocol tailored for this examination and automatic exposure  control for dose modulation.    The absence of intravenous contrast material reduces the sensitivity for  evaluation of the vasculature and solid organs.    FINDINGS:  LOWER THORAX: Dilated fluid-filled esophagus to the level of the  gastroesophageal junction. This may be secondary to gastric pull-through.  LIVER: No mass.  BILIARY TREE: Cholecystectomy. CBD is not dilated.  SPLEEN: within normal limits.  PANCREAS: No focal abnormality.  ADRENALS: Unremarkable.  KIDNEYS/URETERS: Bilateral hydroureteronephrosis. No obstructive stone.  STOMACH: Stomach is not visualized, this is likely secondary to esophagectomy  and gastric pull-through.  SMALL BOWEL: No dilatation or wall thickening.  COLON: Fecal stasis in the colon. No evidence of colitis.  APPENDIX: Not seen  PERITONEUM: No ascites or pneumoperitoneum.  RETROPERITONEUM: No lymphadenopathy or aortic aneurysm.  REPRODUCTIVE ORGANS: Within normal limits.  URINARY BLADDER: Nondistended urinary bladder containing a Gonzalez catheter.  BONES: No destructive bone lesion.  ABDOMINAL WALL: No mass or hernia.  ADDITIONAL COMMENTS: N/A    Impression  Bilateral hydroureteronephrosis without obstructive stone. Decompressed urinary  bladder containing a Gonzalez catheter.  Dilated esophagus with nonvisualization of the stomach suggestive of  esophagectomy and gastric pull-through.          Assessment:     1. Muscle Invasive Bladder Cancer, stage pending    - biopsy evidence of metastasis to the

## 2024-01-10 NOTE — CONSULTS
Palliative Medicine  Patient Name: Jamel Hazel  YOB: 1955  MRN: [id number]  Age: 68 y.o.  Gender: male    Date of Initial Consult: 1/10/2024  Date of Service: 1/10/2024  Time: 2:51 PM  Provider: Kay Michael MD  Hospital Day: 14  Admit Date: 12/28/2023  Referring Provider: Jazmin Gaona NP       Reasons for Consultation:  Goals of Care    HISTORY OF PRESENT ILLNESS (HPI):   Jamel Hazel is a 68 y.o. male with newly diagnosed bladder cancer, multiple colonic polyps, COPD, chronic respiratory failure on home 3L NC, GERD, h/o esophageal cancer in 2012 s/p resection, chronic dysphagia since cancer resection who was admitted on 12/28/2023 from home with a diagnosis of gross hematuria d/t bladder mass. Also treated for MARIA ELENA d/t bilateral hydronephrosis. Urology following. Patient has catheter in place. Patient has received antibiotics and diflucan. Patient is s/p b/l nephrostomy tube on 12/31. He is s/p Cystoscopy, left Retrograde Pyelography and Stent Placement, Bladder Biopsy and Fulguration on 1/5. Biopsy results demonstrated invasive high grade bladder cancer on 1/10. Oncology has been consulted. Cardiology has been following for chest discomfort, elevated troponins, episode of sinus tachycardia to 160s. He is also being treated for significant constipation, and GI has been consulted for this.    12/30/2023 CT A/P:  IMPRESSION:  Bilateral hydroureteronephrosis without obstructive stone. Decompressed urinary  bladder containing a Gonzalez catheter.  Dilated esophagus with nonvisualization of the stomach suggestive of  esophagectomy and gastric pull-through    Psychosocial: Patient has been  to wife Emily for 35 years. He has three step-children. He does not have any biological children. He has two brothers, Gil and Benny. His DIL Ayleen helps him with his medical issues. Patient states he has been doing relatively well physically until recently and that he was cutting grass three  activities documented in the note. This includes time spent prior to the visit and after the visit in direct care of the patient. This time does not include time spent in any separately reportable services.    Electronically signed by   Kay Michael MD  Palliative Care Team  on 1/10/2024 at 2:51 PM

## 2024-01-11 DIAGNOSIS — R91.1 NODULE OF LOWER LOBE OF RIGHT LUNG: ICD-10-CM

## 2024-01-11 DIAGNOSIS — C67.9 BLADDER CARCINOMA (HCC): Primary | ICD-10-CM

## 2024-01-11 PROBLEM — Z71.89 DNR (DO NOT RESUSCITATE) DISCUSSION: Status: ACTIVE | Noted: 2024-01-11

## 2024-01-11 LAB
ANION GAP SERPL CALC-SCNC: 3 MMOL/L (ref 5–15)
BUN SERPL-MCNC: 22 MG/DL (ref 6–20)
BUN/CREAT SERPL: 26 (ref 12–20)
CALCIUM SERPL-MCNC: 8.6 MG/DL (ref 8.5–10.1)
CHLORIDE SERPL-SCNC: 98 MMOL/L (ref 97–108)
CO2 SERPL-SCNC: 31 MMOL/L (ref 21–32)
CREAT SERPL-MCNC: 0.84 MG/DL (ref 0.7–1.3)
GLUCOSE SERPL-MCNC: 133 MG/DL (ref 65–100)
POTASSIUM SERPL-SCNC: 3.8 MMOL/L (ref 3.5–5.1)
SODIUM SERPL-SCNC: 132 MMOL/L (ref 136–145)
TSH SERPL DL<=0.05 MIU/L-ACNC: 3.99 UIU/ML (ref 0.45–4.5)

## 2024-01-11 PROCEDURE — 6360000002 HC RX W HCPCS: Performed by: STUDENT IN AN ORGANIZED HEALTH CARE EDUCATION/TRAINING PROGRAM

## 2024-01-11 PROCEDURE — 36415 COLL VENOUS BLD VENIPUNCTURE: CPT

## 2024-01-11 PROCEDURE — 6370000000 HC RX 637 (ALT 250 FOR IP): Performed by: NURSE PRACTITIONER

## 2024-01-11 PROCEDURE — 2580000003 HC RX 258: Performed by: INTERNAL MEDICINE

## 2024-01-11 PROCEDURE — 6370000000 HC RX 637 (ALT 250 FOR IP): Performed by: INTERNAL MEDICINE

## 2024-01-11 PROCEDURE — 2580000003 HC RX 258: Performed by: STUDENT IN AN ORGANIZED HEALTH CARE EDUCATION/TRAINING PROGRAM

## 2024-01-11 PROCEDURE — 80048 BASIC METABOLIC PNL TOTAL CA: CPT

## 2024-01-11 PROCEDURE — 6370000000 HC RX 637 (ALT 250 FOR IP): Performed by: STUDENT IN AN ORGANIZED HEALTH CARE EDUCATION/TRAINING PROGRAM

## 2024-01-11 PROCEDURE — 99233 SBSQ HOSP IP/OBS HIGH 50: CPT | Performed by: FAMILY MEDICINE

## 2024-01-11 PROCEDURE — 1100000000 HC RM PRIVATE

## 2024-01-11 PROCEDURE — 2700000000 HC OXYGEN THERAPY PER DAY

## 2024-01-11 PROCEDURE — 94640 AIRWAY INHALATION TREATMENT: CPT

## 2024-01-11 PROCEDURE — 6360000002 HC RX W HCPCS: Performed by: INTERNAL MEDICINE

## 2024-01-11 PROCEDURE — 51702 INSERT TEMP BLADDER CATH: CPT

## 2024-01-11 RX ORDER — POLYETHYLENE GLYCOL 3350 17 G/17G
17 POWDER, FOR SOLUTION ORAL DAILY PRN
Status: DISCONTINUED | OUTPATIENT
Start: 2024-01-11 | End: 2024-01-13 | Stop reason: HOSPADM

## 2024-01-11 RX ORDER — CASTOR OIL AND BALSAM, PERU 788; 87 MG/G; MG/G
OINTMENT TOPICAL 2 TIMES DAILY
Status: DISCONTINUED | OUTPATIENT
Start: 2024-01-11 | End: 2024-01-11 | Stop reason: SDUPTHER

## 2024-01-11 RX ADMIN — GUAIFENESIN 600 MG: 600 TABLET, EXTENDED RELEASE ORAL at 08:33

## 2024-01-11 RX ADMIN — CEFEPIME 2000 MG: 2 INJECTION, POWDER, FOR SOLUTION INTRAVENOUS at 05:49

## 2024-01-11 RX ADMIN — POLYETHYLENE GLYCOL 3350 17 G: 17 POWDER, FOR SOLUTION ORAL at 08:34

## 2024-01-11 RX ADMIN — BUDESONIDE AND FORMOTEROL FUMARATE DIHYDRATE 2 PUFF: 160; 4.5 AEROSOL RESPIRATORY (INHALATION) at 20:38

## 2024-01-11 RX ADMIN — SODIUM CHLORIDE, PRESERVATIVE FREE 10 ML: 5 INJECTION INTRAVENOUS at 08:34

## 2024-01-11 RX ADMIN — RANOLAZINE 500 MG: 500 TABLET, EXTENDED RELEASE ORAL at 20:20

## 2024-01-11 RX ADMIN — BUDESONIDE AND FORMOTEROL FUMARATE DIHYDRATE 2 PUFF: 160; 4.5 AEROSOL RESPIRATORY (INHALATION) at 07:52

## 2024-01-11 RX ADMIN — IRON SUCROSE 300 MG: 20 INJECTION, SOLUTION INTRAVENOUS at 16:44

## 2024-01-11 RX ADMIN — MONTELUKAST 10 MG: 10 TABLET, FILM COATED ORAL at 20:20

## 2024-01-11 RX ADMIN — CASTOR OIL AND BALSAM, PERU: 788; 87 OINTMENT TOPICAL at 08:35

## 2024-01-11 RX ADMIN — ONDANSETRON 4 MG: 2 INJECTION INTRAMUSCULAR; INTRAVENOUS at 05:55

## 2024-01-11 RX ADMIN — CASTOR OIL AND BALSAM, PERU: 788; 87 OINTMENT TOPICAL at 20:22

## 2024-01-11 RX ADMIN — DOCUSATE SODIUM 50 MG AND SENNOSIDES 8.6 MG 2 TABLET: 8.6; 5 TABLET, FILM COATED ORAL at 08:34

## 2024-01-11 RX ADMIN — PANTOPRAZOLE SODIUM 40 MG: 40 TABLET, DELAYED RELEASE ORAL at 05:50

## 2024-01-11 RX ADMIN — TROSPIUM CHLORIDE 20 MG: 20 TABLET, FILM COATED ORAL at 08:33

## 2024-01-11 RX ADMIN — CEFEPIME 2000 MG: 2 INJECTION, POWDER, FOR SOLUTION INTRAVENOUS at 23:36

## 2024-01-11 RX ADMIN — GUAIFENESIN 600 MG: 600 TABLET, EXTENDED RELEASE ORAL at 20:20

## 2024-01-11 RX ADMIN — SODIUM CHLORIDE, PRESERVATIVE FREE 10 ML: 5 INJECTION INTRAVENOUS at 20:22

## 2024-01-11 RX ADMIN — CEFEPIME 2000 MG: 2 INJECTION, POWDER, FOR SOLUTION INTRAVENOUS at 14:16

## 2024-01-11 RX ADMIN — OXYCODONE HYDROCHLORIDE 5 MG: 5 TABLET ORAL at 02:33

## 2024-01-11 RX ADMIN — RANOLAZINE 500 MG: 500 TABLET, EXTENDED RELEASE ORAL at 08:33

## 2024-01-11 ASSESSMENT — PAIN DESCRIPTION - DESCRIPTORS: DESCRIPTORS: ACHING

## 2024-01-11 ASSESSMENT — PAIN SCALES - GENERAL
PAINLEVEL_OUTOF10: 9
PAINLEVEL_OUTOF10: 0
PAINLEVEL_OUTOF10: 3

## 2024-01-11 ASSESSMENT — PAIN DESCRIPTION - LOCATION: LOCATION: ABDOMEN

## 2024-01-11 ASSESSMENT — PAIN DESCRIPTION - ORIENTATION: ORIENTATION: RIGHT

## 2024-01-11 NOTE — PROGRESS NOTES
Hospitalist Progress Note    NAME:   Jamel Hazel   : 1955   MRN: [id number]     Date/Time: 2024 3:07 PM  Patient PCP: Aj Loya DO    Estimated discharge date: 1/10  Barriers:  Bowel movement ,oncology evaluation       Assessment / Plan:  Constipation, resolved.  Will need daily bowel regimen at discharge    Bacteremia   MARIA ELENA due to b/l hydronephrosis  Hematuria   Invasive high grade bladder cancer   Bcx growing Klebsiella.  Repeat Bcx NTD  Pt had been on IV cefepime since .  Will complete 14 days total.  S/p cysto with L ureteral stent placement and bladder biopsy on  which showed invasive high grade bladder CA  Gibbons remains in place.  Pt has R and L PCN managed by urology, appreciate recs.  Discharge home with gibbons and b/l PCN  Oncology and palliative care is following  PT/OT    Chest discomfort  Sinus tachycardia, resolved  EKG non revealing.  Trop 158, trended down from 400.  Cardiology evaluate, no change in meds.  Pt is high risk for invasive evaluation    Anemia, improved.  Monitor and transfuse for hbg<7  Cont' IV iron    Thrombocytopenia, stable.    H/o Severe COPD   Not on exacerbations   Continue with home medications       Medical Decision Making:   I personally reviewed labs  I personally reviewed imaging  I personally reviewed EKG  Toxic drug monitoring  Discussed case with: Patient, RN, plan of care discussed        Code Status: DNR, per pt's request.  He would like to sign durable DNR prior to leaving  DVT Prophylaxis: scd    Subjective:     Chief Complaint / Reason for Physician Visit  Pleasant male in bed, no new complaint.  Having multiple stools today.  Appears depressed due to new diagnosis of cancer.  He informed me that his wife is now aware of the diagnosis.  He asked that I change his code status to DNR.  He is interested to learn more about palliative treatment with chemo.  He would like for his wife to be part of this discussion with oncology.

## 2024-01-11 NOTE — PROGRESS NOTES
Comprehensive Nutrition Assessment    Type and Reason for Visit:  Reassess    Nutrition Recommendations/Plan:   Continue diet and supplements as ordered  Bowel regimen per Attending   Please document % meals and supplements consumed in flowsheet I/O's under intake      Malnutrition Assessment:  Malnutrition Status:  Severe malnutrition (12/30/23 1538)    Context:  Chronic Illness     Findings of the 6 clinical characteristics of malnutrition:  Energy Intake:  No significant decrease in energy intake  Weight Loss:  No significant weight loss     Body Fat Loss:  Severe body fat loss Fat Overlying Ribs, Triceps   Muscle Mass Loss:  Severe muscle mass loss Clavicles (pectoralis & deltoids), Thigh (quadraceps), Scapula (trapezius)  Fluid Accumulation:        Strength:  Not Performed    Nutrition Assessment:  Chart reviewed, pt actively having BM's so visit deferred.  GI was following for severe constipation but have signed off today and pt now stooling.  Palliative and Oncology following.  Last PO intake was fairly good but this was over the weekend, unsure of how he has been eating this week.  Labs reviewed, WNL.  Continue to encourage meal trays and supplements.   Patient Vitals for the past 120 hrs:   PO Meals Eaten (%)   01/08/24 1753 51 - 75%   01/08/24 1245 26 - 50%   01/08/24 0951 1 - 25%   01/07/24 1739 26 - 50%   01/07/24 1143 1 - 25%   01/07/24 0900 26 - 50%            Nutrition Related Findings:    Meds: dulcolax, cefepime, venofer, relistor, protonix, glycolax, senokot.    Edema: trace-BLE.    BM: 1/11   Wound Type: None       Current Nutrition Intake & Therapies:    Average Meal Intake: 51-75%  Average Supplements Intake: Unable to assess  ADULT DIET; Regular; Low Sodium (2 gm)  ADULT ORAL NUTRITION SUPPLEMENT; Breakfast, Dinner; Clear Liquid Oral Supplement    Anthropometric Measures:  Height: 177.8 cm (5' 10\")  Ideal Body Weight (IBW): 166 lbs (75 kg)       Current Body Weight: 61.1 kg (134 lb 11.2

## 2024-01-11 NOTE — CARE COORDINATION
Patient transferred from CMSU 2120 to Onc 3406, handoff provided to Unit CM.      Beti Romero, DIVYAN, RN  [facility] Care Management

## 2024-01-11 NOTE — PROGRESS NOTES
Physical Therapy    Returned to see pt for session. He states he has been having constant stool and is still actively currently going. RN confirms that he had been given stimulants for this and had been having issues all afternoon. Will defer and follow.    Jordana Rogers PT

## 2024-01-11 NOTE — PROGRESS NOTES
[facility] at [facility]  [address] [place] [address]  W: [phone] F: [phone]    Reason for Visit:   Jamel Hazel is a 68 y.o. male who is seen in consultation at the request of Dr. Odom for evaluation of Muscle Invasive Bladder Cancer.    Hematology / Oncology Treatment Information:     Hematological/Oncological Diagnosis: Muscle Invasive Bladder Cancer, stage pending    Date of Diagnosis: 1/2024    Oncology/Hematology Treatment Course:   Treatment course:   1) ED admit on 12/28/23 for severe hematuria, constipation.  Workup included cystoscopy showing \"FINDINGS:  The anterior urethra was widely patent without evidence of stricture disease.  His prostatic urethra is 2 cm in length with no obstruction from BPH.  Upon entering the bladder, the left side of the bladder demonstrates a smooth wall without trabeculation.  The left ureteral orifice is in normal position on the interureteric ridge.  There are a few old blood clots noted.  Starting right where the expected location of the right ureteral orifice is, there is a nodular mass that extends up the right lateral wall anteriorly including the dome.  There is an area of increased inflammatory changes in the midline likely secondary to Gonzalez catheter irritation. \" From 1/5/23 cystoscopy    Pathology and Molecular Testing:       Initial Presentation  (HPI):       68 year old with complicated medical history including esophageal cancer in 2012 treated with surgical resection only, non-ischemic cardiomyopathy with hx of CHF in past (last EF12/23/23 showed normal EF), GERD, OA, Depression, SHANNEN, who presents with new diagnosis of muscle invasive bladder cancer.  On chart review, the patient also recently had a colonoscopy after outpatient CT showed proctatitis, findings showed   12/11/23  Findings:   Technically difficult colonoscopy that required an EGD scope to navigate a  Nightly Chencho Crawford Jr., MD   10 mg at 01/10/24 2013    ipratropium 0.5 mg-albuterol 2.5 mg (DUONEB) nebulizer solution 1 Dose  1 Dose Inhalation Q4H PRN Chencho Crawford Jr., MD   1 Dose at 12/29/23 2034    pantoprazole (PROTONIX) tablet 40 mg  40 mg Oral QAM AC Chencho Crawford Jr., MD   40 mg at 01/11/24 0550    oxyCODONE (ROXICODONE) immediate release tablet 5 mg  5 mg Oral Q4H PRN Chencho Crawford Jr., MD   5 mg at 01/11/24 0233    melatonin tablet 6 mg  6 mg Oral Nightly PRN Chencho Crawford Jr., MD        morphine (PF) injection 2 mg  2 mg IntraVENous Q4H PRN Chris Guzman MD   2 mg at 01/10/24 0929       Allergies   Allergen Reactions    Naproxen Anaphylaxis     Other reaction(s): Anaphylaxis   Hives, passed out, had seizure    Naproxen Sodium Anaphylaxis     Hives, passed out, had seizure    Sulfa Antibiotics Hives and Rash     Other reaction(s): Rash             Physical Exam:   /77   Pulse (!) 102   Temp 97.5 °F (36.4 °C) (Oral)   Resp 18   Ht 1.778 m (5' 10\")   Wt 61.1 kg (134 lb 11.2 oz)   SpO2 94%   BMI 19.33 kg/m²   ECOG PS: 2  General: alert, cooperative, no distress   Mental  status: normal mood, behavior, speech, dress, motor activity, and thought processes, able to follow commands   HENT: NCAT   Neck: no visualized mass   Resp: no respiratory distress   Neuro: no gross deficits   Skin: no discoloration or lesions of concern on visible areas   Psychiatric: normal affect, consistent with stated mood, no evidence of hallucinations         Results:     Lab Results   Component Value Date    WBC 8.8 01/06/2024    HGB 8.5 (L) 01/06/2024    HCT 26.6 (L) 01/06/2024    MCV 86.6 01/06/2024     01/06/2024     Lab Results   Component Value Date     (L) 01/11/2024    K 3.8 01/11/2024    CL 98 01/11/2024    CO2 31 01/11/2024    BUN 22 (H) 01/11/2024    CREATININE 0.84 01/11/2024    GLUCOSE 133 (H) 01/11/2024    CALCIUM 8.6 01/11/2024    PROT 6.7 01/10/2024

## 2024-01-11 NOTE — PROGRESS NOTES
Spoke with Dr. Dubose.  Very poor surgical candidate at this time and patient does not want surgery.  Please re-consult if this changes

## 2024-01-11 NOTE — PROGRESS NOTES
Occupational Therapy    Attempted to see patient, cleared by RN. He states he has been having constant stool and is still actively going. RN confirms he had been given stimulants and has been having issues all afternoon. Will defer and continue to follow.    Margarette Guillen, OT

## 2024-01-11 NOTE — PROGRESS NOTES
Palliative Medicine  Patient Name: Jamel Hazel  YOB: 1955  MRN: [id number]  Age: 68 y.o.  Gender: male    Date of Initial Consult: 1/10/2024  Date of Service: 1/11/2024  Time: 3:43 PM  Provider: Kay Michael MD  Hospital Day: 15  Admit Date: 12/28/2023  Referring Provider: Jazmin Gaona NP       Reasons for Consultation:  Goals of Care    HISTORY OF PRESENT ILLNESS (HPI):   Jamel Hazel is a 68 y.o. male with newly diagnosed bladder cancer, multiple colonic polyps, COPD, chronic respiratory failure on home 3L NC, GERD, h/o esophageal cancer in 2012 s/p resection, chronic dysphagia since cancer resection who was admitted on 12/28/2023 from home with a diagnosis of gross hematuria d/t bladder mass. Also treated for MARIA ELENA d/t bilateral hydronephrosis. Urology following. Patient has catheter in place. Patient has received antibiotics and diflucan. Patient is s/p b/l nephrostomy tube on 12/31. He is s/p Cystoscopy, left Retrograde Pyelography and Stent Placement, Bladder Biopsy and Fulguration on 1/5. Biopsy results demonstrated invasive high grade bladder cancer on 1/10. Oncology has been consulted. Cardiology has been following for chest discomfort, elevated troponins, episode of sinus tachycardia to 160s. He is also being treated for significant constipation, and GI has been consulted for this.    12/30/2023 CT A/P:  IMPRESSION:  Bilateral hydroureteronephrosis without obstructive stone. Decompressed urinary  bladder containing a Gonzalez catheter.  Dilated esophagus with nonvisualization of the stomach suggestive of  esophagectomy and gastric pull-through    Psychosocial: Patient has been  to wife Emily for 35 years. He has three step-children. He does not have any biological children. He has two brothers, Gil and Benny. His DIL Ayleen helps him with his medical issues. Patient states he has been doing relatively well physically until recently and that he was cutting grass three

## 2024-01-11 NOTE — PROGRESS NOTES
End of Shift Note    Bedside shift change report given to JOHANA TRUJILLO  (oncoming nurse) by Edelmira Martínez RN (offgoing nurse).  Report included the following information Nurse Handoff Report      Shift worked:  NIGHT SHIFT   Shift summary and any significant changes:       Patient admitted yesterday with new complains of advance bladder cancer with increasing nausea and abdominal pain.  Over the night patient took all prescribed meds,  given oxycodone x 2 for pain.  Had a bowel movement x2. All labs drawn and sent to labs. Chg gibbons care done. Venelex applied on the skin, heels off the bed.  Patient has inpatient consult to palliative, cardiologist is on board, patient has an inpatient consult to colorectal surgery.     Concerns for physician to address:     Zone phone for oncoming shift:   [phone]     Patient Information  Jamel Hazel  68 y.o.  12/28/2023  9:12 PM by Chencho Crawford Jr., MD. 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

## 2024-01-11 NOTE — PROGRESS NOTES
.End of Shift Note    Bedside shift change report given to JOHANA Jeffers (oncoming nurse) by Bere Washington RN (offgoing nurse).  Report included the following information SBAR, Kardex, and MAR    Shift worked: 7a-7p     Shift summary and any significant changes:    Medications given per MAR and education has been provided. Gonzalez care completed. Pt had large BM x2 this shift. Nephrostomy tubes emptied.          Bere Washington RN

## 2024-01-11 NOTE — PROGRESS NOTES
[employer]  OOB as much as possible  Avoid electrolyte imbalances  Appreciates oncology and palliative recs  Rest of care per primary team.   Nothing further to add from a GI standpoint.  GI signing-off.  Please call with any questions.  Thank you for this consult.      Plan discussed with Dr. Venegas  Subjective:   Discussed with RN events overnight. Feels much better today, had 2-3 formed bowel movements. Abdomen is slightly less firm. No nausea or rectal bleeding. No vomiting.      Review of Systems:  Symptom Y/N Comments  Symptom Y/N Comments   Fever/Chills N   Chest Pain N    Cough N   Headaches N    Sputum N   Joint Pain N    SOB/HALL N   Pruritis/Rash N    Tolerating Diet Y   Other       Could NOT obtain due to:      Objective:   VITALS:   Last 24hrs VS reviewed since prior progress note. Most recent are:  Vitals:    01/11/24 0752   BP:    Pulse: (!) 102   Resp: 18   Temp:    SpO2: 94%       Intake/Output Summary (Last 24 hours) at 1/11/2024 1328  Last data filed at 1/11/2024 0654  Gross per 24 hour   Intake --   Output 2750 ml   Net -2750 ml     PHYSICAL EXAM:  General: WD, WN. Alert, cooperative, no acute distress    HEENT: NC, Atraumatic. Anicteric sclerae.  Lungs:  CTA Bilaterally. No Wheezing/Rhonchi/Rales.  Heart:  Regular  rhythm,  No murmur (), No Rubs, No Gallops  Abdomen: Soft, Non distended, Non tender.  +Bowel sounds, no HSM  Extremities: No c/c/e  Neurologic:  Alert and oriented X 3.  No acute neurological distress   Psych:   Good insight. Not anxious nor agitated.    Lab and Radiology Data Reviewed: (see below)    Medications Reviewed: (see below)  PMH/SH reviewed - no change compared to H&P  ________________________________________________________________________  Total time spent with patient: 15 minutes ________________________________________________________________________  Care Plan discussed with:  Patient Y   Family     RN               Consultant:       Angel Platt PA-C      Procedures: see electronic medical records for all procedures/Xrays and details which were not copied into this note but were reviewed prior to creation of Plan.      LABS:  No results for input(s): \"WBC\", \"HGB\", \"HCT\", \"PLT\" in the last 72 hours.  Recent Labs     01/10/24  1733 01/11/24  0200   * 132*   K 4.0 3.8   CL 97 98   CO2 34* 31   BUN 24* 22*     Recent Labs     01/10/24  1733   GLOB 4.2*     No results for input(s): \"INR\", \"APTT\" in the last 72 hours.    Invalid input(s): \"PTP\"   Recent Labs     01/10/24  1733   TIBC 226*      No results found for: \"FOL\", \"RBCF\"  No results for input(s): \"PH\", \"PCO2\", \"PO2\" in the last 72 hours.  No results for input(s): \"CPK\", \"CKMB\", \"TROPONINI\" in the last 72 hours.  [unfilled]    MEDICATIONS:  Current Facility-Administered Medications   Medication Dose Route Frequency    methylnaltrexone (RELISTOR) injection 12 mg  12 mg SubCUTAneous Every Other Day    bisacodyl (DULCOLAX) EC tablet 10 mg  10 mg Oral Daily    iron sucrose (VENOFER) 300 mg in sodium chloride 0.9 % 250 mL IVPB  300 mg IntraVENous Q24H    polyethylene glycol (GLYCOLAX) packet 17 g  17 g Oral BID    sennosides-docusate sodium (SENOKOT-S) 8.6-50 MG tablet 2 tablet  2 tablet Oral Daily    calcium carbonate (TUMS) chewable tablet 500 mg  500 mg Oral TID PRN    ranolazine (RANEXA) extended release tablet 500 mg  500 mg Oral BID    ceFEPIme (MAXIPIME) 2,000 mg in sodium chloride 0.9 % 100 mL IVPB (mini-bag)  2,000 mg IntraVENous Q8H    simethicone (MYLICON) chewable tablet 80 mg  80 mg Oral Q6H PRN    guaiFENesin (MUCINEX) extended release tablet 600 mg  600 mg Oral BID    0.9 % sodium chloride infusion   IntraVENous PRN    umeclidinium-vilanterol (ANORO ELLIPTA) inhaler 1 puff  (Patient Supplied)  1 puff Inhalation QPM    Benzocaine-Menthol (CEPACOL MAX SORE THROAT) lozenge 1 lozenge  1 lozenge Oral Q2H PRN    budesonide-formoterol (SYMBICORT) 160-4.5 MCG/ACT inhaler 2 puff (Patient Supplied)  2 puff

## 2024-01-11 NOTE — PROGRESS NOTES
Physical Therapy    Arrived to see pt for session. Pt states he had just gotten settled to rest and nap prior to lunch. Politely requested a later PT time. Will check back later.    Jordana Rogers, PT

## 2024-01-12 ENCOUNTER — TELEPHONE (OUTPATIENT)
Age: 69
End: 2024-01-12

## 2024-01-12 PROCEDURE — 2580000003 HC RX 258: Performed by: STUDENT IN AN ORGANIZED HEALTH CARE EDUCATION/TRAINING PROGRAM

## 2024-01-12 PROCEDURE — 1100000000 HC RM PRIVATE

## 2024-01-12 PROCEDURE — 97530 THERAPEUTIC ACTIVITIES: CPT

## 2024-01-12 PROCEDURE — 6370000000 HC RX 637 (ALT 250 FOR IP): Performed by: STUDENT IN AN ORGANIZED HEALTH CARE EDUCATION/TRAINING PROGRAM

## 2024-01-12 PROCEDURE — 6370000000 HC RX 637 (ALT 250 FOR IP): Performed by: NURSE PRACTITIONER

## 2024-01-12 PROCEDURE — 6360000002 HC RX W HCPCS: Performed by: STUDENT IN AN ORGANIZED HEALTH CARE EDUCATION/TRAINING PROGRAM

## 2024-01-12 PROCEDURE — 6360000002 HC RX W HCPCS: Performed by: INTERNAL MEDICINE

## 2024-01-12 PROCEDURE — 2700000000 HC OXYGEN THERAPY PER DAY

## 2024-01-12 PROCEDURE — 94640 AIRWAY INHALATION TREATMENT: CPT

## 2024-01-12 PROCEDURE — 97116 GAIT TRAINING THERAPY: CPT

## 2024-01-12 PROCEDURE — 6370000000 HC RX 637 (ALT 250 FOR IP): Performed by: INTERNAL MEDICINE

## 2024-01-12 PROCEDURE — 94761 N-INVAS EAR/PLS OXIMETRY MLT: CPT

## 2024-01-12 PROCEDURE — 99232 SBSQ HOSP IP/OBS MODERATE 35: CPT | Performed by: FAMILY MEDICINE

## 2024-01-12 PROCEDURE — 2580000003 HC RX 258: Performed by: INTERNAL MEDICINE

## 2024-01-12 PROCEDURE — 51702 INSERT TEMP BLADDER CATH: CPT

## 2024-01-12 PROCEDURE — 94760 N-INVAS EAR/PLS OXIMETRY 1: CPT

## 2024-01-12 PROCEDURE — 97535 SELF CARE MNGMENT TRAINING: CPT

## 2024-01-12 PROCEDURE — 6360000002 HC RX W HCPCS

## 2024-01-12 RX ADMIN — ONDANSETRON 4 MG: 2 INJECTION INTRAMUSCULAR; INTRAVENOUS at 22:00

## 2024-01-12 RX ADMIN — GUAIFENESIN 600 MG: 600 TABLET, EXTENDED RELEASE ORAL at 21:35

## 2024-01-12 RX ADMIN — GUAIFENESIN 600 MG: 600 TABLET, EXTENDED RELEASE ORAL at 08:09

## 2024-01-12 RX ADMIN — SODIUM CHLORIDE: 9 INJECTION, SOLUTION INTRAVENOUS at 23:01

## 2024-01-12 RX ADMIN — BUDESONIDE AND FORMOTEROL FUMARATE DIHYDRATE 2 PUFF: 160; 4.5 AEROSOL RESPIRATORY (INHALATION) at 20:41

## 2024-01-12 RX ADMIN — CASTOR OIL AND BALSAM, PERU: 788; 87 OINTMENT TOPICAL at 21:39

## 2024-01-12 RX ADMIN — OXYCODONE HYDROCHLORIDE 5 MG: 5 TABLET ORAL at 06:53

## 2024-01-12 RX ADMIN — CALCIUM CARBONATE (ANTACID) CHEW TAB 500 MG 500 MG: 500 CHEW TAB at 23:30

## 2024-01-12 RX ADMIN — RANOLAZINE 500 MG: 500 TABLET, EXTENDED RELEASE ORAL at 08:09

## 2024-01-12 RX ADMIN — MONTELUKAST 10 MG: 10 TABLET, FILM COATED ORAL at 21:35

## 2024-01-12 RX ADMIN — CEFEPIME 2000 MG: 2 INJECTION, POWDER, FOR SOLUTION INTRAVENOUS at 13:42

## 2024-01-12 RX ADMIN — TROSPIUM CHLORIDE 20 MG: 20 TABLET, FILM COATED ORAL at 08:09

## 2024-01-12 RX ADMIN — BUDESONIDE AND FORMOTEROL FUMARATE DIHYDRATE 2 PUFF: 160; 4.5 AEROSOL RESPIRATORY (INHALATION) at 07:36

## 2024-01-12 RX ADMIN — SODIUM CHLORIDE, PRESERVATIVE FREE 10 ML: 5 INJECTION INTRAVENOUS at 08:09

## 2024-01-12 RX ADMIN — CEFEPIME 2000 MG: 2 INJECTION, POWDER, FOR SOLUTION INTRAVENOUS at 23:04

## 2024-01-12 RX ADMIN — ONDANSETRON 4 MG: 2 INJECTION INTRAMUSCULAR; INTRAVENOUS at 17:08

## 2024-01-12 RX ADMIN — PANTOPRAZOLE SODIUM 40 MG: 40 TABLET, DELAYED RELEASE ORAL at 06:48

## 2024-01-12 RX ADMIN — RANOLAZINE 500 MG: 500 TABLET, EXTENDED RELEASE ORAL at 21:35

## 2024-01-12 RX ADMIN — OXYCODONE HYDROCHLORIDE 5 MG: 5 TABLET ORAL at 21:35

## 2024-01-12 RX ADMIN — CEFEPIME 2000 MG: 2 INJECTION, POWDER, FOR SOLUTION INTRAVENOUS at 06:43

## 2024-01-12 RX ADMIN — METHYLNALTREXONE BROMIDE 12 MG: 12 INJECTION, SOLUTION SUBCUTANEOUS at 08:12

## 2024-01-12 RX ADMIN — CASTOR OIL AND BALSAM, PERU: 788; 87 OINTMENT TOPICAL at 08:13

## 2024-01-12 RX ADMIN — SODIUM CHLORIDE, PRESERVATIVE FREE 10 ML: 5 INJECTION INTRAVENOUS at 21:39

## 2024-01-12 RX ADMIN — IRON SUCROSE 300 MG: 20 INJECTION, SOLUTION INTRAVENOUS at 17:12

## 2024-01-12 ASSESSMENT — PAIN SCALES - GENERAL
PAINLEVEL_OUTOF10: 7

## 2024-01-12 ASSESSMENT — PAIN DESCRIPTION - LOCATION
LOCATION: ABDOMEN;BACK
LOCATION: BACK;ABDOMEN
LOCATION: ABDOMEN

## 2024-01-12 ASSESSMENT — PAIN DESCRIPTION - PAIN TYPE: TYPE: ACUTE PAIN

## 2024-01-12 ASSESSMENT — PAIN - FUNCTIONAL ASSESSMENT: PAIN_FUNCTIONAL_ASSESSMENT: PREVENTS OR INTERFERES SOME ACTIVE ACTIVITIES AND ADLS

## 2024-01-12 ASSESSMENT — PAIN DESCRIPTION - DESCRIPTORS
DESCRIPTORS: ACHING
DESCRIPTORS: ACHING;DISCOMFORT

## 2024-01-12 ASSESSMENT — PAIN DESCRIPTION - ORIENTATION
ORIENTATION: RIGHT
ORIENTATION: MID

## 2024-01-12 NOTE — PLAN OF CARE
Problem: Occupational Therapy - Adult  Goal: By Discharge: Performs self-care activities at highest level of function for planned discharge setting.  See evaluation for individualized goals.  Description: FUNCTIONAL STATUS PRIOR TO ADMISSION:  Patient was ambulatory without AD and mod I for ADLs, working on cars/outside as able with increased time due to COPD/breathing. Was sponge bathing as able due to step over tub.    Receives Help From: Family, ADL Assistance: Independent,  ,  ,  ,  ,  , Homemaking Assistance: Independent, Ambulation Assistance: Independent, Transfer Assistance: Independent, Active : Yes     HOME SUPPORT: Patient lived with wife but didn't require assistance, PRN ADL assist as needed, wife drives.     Occupational Therapy Goals:  Initiated 12/31/2023. Weekly re-assessment performed on 1/8/24. GOAL 1 has been met. GOAL 2-5  have been upgraded  1.  Patient will perform self-feeding with [place] within 7 day(s). GOAL MET and discontinued.   2.  Patient will perform grooming with Set-up within 7 day(s).. UPGRADED: standing at sink  3.  Patient will perform lower body dressing with Minimal Assist within 7 day(s). UPGRADED: supervision  4.  Patient will perform toilet transfers with Minimal Assist  within 7 day(s). UPGRADED: SBA  5.  Patient will perform all aspects of toileting with Minimal Assist within 7 day(s). UPGRADED: SBA  6.  Patient will participate in upper extremity therapeutic exercise/activities with Supervision for 10 minutes within 7 day(s).    7.  Patient will utilize energy conservation techniques during functional activities with verbal cues within 7 day(s).   Outcome: Progressing     OCCUPATIONAL THERAPY TREATMENT  Patient: Jamel Hazel (68 y.o. male)  Date: 1/12/2024  Primary Diagnosis: Bladder spasms [N32.89]  Elevated troponin [R79.89]  Hematuria, unspecified type [R31.9]  Procedure(s) (LRB):  CYSTOSCOPY, LEFT RETROGRADE PYELOGRAM WITH STENT, BLADDER  BIOPSY AND  support  Standing - Dynamic: Good;Constant support      ADL Intervention:    Feeding: Independent       Toileting: Dependent/Total   Functional Mobility: Contact guard assistance    Skin Care: Chlorhexidine wipes;Bath wipes;Foam skin cleanser  Pain Rating:  No complaint of pain/10   Pain Intervention(s):   pain is at a level acceptable to the patient      Activity Tolerance:   Fair  on 3L NC  Please refer to the flowsheet for vital signs taken during this treatment.    After treatment:   Patient left in no apparent distress sitting up in chair, Call bell within reach, Bed/ chair alarm activated, and Heels elevated for pressure relief    COMMUNICATION/EDUCATION:   The patient's plan of care was discussed with: physical therapist and registered nurse    Patient Education  Education Given To: Patient  Education Provided: Plan of Care;Transfer Training;Energy Conservation;Fall Prevention Strategies  Education Method: Verbal  Barriers to Learning: None  Education Outcome: Verbalized understanding    Thank you for this referral.  Margarette Guillen OT  Minutes: 26

## 2024-01-12 NOTE — ACP (ADVANCE CARE PLANNING)
Advance Care Planning     Advance Care Planning (ACP) Conversation    Date of Conversation: 1/11/2024  Conducted with: Patient with Decision Making Capacity    Discussed code status. 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 He wishes to be Do Not Resuscitate.   Counseled regarding DDNR form and completed this with him. I provided him with the original copy and a copy for Ayleen, and I placed a copy on the hard chart for scanning. DNR order has already been entered.        Kay Michael MD

## 2024-01-12 NOTE — PLAN OF CARE
Problem: Physical Therapy - Adult  Goal: By Discharge: Performs mobility at highest level of function for planned discharge setting.  See evaluation for individualized goals.  Description: FUNCTIONAL STATUS PRIOR TO ADMISSION: Patient was independent and active with use of supplemental of oxygen.    HOME SUPPORT PRIOR TO ADMISSION: The patient lived with spouse.    Physical Therapy Goals  Initiated 12/30/2023; Reassessed 1/8/24 - current goals still appropriate  1.  Patient will move from supine to sit and sit to supine in bed with modified independence within 7 day(s).    2.  Patient will perform sit to stand with modified independence within 7 day(s).  3.  Patient will transfer from bed to chair and chair to bed with supervision/set-up using the least restrictive device within 7 day(s).  4.  Patient will ambulate with supervision/set-up for 100 feet with the least restrictive device within 7 day(s).   5.  Patient will ascend/descend 5 stairs with B handrail(s) with supervision/set-up within 7 day(s).   Outcome: Progressing   PHYSICAL THERAPY TREATMENT    Patient: Jamel Hazel (68 y.o. male)  Date: 1/12/2024  Diagnosis: Bladder spasms [N32.89]  Elevated troponin [R79.89]  Hematuria, unspecified type [R31.9] Elevated troponin  Procedure(s) (LRB):  CYSTOSCOPY, LEFT RETROGRADE PYELOGRAM WITH STENT, BLADDER  BIOPSY AND FULGURATION (N/A) 7 Days Post-Op  Precautions: Fall Risk, Up as Tolerated, Other (comment)                    ASSESSMENT:  Patient continues to benefit from skilled PT services and is progressing towards goals. Pt moving well today at an overall SBA to CGA level of function. Main limiting factor is management of tubes, IV and O2 line. Pt showed steady gait with the rolling walker. His sats on 3L were stable at 99% post amb. He states he is used to managing O2 lines and neph tube and wife will be with him to assist. He returned to the chair with call bell in reach. All lines intact at end of session.

## 2024-01-12 NOTE — TELEPHONE ENCOUNTER
Return call placed to Adelina w/ scheduling' number provided is out of service.     Nurse called regular scheduling line # [phone]; scheduling made aware new diagnosis code entered. Scheduling informed nurse pt needs a new imaging order put in.

## 2024-01-12 NOTE — PROGRESS NOTES
Palliative Medicine  Patient Name: Jamel Hazel  YOB: 1955  MRN: [id number]  Age: 68 y.o.  Gender: male    Date of Initial Consult: 1/10/2024  Date of Service: 1/12/2024  Time: 1:07 PM  Provider: Kay Michael MD  Hospital Day: 16  Admit Date: 12/28/2023  Referring Provider: Jazmin Gaona NP       Reasons for Consultation:  Goals of Care    HISTORY OF PRESENT ILLNESS (HPI):   Jamel Hazel is a 68 y.o. male with newly diagnosed bladder cancer, multiple colonic polyps, COPD, chronic respiratory failure on home 3L NC, GERD, h/o esophageal cancer in 2012 s/p resection, chronic dysphagia since cancer resection who was admitted on 12/28/2023 from home with a diagnosis of gross hematuria d/t bladder mass. Also treated for MARIA ELENA d/t bilateral hydronephrosis. Urology following. Patient has catheter in place. Patient has received antibiotics and diflucan. Patient is s/p b/l nephrostomy tube on 12/31. He is s/p Cystoscopy, left Retrograde Pyelography and Stent Placement, Bladder Biopsy and Fulguration on 1/5. Biopsy results demonstrated invasive high grade bladder cancer on 1/10. Oncology has been consulted. Cardiology has been following for chest discomfort, elevated troponins, episode of sinus tachycardia to 160s. He is also being treated for significant constipation, and GI has been consulted for this.    12/30/2023 CT A/P:  IMPRESSION:  Bilateral hydroureteronephrosis without obstructive stone. Decompressed urinary  bladder containing a Gonzalez catheter.  Dilated esophagus with nonvisualization of the stomach suggestive of  esophagectomy and gastric pull-through    Psychosocial: Patient has been  to wife Emily for 35 years. He has three step-children. He does not have any biological children. He has two brothers, Gil and Benny. His DIL Ayleen helps him with his medical issues. Patient states he has been doing relatively well physically until recently and that he was cutting grass three  based rather than MDM  [] The total encounter time on this service date was ____ minutes which was spent performing a face-to-face encounter and personally completing the provider-level activities documented in the note. This includes time spent prior to the visit and after the visit in direct care of the patient. This time does not include time spent in any separately reportable services.    Electronically signed by   Kay Michael MD  Palliative Care Team  on 1/12/2024 at 1:07 PM

## 2024-01-12 NOTE — PROGRESS NOTES
Hospitalist Progress Note    NAME:   Jamel Hazel   : 1955   MRN: [id number]     Date/Time: 2024 2:56 PM  Patient PCP: Aj Loya DO    Estimated discharge date:   Barriers:  DME, PT.OT      Assessment / Plan:  Klebsiella bacteremia   MARIA ELENA due to b/l hydronephrosis  Hematuria   Invasive high grade bladder cancer   Bcx growing Klebsiella.  Repeat Bcx NTD.  Pt had been on IV cefepime since .  Will complete 14 days total.  S/p cysto with L ureteral stent placement and bladder biopsy on  which showed invasive high grade bladder CA  Gibbons remains in place.  Pt has R and L PCN managed by urology, appreciate recs.  Discharge home with gibbons and b/l PCN  Oncology and palliative care is following.  Pt is a poor surgical candidate.  Oncology evaluated, plan for outpt PET.  Treatment will depend on final staging.  PT/OT, pt would like to be discharge home with HH.  He wants to see PT/OT today and get additional DME prior to discharge    Chest discomfort  Sinus tachycardia, resolved  EKG non revealing.  Trop 158, trended down from 400.  Cardiology evaluate, no change in meds.  Pt is too high risk for invasive evaluation.    Constipation, resolved.  Will need daily bowel regimen at discharge    Anemia, improved.  Monitor and transfuse for hbg<7  Completing last dose of IV iron.    Thrombocytopenia, PLT improved to 199.    H/o Severe COPD   Not on exacerbations   Continue with home medications       Medical Decision Making:   I personally reviewed labs  I personally reviewed imaging  I personally reviewed EKG  Toxic drug monitoring  Discussed case with: Patient, RN, plan of care discussed        Code Status: DNR  DVT Prophylaxis: scd    Subjective:     Chief Complaint / Reason for Physician Visit  No nw complaint.  Feels weak.  Wants to work with PT/OT today since he didn't get a chance to yesterday.  Wants to make sure he has everything he needs at home prior to discharge.  Feels even  though he is weak, wife would not want him discharging to rehab.       Objective:     VITALS:   Last 24hrs VS reviewed since prior progress note. Most recent are:  Patient Vitals for the past 24 hrs:   BP Temp Temp src Pulse Resp SpO2 Height   01/12/24 0737 -- -- -- 92 15 96 % --   01/12/24 0715 127/86 98.1 °F (36.7 °C) Oral 89 18 98 % --   01/11/24 2012 126/80 97.7 °F (36.5 °C) Oral 96 18 100 % --   01/11/24 1542 -- -- -- -- -- -- 1.778 m (5' 10\")           Intake/Output Summary (Last 24 hours) at 1/12/2024 1456  Last data filed at 1/12/2024 1310  Gross per 24 hour   Intake 300 ml   Output 875 ml   Net -575 ml          I had a face to face encounter and independently examined this patient on 1/12/2024, as outlined below:  PHYSICAL EXAM:  General: Alert, cooperative  EENT:  EOMI. Anicteric sclerae.  Resp:  CTA bilaterally, no wheezing or rales.  No accessory muscle use  CV:  Regular  rhythm,  No edema  GI:  Bilateral nephrostomy tube intact , firm , no  tenderness   Neurologic:  Alert and oriented X 3, normal speech,   Psych:   Not anxious nor agitated.   Skin:  No rashes.  No jaundice, gibbons insitu    Reviewed most current lab test results and cultures  YES  Reviewed most current radiology test results   YES  Review and summation of old records today    NO  Reviewed patient's current orders and MAR    YES  PMH/SH reviewed - no change compared to H&P    Procedures: see electronic medical records for all procedures/Xrays and details which were not copied into this note but were reviewed prior to creation of Plan.      LABS:  I reviewed today's most current labs and imaging studies.  Pertinent labs include:  No results for input(s): \"WBC\", \"HGB\", \"HCT\", \"PLT\" in the last 72 hours.    Recent Labs     01/10/24  1733 01/11/24  0200   * 132*   K 4.0 3.8   CL 97 98   CO2 34* 31   GLUCOSE 144* 133*   BUN 24* 22*   CREATININE 0.89 0.84   CALCIUM 8.7 8.6   LABALBU 2.5*  --    BILITOT 0.4  --    AST 15  --    ALT 25  --

## 2024-01-12 NOTE — PROGRESS NOTES
.End of Shift Note    Bedside shift change report given to JOHANA Jett (oncoming nurse) by Bere Washington RN (offgoing nurse).  Report included the following information SBAR, Kardex, and MAR    Shift worked: 7a-7p     Shift summary and any significant changes:    Medications given per MAR and education has been provided. PRN Zofran given x1. Gonzalez care completed. R Nephrostomy tube emptied and L clamped off. New PIV placed in LAC.          Bere Washington RN

## 2024-01-13 VITALS
OXYGEN SATURATION: 97 % | HEART RATE: 91 BPM | SYSTOLIC BLOOD PRESSURE: 119 MMHG | BODY MASS INDEX: 19.28 KG/M2 | DIASTOLIC BLOOD PRESSURE: 79 MMHG | HEIGHT: 70 IN | WEIGHT: 134.7 LBS | RESPIRATION RATE: 16 BRPM | TEMPERATURE: 97.7 F

## 2024-01-13 PROCEDURE — 6370000000 HC RX 637 (ALT 250 FOR IP): Performed by: INTERNAL MEDICINE

## 2024-01-13 PROCEDURE — 6370000000 HC RX 637 (ALT 250 FOR IP): Performed by: NURSE PRACTITIONER

## 2024-01-13 PROCEDURE — 2580000003 HC RX 258: Performed by: INTERNAL MEDICINE

## 2024-01-13 PROCEDURE — 51702 INSERT TEMP BLADDER CATH: CPT

## 2024-01-13 PROCEDURE — 6360000002 HC RX W HCPCS: Performed by: INTERNAL MEDICINE

## 2024-01-13 PROCEDURE — 94760 N-INVAS EAR/PLS OXIMETRY 1: CPT

## 2024-01-13 PROCEDURE — 2580000003 HC RX 258: Performed by: STUDENT IN AN ORGANIZED HEALTH CARE EDUCATION/TRAINING PROGRAM

## 2024-01-13 PROCEDURE — 2700000000 HC OXYGEN THERAPY PER DAY

## 2024-01-13 PROCEDURE — 6370000000 HC RX 637 (ALT 250 FOR IP): Performed by: STUDENT IN AN ORGANIZED HEALTH CARE EDUCATION/TRAINING PROGRAM

## 2024-01-13 PROCEDURE — 6360000002 HC RX W HCPCS: Performed by: STUDENT IN AN ORGANIZED HEALTH CARE EDUCATION/TRAINING PROGRAM

## 2024-01-13 RX ORDER — OXYCODONE HYDROCHLORIDE 5 MG/1
5 TABLET ORAL EVERY 4 HOURS PRN
Qty: 10 TABLET | Refills: 0 | Status: SHIPPED | OUTPATIENT
Start: 2024-01-13 | End: 2024-01-16

## 2024-01-13 RX ORDER — GABAPENTIN 100 MG/1
200 CAPSULE ORAL 3 TIMES DAILY
Status: DISCONTINUED | OUTPATIENT
Start: 2024-01-13 | End: 2024-01-13 | Stop reason: HOSPADM

## 2024-01-13 RX ORDER — GABAPENTIN 100 MG/1
200 CAPSULE ORAL 3 TIMES DAILY
Qty: 90 CAPSULE | Refills: 0 | Status: SHIPPED | OUTPATIENT
Start: 2024-01-13 | End: 2024-02-12

## 2024-01-13 RX ORDER — POLYETHYLENE GLYCOL 3350 17 G/17G
17 POWDER, FOR SOLUTION ORAL DAILY PRN
Qty: 30 PACKET | Refills: 0 | Status: SHIPPED | OUTPATIENT
Start: 2024-01-13 | End: 2024-02-12

## 2024-01-13 RX ORDER — OXYCODONE HYDROCHLORIDE 5 MG/1
10 TABLET ORAL EVERY 4 HOURS PRN
Status: DISCONTINUED | OUTPATIENT
Start: 2024-01-13 | End: 2024-01-13

## 2024-01-13 RX ORDER — ONDANSETRON 4 MG/1
4 TABLET, ORALLY DISINTEGRATING ORAL EVERY 8 HOURS PRN
Qty: 21 TABLET | Refills: 0 | Status: SHIPPED | OUTPATIENT
Start: 2024-01-13 | End: 2024-02-12

## 2024-01-13 RX ORDER — OXYCODONE HYDROCHLORIDE 5 MG/1
5 TABLET ORAL EVERY 4 HOURS PRN
Status: DISCONTINUED | OUTPATIENT
Start: 2024-01-13 | End: 2024-01-13 | Stop reason: HOSPADM

## 2024-01-13 RX ORDER — TROSPIUM CHLORIDE 20 MG/1
20 TABLET, FILM COATED ORAL DAILY
Qty: 30 TABLET | Refills: 0 | Status: SHIPPED | OUTPATIENT
Start: 2024-01-14

## 2024-01-13 RX ADMIN — ONDANSETRON 4 MG: 4 TABLET, ORALLY DISINTEGRATING ORAL at 11:10

## 2024-01-13 RX ADMIN — TROSPIUM CHLORIDE 20 MG: 20 TABLET, FILM COATED ORAL at 09:47

## 2024-01-13 RX ADMIN — GABAPENTIN 200 MG: 100 CAPSULE ORAL at 14:03

## 2024-01-13 RX ADMIN — SIMETHICONE 80 MG: 80 TABLET, CHEWABLE ORAL at 05:15

## 2024-01-13 RX ADMIN — CALCIUM CARBONATE (ANTACID) CHEW TAB 500 MG 500 MG: 500 CHEW TAB at 05:42

## 2024-01-13 RX ADMIN — RANOLAZINE 500 MG: 500 TABLET, EXTENDED RELEASE ORAL at 09:47

## 2024-01-13 RX ADMIN — SODIUM CHLORIDE, PRESERVATIVE FREE 10 ML: 5 INJECTION INTRAVENOUS at 09:47

## 2024-01-13 RX ADMIN — PANTOPRAZOLE SODIUM 40 MG: 40 TABLET, DELAYED RELEASE ORAL at 05:32

## 2024-01-13 RX ADMIN — ONDANSETRON 4 MG: 2 INJECTION INTRAMUSCULAR; INTRAVENOUS at 05:32

## 2024-01-13 RX ADMIN — OXYCODONE HYDROCHLORIDE 5 MG: 5 TABLET ORAL at 05:54

## 2024-01-13 RX ADMIN — GUAIFENESIN 600 MG: 600 TABLET, EXTENDED RELEASE ORAL at 09:47

## 2024-01-13 RX ADMIN — CEFEPIME 2000 MG: 2 INJECTION, POWDER, FOR SOLUTION INTRAVENOUS at 06:27

## 2024-01-13 RX ADMIN — OXYCODONE HYDROCHLORIDE 5 MG: 5 TABLET ORAL at 09:54

## 2024-01-13 RX ADMIN — BUDESONIDE AND FORMOTEROL FUMARATE DIHYDRATE 2 PUFF: 160; 4.5 AEROSOL RESPIRATORY (INHALATION) at 09:34

## 2024-01-13 RX ADMIN — CASTOR OIL AND BALSAM, PERU: 788; 87 OINTMENT TOPICAL at 09:55

## 2024-01-13 ASSESSMENT — PAIN DESCRIPTION - LOCATION
LOCATION: ABDOMEN;BACK
LOCATION: ABDOMEN;BACK
LOCATION: BACK

## 2024-01-13 ASSESSMENT — PAIN SCALES - GENERAL
PAINLEVEL_OUTOF10: 5
PAINLEVEL_OUTOF10: 7
PAINLEVEL_OUTOF10: 7

## 2024-01-13 ASSESSMENT — PAIN DESCRIPTION - ORIENTATION
ORIENTATION: MID

## 2024-01-13 ASSESSMENT — PAIN DESCRIPTION - DESCRIPTORS
DESCRIPTORS: ACHING

## 2024-01-13 ASSESSMENT — PAIN DESCRIPTION - PAIN TYPE: TYPE: ACUTE PAIN

## 2024-01-13 NOTE — CARE COORDINATION
4:43pm- [place] referral still pending. [facility], [facility] and [facility] declined.     12:30pm- CM called pt's nurse via phone. CM informed pt's nurse that home health was not arranged and that CM will need to arrange home health. Pt's nurse stated that pt stated that he would like home health to be arranged. CM sent referral to [facility], [facility], [facility] and [facility] and [facility].     12:12pm- CM note stated that pt was agreeable to home health and that when SOC would be; no referral indicating that home health was arranged.     Tila Plascencia, MS  Weekend Care Manager-[facility]  [phone]

## 2024-01-13 NOTE — DISCHARGE SUMMARY
Discharge Summary    Name: Jamel Hazel  [id number]  YOB: 1955 (Age: 68 y.o.)   Date of Admission: 12/28/2023  Date of Discharge: 1/13/2024  Attending Physician: Dora Negro MD    Discharge Diagnosis:   Klebsiella bacteremia   MARIA ELENA due to b/l hydronephrosis  Hematuria   Invasive high grade bladder cancer   Chest discomfort  Sinus tachycardia  Constipation   Anemia   Thrombocytopenia   H/o Severe COPD     Consultations:  IP CONSULT TO UROLOGY  IP CONSULT TO CARDIOLOGY  IP CONSULT TO NEPHROLOGY  IP CONSULT HOME HEALTH  IP CONSULT TO ONCOLOGY  IP CONSULT TO PALLIATIVE CARE  IP CONSULT TO GI      Brief Admission History/Reason for Admission Per Chencho Crawford Jr., MD:   Jamel Hazel is a 68 y.o.  male      Cardiomyopathy previously followed by Dr. Reid, has not seen in years              No recent echoes or catheter reports in the computer     Prior esophageal cancer 2012 s/p resection  Chronic dysphagia since surgery  130 lb weight due to the dysphagia sustained over years     Baseline COPD  Chronic respiratory failure with hypoxia on 3 L nasal cannula oxygen at home     Recent admission at [facility] for hematuria and urinary retention 12/7/2023 to 12/13/2023  Acute urinary Retention, gross hematuria, new diagnosis of bilateral hydronephrosis  Asymmetric bladder wall thickening on imaging              ? mass  Gibbons placed last admit, plan to leave in place till urology follow up  Hydronephrosis persistent despite gibbons, urology felt likely chronic  Planning for cystoscopy as outpatient next week  Also concerned for rectal mass seen on abdominal imaging  Had a colonoscopy with Dr. Parks during that admission with multiple distal polyps              Some of the polyps could not be removed              Plan was for outpatient GI follow-up and possible surgery for the nonresectable polyps     Back with bloody urine leaking around the gibbons, suprapubic abdominal pain        [facility] - P [phone] - F [phone]  [address], [place] [address]      Phone: [phone]   gabapentin 100 MG capsule  ondansetron 4 MG disintegrating tablet  oxyCODONE 5 MG immediate release tablet  polyethylene glycol 17 g packet  ranolazine 500 MG extended release tablet  simethicone 80 MG chewable tablet  trospium 20 MG tablet           DISPOSITION:    Home with Family:       Home with HH/PT/OT/RN: x   SNF/LTC:    JORGE:    OTHER:          Follow up with:   PCP : Aj Loya DO  [facility] EMERGENCY DEPT  [address] [address] [phone]    As needed, If symptoms worsen    [facility]  [address] [id number] [phone]  Go on 1/23/2024  follow up on this date 1/23/23 with Dr. Beaver at 1:40pm at our [place] office    Edwin Reid MD  [address]   [place] [address] [phone]    Follow up in 2 week(s)      Marilee Bean, APRN - NP  [address] [address] [address] [phone]    Go on 1/17/2024  at 11:00 a.m. for your PCP hospital follow up.    Myrtle Dubose MD  [address]  [place] [address] [phone]    Follow up in 1 week(s)            Total time in minutes spent coordinating this discharge (includes going over instructions, follow-up, prescriptions, and preparing report for sign off to her PCP) :  35 minutes

## 2024-01-13 NOTE — PROGRESS NOTES
4:16pm - Patient has discharge order. Waiting for case management to set up home health for patient prior to discharge    4:45pm - case management informed me that she could not find a home health agency and discussed the other options for the patient    5:45pm - Patient's wife decided that she wanted the patient home and that they will contact their NP for home health agencies. Wife also said that her friend is a home health nurse and that HH nurse will come over to their house tomorrow.    5:54pm - patient's IV removed. Gonzalez and drains stay in place, per Sruthi DOS SANTOS. Discharge instructions given and patient had no further questions. Patient transported home via wife.

## 2024-01-13 NOTE — PROGRESS NOTES
End of Shift Note    Bedside shift change report given to Rebecca VAUGHN (oncoming nurse) by Johnie Smith RN (offgoing nurse).  Report included the following information SBAR, Kardex, MAR, and Recent Results    Shift worked:  7p-7a     Shift summary and any significant changes:     Scheduled medications given see MAR and PRN Zofran x2, Oxy x2, Mylicon x1, Tums x2.Pt had 2 loose BM. Incontinent care and gibbons care provided. Nephrostomy tube emptied and redressed.       Length of Stay:  Expected LOS: 16  Actual LOS: 15      Johnie Smith RN

## 2024-01-15 ENCOUNTER — HOSPITAL ENCOUNTER (OUTPATIENT)
Facility: HOSPITAL | Age: 69
Discharge: HOME | End: 2024-01-18
Payer: MEDICARE

## 2024-01-15 DIAGNOSIS — C67.9 BLADDER CARCINOMA (HCC): ICD-10-CM

## 2024-01-15 DIAGNOSIS — R91.1 NODULE OF LOWER LOBE OF RIGHT LUNG: ICD-10-CM

## 2024-01-15 LAB
GLUCOSE BLD STRIP.AUTO-MCNC: 104 MG/DL (ref 65–117)
SERVICE CMNT-IMP: NORMAL

## 2024-01-15 PROCEDURE — 82962 GLUCOSE BLOOD TEST: CPT

## 2024-01-15 PROCEDURE — 78815 PET IMAGE W/CT SKULL-THIGH: CPT

## 2024-01-15 PROCEDURE — A9609 HC RX DIAGNOSTIC RADIOPHARMACEUTICAL: HCPCS | Performed by: NURSE PRACTITIONER

## 2024-01-15 PROCEDURE — 3430000000 HC RX DIAGNOSTIC RADIOPHARMACEUTICAL: Performed by: NURSE PRACTITIONER

## 2024-01-15 RX ORDER — FLUDEOXYGLUCOSE F-18 500 MCI/ML
10 INJECTION INTRAVENOUS ONCE
Status: COMPLETED | OUTPATIENT
Start: 2024-01-15 | End: 2024-01-15

## 2024-01-15 RX ADMIN — FLUDEOXYGLUCOSE F-18 10 MILLICURIE: 500 INJECTION INTRAVENOUS at 13:02

## 2024-01-17 ENCOUNTER — TELEPHONE (OUTPATIENT)
Age: 69
End: 2024-01-17

## 2024-01-17 NOTE — TELEPHONE ENCOUNTER
----- Message from Rebecca Moser sent at 1/12/2024  2:32 PM EST -----  Regarding: RE: New Onc Pt  Done. Scheduling already called pt.  ----- Message -----  From: Sylvia Cardenas RN  Sent: 1/12/2024  11:02 AM EST  To: Sylvia Cardenas V RN; Vonregan Cain; #  Subject: New Onc Pt                                       Good Morning Ladies.    This pt is still in the hospital. We need to schedule a f/u for him in 2 weeks with Dr. Dubose put he needs to have his PET scan done prior to his appt w/ Dr. Dubose in 2 weeks.     Can you please call pt and give him the number to scheduling ([phone]) to schedule his PET scan for when he is released from the hospital and schedule him a 2 week new pt appt w/ Dr. Dubose. TY.

## 2024-01-19 ENCOUNTER — TELEPHONE (OUTPATIENT)
Age: 69
End: 2024-01-19

## 2024-01-19 NOTE — TELEPHONE ENCOUNTER
----- Message from Rebecca Moser sent at 1/18/2024  3:37 PM EST -----  Regarding: RE: Move New Onc Appt up 1 week  Pt is moved up to 1/22/24.  ----- Message -----  From: Sylvia Cardenas RN  Sent: 1/17/2024   4:35 PM EST  To: Sylvia PARKER RN; Jeromy Barlow; #  Subject: Move New Onc Appt up 1 week                      Hi Ladies.     Can you please move pt's new onc appt up by 1 week to the week of 1/22?    TY.

## 2024-01-22 ENCOUNTER — OFFICE VISIT (OUTPATIENT)
Age: 69
End: 2024-01-22
Payer: MEDICARE

## 2024-01-22 VITALS
TEMPERATURE: 98.3 F | RESPIRATION RATE: 16 BRPM | SYSTOLIC BLOOD PRESSURE: 93 MMHG | HEART RATE: 105 BPM | BODY MASS INDEX: 19.33 KG/M2 | DIASTOLIC BLOOD PRESSURE: 56 MMHG | OXYGEN SATURATION: 96 % | HEIGHT: 70 IN

## 2024-01-22 DIAGNOSIS — C67.9 STAGE IV BLADDER CANCER (HCC): Primary | ICD-10-CM

## 2024-01-22 DIAGNOSIS — R31.9 HEMATURIA, UNSPECIFIED TYPE: ICD-10-CM

## 2024-01-22 PROCEDURE — 99215 OFFICE O/P EST HI 40 MIN: CPT | Performed by: STUDENT IN AN ORGANIZED HEALTH CARE EDUCATION/TRAINING PROGRAM

## 2024-01-22 PROCEDURE — 1123F ACP DISCUSS/DSCN MKR DOCD: CPT | Performed by: STUDENT IN AN ORGANIZED HEALTH CARE EDUCATION/TRAINING PROGRAM

## 2024-01-22 PROCEDURE — 3074F SYST BP LT 130 MM HG: CPT | Performed by: STUDENT IN AN ORGANIZED HEALTH CARE EDUCATION/TRAINING PROGRAM

## 2024-01-22 PROCEDURE — 3078F DIAST BP <80 MM HG: CPT | Performed by: STUDENT IN AN ORGANIZED HEALTH CARE EDUCATION/TRAINING PROGRAM

## 2024-01-22 RX ORDER — FINASTERIDE 5 MG/1
5 TABLET, FILM COATED ORAL DAILY
COMMUNITY
Start: 2023-11-01

## 2024-01-22 RX ORDER — ALFUZOSIN HYDROCHLORIDE 10 MG/1
10 TABLET, EXTENDED RELEASE ORAL DAILY
COMMUNITY

## 2024-01-22 RX ORDER — OXYCODONE HYDROCHLORIDE 5 MG/1
5 CAPSULE ORAL EVERY 4 HOURS PRN
COMMUNITY

## 2024-01-22 NOTE — PROGRESS NOTES
Jamel Hazel is a 68 y.o. male who presents for follow up of   Chief Complaint   Patient presents with    New Patient     Bladder cancer     Mr. Hazel is here today for an evaluation. He reports he has been having harley bleeding internally he also had some bleeding from his catheter.  He reports feeling fatigue, sob He denies dizziness and he also reports low blood pressure. He is on 3 liters of oxygen.   Medications reviewed with the patient, and chart updated to reflect changes.          
Subjective:      History was provided by the mother. Sundeep Joseph is a 2 m.o. male who is brought in for this well child visit. Birth History    Birth     Length: 1' 8\" (0.508 m)     Weight: 9 lb 4.9 oz (4.22 kg)     HC 36.5 cm    Apgar     One: 9     Five: 9    Delivery Method: , Low Transverse    Gestation Age: 36 1/7 wks     Patient Active Problem List    Diagnosis Date Noted    Majestic screening tests negative 2019    Breastfeeding (infant) 2019    LGA (large for gestational age) infant 2018    Breech presentation at birth 2018   Marty Zimmer infant, born in hospital, delivered by  2018     History reviewed. No pertinent past medical history. Immunization History   Administered Date(s) Administered    Hep B Vaccine 2018    Hep B, Adol/Ped 2018     *History of previous adverse reactions to immunizations: no    Current Issues:  Current concerns on the part of Richie's mother include no new health issues. Review of Nutrition:  Current feeding pattern: breast milk, formula (Nutramagen)  Difficulties with feeding: no  Currently stooling frequency: twice a day, loose    Social Screening:  Current child-care arrangements: in home: primary caregiver: mother  Parental coping and self-care: Doing well; no concerns. Secondhand smoke exposure? no    G & D:  Smiles, coos, tracks    Objective:     Growth parameters are noted and are appropriate for age. General:  alert, no distress, appears stated age   Skin:  Dry areas diffusely; he has 2 papules at his L cheek, and one has been scratched and scabbed. 2 raw areas at buttocks are noted. Head:  normal fontanelles   Eyes:  sclerae white, pupils equal and reactive, red reflex normal bilaterally   Ears:  normal bilateral   Mouth:  No perioral or gingival cyanosis or lesions. Tongue is normal in appearance.    Lungs:  clear to auscultation bilaterally   Heart:  regular rate and rhythm, S1, S2
normal, no murmur, click, rub or gallop   Abdomen:  soft, non-tender. Bowel sounds normal. No masses,  no organomegaly   Screening DDH:  Ortolani's and Kang's signs absent bilaterally, leg length symmetrical, thigh & gluteal folds symmetrical   :  normal female   Femoral pulses:  present bilaterally   Extremities:  extremities normal, atraumatic, no cyanosis or edema   Neuro:  alert     Assessment:      Healthy 2 m.o. old infant   Diaper rash  Loose stools  Dry skin  Papules x 2 (face)    Plan:     1. Anticipatory guidance provided: Gave CRS handout on well-child issues at this age. 2. Screening tests:               State  metabolic screen (if not done previously after 11days old): no              Urine reducing substances (for galactosemia):no              Hb or HCT (CDC recc's before 6mos if  or LBW): no    3. Ultrasound of the hips to screen for developmental dysplasia of the hip : no    4. Orders placed during this Well Child Exam:  No orders of the defined types were placed in this encounter. 5.  Pentacel, Prevnar, HepB, Rotarix today    6. Mupirocin Ointment to buttocks rash and papular rash at cheeks    7. Vaseline ad myriam    8.   Trial of Gentlease in place of Nutramigen
bladder  which is decompressed and correlation would be helpful.    There are few small scattered foci of increased activity in the abdomen and  pelvis which seems to correspond to bowel.  There is a normal-sized left periaortic lymph node with slight increased  metabolic activity which is nonspecific.  There is more intense activity in the rectal wall and distal sigmoid colon where  there is questionable wall thickening and and correlation would be helpful to  assess for inflammatory process. 23X        -  Poor surgical candidate due to medical comorbidities    -Given medical comorbidities, severe emphysema etc., he would be a poor candidate for cisplatin-based therapies    - Plan for combination treatment with pembrolizumab and enfortumab vedotin. Evidence supporting this combination comes from the EV-302 study studied 900 people with advanced bladder cancer who were randomly assigned to receive enfortumab plus pembrolizumab or chemotherapy as an initial treatment. People in the chemotherapy group received a maximum of six infusions of chemotherapy. Overall, tumors shrank or stopped growing in about 67% of participants treated with enfortumab plus pembrolizumab, compared with 44% of those who received platinum-based chemotherapy. In addition, complete response was seen in almost 30% of participants in the enfortumab-plus-pembrolizumab group compared with about 12% in the chemotherapy group. The most common side effects in the enfortumab-plus-pembrolizumab group included skin reactions and pain or numbness in the hands and feet. Fatigue, nausea, and a decrease in red and white blood cells were more common in those who received chemotherapy. After following participants for a median of about a year and a half, the study found that patients in the enfortumab-plus-pembrolizumab group lived nearly twice as long as those in the chemotherapy group: a median of 31 months versus 16 months.    We discussed the risks and

## 2024-01-23 DIAGNOSIS — C67.9 STAGE IV BLADDER CANCER (HCC): Primary | ICD-10-CM

## 2024-01-25 ENCOUNTER — TELEPHONE (OUTPATIENT)
Age: 69
End: 2024-01-25

## 2024-01-25 NOTE — TELEPHONE ENCOUNTER
----- Message from Marjorie Celaya sent at 1/24/2024  1:23 PM EST -----  Regarding: RE: New Start Keytruda + Padcev  He is all set  ----- Message -----  From: Marilee Lewis APRN - NP  Sent: 1/23/2024  12:02 PM EST  To: Marjorie Celaya; Sylvia PARKER RN  Subject: RE: New Start Keytruda + Padcev                  Plan is in.     ----- Message -----  From: Sylvia Cardenas RN  Sent: 1/23/2024  11:29 AM EST  To: FRITZ Clay NP; Marjorie Celaya; #  Subject: New Start Keytruda + Padcev                      GM Ladies.    New start for Keytruda + Padcev. Keytruda every 21 days + Padcev days 1 and 8 of 21 day cycle.     Marilee please put in order.    Jessica can you please call pt to schedule [facility] appt and f/u appt w. Dr. Dubose.     TY.

## 2024-01-28 PROBLEM — R79.89 ELEVATED TROPONIN: Status: RESOLVED | Noted: 2023-12-29 | Resolved: 2024-01-28

## 2024-01-29 ENCOUNTER — TELEPHONE (OUTPATIENT)
Age: 69
End: 2024-01-29

## 2024-01-29 DIAGNOSIS — C67.9 STAGE IV BLADDER CANCER (HCC): Primary | ICD-10-CM

## 2024-01-29 DIAGNOSIS — G89.3 CANCER ASSOCIATED PAIN: ICD-10-CM

## 2024-01-29 RX ORDER — OXYCODONE HYDROCHLORIDE 10 MG/1
10-20 TABLET ORAL EVERY 4 HOURS PRN
Qty: 120 TABLET | Refills: 0 | Status: SHIPPED | OUTPATIENT
Start: 2024-01-29 | End: 2024-02-28

## 2024-01-29 NOTE — TELEPHONE ENCOUNTER
Return call placed to pt. HIPAA verified by two patient identifiers.     Longterm pain management.

## 2024-01-29 NOTE — TELEPHONE ENCOUNTER
Pt called stating he's wanting to speak with someone about pain management. Pt would like to be prescribed a different medication. Pt is currently taking Oxycodone. Request a call back    # [phone]

## 2024-01-29 NOTE — TELEPHONE ENCOUNTER
Patient reports taking Percocet 5-325 without relief.  Start Oxycodone 10mg 1-2 tabs q4h PRN and okay to take Tylenol 1g with it up to 3g per day as needed.  Referral placed to palliative care for assistance with pain management.

## 2024-01-31 ENCOUNTER — TELEPHONE (OUTPATIENT)
Age: 69
End: 2024-01-31

## 2024-01-31 NOTE — TELEPHONE ENCOUNTER
[facility] Palliative Medicine Office  Nursing Note  [phone]  Fax [phone]      Name:  Jamel Hazel  YOB: 1955    Received outpatient Palliative Medicine referral from Dr. Kay Michael to see patient for symptom management and supportive care. Chart  reviewed. Jamel Hazel is a 68 y.o. male with bladder cancer.   Patient was hospitalized at [facility] 12/28/23-1/13/24 and treated for Klebsiella bacteremia.   Patient was followed by the inpatient Palliative Medicine team during his hospitalization.     Nurse called patient and explained [facility] outpatient Palliative Medicine services. Appointment scheduled for 2/21/24 at 10:30am with Dr. Carole Campbell.   Offered appt sooner, but other available times conflicted with other appointments that patient already has scheduled.    Catalina Hutson RN, Gerontological Nursing-BC, PN

## 2024-02-05 ENCOUNTER — HOSPITAL ENCOUNTER (OUTPATIENT)
Facility: HOSPITAL | Age: 69
Discharge: HOME | End: 2024-02-08
Payer: MEDICARE

## 2024-02-05 VITALS
HEART RATE: 90 BPM | OXYGEN SATURATION: 100 % | WEIGHT: 115 LBS | DIASTOLIC BLOOD PRESSURE: 67 MMHG | RESPIRATION RATE: 21 BRPM | BODY MASS INDEX: 16.46 KG/M2 | TEMPERATURE: 98.2 F | HEIGHT: 70 IN | SYSTOLIC BLOOD PRESSURE: 123 MMHG

## 2024-02-05 DIAGNOSIS — N13.30 HYDRONEPHROSIS, UNSPECIFIED HYDRONEPHROSIS TYPE: ICD-10-CM

## 2024-02-05 PROCEDURE — 6360000004 HC RX CONTRAST MEDICATION: Performed by: STUDENT IN AN ORGANIZED HEALTH CARE EDUCATION/TRAINING PROGRAM

## 2024-02-05 PROCEDURE — 50435 EXCHANGE NEPHROSTOMY CATH: CPT

## 2024-02-05 PROCEDURE — 6360000002 HC RX W HCPCS: Performed by: STUDENT IN AN ORGANIZED HEALTH CARE EDUCATION/TRAINING PROGRAM

## 2024-02-05 PROCEDURE — 2500000003 HC RX 250 WO HCPCS: Performed by: STUDENT IN AN ORGANIZED HEALTH CARE EDUCATION/TRAINING PROGRAM

## 2024-02-05 RX ORDER — LIDOCAINE HYDROCHLORIDE 20 MG/ML
20 INJECTION, SOLUTION INFILTRATION; PERINEURAL ONCE
Status: COMPLETED | OUTPATIENT
Start: 2024-02-05 | End: 2024-02-05

## 2024-02-05 RX ORDER — FENTANYL CITRATE 50 UG/ML
100 INJECTION, SOLUTION INTRAMUSCULAR; INTRAVENOUS
Status: DISCONTINUED | OUTPATIENT
Start: 2024-02-05 | End: 2024-02-09 | Stop reason: HOSPADM

## 2024-02-05 RX ORDER — HEPARIN SODIUM 200 [USP'U]/100ML
200 INJECTION, SOLUTION INTRAVENOUS ONCE
Status: COMPLETED | OUTPATIENT
Start: 2024-02-05 | End: 2024-02-05

## 2024-02-05 RX ORDER — FENTANYL CITRATE 50 UG/ML
INJECTION, SOLUTION INTRAMUSCULAR; INTRAVENOUS PRN
Status: COMPLETED | OUTPATIENT
Start: 2024-02-05 | End: 2024-02-05

## 2024-02-05 RX ADMIN — FENTANYL CITRATE 25 MCG: 50 INJECTION, SOLUTION INTRAMUSCULAR; INTRAVENOUS at 08:35

## 2024-02-05 RX ADMIN — IOPAMIDOL 15 ML: 755 INJECTION, SOLUTION INTRAVENOUS at 08:57

## 2024-02-05 RX ADMIN — LIDOCAINE HYDROCHLORIDE 8 ML: 20 INJECTION, SOLUTION INFILTRATION; PERINEURAL at 08:57

## 2024-02-05 RX ADMIN — HEPARIN SODIUM IN SODIUM CHLORIDE 100 ML: 200 INJECTION INTRAVENOUS at 08:58

## 2024-02-05 RX ADMIN — FENTANYL CITRATE 25 MCG: 50 INJECTION, SOLUTION INTRAMUSCULAR; INTRAVENOUS at 08:31

## 2024-02-05 RX ADMIN — FENTANYL CITRATE 25 MCG: 50 INJECTION, SOLUTION INTRAMUSCULAR; INTRAVENOUS at 08:37

## 2024-02-05 ASSESSMENT — PAIN - FUNCTIONAL ASSESSMENT: PAIN_FUNCTIONAL_ASSESSMENT: NONE - DENIES PAIN

## 2024-02-05 ASSESSMENT — PAIN SCALES - GENERAL
PAINLEVEL_OUTOF10: 0

## 2024-02-05 NOTE — DISCHARGE INSTRUCTIONS
[facility]  Special Procedures/Interventional Radiology    Nephrostomy Exchange Instructions      Call your physician if no note swelling, pus, drainage, fever, chills, etc. feelings of pain or change in urine color.      Your dressing will probably need to be changed as it becomes soaked with drainage change as necessary.  Continue caring for your tubes as you have been per your physician.    We have a nurse on call 24 hours a day please call if you have questions. [phone]    Continue your regular diet as per your physician

## 2024-02-05 NOTE — PROGRESS NOTES
0748 Patient ambulatory back to IR recovery at this time. Family member in lobby. Patient is A&Ox4, on RA, and is in NAD at this time. Patient has no complaints of pain at this time. Call bell is within reach, bed locked, and lowered at this time. Patient awaiting to be consented for ordered procedure at this time.

## 2024-02-12 DIAGNOSIS — C67.9 STAGE IV BLADDER CANCER (HCC): Primary | ICD-10-CM

## 2024-02-12 RX ORDER — SODIUM CHLORIDE 9 MG/ML
5-250 INJECTION, SOLUTION INTRAVENOUS PRN
OUTPATIENT
Start: 2024-02-20

## 2024-02-12 RX ORDER — ACETAMINOPHEN 325 MG/1
650 TABLET ORAL
Status: CANCELLED | OUTPATIENT
Start: 2024-02-13

## 2024-02-12 RX ORDER — EPINEPHRINE 1 MG/ML
0.3 INJECTION, SOLUTION, CONCENTRATE INTRAVENOUS PRN
Status: CANCELLED | OUTPATIENT
Start: 2024-02-13

## 2024-02-12 RX ORDER — DIPHENHYDRAMINE HYDROCHLORIDE 50 MG/ML
50 INJECTION INTRAMUSCULAR; INTRAVENOUS
Status: CANCELLED | OUTPATIENT
Start: 2024-02-13

## 2024-02-12 RX ORDER — SODIUM CHLORIDE 0.9 % (FLUSH) 0.9 %
5-40 SYRINGE (ML) INJECTION PRN
Status: CANCELLED | OUTPATIENT
Start: 2024-02-13

## 2024-02-12 RX ORDER — ONDANSETRON 2 MG/ML
8 INJECTION INTRAMUSCULAR; INTRAVENOUS ONCE
OUTPATIENT
Start: 2024-02-20 | End: 2024-02-20

## 2024-02-12 RX ORDER — ALBUTEROL SULFATE 90 UG/1
4 AEROSOL, METERED RESPIRATORY (INHALATION) PRN
OUTPATIENT
Start: 2024-02-20

## 2024-02-12 RX ORDER — SODIUM CHLORIDE 0.9 % (FLUSH) 0.9 %
5-40 SYRINGE (ML) INJECTION PRN
OUTPATIENT
Start: 2024-02-20

## 2024-02-12 RX ORDER — PROCHLORPERAZINE EDISYLATE 5 MG/ML
10 INJECTION INTRAMUSCULAR; INTRAVENOUS
OUTPATIENT
Start: 2024-02-20

## 2024-02-12 RX ORDER — SODIUM CHLORIDE 9 MG/ML
5-250 INJECTION, SOLUTION INTRAVENOUS PRN
Status: CANCELLED | OUTPATIENT
Start: 2024-02-13

## 2024-02-12 RX ORDER — DIPHENHYDRAMINE HYDROCHLORIDE 50 MG/ML
50 INJECTION INTRAMUSCULAR; INTRAVENOUS
OUTPATIENT
Start: 2024-02-20

## 2024-02-12 RX ORDER — SODIUM CHLORIDE 9 MG/ML
INJECTION, SOLUTION INTRAVENOUS CONTINUOUS
OUTPATIENT
Start: 2024-02-20

## 2024-02-12 RX ORDER — LORAZEPAM 2 MG/ML
0.5 INJECTION INTRAMUSCULAR
Status: CANCELLED | OUTPATIENT
Start: 2024-02-13

## 2024-02-12 RX ORDER — ACETAMINOPHEN 325 MG/1
650 TABLET ORAL
OUTPATIENT
Start: 2024-02-20

## 2024-02-12 RX ORDER — LORAZEPAM 2 MG/ML
0.5 INJECTION INTRAMUSCULAR
OUTPATIENT
Start: 2024-02-20

## 2024-02-12 RX ORDER — ONDANSETRON 2 MG/ML
8 INJECTION INTRAMUSCULAR; INTRAVENOUS
Status: CANCELLED | OUTPATIENT
Start: 2024-02-13

## 2024-02-12 RX ORDER — HEPARIN 100 UNIT/ML
500 SYRINGE INTRAVENOUS PRN
OUTPATIENT
Start: 2024-02-20

## 2024-02-12 RX ORDER — MEPERIDINE HYDROCHLORIDE 25 MG/ML
12.5 INJECTION INTRAMUSCULAR; INTRAVENOUS; SUBCUTANEOUS PRN
Status: CANCELLED | OUTPATIENT
Start: 2024-02-13

## 2024-02-12 RX ORDER — ONDANSETRON 2 MG/ML
8 INJECTION INTRAMUSCULAR; INTRAVENOUS
OUTPATIENT
Start: 2024-02-20

## 2024-02-12 RX ORDER — SODIUM CHLORIDE 9 MG/ML
INJECTION, SOLUTION INTRAVENOUS CONTINUOUS
Status: CANCELLED | OUTPATIENT
Start: 2024-02-13

## 2024-02-12 RX ORDER — PROCHLORPERAZINE EDISYLATE 5 MG/ML
10 INJECTION INTRAMUSCULAR; INTRAVENOUS
Status: CANCELLED | OUTPATIENT
Start: 2024-02-13

## 2024-02-12 RX ORDER — EPINEPHRINE 1 MG/ML
0.3 INJECTION, SOLUTION, CONCENTRATE INTRAVENOUS PRN
OUTPATIENT
Start: 2024-02-20

## 2024-02-12 RX ORDER — MEPERIDINE HYDROCHLORIDE 25 MG/ML
12.5 INJECTION INTRAMUSCULAR; INTRAVENOUS; SUBCUTANEOUS PRN
OUTPATIENT
Start: 2024-02-20

## 2024-02-12 RX ORDER — ALBUTEROL SULFATE 90 UG/1
4 AEROSOL, METERED RESPIRATORY (INHALATION) PRN
Status: CANCELLED | OUTPATIENT
Start: 2024-02-13

## 2024-02-12 RX ORDER — ONDANSETRON 2 MG/ML
8 INJECTION INTRAMUSCULAR; INTRAVENOUS ONCE
Status: CANCELLED | OUTPATIENT
Start: 2024-02-13 | End: 2024-02-13

## 2024-02-12 RX ORDER — HEPARIN 100 UNIT/ML
500 SYRINGE INTRAVENOUS PRN
Status: CANCELLED | OUTPATIENT
Start: 2024-02-13

## 2024-02-13 ENCOUNTER — OFFICE VISIT (OUTPATIENT)
Age: 69
End: 2024-02-13
Payer: MEDICARE

## 2024-02-13 ENCOUNTER — HOSPITAL ENCOUNTER (OUTPATIENT)
Facility: HOSPITAL | Age: 69
Setting detail: INFUSION SERIES
Discharge: HOME | End: 2024-02-13
Payer: MEDICARE

## 2024-02-13 VITALS
WEIGHT: 119.7 LBS | BODY MASS INDEX: 17.13 KG/M2 | HEART RATE: 87 BPM | DIASTOLIC BLOOD PRESSURE: 65 MMHG | TEMPERATURE: 98.8 F | HEIGHT: 70 IN | SYSTOLIC BLOOD PRESSURE: 115 MMHG | OXYGEN SATURATION: 98 % | RESPIRATION RATE: 16 BRPM

## 2024-02-13 VITALS
BODY MASS INDEX: 17.04 KG/M2 | OXYGEN SATURATION: 98 % | DIASTOLIC BLOOD PRESSURE: 67 MMHG | TEMPERATURE: 98.8 F | RESPIRATION RATE: 18 BRPM | SYSTOLIC BLOOD PRESSURE: 115 MMHG | HEART RATE: 112 BPM | WEIGHT: 119 LBS | HEIGHT: 70 IN

## 2024-02-13 DIAGNOSIS — Z51.11 ENCOUNTER FOR ANTINEOPLASTIC CHEMOTHERAPY: ICD-10-CM

## 2024-02-13 DIAGNOSIS — C67.9 STAGE IV BLADDER CANCER (HCC): Primary | ICD-10-CM

## 2024-02-13 DIAGNOSIS — Z51.12 ENCOUNTER FOR ANTINEOPLASTIC IMMUNOTHERAPY: ICD-10-CM

## 2024-02-13 LAB
ALBUMIN SERPL-MCNC: 2.6 G/DL (ref 3.5–5)
ALBUMIN/GLOB SERPL: 0.7 (ref 1.1–2.2)
ALP SERPL-CCNC: 91 U/L (ref 45–117)
ALT SERPL-CCNC: 17 U/L (ref 12–78)
ANION GAP SERPL CALC-SCNC: 2 MMOL/L (ref 5–15)
AST SERPL-CCNC: 20 U/L (ref 15–37)
BASOPHILS # BLD: 0 K/UL (ref 0–0.1)
BASOPHILS NFR BLD: 1 % (ref 0–1)
BILIRUB SERPL-MCNC: 0.3 MG/DL (ref 0.2–1)
BUN SERPL-MCNC: 18 MG/DL (ref 6–20)
BUN/CREAT SERPL: 20 (ref 12–20)
CALCIUM SERPL-MCNC: 8.4 MG/DL (ref 8.5–10.1)
CHLORIDE SERPL-SCNC: 102 MMOL/L (ref 97–108)
CO2 SERPL-SCNC: 38 MMOL/L (ref 21–32)
CORTIS SERPL-MCNC: 22.7 UG/DL
CREAT SERPL-MCNC: 0.92 MG/DL (ref 0.7–1.3)
DIFFERENTIAL METHOD BLD: ABNORMAL
EOSINOPHIL # BLD: 0.1 K/UL (ref 0–0.4)
EOSINOPHIL NFR BLD: 2 % (ref 0–7)
ERYTHROCYTE [DISTWIDTH] IN BLOOD BY AUTOMATED COUNT: 17.8 % (ref 11.5–14.5)
GLOBULIN SER CALC-MCNC: 3.7 G/DL (ref 2–4)
GLUCOSE SERPL-MCNC: 119 MG/DL (ref 65–100)
HBV SURFACE AB SER QL: NONREACTIVE
HBV SURFACE AB SER-ACNC: <3.1 MIU/ML
HBV SURFACE AG SER QL: 0.18 INDEX
HBV SURFACE AG SER QL: NEGATIVE
HCT VFR BLD AUTO: 26.9 % (ref 36.6–50.3)
HGB BLD-MCNC: 8.3 G/DL (ref 12.1–17)
IMM GRANULOCYTES # BLD AUTO: 0 K/UL (ref 0–0.04)
IMM GRANULOCYTES NFR BLD AUTO: 1 % (ref 0–0.5)
LYMPHOCYTES # BLD: 0.9 K/UL (ref 0.8–3.5)
LYMPHOCYTES NFR BLD: 14 % (ref 12–49)
MCH RBC QN AUTO: 28.9 PG (ref 26–34)
MCHC RBC AUTO-ENTMCNC: 30.9 G/DL (ref 30–36.5)
MCV RBC AUTO: 93.7 FL (ref 80–99)
MONOCYTES # BLD: 0.6 K/UL (ref 0–1)
MONOCYTES NFR BLD: 9 % (ref 5–13)
NEUTS SEG # BLD: 4.8 K/UL (ref 1.8–8)
NEUTS SEG NFR BLD: 73 % (ref 32–75)
NRBC # BLD: 0 K/UL (ref 0–0.01)
NRBC BLD-RTO: 0 PER 100 WBC
PHOSPHATE SERPL-MCNC: 3.5 MG/DL (ref 2.6–4.7)
PLATELET # BLD AUTO: 168 K/UL (ref 150–400)
PMV BLD AUTO: 9 FL (ref 8.9–12.9)
POTASSIUM SERPL-SCNC: 3.8 MMOL/L (ref 3.5–5.1)
PROT SERPL-MCNC: 6.3 G/DL (ref 6.4–8.2)
RBC # BLD AUTO: 2.87 M/UL (ref 4.1–5.7)
SODIUM SERPL-SCNC: 142 MMOL/L (ref 136–145)
TSH SERPL DL<=0.05 MIU/L-ACNC: 7.77 UIU/ML (ref 0.36–3.74)
WBC # BLD AUTO: 6.4 K/UL (ref 4.1–11.1)

## 2024-02-13 PROCEDURE — 6360000002 HC RX W HCPCS: Performed by: STUDENT IN AN ORGANIZED HEALTH CARE EDUCATION/TRAINING PROGRAM

## 2024-02-13 PROCEDURE — 3078F DIAST BP <80 MM HG: CPT | Performed by: STUDENT IN AN ORGANIZED HEALTH CARE EDUCATION/TRAINING PROGRAM

## 2024-02-13 PROCEDURE — 96375 TX/PRO/DX INJ NEW DRUG ADDON: CPT

## 2024-02-13 PROCEDURE — 99215 OFFICE O/P EST HI 40 MIN: CPT | Performed by: STUDENT IN AN ORGANIZED HEALTH CARE EDUCATION/TRAINING PROGRAM

## 2024-02-13 PROCEDURE — 87340 HEPATITIS B SURFACE AG IA: CPT

## 2024-02-13 PROCEDURE — 80053 COMPREHEN METABOLIC PANEL: CPT

## 2024-02-13 PROCEDURE — 96413 CHEMO IV INFUSION 1 HR: CPT

## 2024-02-13 PROCEDURE — 1123F ACP DISCUSS/DSCN MKR DOCD: CPT | Performed by: STUDENT IN AN ORGANIZED HEALTH CARE EDUCATION/TRAINING PROGRAM

## 2024-02-13 PROCEDURE — 86704 HEP B CORE ANTIBODY TOTAL: CPT

## 2024-02-13 PROCEDURE — 86706 HEP B SURFACE ANTIBODY: CPT

## 2024-02-13 PROCEDURE — 84443 ASSAY THYROID STIM HORMONE: CPT

## 2024-02-13 PROCEDURE — 84439 ASSAY OF FREE THYROXINE: CPT

## 2024-02-13 PROCEDURE — 36415 COLL VENOUS BLD VENIPUNCTURE: CPT

## 2024-02-13 PROCEDURE — 84100 ASSAY OF PHOSPHORUS: CPT

## 2024-02-13 PROCEDURE — 3074F SYST BP LT 130 MM HG: CPT | Performed by: STUDENT IN AN ORGANIZED HEALTH CARE EDUCATION/TRAINING PROGRAM

## 2024-02-13 PROCEDURE — 82533 TOTAL CORTISOL: CPT

## 2024-02-13 PROCEDURE — 85025 COMPLETE CBC W/AUTO DIFF WBC: CPT

## 2024-02-13 PROCEDURE — 96417 CHEMO IV INFUS EACH ADDL SEQ: CPT

## 2024-02-13 PROCEDURE — 2580000003 HC RX 258: Performed by: STUDENT IN AN ORGANIZED HEALTH CARE EDUCATION/TRAINING PROGRAM

## 2024-02-13 RX ORDER — ONDANSETRON 2 MG/ML
8 INJECTION INTRAMUSCULAR; INTRAVENOUS ONCE
Status: COMPLETED | OUTPATIENT
Start: 2024-02-13 | End: 2024-02-13

## 2024-02-13 RX ORDER — SODIUM CHLORIDE 0.9 % (FLUSH) 0.9 %
5-40 SYRINGE (ML) INJECTION PRN
Status: DISCONTINUED | OUTPATIENT
Start: 2024-02-13 | End: 2024-02-14 | Stop reason: HOSPADM

## 2024-02-13 RX ORDER — SODIUM CHLORIDE 9 MG/ML
5-250 INJECTION, SOLUTION INTRAVENOUS PRN
Status: DISCONTINUED | OUTPATIENT
Start: 2024-02-13 | End: 2024-02-14 | Stop reason: HOSPADM

## 2024-02-13 RX ADMIN — ENFORTUMAB VEDOTIN 68 MG: 30 INJECTION, POWDER, LYOPHILIZED, FOR SOLUTION INTRAVENOUS at 13:36

## 2024-02-13 RX ADMIN — SODIUM CHLORIDE 25 ML/HR: 9 INJECTION, SOLUTION INTRAVENOUS at 12:43

## 2024-02-13 RX ADMIN — SODIUM CHLORIDE, PRESERVATIVE FREE 10 ML: 5 INJECTION INTRAVENOUS at 14:10

## 2024-02-13 RX ADMIN — SODIUM CHLORIDE 200 MG: 9 INJECTION, SOLUTION INTRAVENOUS at 12:56

## 2024-02-13 RX ADMIN — ONDANSETRON 8 MG: 2 INJECTION INTRAMUSCULAR; INTRAVENOUS at 12:43

## 2024-02-13 ASSESSMENT — PAIN SCALES - GENERAL: PAINLEVEL_OUTOF10: 5

## 2024-02-13 ASSESSMENT — PAIN DESCRIPTION - DESCRIPTORS: DESCRIPTORS: ACHING

## 2024-02-13 ASSESSMENT — PAIN DESCRIPTION - LOCATION: LOCATION: BACK

## 2024-02-13 NOTE — PROGRESS NOTES
Jamel Hazel is a 68 y.o. male who presents for follow up of   Chief Complaint   Patient presents with    Follow-up    Cancer     Bladder cancer       Mr. Hazel Is here today for chemotherapy. He reports no new clinical symptoms or new complaints since last clinic evaluation. He reports he is having a lot of pain across his necrotomy tubes 6/10. He reports bleeding in his gibbons.Constipation, fatigue, nausea,and cramping in his back. He reports more heartburn and is taking more jignesh seltzers to help his family is worried it may be causing delores harm because he shouldn't be taking that. He also reports coughing and hoarseness, sob, itching and urinary pain. He reports swelling in his legs as well and has an appointment with vascular.       No interval hospitalizations reported    No interval surgery or procedures reported    No reported new medication changes reported       Medications reviewed with the patient, and chart updated to reflect changes.        
(ANORO ELLIPTA) 62.5-25 MCG/ACT inhaler Inhale 1 puff into the lungs daily      gabapentin (NEURONTIN) 100 MG capsule Take 2 capsules by mouth 3 times daily for 90 doses. Max Daily Amount: 600 mg 90 capsule 0    simethicone (MYLICON) 80 MG chewable tablet Take 1 tablet by mouth every 6 hours as needed for Flatulence (Patient not taking: Reported on 1/22/2024) 180 tablet 0    furosemide (LASIX) 20 MG tablet Take 20 mg by mouth daily as needed (Patient not taking: Reported on 1/22/2024)       No current facility-administered medications for this visit.       Allergies   Allergen Reactions    Naproxen Anaphylaxis     Other reaction(s): Anaphylaxis   Hives, passed out, had seizure    Naproxen Sodium Anaphylaxis     Hives, passed out, had seizure    Sulfa Antibiotics Hives and Rash     Other reaction(s): Rash             Physical Exam:   /67   Pulse (!) 112   Temp 98.8 °F (37.1 °C)   Resp 18   Ht 1.778 m (5' 10\")   Wt 54 kg (119 lb)   SpO2 98%   BMI 17.07 kg/m²   ECOG PS: 2  General: alert, cooperative, no distress   Mental  status: normal mood, behavior, speech, dress, motor activity, and thought processes, able to follow commands   HENT: NCAT   Neck: no visualized mass   Resp: no respiratory distress   Neuro: no gross deficits   Skin: no discoloration or lesions of concern on visible areas   Psychiatric: normal affect, consistent with stated mood, no evidence of hallucinations         Results:     Lab Results   Component Value Date    WBC 6.4 02/13/2024    HGB 8.3 (L) 02/13/2024    HCT 26.9 (L) 02/13/2024    MCV 93.7 02/13/2024     02/13/2024     Lab Results   Component Value Date     (L) 01/11/2024    K 3.8 01/11/2024    CL 98 01/11/2024    CO2 31 01/11/2024    BUN 22 (H) 01/11/2024    CREATININE 0.84 01/11/2024    GLUCOSE 133 (H) 01/11/2024    CALCIUM 8.6 01/11/2024    PROT 6.7 01/10/2024    LABALBU 2.5 (L) 01/10/2024    BILITOT 0.4 01/10/2024    ALKPHOS 102 01/10/2024    AST 15 01/10/2024

## 2024-02-13 NOTE — DISCHARGE INSTR - COC
Continuity of Care Form    Patient Name: Jamel Hazel   :  1955  MRN:  [id number]    Admit date:  2024  Discharge date:  ***    Code Status Order: Prior   Advance Directives:     Admitting Physician:  No admitting provider for patient encounter.  PCP: Aj Loya DO    Discharging Nurse: ***  Discharging Hospital Unit/Room#: No information available for this encounter.  Discharging Unit Phone Number: ***    Emergency Contact:   Extended Emergency Contact Information  Primary Emergency Contact: Emily Hazel  Address: [address] [place] [id number] [facility]  Home Phone: [phone]  Mobile Phone: [phone]  Relation: Spouse  Secondary Emergency Contact: Ayleen Dior (daughter-in-law)  Mobile Phone: [phone]  Relation: Daughter-in-Law    Past Surgical History:  Past Surgical History:   Procedure Laterality Date    CARDIAC CATHETERIZATION      CHOLECYSTECTOMY      COLONOSCOPY N/A 2017    COLONOSCOPY performed by Kev Light Jr., MD at [facility] ENDOSCOPY    COLONOSCOPY N/A 2017    COLONOSCOPY er 26 performed by Kev Light Jr., MD at [facility] ENDOSCOPY    COLONOSCOPY N/A 2023    COLONOSCOPY DIAGNOSTIC performed by Aj Parks MD at [facility] ENDOSCOPY    COLONOSCOPY,FLEX,W/CONTROL, BLEEDING  2017         COLONOSCOPY,REMV LESN,SNARE  2017         COLORECTAL SCRN; HI RISK IND  2017         COLSC FLX W/RMVL OF TUMOR POLYP LESION SNARE TQ  2011    colon x 3     CYSTOSCOPY N/A 2024    CYSTOSCOPY, LEFT RETROGRADE PYELOGRAM WITH STENT, BLADDER  BIOPSY AND FULGURATION performed by Rahat Alexander II, MD at [facility] MAIN OR    EGD BALLOON DILATION ESOPHAGUS <30 MM DIAM  2013         EGD BALLOON DILATION ESOPHAGUS <30 MM DIAM  3/4/2013         EGD BALLOON DILATION ESOPHAGUS <30 MM DIAM  2013         EGD BALLOON DILATION ESOPHAGUS <30 MM DIAM  2013         EGD BALLOON DILATION ESOPHAGUS <30 MM  Date:}    Treatments at the Time of Hospital Discharge:   Respiratory Treatments: ***  Oxygen Therapy:  {Therapy; copd oxygen:61729}  Ventilator:    {[facility] Vent List:123815988}    Rehab Therapies: {THERAPEUTIC INTERVENTION:4878853825}  Weight Bearing Status/Restrictions: { CC Weight Bearin}  Other Medical Equipment (for information only, NOT a DME order):  {EQUIPMENT:534259059}  Other Treatments: ***    Patient's personal belongings (please select all that are sent with patient):  {P DME Belongings:855204804}    RN SIGNATURE:  {Esignature:104754204}    CASE MANAGEMENT/SOCIAL WORK SECTION    Inpatient Status Date: ***    Readmission Risk Assessment Score:  Readmission Risk              Risk of Unplanned Readmission:  0           Discharging to Facility/ Agency   Name:   Address:  Phone:  Fax:    Dialysis Facility (if applicable)   Name:  Address:  Dialysis Schedule:  Phone:  Fax:    / signature: {Esignature:779770819}    PHYSICIAN SECTION    Prognosis: {Prognosis:0184951723}    Condition at Discharge: { Patient Condition:738116632}    Rehab Potential (if transferring to Rehab): {Prognosis:4018961909}    Recommended Labs or Other Treatments After Discharge: ***    Physician Certification: I certify the above information and transfer of Jamel Hazel  is necessary for the continuing treatment of the diagnosis listed and that he requires {Admit to Appropriate Level of Care:00855} for {GREATER/LESS:361274754} 30 days.     Update Admission H&P: {CHP DME Changes in HandP:094290179}    PHYSICIAN SIGNATURE:  {Esignature:052304397}

## 2024-02-13 NOTE — PROGRESS NOTES
Outpatient Infusion Center - Chemotherapy Progress Note    Pt admit to \[facility]\"" for C1 in stable condition. Assessment completed.     24G PIV established in left forearm with positive blood return. Labs drawn per order and sent. Line flushed, clamped, Curos Cap applied to end clave. Pt over to MD office, JOHN.    Patient Vitals for the past 12 hrs:   Temp Pulse Resp BP SpO2   02/13/24 1102 98.8 °F (37.1 °C) (!) 112 18 115/67 98 %        Recent Results (from the past 12 hour(s))   Phosphorus    Collection Time: 02/13/24 11:03 AM   Result Value Ref Range    Phosphorus 3.5 2.6 - 4.7 MG/DL   Comprehensive metabolic panel    Collection Time: 02/13/24 11:03 AM   Result Value Ref Range    Sodium 142 136 - 145 mmol/L    Potassium 3.8 3.5 - 5.1 mmol/L    Chloride 102 97 - 108 mmol/L    CO2 38 (H) 21 - 32 mmol/L    Anion Gap 2 (L) 5 - 15 mmol/L    Glucose 119 (H) 65 - 100 mg/dL    BUN 18 6 - 20 MG/DL    Creatinine 0.92 0.70 - 1.30 MG/DL    Bun/Cre Ratio 20 12 - 20      Est, Glom Filt Rate >60 >60 ml/min/1.73m2    Calcium 8.4 (L) 8.5 - 10.1 MG/DL    Total Bilirubin 0.3 0.2 - 1.0 MG/DL    ALT 17 12 - 78 U/L    AST 20 15 - 37 U/L    Alk Phosphatase 91 45 - 117 U/L    Total Protein 6.3 (L) 6.4 - 8.2 g/dL    Albumin 2.6 (L) 3.5 - 5.0 g/dL    Globulin 3.7 2.0 - 4.0 g/dL    Albumin/Globulin Ratio 0.7 (L) 1.1 - 2.2     CBC With Auto Differential    Collection Time: 02/13/24 11:03 AM   Result Value Ref Range    WBC 6.4 4.1 - 11.1 K/uL    RBC 2.87 (L) 4.10 - 5.70 M/uL    Hemoglobin 8.3 (L) 12.1 - 17.0 g/dL    Hematocrit 26.9 (L) 36.6 - 50.3 %    MCV 93.7 80.0 - 99.0 FL    MCH 28.9 26.0 - 34.0 PG    MCHC 30.9 30.0 - 36.5 g/dL    RDW 17.8 (H) 11.5 - 14.5 %    Platelets 168 150 - 400 K/uL    MPV 9.0 8.9 - 12.9 FL    Nucleated RBCs 0.0 0  WBC    nRBC 0.00 0.00 - 0.01 K/uL    Neutrophils % 73 32 - 75 %    Lymphocytes % 14 12 - 49 %    Monocytes % 9 5 - 13 %    Eosinophils % 2 0 - 7 %    Basophils % 1 0 - 1 %    Immature Granulocytes 1

## 2024-02-14 LAB — T4 FREE SERPL-MCNC: 1.1 NG/DL (ref 0.8–1.5)

## 2024-02-15 LAB — HBV CORE AB SERPL QL IA: NEGATIVE

## 2024-02-19 ENCOUNTER — TELEPHONE (OUTPATIENT)
Age: 69
End: 2024-02-19

## 2024-02-19 NOTE — TELEPHONE ENCOUNTER
Patient called and stated that he needs to r/s his infusion appt tomorrow due to a conflicting appt with Urology.     # [phone]

## 2024-02-20 ENCOUNTER — TELEPHONE (OUTPATIENT)
Age: 69
End: 2024-02-20

## 2024-02-20 NOTE — TELEPHONE ENCOUNTER
Called patient to advise/confirm upcoming appt with Dr. Campbell on 2/21/2024  at 10:30  at [facility] Office.  Spoke with patient and confirmed appointment.

## 2024-02-21 ENCOUNTER — OFFICE VISIT (OUTPATIENT)
Age: 69
End: 2024-02-21
Payer: MEDICARE

## 2024-02-21 ENCOUNTER — TELEPHONE (OUTPATIENT)
Age: 69
End: 2024-02-21

## 2024-02-21 VITALS
TEMPERATURE: 97.3 F | WEIGHT: 119 LBS | OXYGEN SATURATION: 98 % | RESPIRATION RATE: 20 BRPM | HEIGHT: 70 IN | BODY MASS INDEX: 17.04 KG/M2 | SYSTOLIC BLOOD PRESSURE: 111 MMHG | DIASTOLIC BLOOD PRESSURE: 63 MMHG | HEART RATE: 92 BPM

## 2024-02-21 DIAGNOSIS — R13.10 DYSPHAGIA, UNSPECIFIED TYPE: ICD-10-CM

## 2024-02-21 DIAGNOSIS — C67.9 STAGE IV BLADDER CANCER (HCC): Primary | ICD-10-CM

## 2024-02-21 DIAGNOSIS — J43.9 PULMONARY EMPHYSEMA, UNSPECIFIED EMPHYSEMA TYPE (HCC): ICD-10-CM

## 2024-02-21 DIAGNOSIS — C15.9 MALIGNANT NEOPLASM OF ESOPHAGUS, UNSPECIFIED LOCATION (HCC): ICD-10-CM

## 2024-02-21 DIAGNOSIS — M54.50 ACUTE BILATERAL LOW BACK PAIN WITHOUT SCIATICA: ICD-10-CM

## 2024-02-21 PROCEDURE — 99215 OFFICE O/P EST HI 40 MIN: CPT | Performed by: INTERNAL MEDICINE

## 2024-02-21 PROCEDURE — 3078F DIAST BP <80 MM HG: CPT | Performed by: INTERNAL MEDICINE

## 2024-02-21 PROCEDURE — 1123F ACP DISCUSS/DSCN MKR DOCD: CPT | Performed by: INTERNAL MEDICINE

## 2024-02-21 PROCEDURE — 3074F SYST BP LT 130 MM HG: CPT | Performed by: INTERNAL MEDICINE

## 2024-02-21 RX ORDER — OXYBUTYNIN CHLORIDE 5 MG/1
5 TABLET ORAL DAILY
COMMUNITY

## 2024-02-21 RX ORDER — PREDNISONE 10 MG/1
10 TABLET ORAL DAILY
COMMUNITY

## 2024-02-21 RX ORDER — AMOXICILLIN AND CLAVULANATE POTASSIUM 875; 125 MG/1; MG/1
1 TABLET, FILM COATED ORAL 2 TIMES DAILY
COMMUNITY

## 2024-02-21 ASSESSMENT — PATIENT HEALTH QUESTIONNAIRE - PHQ9
SUM OF ALL RESPONSES TO PHQ QUESTIONS 1-9: 2
SUM OF ALL RESPONSES TO PHQ9 QUESTIONS 1 & 2: 2
SUM OF ALL RESPONSES TO PHQ QUESTIONS 1-9: 2
2. FEELING DOWN, DEPRESSED OR HOPELESS: 1
SUM OF ALL RESPONSES TO PHQ QUESTIONS 1-9: 2
SUM OF ALL RESPONSES TO PHQ QUESTIONS 1-9: 2
1. LITTLE INTEREST OR PLEASURE IN DOING THINGS: 1

## 2024-02-21 NOTE — PATIENT INSTRUCTIONS
Dear Jamel Hazel ,    It was a pleasure seeing you today    We will see you again in 6  weeks    If labs or imaging tests have been ordered for you today, please call the office  at [phone] 48 hours after completion to obtain the results.        This is the plan we talked about:    Chronic back pain and b/l nephrostomy tube pain  - You are taking Oxycodone 5-10 mg about 2 times a day. One tab at night and one tab in the morning with a half tab in the afternoon.  - You have plenty of meds now. Please let us know 1 week before running out of your oxycodone so we can provide a timely refill.    Constipation  -Constipation is a common side effect of pain medications as they slow your bowels.   -Please start Pericolace 2 tabs daily and increase to 2 tabs two times a day if needed. This is necessary to keep your bowels soft.   - Add Miralax every other day as a laxative.  - Goal is to have soft bowel movements every day or every other day.   - Please call us if you do not have bowel movements for more than 3 days.    Disease understanding and support  - You and your family have good understanding of your medical condition. You are tolerating treatment well.   - You have good support through your wife and family.  - You signed a DDNR in the hospital    This is what you have shared with us about Advance Care Planning:    Primary Decision Maker: Ayleen Dior (daughter-in-law) - Daughter-in-Law - [phone]    Secondary Decision Maker: Ed Hazel - Brother/Sister - [phone]    Secondary Decision Maker: Benny Hazel - Brother/Sister - [phone]            2/21/2024    10:30 AM   Demographics   Marital Status            The Palliative Medicine Team is here to support you and your family.       Sincerely,      Carole Campbell MD   and the Palliative Medicine Team

## 2024-02-21 NOTE — TELEPHONE ENCOUNTER
----- Message from Marjorie Celaya sent at 2/21/2024 10:52 AM EST -----  Regarding: RE: Date changes  Mr Hazel appts are all adjusted and fixed now  ----- Message -----  From: Marilee Lewis APRN - NP  Sent: 2/21/2024   9:43 AM EST  To: Marjorie PARKER RN  Subject: FW: Date changes                                 And OV on 3/12 please.     ----- Message -----  From: Marilee Lewis APRN - NP  Sent: 2/21/2024   9:43 AM EST  To: Marjorie Celaya; Sylvia Cardenas V RN  Subject: Date changes                                     He delayed his treatment a week so he is getting treated 2/27 for day 8 and needs to get the future appts fixed.  He needs 2 weeks on and 1 week off so start of next cycle will be 3/12.     Thanks!

## 2024-02-21 NOTE — PROGRESS NOTES
Palliative Medicine Outpatient Services  [place]: [phone] ([phone])    Patient Name: Jamel Hazel  YOB: 1955    Date of Current Visit: 02/21/24  Location of Current Visit:    [] [facility] Office  [] [facility] Office  [x] [facility] Office  [] Home  []Synchronous real-time A/V virtual visit    Date of Initial Visit: 2/21/24   Referral from: Dr. Michael  Primary Care Physician: Aj Loya DO      SUMMARY:   Jamel Hazel is a 68 y.o. year old with a  history of newly diagnosed  with stage 4 bladder cancer, rectal wall mets,  multiple colonic polyps, COPD, chronic respiratory failure on home 3L NC, GERD, h/o esophageal cancer in 2012 s/p resection, chronic dysphagia since cancer resection , Patient has catheter in place. Patient has received antibiotics and diflucan. Patient is s/p b/l nephrostomy tube on 12/31. He is s/p Cystoscopy, left Retrograde Pyelography and Stent Placement, Bladder Biopsy and Fulguration on 1/5. Biopsy results demonstrated invasive high grade bladder cancer on 1/10 who was referred to Palliative Medicine by Dr. Michael who saw him in inpatient consultation for management of symptoms and psychosocial support.      Psychosocial: Patient has been  to wife Emily for 35 years. He has three step-children. He does not have any biological children. He has two brothers, Gil and Benny. His DIL Ayleen helps him with his medical issues. Patient states he has been doing relatively well physically until recently and that he was cutting grass three weeks ago. 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    Current treatment- pembrolizumab and Enfortumab Vedotin       Initial Referral Intake note reviewed   PALLIATIVE DIAGNOSES:      Diagnosis Orders   1. Stage IV bladder cancer (HCC)        2. Pulmonary emphysema, unspecified emphysema type (HCC)        3. Malignant neoplasm of esophagus,

## 2024-02-21 NOTE — PROGRESS NOTES
Palliative Medicine Office Visit  Palliative Medicine Nurse Check In  [phone]    Patient Name: Jamel Hazel  YOB: 1955      Date of Office Visit: 2/21/2024    Patient states: \"  \"    From Check In Sheet (scanned in Media):  Has a medical provider talked with you about cardiopulmonary resuscitation (CPR)?   [x] Yes   [] No   [] Unable to obtain    Nurse reminder to complete or update ACP FlowSheet:    Is ACP on the Problem List?    [] Yes    [] No  IF ACP Document is ON FILE; Nurse to place ACP on Problem List     Is there an ACP Note in Chart Review/Note?    [] Yes    [] No   If NO: ALERT PROVIDER         2/21/2024    10:08 AM   Demographics   Marital Status        Is there anything that we should know about you as a person in order to provide you the best care possible?     Have you been to the ER, urgent care clinic since your last visit?   [] Yes   [x] No   [] Unable to obtain    Have you been hospitalized since your last visit?   [] Yes   [x] No   [] Unable to obtain    Have you seen or consulted any other health care providers outside of the [facility] since your last visit?   [] Yes   [x] No   [] Unable to obtain    Functional status (describe):         Last BM: 2/20/2024     accessed (date): 2/21/2024    Bottle review (for opioid pain medication):  Medication 1:   Date filled:   Directions:   # filled:   # left:   # pills taking per day:  Last dose taken:    Medication 2:   Date filled:   Directions:   # filled:   # left:   # pills taking per day:  Last dose taken:    Medication 3:   Date filled:   Directions:   # filled:   # left:   # pills taking per day:  Last dose taken:    Medication 4:   Date filled:   Directions:   # filled:   # left:   # pills taking per day:  Last dose taken:

## 2024-02-23 ENCOUNTER — HOSPITAL ENCOUNTER (EMERGENCY)
Facility: HOSPITAL | Age: 69
Discharge: HOME | End: 2024-02-23
Attending: EMERGENCY MEDICINE
Payer: MEDICARE

## 2024-02-23 ENCOUNTER — APPOINTMENT (OUTPATIENT)
Facility: HOSPITAL | Age: 69
End: 2024-02-23
Payer: MEDICARE

## 2024-02-23 VITALS
HEIGHT: 70 IN | HEART RATE: 82 BPM | WEIGHT: 119 LBS | SYSTOLIC BLOOD PRESSURE: 138 MMHG | RESPIRATION RATE: 11 BRPM | BODY MASS INDEX: 17.04 KG/M2 | TEMPERATURE: 98.9 F | OXYGEN SATURATION: 99 % | DIASTOLIC BLOOD PRESSURE: 77 MMHG

## 2024-02-23 DIAGNOSIS — N39.0 URINARY TRACT INFECTION ASSOCIATED WITH NEPHROSTOMY CATHETER, INITIAL ENCOUNTER (HCC): Primary | ICD-10-CM

## 2024-02-23 DIAGNOSIS — T83.512A URINARY TRACT INFECTION ASSOCIATED WITH NEPHROSTOMY CATHETER, INITIAL ENCOUNTER (HCC): Primary | ICD-10-CM

## 2024-02-23 LAB
ALBUMIN SERPL-MCNC: 2.4 G/DL (ref 3.5–5)
ALBUMIN/GLOB SERPL: 0.6 (ref 1.1–2.2)
ALP SERPL-CCNC: 71 U/L (ref 45–117)
ALT SERPL-CCNC: 13 U/L (ref 12–78)
ANION GAP SERPL CALC-SCNC: 2 MMOL/L (ref 5–15)
APPEARANCE UR: ABNORMAL
AST SERPL-CCNC: 30 U/L (ref 15–37)
BACTERIA URNS QL MICRO: ABNORMAL /HPF
BASOPHILS # BLD: 0 K/UL (ref 0–0.1)
BASOPHILS NFR BLD: 0 % (ref 0–1)
BILIRUB SERPL-MCNC: 0.3 MG/DL (ref 0.2–1)
BILIRUB UR QL: NEGATIVE
BUN SERPL-MCNC: 19 MG/DL (ref 6–20)
BUN/CREAT SERPL: 29 (ref 12–20)
CALCIUM SERPL-MCNC: 8.4 MG/DL (ref 8.5–10.1)
CHLORIDE SERPL-SCNC: 100 MMOL/L (ref 97–108)
CO2 SERPL-SCNC: 37 MMOL/L (ref 21–32)
COLOR UR: ABNORMAL
COMMENT:: NORMAL
CREAT SERPL-MCNC: 0.65 MG/DL (ref 0.7–1.3)
DIFFERENTIAL METHOD BLD: ABNORMAL
EOSINOPHIL # BLD: 0.1 K/UL (ref 0–0.4)
EOSINOPHIL NFR BLD: 1 % (ref 0–7)
EPITH CASTS URNS QL MICRO: ABNORMAL /LPF
ERYTHROCYTE [DISTWIDTH] IN BLOOD BY AUTOMATED COUNT: 16.2 % (ref 11.5–14.5)
GLOBULIN SER CALC-MCNC: 3.7 G/DL (ref 2–4)
GLUCOSE SERPL-MCNC: 108 MG/DL (ref 65–100)
GLUCOSE UR STRIP.AUTO-MCNC: NEGATIVE MG/DL
HCT VFR BLD AUTO: 25.1 % (ref 36.6–50.3)
HGB BLD-MCNC: 7.9 G/DL (ref 12.1–17)
HGB UR QL STRIP: ABNORMAL
IMM GRANULOCYTES # BLD AUTO: 0 K/UL (ref 0–0.04)
IMM GRANULOCYTES NFR BLD AUTO: 0 % (ref 0–0.5)
KETONES UR QL STRIP.AUTO: NEGATIVE MG/DL
LACTATE BLD-SCNC: 0.55 MMOL/L (ref 0.4–2)
LEUKOCYTE ESTERASE UR QL STRIP.AUTO: ABNORMAL
LYMPHOCYTES # BLD: 0.8 K/UL (ref 0.8–3.5)
LYMPHOCYTES NFR BLD: 15 % (ref 12–49)
MCH RBC QN AUTO: 29.3 PG (ref 26–34)
MCHC RBC AUTO-ENTMCNC: 31.5 G/DL (ref 30–36.5)
MCV RBC AUTO: 93 FL (ref 80–99)
MONOCYTES # BLD: 0.4 K/UL (ref 0–1)
MONOCYTES NFR BLD: 7 % (ref 5–13)
NEUTS SEG # BLD: 4.1 K/UL (ref 1.8–8)
NEUTS SEG NFR BLD: 77 % (ref 32–75)
NITRITE UR QL STRIP.AUTO: POSITIVE
NRBC # BLD: 0 K/UL (ref 0–0.01)
NRBC BLD-RTO: 0 PER 100 WBC
PH UR STRIP: 6.5 (ref 5–8)
PLATELET # BLD AUTO: 116 K/UL (ref 150–400)
PMV BLD AUTO: 10 FL (ref 8.9–12.9)
POTASSIUM SERPL-SCNC: 3.4 MMOL/L (ref 3.5–5.1)
PROCALCITONIN SERPL-MCNC: <0.05 NG/ML
PROT SERPL-MCNC: 6.1 G/DL (ref 6.4–8.2)
PROT UR STRIP-MCNC: 300 MG/DL
RBC # BLD AUTO: 2.7 M/UL (ref 4.1–5.7)
RBC #/AREA URNS HPF: ABNORMAL /HPF (ref 0–5)
SODIUM SERPL-SCNC: 139 MMOL/L (ref 136–145)
SP GR UR REFRACTOMETRY: 1.02
SPECIMEN HOLD: NORMAL
URINE CULTURE IF INDICATED: ABNORMAL
UROBILINOGEN UR QL STRIP.AUTO: 1 EU/DL (ref 0.2–1)
WBC # BLD AUTO: 5.3 K/UL (ref 4.1–11.1)
WBC URNS QL MICRO: ABNORMAL /HPF (ref 0–4)

## 2024-02-23 PROCEDURE — 6360000002 HC RX W HCPCS: Performed by: EMERGENCY MEDICINE

## 2024-02-23 PROCEDURE — 99284 EMERGENCY DEPT VISIT MOD MDM: CPT

## 2024-02-23 PROCEDURE — 96365 THER/PROPH/DIAG IV INF INIT: CPT

## 2024-02-23 PROCEDURE — 80053 COMPREHEN METABOLIC PANEL: CPT

## 2024-02-23 PROCEDURE — 96375 TX/PRO/DX INJ NEW DRUG ADDON: CPT

## 2024-02-23 PROCEDURE — 81001 URINALYSIS AUTO W/SCOPE: CPT

## 2024-02-23 PROCEDURE — 87086 URINE CULTURE/COLONY COUNT: CPT

## 2024-02-23 PROCEDURE — 87040 BLOOD CULTURE FOR BACTERIA: CPT

## 2024-02-23 PROCEDURE — 83605 ASSAY OF LACTIC ACID: CPT

## 2024-02-23 PROCEDURE — 85025 COMPLETE CBC W/AUTO DIFF WBC: CPT

## 2024-02-23 PROCEDURE — 36415 COLL VENOUS BLD VENIPUNCTURE: CPT

## 2024-02-23 PROCEDURE — 2580000003 HC RX 258: Performed by: EMERGENCY MEDICINE

## 2024-02-23 PROCEDURE — 84145 PROCALCITONIN (PCT): CPT

## 2024-02-23 PROCEDURE — 76770 US EXAM ABDO BACK WALL COMP: CPT

## 2024-02-23 RX ORDER — 0.9 % SODIUM CHLORIDE 0.9 %
1000 INTRAVENOUS SOLUTION INTRAVENOUS
Status: COMPLETED | OUTPATIENT
Start: 2024-02-23 | End: 2024-02-23

## 2024-02-23 RX ORDER — MORPHINE SULFATE 4 MG/ML
4 INJECTION, SOLUTION INTRAMUSCULAR; INTRAVENOUS
Status: COMPLETED | OUTPATIENT
Start: 2024-02-23 | End: 2024-02-23

## 2024-02-23 RX ORDER — CEFDINIR 300 MG/1
300 CAPSULE ORAL 2 TIMES DAILY
Qty: 20 CAPSULE | Refills: 0 | Status: SHIPPED | OUTPATIENT
Start: 2024-02-23 | End: 2024-03-04

## 2024-02-23 RX ADMIN — CEFEPIME 2000 MG: 2 INJECTION, POWDER, FOR SOLUTION INTRAVENOUS at 19:40

## 2024-02-23 RX ADMIN — MORPHINE SULFATE 4 MG: 4 INJECTION, SOLUTION INTRAMUSCULAR; INTRAVENOUS at 17:39

## 2024-02-23 RX ADMIN — SODIUM CHLORIDE 1000 ML: 9 INJECTION, SOLUTION INTRAVENOUS at 17:41

## 2024-02-23 ASSESSMENT — PAIN - FUNCTIONAL ASSESSMENT: PAIN_FUNCTIONAL_ASSESSMENT: 0-10

## 2024-02-23 ASSESSMENT — PAIN SCALES - GENERAL
PAINLEVEL_OUTOF10: 2
PAINLEVEL_OUTOF10: 3

## 2024-02-23 NOTE — ED PROVIDER NOTES
esophageal nodule - pre-cancerous    Psychiatric disorder     depression    Seizures (HCC)     caused with allergic reaction from Aleve    Thyroid disease     Unspecified sleep apnea     c-pap-doesnt wear now/apnea resolved after surgery/lost weight and no sleep apnea       Past Surgical History:   Procedure Laterality Date    CARDIAC CATHETERIZATION      CHOLECYSTECTOMY      COLONOSCOPY N/A 1/11/2017    COLONOSCOPY performed by Kev Light Jr., MD at [facility] ENDOSCOPY    COLONOSCOPY N/A 1/13/2017    COLONOSCOPY er 26 performed by Kev Light Jr., MD at [facility] ENDOSCOPY    COLONOSCOPY N/A 12/11/2023    COLONOSCOPY DIAGNOSTIC performed by Aj Parks MD at [facility] ENDOSCOPY    COLONOSCOPY,FLEX,W/CONTROL, BLEEDING  1/13/2017         COLONOSCOPY,REMV LESN,SNARE  1/11/2017         COLORECTAL SCRN; HI RISK IND  1/11/2017         COLSC FLX W/RMVL OF TUMOR POLYP LESION SNARE TQ  12/30/2011    colon x 3     CYSTOSCOPY N/A 1/5/2024    CYSTOSCOPY, LEFT RETROGRADE PYELOGRAM WITH STENT, BLADDER  BIOPSY AND FULGURATION performed by Rahat Alexander II, MD at [facility] MAIN OR    EGD BALLOON DILATION ESOPHAGUS <30 MM DIAM  4/26/2013         EGD BALLOON DILATION ESOPHAGUS <30 MM DIAM  3/4/2013         EGD BALLOON DILATION ESOPHAGUS <30 MM DIAM  7/31/2013         EGD BALLOON DILATION ESOPHAGUS <30 MM DIAM  9/25/2013         EGD BALLOON DILATION ESOPHAGUS <30 MM DIAM  6/20/2012         EGD BALLOON DILATION ESOPHAGUS <30 MM DIAM  7/5/2012         EGD BALLOON DILATION ESOPHAGUS <30 MM DIAM  7/24/2012         EGD BALLOON DILATION ESOPHAGUS <30 MM DIAM  8/28/2012         EGD BALLOON DILATION ESOPHAGUS <30 MM DIAM  9/24/2012         EGD BALLOON DILATION ESOPHAGUS <30 MM DIAM  11/6/2012         EGD TRANSORAL BIOPSY SINGLE/MULTIPLE  3/4/2013         EGD TRANSORAL BIOPSY SINGLE/MULTIPLE  4/30/2013         EGD TRANSORAL BIOPSY SINGLE/MULTIPLE  12/30/2011         EGD TRANSORAL BIOPSY SINGLE/MULTIPLE  8/28/2012         EGD

## 2024-02-23 NOTE — ED NOTES
This RN and Kassy VAUGHN flushed pt's left nephrostomy with 20mL of sterile water per Varun DOS SANTOS verbal order. Pt tolerated well, reports slight discomfort.

## 2024-02-23 NOTE — ED NOTES
Patient arrives via wheelchair to triage, family reports home health was in today and is concerned that L Nephrostomy tube is leaking or dislodged. Patient has hx of stage IV colon and bladder cancer.

## 2024-02-24 NOTE — DISCHARGE INSTRUCTIONS
Follow up with Interventional Radiology in the next week for a nephrostomy tube exchange.  Return if you have worsening pain or fevers.

## 2024-02-24 NOTE — ED NOTES
This RN gave SBAR report to Thee, RN at this time. Offered oncoming RN the opportunity to ask any questions. This RN stated pertinent pt information, additionally informed oncoming RN of tasks that still need to be completed. Pt's bed locked & in lowest position and call light w/in reach. Emergency equipment at bedside.

## 2024-02-25 LAB
BACTERIA SPEC CULT: ABNORMAL
BACTERIA SPEC CULT: NORMAL
BACTERIA SPEC CULT: NORMAL
CC UR VC: ABNORMAL
SERVICE CMNT-IMP: ABNORMAL
SERVICE CMNT-IMP: NORMAL
SERVICE CMNT-IMP: NORMAL

## 2024-02-27 ENCOUNTER — HOSPITAL ENCOUNTER (OUTPATIENT)
Facility: HOSPITAL | Age: 69
Setting detail: INFUSION SERIES
Discharge: HOME | End: 2024-02-27
Payer: MEDICARE

## 2024-02-27 VITALS
DIASTOLIC BLOOD PRESSURE: 67 MMHG | HEIGHT: 70 IN | TEMPERATURE: 99.1 F | HEART RATE: 83 BPM | OXYGEN SATURATION: 99 % | SYSTOLIC BLOOD PRESSURE: 104 MMHG | BODY MASS INDEX: 17.31 KG/M2 | RESPIRATION RATE: 16 BRPM | WEIGHT: 120.9 LBS

## 2024-02-27 DIAGNOSIS — C67.9 STAGE IV BLADDER CANCER (HCC): Primary | ICD-10-CM

## 2024-02-27 LAB
ANION GAP SERPL CALC-SCNC: 2 MMOL/L (ref 5–15)
BASOPHILS # BLD: 0 K/UL (ref 0–0.1)
BASOPHILS NFR BLD: 1 % (ref 0–1)
BUN SERPL-MCNC: 16 MG/DL (ref 6–20)
BUN/CREAT SERPL: 22 (ref 12–20)
CALCIUM SERPL-MCNC: 8.4 MG/DL (ref 8.5–10.1)
CHLORIDE SERPL-SCNC: 98 MMOL/L (ref 97–108)
CO2 SERPL-SCNC: 37 MMOL/L (ref 21–32)
CREAT SERPL-MCNC: 0.74 MG/DL (ref 0.7–1.3)
DIFFERENTIAL METHOD BLD: ABNORMAL
EOSINOPHIL # BLD: 0.2 K/UL (ref 0–0.4)
EOSINOPHIL NFR BLD: 3 % (ref 0–7)
ERYTHROCYTE [DISTWIDTH] IN BLOOD BY AUTOMATED COUNT: 16.1 % (ref 11.5–14.5)
GLUCOSE SERPL-MCNC: 110 MG/DL (ref 65–100)
HCT VFR BLD AUTO: 28.1 % (ref 36.6–50.3)
HGB BLD-MCNC: 8.8 G/DL (ref 12.1–17)
IMM GRANULOCYTES # BLD AUTO: 0 K/UL (ref 0–0.04)
IMM GRANULOCYTES NFR BLD AUTO: 1 % (ref 0–0.5)
LYMPHOCYTES # BLD: 1.1 K/UL (ref 0.8–3.5)
LYMPHOCYTES NFR BLD: 21 % (ref 12–49)
MCH RBC QN AUTO: 28.9 PG (ref 26–34)
MCHC RBC AUTO-ENTMCNC: 31.3 G/DL (ref 30–36.5)
MCV RBC AUTO: 92.4 FL (ref 80–99)
MONOCYTES # BLD: 0.6 K/UL (ref 0–1)
MONOCYTES NFR BLD: 11 % (ref 5–13)
NEUTS SEG # BLD: 3.3 K/UL (ref 1.8–8)
NEUTS SEG NFR BLD: 63 % (ref 32–75)
NRBC # BLD: 0 K/UL (ref 0–0.01)
NRBC BLD-RTO: 0 PER 100 WBC
PHOSPHATE SERPL-MCNC: 2.7 MG/DL (ref 2.6–4.7)
PLATELET # BLD AUTO: 239 K/UL (ref 150–400)
PMV BLD AUTO: 9 FL (ref 8.9–12.9)
POTASSIUM SERPL-SCNC: 3 MMOL/L (ref 3.5–5.1)
RBC # BLD AUTO: 3.04 M/UL (ref 4.1–5.7)
SODIUM SERPL-SCNC: 137 MMOL/L (ref 136–145)
WBC # BLD AUTO: 5.2 K/UL (ref 4.1–11.1)

## 2024-02-27 PROCEDURE — 36415 COLL VENOUS BLD VENIPUNCTURE: CPT

## 2024-02-27 PROCEDURE — 85025 COMPLETE CBC W/AUTO DIFF WBC: CPT

## 2024-02-27 PROCEDURE — 6360000002 HC RX W HCPCS: Performed by: NURSE PRACTITIONER

## 2024-02-27 PROCEDURE — 80048 BASIC METABOLIC PNL TOTAL CA: CPT

## 2024-02-27 PROCEDURE — 6360000002 HC RX W HCPCS: Performed by: STUDENT IN AN ORGANIZED HEALTH CARE EDUCATION/TRAINING PROGRAM

## 2024-02-27 PROCEDURE — 6370000000 HC RX 637 (ALT 250 FOR IP): Performed by: NURSE PRACTITIONER

## 2024-02-27 PROCEDURE — 2580000003 HC RX 258: Performed by: STUDENT IN AN ORGANIZED HEALTH CARE EDUCATION/TRAINING PROGRAM

## 2024-02-27 PROCEDURE — 96413 CHEMO IV INFUSION 1 HR: CPT

## 2024-02-27 PROCEDURE — 96367 TX/PROPH/DG ADDL SEQ IV INF: CPT

## 2024-02-27 PROCEDURE — 96375 TX/PRO/DX INJ NEW DRUG ADDON: CPT

## 2024-02-27 PROCEDURE — 84100 ASSAY OF PHOSPHORUS: CPT

## 2024-02-27 RX ORDER — SODIUM CHLORIDE 0.9 % (FLUSH) 0.9 %
5-40 SYRINGE (ML) INJECTION PRN
Status: DISCONTINUED | OUTPATIENT
Start: 2024-02-27 | End: 2024-02-28 | Stop reason: HOSPADM

## 2024-02-27 RX ORDER — ONDANSETRON 2 MG/ML
8 INJECTION INTRAMUSCULAR; INTRAVENOUS ONCE
Status: COMPLETED | OUTPATIENT
Start: 2024-02-27 | End: 2024-02-27

## 2024-02-27 RX ORDER — SODIUM CHLORIDE 9 MG/ML
5-250 INJECTION, SOLUTION INTRAVENOUS PRN
Status: DISCONTINUED | OUTPATIENT
Start: 2024-02-27 | End: 2024-02-28 | Stop reason: HOSPADM

## 2024-02-27 RX ORDER — POTASSIUM CHLORIDE 7.45 MG/ML
10 INJECTION INTRAVENOUS ONCE
Status: COMPLETED | OUTPATIENT
Start: 2024-02-27 | End: 2024-02-27

## 2024-02-27 RX ORDER — POTASSIUM CHLORIDE 750 MG/1
40 TABLET, FILM COATED, EXTENDED RELEASE ORAL ONCE
Status: COMPLETED | OUTPATIENT
Start: 2024-02-27 | End: 2024-02-27

## 2024-02-27 RX ADMIN — SODIUM CHLORIDE 68 MG: 9 INJECTION, SOLUTION INTRAVENOUS at 13:37

## 2024-02-27 RX ADMIN — POTASSIUM CHLORIDE 40 MEQ: 750 TABLET, FILM COATED, EXTENDED RELEASE ORAL at 12:14

## 2024-02-27 RX ADMIN — POTASSIUM CHLORIDE 10 MEQ: 7.46 INJECTION, SOLUTION INTRAVENOUS at 12:29

## 2024-02-27 RX ADMIN — ONDANSETRON 8 MG: 2 INJECTION INTRAMUSCULAR; INTRAVENOUS at 12:19

## 2024-02-27 NOTE — PROGRESS NOTES
Outpatient Infusion Center - Chemotherapy Progress Note    Pt admit to [facility] for C1D8 in stable condition. Assessment completed. JOHN post ED visit per Marilee Lewis NP.     24G PIV established in left A/C with positive blood return. Labs drawn per order and sent. Line flushed, clamped, Curos Cap applied to end clave. Orders received for PO and IV potassium.    Patient Vitals for the past 12 hrs:   Temp Pulse Resp BP SpO2   02/27/24 1415 -- 83 16 104/67 99 %   02/27/24 1105 99.1 °F (37.3 °C) 87 16 96/60 100 %        Recent Results (from the past 12 hour(s))   Phosphorus    Collection Time: 02/27/24 11:07 AM   Result Value Ref Range    Phosphorus 2.7 2.6 - 4.7 MG/DL   Basic metabolic panel    Collection Time: 02/27/24 11:07 AM   Result Value Ref Range    Sodium 137 136 - 145 mmol/L    Potassium 3.0 (L) 3.5 - 5.1 mmol/L    Chloride 98 97 - 108 mmol/L    CO2 37 (H) 21 - 32 mmol/L    Anion Gap 2 (L) 5 - 15 mmol/L    Glucose 110 (H) 65 - 100 mg/dL    BUN 16 6 - 20 MG/DL    Creatinine 0.74 0.70 - 1.30 MG/DL    Bun/Cre Ratio 22 (H) 12 - 20      Est, Glom Filt Rate >60 >60 ml/min/1.73m2    Calcium 8.4 (L) 8.5 - 10.1 MG/DL   CBC With Auto Differential    Collection Time: 02/27/24 11:07 AM   Result Value Ref Range    WBC 5.2 4.1 - 11.1 K/uL    RBC 3.04 (L) 4.10 - 5.70 M/uL    Hemoglobin 8.8 (L) 12.1 - 17.0 g/dL    Hematocrit 28.1 (L) 36.6 - 50.3 %    MCV 92.4 80.0 - 99.0 FL    MCH 28.9 26.0 - 34.0 PG    MCHC 31.3 30.0 - 36.5 g/dL    RDW 16.1 (H) 11.5 - 14.5 %    Platelets 239 150 - 400 K/uL    MPV 9.0 8.9 - 12.9 FL    Nucleated RBCs 0.0 0  WBC    nRBC 0.00 0.00 - 0.01 K/uL    Neutrophils % 63 32 - 75 %    Lymphocytes % 21 12 - 49 %    Monocytes % 11 5 - 13 %    Eosinophils % 3 0 - 7 %    Basophils % 1 0 - 1 %    Immature Granulocytes 1 (H) 0.0 - 0.5 %    Neutrophils Absolute 3.3 1.8 - 8.0 K/UL    Lymphocytes Absolute 1.1 0.8 - 3.5 K/UL    Monocytes Absolute 0.6 0.0 - 1.0 K/UL    Eosinophils Absolute 0.2 0.0 - 0.4

## 2024-02-28 ENCOUNTER — APPOINTMENT (OUTPATIENT)
Facility: HOSPITAL | Age: 69
End: 2024-02-28
Payer: MEDICARE

## 2024-02-28 ENCOUNTER — HOSPITAL ENCOUNTER (EMERGENCY)
Facility: HOSPITAL | Age: 69
Discharge: HOME | End: 2024-02-28
Attending: STUDENT IN AN ORGANIZED HEALTH CARE EDUCATION/TRAINING PROGRAM
Payer: MEDICARE

## 2024-02-28 VITALS
TEMPERATURE: 98.2 F | BODY MASS INDEX: 19.19 KG/M2 | DIASTOLIC BLOOD PRESSURE: 70 MMHG | RESPIRATION RATE: 16 BRPM | WEIGHT: 134.04 LBS | OXYGEN SATURATION: 98 % | HEIGHT: 70 IN | SYSTOLIC BLOOD PRESSURE: 140 MMHG | HEART RATE: 77 BPM

## 2024-02-28 DIAGNOSIS — T83.022A NEPHROSTOMY TUBE DISPLACED (HCC): Primary | ICD-10-CM

## 2024-02-28 PROCEDURE — 99284 EMERGENCY DEPT VISIT MOD MDM: CPT

## 2024-02-28 PROCEDURE — 2709999900 IR GUIDED NEPHROSTOMY CATH PLACEMENT LEFT

## 2024-02-28 PROCEDURE — 6360000002 HC RX W HCPCS: Performed by: STUDENT IN AN ORGANIZED HEALTH CARE EDUCATION/TRAINING PROGRAM

## 2024-02-28 PROCEDURE — 6360000004 HC RX CONTRAST MEDICATION: Performed by: STUDENT IN AN ORGANIZED HEALTH CARE EDUCATION/TRAINING PROGRAM

## 2024-02-28 PROCEDURE — 96374 THER/PROPH/DIAG INJ IV PUSH: CPT

## 2024-02-28 RX ORDER — HEPARIN SODIUM 200 [USP'U]/100ML
200 INJECTION, SOLUTION INTRAVENOUS ONCE
Status: DISCONTINUED | OUTPATIENT
Start: 2024-02-28 | End: 2024-02-28 | Stop reason: HOSPADM

## 2024-02-28 RX ORDER — FENTANYL CITRATE 50 UG/ML
100 INJECTION, SOLUTION INTRAMUSCULAR; INTRAVENOUS PRN
Status: DISCONTINUED | OUTPATIENT
Start: 2024-02-28 | End: 2024-02-28

## 2024-02-28 RX ORDER — LIDOCAINE HYDROCHLORIDE 20 MG/ML
20 INJECTION, SOLUTION INFILTRATION; PERINEURAL ONCE
Status: DISCONTINUED | OUTPATIENT
Start: 2024-02-28 | End: 2024-02-28

## 2024-02-28 RX ADMIN — HEPARIN SODIUM 50 ML: 200 INJECTION, SOLUTION INTRAVENOUS at 13:38

## 2024-02-28 RX ADMIN — FENTANYL CITRATE 50 MCG: 50 INJECTION INTRAMUSCULAR; INTRAVENOUS at 13:18

## 2024-02-28 RX ADMIN — IOPAMIDOL 15 ML: 755 INJECTION, SOLUTION INTRAVENOUS at 13:38

## 2024-02-28 ASSESSMENT — PAIN - FUNCTIONAL ASSESSMENT
PAIN_FUNCTIONAL_ASSESSMENT: NONE - DENIES PAIN
PAIN_FUNCTIONAL_ASSESSMENT: NONE - DENIES PAIN

## 2024-02-28 NOTE — ED PROVIDER NOTES
[facility] EMERGENCY DEPT  EMERGENCY DEPARTMENT ENCOUNTER       Pt Name: Jamel Hazel  MRN: [id number]  Birthdate 1955  Date of evaluation: 2/28/2024  Provider: Kong Curiel MD   PCP: Aj Loya DO  Note Started: 11:19 AM EST 2/28/24     CHIEF COMPLAINT       Chief Complaint   Patient presents with    Referral - General     Sent from [facility] - his nephrostomy tube and stent was pulled out overnight and he was sent here to have it replaced        HISTORY OF PRESENT ILLNESS: 1 or more elements      History From: patient, History limited by: none     Jamel Hazel is a 68 y.o. male presenting with nephrostomy tube dysfunction.  Notes it came out overnight.  Notes he feels generally around his baseline.       Please See MDM for Additional Details of the HPI/PMH  Nursing Notes were all reviewed and agreed with or any disagreements were addressed in the HPI.     REVIEW OF SYSTEMS        Positives and Pertinent negatives as per HPI.    PAST HISTORY     Past Medical History:  Past Medical History:   Diagnosis Date    Arthritis     osteoarthritis    Asthma     Cancer (HCC)     esophagus    Cancer of esophagus (HCC) 4/2012    adenocarcinoma; Dr. Murillo    Chronic pain     lower back, has crushed vertebrae    Essential hypertension     GERD (gastroesophageal reflux disease)     Hypertension     not on medication    Nonischemic cardiomyopathy (HCC)     2003 EF 35-50%; negative cath, 2/2012 EF 55%; Dr. 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    Thyroid disease     Unspecified sleep apnea     c-pap-doesnt wear now/apnea resolved after surgery/lost weight and no sleep apnea       Past Surgical History:  Past Surgical History:

## 2024-02-28 NOTE — CONSULTS
New Urology Consult Note    Patient: Jamel Hazel MRN: [id number]  SSN: [id number]    YOB: 1955  Age: 68 y.o.  Sex: male            Assessment:     Jamel Hazel is a 68 y.o. male with dislodgement of L perc tube   Pt with hx or bladder cancer with nets     Recommendations:     1. L PCN dislodged - consult to IR to replace L perc and evaluate R Perc   Ok for pt to DC home from urology standpoint once perc tubes are functioning    Thank you for this consult. Please contact [facility] with any further questions/concerns.     d/w Dr Figueroa       History of Present Illness:     Reason for Consult:  L perc tube dislodged    Jamel Hazel is seen in consultation for reasons noted above at the request of Kong Curiel MD.    This is a 68 y.o. male with a history of Bladder cancer , Chet Hydronephrosis pt follow ed by Dr Beaver at  . 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 Pt has metastatic disease encasing the colon . He presents to the ED with L perc out , urology was consulted .    Pt seen in ED denies any pain . Discussed need for L perc drain to be replaced . Pt agrees has concern of R perc drain with light punch colored urine the patient denies any pain with it . Pt recently seen in office and gibbons removed . He has clear yellow urine in urinal  about 100 ml .       Subjective     Past Medical History  Past Medical History:   Diagnosis Date    Arthritis     osteoarthritis    Asthma     Cancer (HCC)     esophagus    Cancer of esophagus (HCC) 4/2012    adenocarcinoma; Dr. Murillo    Chronic pain     lower back, has crushed vertebrae    Essential hypertension     GERD (gastroesophageal reflux disease)     Hypertension     not on medication    Nonischemic cardiomyopathy (HCC)     2003 EF 35-50%; negative cath, 2/2012 EF 55%; Dr. Reid    Other ill-defined conditions(799.89)     Shea's

## 2024-02-28 NOTE — ED NOTES
Patient discharged by Emily. Patient provided with discharge instructions Rx and instructions on follow up care. Patient rolled out to the waiting room in a wheelchair by staff.

## 2024-03-04 RX ORDER — MEPERIDINE HYDROCHLORIDE 25 MG/ML
12.5 INJECTION INTRAMUSCULAR; INTRAVENOUS; SUBCUTANEOUS PRN
Status: CANCELLED | OUTPATIENT
Start: 2024-03-12

## 2024-03-04 RX ORDER — PROCHLORPERAZINE EDISYLATE 5 MG/ML
10 INJECTION INTRAMUSCULAR; INTRAVENOUS
Status: CANCELLED | OUTPATIENT
Start: 2024-03-12

## 2024-03-04 RX ORDER — SODIUM CHLORIDE 9 MG/ML
INJECTION, SOLUTION INTRAVENOUS CONTINUOUS
Status: CANCELLED | OUTPATIENT
Start: 2024-03-12

## 2024-03-04 RX ORDER — SODIUM CHLORIDE 9 MG/ML
5-250 INJECTION, SOLUTION INTRAVENOUS PRN
Status: CANCELLED | OUTPATIENT
Start: 2024-03-19

## 2024-03-04 RX ORDER — ONDANSETRON 2 MG/ML
8 INJECTION INTRAMUSCULAR; INTRAVENOUS
Status: CANCELLED | OUTPATIENT
Start: 2024-03-12

## 2024-03-04 RX ORDER — SODIUM CHLORIDE 9 MG/ML
INJECTION, SOLUTION INTRAVENOUS CONTINUOUS
Status: CANCELLED | OUTPATIENT
Start: 2024-03-19

## 2024-03-04 RX ORDER — SODIUM CHLORIDE 0.9 % (FLUSH) 0.9 %
5-40 SYRINGE (ML) INJECTION PRN
Status: CANCELLED | OUTPATIENT
Start: 2024-03-19

## 2024-03-04 RX ORDER — ACETAMINOPHEN 325 MG/1
650 TABLET ORAL
Status: CANCELLED | OUTPATIENT
Start: 2024-03-12

## 2024-03-04 RX ORDER — ONDANSETRON 2 MG/ML
8 INJECTION INTRAMUSCULAR; INTRAVENOUS
Status: CANCELLED | OUTPATIENT
Start: 2024-03-19

## 2024-03-04 RX ORDER — LORAZEPAM 2 MG/ML
0.5 INJECTION INTRAMUSCULAR
Status: CANCELLED | OUTPATIENT
Start: 2024-03-12

## 2024-03-04 RX ORDER — ALBUTEROL SULFATE 90 UG/1
4 AEROSOL, METERED RESPIRATORY (INHALATION) PRN
Status: CANCELLED | OUTPATIENT
Start: 2024-03-12

## 2024-03-04 RX ORDER — MEPERIDINE HYDROCHLORIDE 25 MG/ML
12.5 INJECTION INTRAMUSCULAR; INTRAVENOUS; SUBCUTANEOUS PRN
Status: CANCELLED | OUTPATIENT
Start: 2024-03-19

## 2024-03-04 RX ORDER — SODIUM CHLORIDE 9 MG/ML
5-250 INJECTION, SOLUTION INTRAVENOUS PRN
Status: CANCELLED | OUTPATIENT
Start: 2024-03-12

## 2024-03-04 RX ORDER — HEPARIN 100 UNIT/ML
500 SYRINGE INTRAVENOUS PRN
Status: CANCELLED | OUTPATIENT
Start: 2024-03-19

## 2024-03-04 RX ORDER — PROCHLORPERAZINE EDISYLATE 5 MG/ML
10 INJECTION INTRAMUSCULAR; INTRAVENOUS
Status: CANCELLED | OUTPATIENT
Start: 2024-03-19

## 2024-03-04 RX ORDER — DIPHENHYDRAMINE HYDROCHLORIDE 50 MG/ML
50 INJECTION INTRAMUSCULAR; INTRAVENOUS
Status: CANCELLED | OUTPATIENT
Start: 2024-03-19

## 2024-03-04 RX ORDER — DIPHENHYDRAMINE HYDROCHLORIDE 50 MG/ML
50 INJECTION INTRAMUSCULAR; INTRAVENOUS
Status: CANCELLED | OUTPATIENT
Start: 2024-03-12

## 2024-03-04 RX ORDER — HEPARIN 100 UNIT/ML
500 SYRINGE INTRAVENOUS PRN
Status: CANCELLED | OUTPATIENT
Start: 2024-03-12

## 2024-03-04 RX ORDER — EPINEPHRINE 1 MG/ML
0.3 INJECTION, SOLUTION INTRAMUSCULAR; SUBCUTANEOUS PRN
Status: CANCELLED | OUTPATIENT
Start: 2024-03-12

## 2024-03-04 RX ORDER — ALBUTEROL SULFATE 90 UG/1
4 AEROSOL, METERED RESPIRATORY (INHALATION) PRN
Status: CANCELLED | OUTPATIENT
Start: 2024-03-19

## 2024-03-04 RX ORDER — ACETAMINOPHEN 325 MG/1
650 TABLET ORAL
Status: CANCELLED | OUTPATIENT
Start: 2024-03-19

## 2024-03-04 RX ORDER — LORAZEPAM 2 MG/ML
0.5 INJECTION INTRAMUSCULAR
Status: CANCELLED | OUTPATIENT
Start: 2024-03-19

## 2024-03-04 RX ORDER — ONDANSETRON 2 MG/ML
8 INJECTION INTRAMUSCULAR; INTRAVENOUS ONCE
Status: CANCELLED | OUTPATIENT
Start: 2024-03-19 | End: 2024-03-19

## 2024-03-04 RX ORDER — EPINEPHRINE 1 MG/ML
0.3 INJECTION, SOLUTION INTRAMUSCULAR; SUBCUTANEOUS PRN
Status: CANCELLED | OUTPATIENT
Start: 2024-03-19

## 2024-03-05 ENCOUNTER — APPOINTMENT (OUTPATIENT)
Facility: HOSPITAL | Age: 69
End: 2024-03-05
Payer: MEDICARE

## 2024-03-06 DIAGNOSIS — C79.51 PAIN FROM BONE METASTASES (HCC): Primary | ICD-10-CM

## 2024-03-06 DIAGNOSIS — G89.3 PAIN FROM BONE METASTASES (HCC): Primary | ICD-10-CM

## 2024-03-06 NOTE — TELEPHONE ENCOUNTER
Palliative Medicine Clinic   [place]: [phone])    Patient Name: Jamel Hazel  YOB: 1955    Medication Refill Request Triage    Requested by:    [x]  Patient  []  Support person:      Last Pall Med Clinic Visit:  Visit date not found    Next Pall Med Clinic Visit: 4/3/2024     Preferred Pharmacy: [facility]  Backup Pharmacy:  *    Medications requested:  Oxycodone 10 mg    Is patient completely out?  []   YES  [x]   NO  If NO, how many pills does patient have left?  _20_    Other pertinent information shared:

## 2024-03-06 NOTE — TELEPHONE ENCOUNTER
Palliative Medicine Clinic   [place]: [phone] ([phone])    Patient Name: Jamel Hazel  YOB: 1955    Medication Refill Request    Patient is scheduled for follow up:  [x]  YES  []   NO  Next [facility] Med [facility] Visit:     PDMP reviewed:  [x] YES   []  System down / Unable  []  NO- Patient fills out of state    Medication:oxyCODONE HCl (OXY-IR) 10 MG   Dose and directions:Take 1-2 tablets by mouth every 4 hours   Number dispensed:120  Date filled (PDMP or Pharmacy):1/29/2024  # left:        Appropriate for refill:  [x]  YES  []  Early Request - Requires MD/NP Review      Other pertinent information for prescriber:

## 2024-03-07 RX ORDER — OXYCODONE HYDROCHLORIDE 10 MG/1
10-20 TABLET ORAL EVERY 4 HOURS PRN
Qty: 120 TABLET | Refills: 0 | Status: SHIPPED | OUTPATIENT
Start: 2024-03-07 | End: 2024-04-06

## 2024-03-12 ENCOUNTER — OFFICE VISIT (OUTPATIENT)
Age: 69
End: 2024-03-12
Payer: MEDICARE

## 2024-03-12 ENCOUNTER — HOSPITAL ENCOUNTER (OUTPATIENT)
Facility: HOSPITAL | Age: 69
Setting detail: INFUSION SERIES
Discharge: HOME | End: 2024-03-12
Payer: MEDICARE

## 2024-03-12 VITALS
TEMPERATURE: 98 F | HEART RATE: 80 BPM | OXYGEN SATURATION: 98 % | WEIGHT: 119 LBS | SYSTOLIC BLOOD PRESSURE: 100 MMHG | HEIGHT: 70 IN | DIASTOLIC BLOOD PRESSURE: 62 MMHG | RESPIRATION RATE: 16 BRPM | BODY MASS INDEX: 17.04 KG/M2

## 2024-03-12 VITALS
WEIGHT: 119.5 LBS | HEIGHT: 70 IN | HEART RATE: 80 BPM | TEMPERATURE: 98.8 F | SYSTOLIC BLOOD PRESSURE: 91 MMHG | RESPIRATION RATE: 16 BRPM | OXYGEN SATURATION: 100 % | DIASTOLIC BLOOD PRESSURE: 52 MMHG | BODY MASS INDEX: 17.11 KG/M2

## 2024-03-12 DIAGNOSIS — C67.9 STAGE IV BLADDER CANCER (HCC): Primary | ICD-10-CM

## 2024-03-12 DIAGNOSIS — Z51.11 ENCOUNTER FOR ANTINEOPLASTIC CHEMOTHERAPY: ICD-10-CM

## 2024-03-12 DIAGNOSIS — R60.0 BILATERAL LOWER EXTREMITY EDEMA: ICD-10-CM

## 2024-03-12 DIAGNOSIS — G89.3 CANCER ASSOCIATED PAIN: ICD-10-CM

## 2024-03-12 DIAGNOSIS — Z51.12 ENCOUNTER FOR ANTINEOPLASTIC IMMUNOTHERAPY: ICD-10-CM

## 2024-03-12 LAB
ALBUMIN SERPL-MCNC: 2.6 G/DL (ref 3.5–5)
ALBUMIN/GLOB SERPL: 0.7 (ref 1.1–2.2)
ALP SERPL-CCNC: 65 U/L (ref 45–117)
ALT SERPL-CCNC: 15 U/L (ref 12–78)
ANION GAP SERPL CALC-SCNC: 3 MMOL/L (ref 5–15)
AST SERPL-CCNC: 21 U/L (ref 15–37)
BASOPHILS # BLD: 0 K/UL (ref 0–0.1)
BASOPHILS NFR BLD: 1 % (ref 0–1)
BILIRUB SERPL-MCNC: 0.3 MG/DL (ref 0.2–1)
BUN SERPL-MCNC: 18 MG/DL (ref 6–20)
BUN/CREAT SERPL: 22 (ref 12–20)
CALCIUM SERPL-MCNC: 8.8 MG/DL (ref 8.5–10.1)
CHLORIDE SERPL-SCNC: 95 MMOL/L (ref 97–108)
CO2 SERPL-SCNC: 38 MMOL/L (ref 21–32)
CREAT SERPL-MCNC: 0.83 MG/DL (ref 0.7–1.3)
DIFFERENTIAL METHOD BLD: ABNORMAL
EOSINOPHIL # BLD: 0.2 K/UL (ref 0–0.4)
EOSINOPHIL NFR BLD: 5 % (ref 0–7)
ERYTHROCYTE [DISTWIDTH] IN BLOOD BY AUTOMATED COUNT: 15.9 % (ref 11.5–14.5)
GLOBULIN SER CALC-MCNC: 3.8 G/DL (ref 2–4)
GLUCOSE SERPL-MCNC: 102 MG/DL (ref 65–100)
HCT VFR BLD AUTO: 27.1 % (ref 36.6–50.3)
HGB BLD-MCNC: 8.4 G/DL (ref 12.1–17)
IMM GRANULOCYTES # BLD AUTO: 0 K/UL (ref 0–0.04)
IMM GRANULOCYTES NFR BLD AUTO: 0 % (ref 0–0.5)
LYMPHOCYTES # BLD: 1.1 K/UL (ref 0.8–3.5)
LYMPHOCYTES NFR BLD: 22 % (ref 12–49)
MCH RBC QN AUTO: 28.9 PG (ref 26–34)
MCHC RBC AUTO-ENTMCNC: 31 G/DL (ref 30–36.5)
MCV RBC AUTO: 93.1 FL (ref 80–99)
MONOCYTES # BLD: 0.5 K/UL (ref 0–1)
MONOCYTES NFR BLD: 11 % (ref 5–13)
NEUTS SEG # BLD: 2.9 K/UL (ref 1.8–8)
NEUTS SEG NFR BLD: 61 % (ref 32–75)
NRBC # BLD: 0 K/UL (ref 0–0.01)
NRBC BLD-RTO: 0 PER 100 WBC
PHOSPHATE SERPL-MCNC: 3.6 MG/DL (ref 2.6–4.7)
PLATELET # BLD AUTO: 283 K/UL (ref 150–400)
PMV BLD AUTO: 8.5 FL (ref 8.9–12.9)
POTASSIUM SERPL-SCNC: 3.9 MMOL/L (ref 3.5–5.1)
PROT SERPL-MCNC: 6.4 G/DL (ref 6.4–8.2)
RBC # BLD AUTO: 2.91 M/UL (ref 4.1–5.7)
SODIUM SERPL-SCNC: 136 MMOL/L (ref 136–145)
WBC # BLD AUTO: 4.8 K/UL (ref 4.1–11.1)

## 2024-03-12 PROCEDURE — 3074F SYST BP LT 130 MM HG: CPT | Performed by: STUDENT IN AN ORGANIZED HEALTH CARE EDUCATION/TRAINING PROGRAM

## 2024-03-12 PROCEDURE — 99215 OFFICE O/P EST HI 40 MIN: CPT | Performed by: STUDENT IN AN ORGANIZED HEALTH CARE EDUCATION/TRAINING PROGRAM

## 2024-03-12 PROCEDURE — 96417 CHEMO IV INFUS EACH ADDL SEQ: CPT

## 2024-03-12 PROCEDURE — 1123F ACP DISCUSS/DSCN MKR DOCD: CPT | Performed by: STUDENT IN AN ORGANIZED HEALTH CARE EDUCATION/TRAINING PROGRAM

## 2024-03-12 PROCEDURE — 6360000002 HC RX W HCPCS: Performed by: STUDENT IN AN ORGANIZED HEALTH CARE EDUCATION/TRAINING PROGRAM

## 2024-03-12 PROCEDURE — 85025 COMPLETE CBC W/AUTO DIFF WBC: CPT

## 2024-03-12 PROCEDURE — 96375 TX/PRO/DX INJ NEW DRUG ADDON: CPT

## 2024-03-12 PROCEDURE — 2580000003 HC RX 258: Performed by: STUDENT IN AN ORGANIZED HEALTH CARE EDUCATION/TRAINING PROGRAM

## 2024-03-12 PROCEDURE — 84100 ASSAY OF PHOSPHORUS: CPT

## 2024-03-12 PROCEDURE — 36415 COLL VENOUS BLD VENIPUNCTURE: CPT

## 2024-03-12 PROCEDURE — 3078F DIAST BP <80 MM HG: CPT | Performed by: STUDENT IN AN ORGANIZED HEALTH CARE EDUCATION/TRAINING PROGRAM

## 2024-03-12 PROCEDURE — 96413 CHEMO IV INFUSION 1 HR: CPT

## 2024-03-12 PROCEDURE — 80053 COMPREHEN METABOLIC PANEL: CPT

## 2024-03-12 RX ORDER — ONDANSETRON 2 MG/ML
8 INJECTION INTRAMUSCULAR; INTRAVENOUS ONCE
Status: COMPLETED | OUTPATIENT
Start: 2024-03-12 | End: 2024-03-12

## 2024-03-12 RX ORDER — SODIUM CHLORIDE 0.9 % (FLUSH) 0.9 %
5-40 SYRINGE (ML) INJECTION PRN
Status: DISCONTINUED | OUTPATIENT
Start: 2024-03-12 | End: 2024-03-13 | Stop reason: HOSPADM

## 2024-03-12 RX ORDER — FUROSEMIDE 20 MG/1
20 TABLET ORAL DAILY
Qty: 60 TABLET | Refills: 3 | Status: SHIPPED | OUTPATIENT
Start: 2024-03-12

## 2024-03-12 RX ORDER — SODIUM CHLORIDE 9 MG/ML
5-250 INJECTION, SOLUTION INTRAVENOUS PRN
Status: DISCONTINUED | OUTPATIENT
Start: 2024-03-12 | End: 2024-03-13 | Stop reason: HOSPADM

## 2024-03-12 RX ADMIN — SODIUM CHLORIDE, PRESERVATIVE FREE 10 ML: 5 INJECTION INTRAVENOUS at 12:54

## 2024-03-12 RX ADMIN — SODIUM CHLORIDE 200 MG: 9 INJECTION, SOLUTION INTRAVENOUS at 14:12

## 2024-03-12 RX ADMIN — ENFORTUMAB VEDOTIN 60 MG: 30 INJECTION, POWDER, LYOPHILIZED, FOR SOLUTION INTRAVENOUS at 13:35

## 2024-03-12 RX ADMIN — SODIUM CHLORIDE, PRESERVATIVE FREE 10 ML: 5 INJECTION INTRAVENOUS at 11:24

## 2024-03-12 RX ADMIN — ONDANSETRON 8 MG: 2 INJECTION INTRAMUSCULAR; INTRAVENOUS at 12:54

## 2024-03-12 RX ADMIN — SODIUM CHLORIDE 100 ML/HR: 9 INJECTION, SOLUTION INTRAVENOUS at 12:42

## 2024-03-12 RX ADMIN — SODIUM CHLORIDE, PRESERVATIVE FREE 10 ML: 5 INJECTION INTRAVENOUS at 15:03

## 2024-03-12 NOTE — PROGRESS NOTES
Jamel Hazel is a 68 y.o. male who presents for follow up of   Chief Complaint   Patient presents with    Follow-up     Bladder cancer       Mr. Hazel is here today for chemotherapy. He  reports no new clinical symptoms or new complaints since last clinic evaluation. He continues to have fatigue, nausea, left side abdominal pain 7/10 today, coughing, itching and swelling of foot. He also reports numbness and tingling as well.     He reports he was in the ED for a tooth coming out. In February.     No interval surgery or procedures reported    No reported new medication changes reported       Medications reviewed with the patient, and chart updated to reflect changes.        
obstructing ureterovesicular junction   - will consider radiation oncology evaluation if no improvement with IO/Padcev treatment.     4. Bilateral pitting edema  - start lasix 20 mg daily  - will recommend compression device for home use. He is too weak to travel frequently for lymphedema clinic      Signed By: Myrtle Dubose MD      Attending Medical Oncologist   [facility]

## 2024-03-12 NOTE — PROGRESS NOTES
[place] [facility] Visit Note    Pt arrived to [facility] in wheelchair in no acute distress at 1102 for Padcev/Keytruda C2D1.  Assessment unremarkable except 1+ edema in lower extremities, weakness and fatigue, and dry flaky skin.  Patient has bilateral nephrostomy tubes. #24g PIV inserted in right forearm without issue and positive blood return noted.  Labs obtained- CBC with diff, CMP, and Phosphorus.  Pt to MD office for follow-up appt.    Patient denied having any symptoms of COVID-19, i.e. SOB, coughing, fever, or generally not feeling well.      BP (!) 94/56   Pulse 84   Temp 98.8 °F (37.1 °C) (Temporal)   Resp 16   Ht 1.778 m (5' 10\")   Wt 54.2 kg (119 lb 8 oz)   SpO2 100%   BMI 17.15 kg/m²     Recent Results (from the past 12 hour(s))   CBC With Auto Differential    Collection Time: 03/12/24 11:10 AM   Result Value Ref Range    WBC 4.8 4.1 - 11.1 K/uL    RBC 2.91 (L) 4.10 - 5.70 M/uL    Hemoglobin 8.4 (L) 12.1 - 17.0 g/dL    Hematocrit 27.1 (L) 36.6 - 50.3 %    MCV 93.1 80.0 - 99.0 FL    MCH 28.9 26.0 - 34.0 PG    MCHC 31.0 30.0 - 36.5 g/dL    RDW 15.9 (H) 11.5 - 14.5 %    Platelets 283 150 - 400 K/uL    MPV 8.5 (L) 8.9 - 12.9 FL    Nucleated RBCs 0.0 0  WBC    nRBC 0.00 0.00 - 0.01 K/uL    Neutrophils % 61 32 - 75 %    Lymphocytes % 22 12 - 49 %    Monocytes % 11 5 - 13 %    Eosinophils % 5 0 - 7 %    Basophils % 1 0 - 1 %    Immature Granulocytes 0 0.0 - 0.5 %    Neutrophils Absolute 2.9 1.8 - 8.0 K/UL    Lymphocytes Absolute 1.1 0.8 - 3.5 K/UL    Monocytes Absolute 0.5 0.0 - 1.0 K/UL    Eosinophils Absolute 0.2 0.0 - 0.4 K/UL    Basophils Absolute 0.0 0.0 - 0.1 K/UL    Absolute Immature Granulocyte 0.0 0.00 - 0.04 K/UL    Differential Type AUTOMATED     Comprehensive metabolic panel    Collection Time: 03/12/24 11:10 AM   Result Value Ref Range    Sodium 136 136 - 145 mmol/L    Potassium 3.9 3.5 - 5.1 mmol/L    Chloride 95 (L) 97 - 108 mmol/L    CO2 38 (H) 21 - 32 mmol/L     Anion Gap 3 (L) 5 - 15 mmol/L    Glucose 102 (H) 65 - 100 mg/dL    BUN 18 6 - 20 MG/DL    Creatinine 0.83 0.70 - 1.30 MG/DL    Bun/Cre Ratio 22 (H) 12 - 20      Est, Glom Filt Rate >60 >60 ml/min/1.73m2    Calcium 8.8 8.5 - 10.1 MG/DL    Total Bilirubin 0.3 0.2 - 1.0 MG/DL    ALT 15 12 - 78 U/L    AST 21 15 - 37 U/L    Alk Phosphatase 65 45 - 117 U/L    Total Protein 6.4 6.4 - 8.2 g/dL    Albumin 2.6 (L) 3.5 - 5.0 g/dL    Globulin 3.8 2.0 - 4.0 g/dL    Albumin/Globulin Ratio 0.7 (L) 1.1 - 2.2     Phosphorus    Collection Time: 03/12/24 11:10 AM   Result Value Ref Range    Phosphorus 3.6 2.6 - 4.7 MG/DL     When patient returned from MD office, peripheral IV would not flush.  New #24g IV inserted in left upper arm.     Two nurses verified prior to administering:  Drug name  Drug dose  Infusion volume or drug volume when prepared in a syringe  Rate of administration  Route of administration  Expiration dates and/or times  Appearance and physical integrity of the drugs  Rate set on infusion pump, when used  Sequencing of drug administration     The following medications administered:  NS @ KVO  Zofran 8 mg IVP  Padcev 60 mg IV over 30 minutes  Keytruda 200 mg IV over 30 minutes    BP (!) 91/52   Pulse 80   Temp 98.8 °F (37.1 °C) (Temporal)   Resp 16   Ht 1.778 m (5' 10\")   Wt 54.2 kg (119 lb 8 oz)   SpO2 100%   BMI 17.15 kg/m²      Pt tolerated treatment well.  No adverse reaction noted.  IV flushed per policy and removed, 2x2 and coban placed.  Pt discharged in wheelchair in no acute distress at 1502, accompanied by spouse.  Next appointment 3/19/24 @ 1100.

## 2024-03-19 ENCOUNTER — CLINICAL DOCUMENTATION (OUTPATIENT)
Age: 69
End: 2024-03-19

## 2024-03-19 ENCOUNTER — HOSPITAL ENCOUNTER (OUTPATIENT)
Facility: HOSPITAL | Age: 69
Setting detail: INFUSION SERIES
Discharge: HOME | End: 2024-03-19
Payer: MEDICARE

## 2024-03-19 VITALS
BODY MASS INDEX: 16.62 KG/M2 | RESPIRATION RATE: 16 BRPM | DIASTOLIC BLOOD PRESSURE: 73 MMHG | WEIGHT: 116.1 LBS | TEMPERATURE: 97.9 F | SYSTOLIC BLOOD PRESSURE: 116 MMHG | OXYGEN SATURATION: 95 % | HEART RATE: 84 BPM | HEIGHT: 70 IN

## 2024-03-19 DIAGNOSIS — C15.9 MALIGNANT NEOPLASM OF ESOPHAGUS, UNSPECIFIED LOCATION (HCC): Primary | ICD-10-CM

## 2024-03-19 DIAGNOSIS — C67.9 STAGE IV BLADDER CANCER (HCC): Primary | ICD-10-CM

## 2024-03-19 LAB
ANION GAP SERPL CALC-SCNC: 3 MMOL/L (ref 5–15)
BASO+EOS+MONOS # BLD AUTO: 0.6 K/UL (ref 0.2–1.2)
BASO+EOS+MONOS NFR BLD AUTO: 9 % (ref 3.2–16.9)
BUN SERPL-MCNC: 15 MG/DL (ref 6–20)
BUN/CREAT SERPL: 17 (ref 12–20)
CALCIUM SERPL-MCNC: 9 MG/DL (ref 8.5–10.1)
CHLORIDE SERPL-SCNC: 90 MMOL/L (ref 97–108)
CO2 SERPL-SCNC: 38 MMOL/L (ref 21–32)
CREAT SERPL-MCNC: 0.89 MG/DL (ref 0.7–1.3)
DIFFERENTIAL METHOD BLD: ABNORMAL
ERYTHROCYTE [DISTWIDTH] IN BLOOD BY AUTOMATED COUNT: 16.3 % (ref 11.8–15.8)
GLUCOSE SERPL-MCNC: 91 MG/DL (ref 65–100)
HCT VFR BLD AUTO: 31.4 % (ref 36.6–50.3)
HGB BLD-MCNC: 9.9 G/DL (ref 12.1–17)
LYMPHOCYTES # BLD: 1.2 K/UL (ref 0.8–3.5)
LYMPHOCYTES NFR BLD: 19 % (ref 12–49)
MCH RBC QN AUTO: 28.8 PG (ref 26–34)
MCHC RBC AUTO-ENTMCNC: 31.5 G/DL (ref 30–36.5)
MCV RBC AUTO: 91.3 FL (ref 80–99)
NEUTS SEG # BLD: 4.6 K/UL (ref 1.8–8)
NEUTS SEG NFR BLD: 73 % (ref 32–75)
PHOSPHATE SERPL-MCNC: 3.2 MG/DL (ref 2.6–4.7)
PLATELET # BLD AUTO: 371 K/UL (ref 150–400)
POTASSIUM SERPL-SCNC: 3.5 MMOL/L (ref 3.5–5.1)
RBC # BLD AUTO: 3.44 M/UL (ref 4.1–5.7)
SODIUM SERPL-SCNC: 131 MMOL/L (ref 136–145)
WBC # BLD AUTO: 6.4 K/UL (ref 4.1–11.1)

## 2024-03-19 PROCEDURE — 85025 COMPLETE CBC W/AUTO DIFF WBC: CPT

## 2024-03-19 PROCEDURE — 2580000003 HC RX 258: Performed by: STUDENT IN AN ORGANIZED HEALTH CARE EDUCATION/TRAINING PROGRAM

## 2024-03-19 PROCEDURE — 96375 TX/PRO/DX INJ NEW DRUG ADDON: CPT

## 2024-03-19 PROCEDURE — 80048 BASIC METABOLIC PNL TOTAL CA: CPT

## 2024-03-19 PROCEDURE — 84100 ASSAY OF PHOSPHORUS: CPT

## 2024-03-19 PROCEDURE — 6360000002 HC RX W HCPCS: Performed by: STUDENT IN AN ORGANIZED HEALTH CARE EDUCATION/TRAINING PROGRAM

## 2024-03-19 PROCEDURE — 96413 CHEMO IV INFUSION 1 HR: CPT

## 2024-03-19 PROCEDURE — 36415 COLL VENOUS BLD VENIPUNCTURE: CPT

## 2024-03-19 RX ORDER — ONDANSETRON 2 MG/ML
8 INJECTION INTRAMUSCULAR; INTRAVENOUS ONCE
Status: COMPLETED | OUTPATIENT
Start: 2024-03-19 | End: 2024-03-19

## 2024-03-19 RX ORDER — SODIUM CHLORIDE 9 MG/ML
5-250 INJECTION, SOLUTION INTRAVENOUS PRN
Status: DISCONTINUED | OUTPATIENT
Start: 2024-03-19 | End: 2024-03-20 | Stop reason: HOSPADM

## 2024-03-19 RX ORDER — SODIUM CHLORIDE 0.9 % (FLUSH) 0.9 %
5-40 SYRINGE (ML) INJECTION PRN
Status: DISCONTINUED | OUTPATIENT
Start: 2024-03-19 | End: 2024-03-20 | Stop reason: HOSPADM

## 2024-03-19 RX ADMIN — ONDANSETRON 8 MG: 2 INJECTION INTRAMUSCULAR; INTRAVENOUS at 11:54

## 2024-03-19 RX ADMIN — ENFORTUMAB VEDOTIN 60 MG: 30 INJECTION, POWDER, LYOPHILIZED, FOR SOLUTION INTRAVENOUS at 12:26

## 2024-03-19 RX ADMIN — SODIUM CHLORIDE 50 ML/HR: 9 INJECTION, SOLUTION INTRAVENOUS at 11:54

## 2024-03-19 NOTE — PROGRESS NOTES
[facility] Progress Note    Date: 2024    Name: Jamel Hazel    MRN: [id number]         : 1955    Mr. Hazel arrived (ambulation) at 1045 and in no distress for cycle 2 day 8 of Padcev regimen.  Assessment was completed, no acute issues at this time, no new complaints voiced.      Covid Screening      1.Do you have any symptoms of COVID-19?  SOB, coughing, fever, or generally not feeling well ? No  2. Have you been exposed to COVID-19 recently? No  3. Have you had any recent contact with family/friend that has a pending COVID test? No    Peripheral IV 24 Left Other (Comment) (Active)   Site Assessment Clean, dry & intact 24 111   Line Status Blood return noted;Specimen collected;Flushed 24 1119   Line Care Connections checked and tightened 24 1119   Phlebitis Assessment No symptoms 24 1119   Infiltration Assessment 0 24 1119   Alcohol Cap Used Yes 24 1119   Dressing Status New dressing applied 24 1119   Dressing Type Transparent 24 1119   Dressing Intervention New 24 1119      + blood return noted, labs drawn and sent.      Proceeded to appt with Dr. Mr. Hazel's vitals were reviewed.  Patient Vitals for the past 24 hrs:   BP Temp Temp src Pulse Resp SpO2 Height Weight   24 1300 116/73 -- -- 84 16 -- -- --   24 1045 114/70 97.9 °F (36.6 °C) Temporal (!) 102 16 93 % 1.778 m (5' 10\") 52.9 kg (116 lb 11.2 oz)       Lab results were obtained and reviewed.  Recent Results (from the past 12 hour(s))   Basic metabolic panel    Collection Time: 24 10:58 AM   Result Value Ref Range    Sodium 131 (L) 136 - 145 mmol/L    Potassium 3.5 3.5 - 5.1 mmol/L    Chloride 90 (L) 97 - 108 mmol/L    CO2 38 (H) 21 - 32 mmol/L    Anion Gap 3 (L) 5 - 15 mmol/L    Glucose 91 65 - 100 mg/dL    BUN 15 6 - 20 MG/DL    Creatinine 0.89 0.70 - 1.30 MG/DL    Bun/Cre Ratio 17 12 - 20      Est, Glom Filt Rate >60 >60 ml/min/1.73m2    Calcium 9.0 8.5 - 10.1

## 2024-03-19 NOTE — PROGRESS NOTES
[facility] [place]  Oncology Social Work  Encounter     Patient Name:  Jamel Hazel     Medical History: dx stage IV bladder cancer    Advance Directives:     Narrative: SW provided pt and spouse with BS FAA Tiers to show them they will receive partial assistance with a 83% or 79% discount.  SW provided her Direct # for any questions.      SW also got a print out from [facility] PSR on pt MOOP (max Out of pocket) which was $3900? And he has $3700 remaining.      SW encouraged pt to sign FAA, bring proof of income (SSA award letter and 3 bank statements.     Barriers to Care:     Plan:     Referral/Handouts:         Insurance/Entitlements referral  Financial/Medication assistance referral

## 2024-03-25 ENCOUNTER — TELEPHONE (OUTPATIENT)
Age: 69
End: 2024-03-25

## 2024-03-25 DIAGNOSIS — C67.9 STAGE IV BLADDER CANCER (HCC): Primary | ICD-10-CM

## 2024-03-25 DIAGNOSIS — K13.79 MOUTH PAIN: ICD-10-CM

## 2024-03-25 RX ORDER — ACETAMINOPHEN 325 MG/1
650 TABLET ORAL
Status: CANCELLED | OUTPATIENT
Start: 2024-04-02

## 2024-03-25 RX ORDER — SODIUM CHLORIDE 0.9 % (FLUSH) 0.9 %
5-40 SYRINGE (ML) INJECTION PRN
OUTPATIENT
Start: 2024-04-09

## 2024-03-25 RX ORDER — ALBUTEROL SULFATE 90 UG/1
4 AEROSOL, METERED RESPIRATORY (INHALATION) PRN
Status: CANCELLED | OUTPATIENT
Start: 2024-04-02

## 2024-03-25 RX ORDER — DIPHENHYDRAMINE HYDROCHLORIDE 50 MG/ML
50 INJECTION INTRAMUSCULAR; INTRAVENOUS
Status: CANCELLED | OUTPATIENT
Start: 2024-04-02

## 2024-03-25 RX ORDER — ONDANSETRON 2 MG/ML
8 INJECTION INTRAMUSCULAR; INTRAVENOUS
OUTPATIENT
Start: 2024-04-09

## 2024-03-25 RX ORDER — LORAZEPAM 2 MG/ML
0.5 INJECTION INTRAMUSCULAR
Status: CANCELLED | OUTPATIENT
Start: 2024-04-02

## 2024-03-25 RX ORDER — SODIUM CHLORIDE 9 MG/ML
5-250 INJECTION, SOLUTION INTRAVENOUS PRN
OUTPATIENT
Start: 2024-04-09

## 2024-03-25 RX ORDER — SODIUM CHLORIDE 9 MG/ML
5-250 INJECTION, SOLUTION INTRAVENOUS PRN
Status: CANCELLED | OUTPATIENT
Start: 2024-04-02

## 2024-03-25 RX ORDER — MEPERIDINE HYDROCHLORIDE 25 MG/ML
12.5 INJECTION INTRAMUSCULAR; INTRAVENOUS; SUBCUTANEOUS PRN
Status: CANCELLED | OUTPATIENT
Start: 2024-04-02

## 2024-03-25 RX ORDER — ONDANSETRON 2 MG/ML
8 INJECTION INTRAMUSCULAR; INTRAVENOUS ONCE
OUTPATIENT
Start: 2024-04-09 | End: 2024-04-09

## 2024-03-25 RX ORDER — PROCHLORPERAZINE EDISYLATE 5 MG/ML
10 INJECTION INTRAMUSCULAR; INTRAVENOUS
Status: CANCELLED | OUTPATIENT
Start: 2024-04-02

## 2024-03-25 RX ORDER — HEPARIN 100 UNIT/ML
500 SYRINGE INTRAVENOUS PRN
OUTPATIENT
Start: 2024-04-09

## 2024-03-25 RX ORDER — EPINEPHRINE 1 MG/ML
0.3 INJECTION, SOLUTION INTRAMUSCULAR; SUBCUTANEOUS PRN
OUTPATIENT
Start: 2024-04-09

## 2024-03-25 RX ORDER — MEPERIDINE HYDROCHLORIDE 25 MG/ML
12.5 INJECTION INTRAMUSCULAR; INTRAVENOUS; SUBCUTANEOUS PRN
OUTPATIENT
Start: 2024-04-09

## 2024-03-25 RX ORDER — ONDANSETRON 2 MG/ML
8 INJECTION INTRAMUSCULAR; INTRAVENOUS
Status: CANCELLED | OUTPATIENT
Start: 2024-04-02

## 2024-03-25 RX ORDER — PROCHLORPERAZINE EDISYLATE 5 MG/ML
10 INJECTION INTRAMUSCULAR; INTRAVENOUS
OUTPATIENT
Start: 2024-04-09

## 2024-03-25 RX ORDER — DIPHENHYDRAMINE HYDROCHLORIDE 50 MG/ML
50 INJECTION INTRAMUSCULAR; INTRAVENOUS
OUTPATIENT
Start: 2024-04-09

## 2024-03-25 RX ORDER — EPINEPHRINE 1 MG/ML
0.3 INJECTION, SOLUTION INTRAMUSCULAR; SUBCUTANEOUS PRN
Status: CANCELLED | OUTPATIENT
Start: 2024-04-02

## 2024-03-25 RX ORDER — SODIUM CHLORIDE 9 MG/ML
INJECTION, SOLUTION INTRAVENOUS CONTINUOUS
Status: CANCELLED | OUTPATIENT
Start: 2024-04-02

## 2024-03-25 RX ORDER — HEPARIN 100 UNIT/ML
500 SYRINGE INTRAVENOUS PRN
Status: CANCELLED | OUTPATIENT
Start: 2024-04-02

## 2024-03-25 RX ORDER — LORAZEPAM 2 MG/ML
0.5 INJECTION INTRAMUSCULAR
OUTPATIENT
Start: 2024-04-09

## 2024-03-25 RX ORDER — SODIUM CHLORIDE 9 MG/ML
INJECTION, SOLUTION INTRAVENOUS CONTINUOUS
OUTPATIENT
Start: 2024-04-09

## 2024-03-25 RX ORDER — ACETAMINOPHEN 325 MG/1
650 TABLET ORAL
OUTPATIENT
Start: 2024-04-09

## 2024-03-25 RX ORDER — ALBUTEROL SULFATE 90 UG/1
4 AEROSOL, METERED RESPIRATORY (INHALATION) PRN
OUTPATIENT
Start: 2024-04-09

## 2024-03-25 NOTE — TELEPHONE ENCOUNTER
Return call placed to pt. HIPAA verified by two patient identifiers.     Pt report he has thrush, pt report irritation to his mouth and tongue. Pt unsure if he has ulcers. Pt not sure if he has white patches on his tongue or pallet in his mouth. Pt denies throat pain.    Pt advised to stay away spicy foods or any food or drink item that irritates his mouth.

## 2024-03-25 NOTE — TELEPHONE ENCOUNTER
Pt wants a prescription called in for an issue he is having due to the chemotherapy this will be a new medication and he stated the doctor should know what's going on with him and why he needs this. Please call pt @ [phone]

## 2024-03-26 ENCOUNTER — APPOINTMENT (OUTPATIENT)
Facility: HOSPITAL | Age: 69
End: 2024-03-26
Payer: MEDICARE

## 2024-03-26 NOTE — ADDENDUM NOTE
Addended by: LINDA SHEPARD on: 3/26/2024 11:59 AM     Modules accepted: Orders    
Addended by: LINDA SHPEARD on: 3/26/2024 10:01 AM     Modules accepted: Orders    
none

## 2024-03-26 NOTE — TELEPHONE ENCOUNTER
Return call placed to pt. HIPAA verified by two patient identifiers.     Pt unable to write information down about pharmacy for medication. Nurse left detailed message on pt's voicemail w/ address and telephone number for [facility]

## 2024-03-29 ENCOUNTER — HOSPITAL ENCOUNTER (OUTPATIENT)
Facility: HOSPITAL | Age: 69
End: 2024-03-29
Payer: MEDICARE

## 2024-03-29 VITALS
TEMPERATURE: 98.4 F | RESPIRATION RATE: 20 BRPM | DIASTOLIC BLOOD PRESSURE: 82 MMHG | BODY MASS INDEX: 16.61 KG/M2 | SYSTOLIC BLOOD PRESSURE: 117 MMHG | OXYGEN SATURATION: 97 % | HEIGHT: 70 IN | HEART RATE: 72 BPM | WEIGHT: 116 LBS

## 2024-03-29 DIAGNOSIS — C15.9 MALIGNANT NEOPLASM OF ESOPHAGUS, UNSPECIFIED LOCATION (HCC): ICD-10-CM

## 2024-03-29 PROCEDURE — 2580000003 HC RX 258: Performed by: RADIOLOGY

## 2024-03-29 PROCEDURE — 2500000003 HC RX 250 WO HCPCS: Performed by: RADIOLOGY

## 2024-03-29 PROCEDURE — 2709999900 IR PORT PLACEMENT > 5 YEARS

## 2024-03-29 PROCEDURE — 6360000002 HC RX W HCPCS: Performed by: RADIOLOGY

## 2024-03-29 RX ORDER — HEPARIN SODIUM 200 [USP'U]/100ML
200 INJECTION, SOLUTION INTRAVENOUS ONCE
Status: DISCONTINUED | OUTPATIENT
Start: 2024-03-29 | End: 2024-04-02 | Stop reason: HOSPADM

## 2024-03-29 RX ORDER — FENTANYL CITRATE 50 UG/ML
100 INJECTION, SOLUTION INTRAMUSCULAR; INTRAVENOUS
Status: DISCONTINUED | OUTPATIENT
Start: 2024-03-29 | End: 2024-03-29

## 2024-03-29 RX ORDER — HEPARIN 100 UNIT/ML
300 SYRINGE INTRAVENOUS ONCE
Status: COMPLETED | OUTPATIENT
Start: 2024-03-29 | End: 2024-03-29

## 2024-03-29 RX ORDER — MIDAZOLAM HYDROCHLORIDE 1 MG/ML
5 INJECTION, SOLUTION INTRAMUSCULAR; INTRAVENOUS
Status: DISCONTINUED | OUTPATIENT
Start: 2024-03-29 | End: 2024-03-29

## 2024-03-29 RX ORDER — SODIUM CHLORIDE 9 MG/ML
INJECTION, SOLUTION INTRAVENOUS CONTINUOUS
Status: DISCONTINUED | OUTPATIENT
Start: 2024-03-29 | End: 2024-03-29

## 2024-03-29 RX ORDER — LIDOCAINE HCL/EPINEPHRINE/PF 2%-1:200K
20 VIAL (ML) INJECTION ONCE
Status: COMPLETED | OUTPATIENT
Start: 2024-03-29 | End: 2024-03-29

## 2024-03-29 RX ORDER — LIDOCAINE HYDROCHLORIDE 20 MG/ML
20 INJECTION, SOLUTION INFILTRATION; PERINEURAL ONCE
Status: COMPLETED | OUTPATIENT
Start: 2024-03-29 | End: 2024-03-29

## 2024-03-29 RX ADMIN — WATER 2000 MG: 1 INJECTION INTRAMUSCULAR; INTRAVENOUS; SUBCUTANEOUS at 08:45

## 2024-03-29 RX ADMIN — Medication 300 UNITS: at 09:17

## 2024-03-29 RX ADMIN — MIDAZOLAM HYDROCHLORIDE 0.5 MG: 1 INJECTION, SOLUTION INTRAMUSCULAR; INTRAVENOUS at 09:15

## 2024-03-29 RX ADMIN — FENTANYL CITRATE 25 MCG: 50 INJECTION, SOLUTION INTRAMUSCULAR; INTRAVENOUS at 09:10

## 2024-03-29 RX ADMIN — LIDOCAINE HYDROCHLORIDE,EPINEPHRINE BITARTRATE 10 ML: 20; .005 INJECTION, SOLUTION EPIDURAL; INFILTRATION; INTRACAUDAL; PERINEURAL at 09:17

## 2024-03-29 RX ADMIN — MIDAZOLAM HYDROCHLORIDE 1 MG: 1 INJECTION, SOLUTION INTRAMUSCULAR; INTRAVENOUS at 09:10

## 2024-03-29 RX ADMIN — SODIUM CHLORIDE: 9 INJECTION, SOLUTION INTRAVENOUS at 08:45

## 2024-03-29 RX ADMIN — LIDOCAINE HYDROCHLORIDE 10 ML: 20 INJECTION, SOLUTION INFILTRATION; PERINEURAL at 09:17

## 2024-03-29 NOTE — PROGRESS NOTES
0745: Patient arrived to [facility] via wheelchair. Patient is A&Ox4 on 3L NC baseline, in NAD at this time. NPO status, code status, and allergies reviewed with patient prior to procedure.     0920:  Name of Procedure: Port Placement     Sedation medications given:      Versed: 1.5 mg     Fentanyl:  25 mcg     Sedation Tolerated: well     Procedure and sedation times are the same.      Sedation Start: 0905  Sedation End: 0920     Vital Signs:  VSS throughout     Any complications related to procedure: none identified at this time     Patient is A&Ox4, on 3L NC, and is in NAD at this time.     This patient is at an increased risk of falling because they have received sedating medications. Please evaluate and implement fall precautions/fall prevention practices as appropriate.    1015: Patient A&Ox4, on 3L NC baseline, in NAD at this time. Discharge instructions reviewed with patient. Opportunity for questions and clarification given. Patient verbalized understanding. All lines and telemetry removed prior to discharge. Patient wheeled to meet wife for transportation home.

## 2024-03-29 NOTE — DISCHARGE INSTRUCTIONS
[facility]                                                                              Angiography Department      Radiologist:  [facility]- Watson Leach NP    Date:   03/29/2024      Portacath Discharge Instructions      Watch for signs of infection:    Redness,   Fever, chills,   Increased pain, and/or drainage from the site.    If this occurs, call your physician at once.    If you have an appointment with a provider or at the infusion center in the upcoming week,  they may check your site and change the dressing for you.      Keep your dressing clean and dry.  Leave the dressing in place and change after 3 days.     Continue your previous diet and restart your regularly prescribed medications.     You may take Tylenol, as directed on the label, for pain if needed.   Avoid ibuprofen (Advil, Motrin) and aspirin as they may cause you to bleed.    Because you received sedation, you are not to drive or sign any legal documents for the next 24 hours.    Do not lift anything heavier than 5 pounds with the affected arm and avoid pushing and pulling movements for several days.     If you have any questions or concerns, please call our radiology department at [phone].

## 2024-03-29 NOTE — H&P
INTERVENTIONAL RADIOLOGY  Preoperative History and Physical      Patient:  Jamel Hazel  :  1955  Age:  68 y.o.  MRN:  [id number]  Today's Date:  3/29/2024      CC / HPI   Jamel Hazel is a 68 y.o. male with a history of bladder ca who presents for mediport placement.    PAST MEDICAL HISTORY  Past Medical History:   Diagnosis Date    Arthritis     osteoarthritis    Asthma     Cancer (HCC)     esophagus    Cancer of esophagus (HCC) 2012    adenocarcinoma; Dr. Murillo    Chronic pain     lower back, has crushed vertebrae    Essential hypertension     GERD (gastroesophageal reflux disease)     Hypertension     not on medication    Nonischemic cardiomyopathy (HCC)      EF 35-50%; negative cath, 2012 EF 55%; Dr. 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    Thyroid disease     Unspecified sleep apnea     c-pap-doesnt wear now/apnea resolved after surgery/lost weight and no sleep apnea     PAST SURGICAL HISTORY  Past Surgical History:   Procedure Laterality Date    CARDIAC CATHETERIZATION      CHOLECYSTECTOMY      COLONOSCOPY N/A 2017    COLONOSCOPY performed by Kev Light Jr., MD at [facility] ENDOSCOPY    COLONOSCOPY N/A 2017    COLONOSCOPY er 26 performed by Kev Light Jr., MD at [facility] ENDOSCOPY    COLONOSCOPY N/A 2023    COLONOSCOPY DIAGNOSTIC performed by Aj Parks MD at [facility] ENDOSCOPY    COLONOSCOPY,FLEX,W/CONTROL, BLEEDING  2017         COLONOSCOPY,REMV JORDI,SNARE  2017         COLORECTAL SCRN; HI RISK IND  2017         COLSC FLX W/RMVL OF TUMOR POLYP LESION SNARE TQ  2011    colon x 3     CYSTOSCOPY N/A 2024    CYSTOSCOPY, LEFT RETROGRADE PYELOGRAM  Classification:  ASA 3 - Patient with moderate systemic disease with functional limitations  Post procedure plan:  observation per protocol  Informed consent:  indication, risks and alternatives of the planned procedure and sedation methods reviewed with the patient / family who agree to proceed  Code status:  Full      JERALD SHEPPARD JR, APRN - NP  [facility]

## 2024-04-01 ENCOUNTER — TELEPHONE (OUTPATIENT)
Age: 69
End: 2024-04-01

## 2024-04-01 NOTE — TELEPHONE ENCOUNTER
Called patient to advise/confirm upcoming appt with Dr. Campbell on 4/3/2024  at 1:30  at [facility] Office.  Spoke with wife and confirmed appointment.

## 2024-04-02 ENCOUNTER — HOSPITAL ENCOUNTER (OUTPATIENT)
Facility: HOSPITAL | Age: 69
Setting detail: INFUSION SERIES
Discharge: HOME | End: 2024-04-02
Payer: MEDICARE

## 2024-04-02 ENCOUNTER — OFFICE VISIT (OUTPATIENT)
Age: 69
End: 2024-04-02
Payer: MEDICARE

## 2024-04-02 VITALS
HEART RATE: 82 BPM | RESPIRATION RATE: 18 BRPM | OXYGEN SATURATION: 95 % | HEIGHT: 70 IN | BODY MASS INDEX: 16.12 KG/M2 | SYSTOLIC BLOOD PRESSURE: 101 MMHG | DIASTOLIC BLOOD PRESSURE: 57 MMHG | WEIGHT: 112.6 LBS | TEMPERATURE: 99.1 F

## 2024-04-02 VITALS
SYSTOLIC BLOOD PRESSURE: 99 MMHG | DIASTOLIC BLOOD PRESSURE: 60 MMHG | OXYGEN SATURATION: 97 % | TEMPERATURE: 99.1 F | WEIGHT: 112.66 LBS | HEART RATE: 73 BPM | HEIGHT: 70 IN | BODY MASS INDEX: 16.13 KG/M2

## 2024-04-02 DIAGNOSIS — C67.9 STAGE IV BLADDER CANCER (HCC): Primary | ICD-10-CM

## 2024-04-02 DIAGNOSIS — Z51.12 ENCOUNTER FOR ANTINEOPLASTIC IMMUNOTHERAPY: ICD-10-CM

## 2024-04-02 DIAGNOSIS — I89.0 LYMPHEDEMA: ICD-10-CM

## 2024-04-02 DIAGNOSIS — Z51.11 ENCOUNTER FOR ANTINEOPLASTIC CHEMOTHERAPY: ICD-10-CM

## 2024-04-02 LAB
ALBUMIN SERPL-MCNC: 2.8 G/DL (ref 3.5–5)
ALBUMIN/GLOB SERPL: 0.7 (ref 1.1–2.2)
ALP SERPL-CCNC: 67 U/L (ref 45–117)
ALT SERPL-CCNC: 16 U/L (ref 12–78)
ANION GAP SERPL CALC-SCNC: 2 MMOL/L (ref 5–15)
AST SERPL-CCNC: 27 U/L (ref 15–37)
BASOPHILS # BLD: 0.1 K/UL (ref 0–0.1)
BASOPHILS NFR BLD: 1 % (ref 0–1)
BILIRUB SERPL-MCNC: 0.3 MG/DL (ref 0.2–1)
BUN SERPL-MCNC: 15 MG/DL (ref 6–20)
BUN/CREAT SERPL: 19 (ref 12–20)
CALCIUM SERPL-MCNC: 8.8 MG/DL (ref 8.5–10.1)
CHLORIDE SERPL-SCNC: 97 MMOL/L (ref 97–108)
CO2 SERPL-SCNC: 38 MMOL/L (ref 21–32)
CREAT SERPL-MCNC: 0.8 MG/DL (ref 0.7–1.3)
DIFFERENTIAL METHOD BLD: ABNORMAL
EOSINOPHIL # BLD: 0.2 K/UL (ref 0–0.4)
EOSINOPHIL NFR BLD: 3 % (ref 0–7)
ERYTHROCYTE [DISTWIDTH] IN BLOOD BY AUTOMATED COUNT: 15.5 % (ref 11.5–14.5)
GLOBULIN SER CALC-MCNC: 4.1 G/DL (ref 2–4)
GLUCOSE SERPL-MCNC: 100 MG/DL (ref 65–100)
HCT VFR BLD AUTO: 28.6 % (ref 36.6–50.3)
HGB BLD-MCNC: 9 G/DL (ref 12.1–17)
IMM GRANULOCYTES # BLD AUTO: 0 K/UL (ref 0–0.04)
IMM GRANULOCYTES NFR BLD AUTO: 0 % (ref 0–0.5)
LYMPHOCYTES # BLD: 0.8 K/UL (ref 0.8–3.5)
LYMPHOCYTES NFR BLD: 14 % (ref 12–49)
MCH RBC QN AUTO: 29.3 PG (ref 26–34)
MCHC RBC AUTO-ENTMCNC: 31.5 G/DL (ref 30–36.5)
MCV RBC AUTO: 93.2 FL (ref 80–99)
MONOCYTES # BLD: 0.6 K/UL (ref 0–1)
MONOCYTES NFR BLD: 10 % (ref 5–13)
NEUTS SEG # BLD: 3.9 K/UL (ref 1.8–8)
NEUTS SEG NFR BLD: 72 % (ref 32–75)
NRBC # BLD: 0 K/UL (ref 0–0.01)
NRBC BLD-RTO: 0 PER 100 WBC
PHOSPHATE SERPL-MCNC: 3 MG/DL (ref 2.6–4.7)
PLATELET # BLD AUTO: 399 K/UL (ref 150–400)
PMV BLD AUTO: 8.7 FL (ref 8.9–12.9)
POTASSIUM SERPL-SCNC: 3.6 MMOL/L (ref 3.5–5.1)
PROT SERPL-MCNC: 6.9 G/DL (ref 6.4–8.2)
RBC # BLD AUTO: 3.07 M/UL (ref 4.1–5.7)
RBC MORPH BLD: ABNORMAL
RBC MORPH BLD: ABNORMAL
SODIUM SERPL-SCNC: 137 MMOL/L (ref 136–145)
T4 FREE SERPL-MCNC: 1.1 NG/DL (ref 0.8–1.5)
TSH SERPL DL<=0.05 MIU/L-ACNC: 2.54 UIU/ML (ref 0.36–3.74)
WBC # BLD AUTO: 5.6 K/UL (ref 4.1–11.1)

## 2024-04-02 PROCEDURE — 84439 ASSAY OF FREE THYROXINE: CPT

## 2024-04-02 PROCEDURE — 6360000002 HC RX W HCPCS: Performed by: STUDENT IN AN ORGANIZED HEALTH CARE EDUCATION/TRAINING PROGRAM

## 2024-04-02 PROCEDURE — 1123F ACP DISCUSS/DSCN MKR DOCD: CPT | Performed by: STUDENT IN AN ORGANIZED HEALTH CARE EDUCATION/TRAINING PROGRAM

## 2024-04-02 PROCEDURE — 99215 OFFICE O/P EST HI 40 MIN: CPT | Performed by: STUDENT IN AN ORGANIZED HEALTH CARE EDUCATION/TRAINING PROGRAM

## 2024-04-02 PROCEDURE — 2580000003 HC RX 258: Performed by: STUDENT IN AN ORGANIZED HEALTH CARE EDUCATION/TRAINING PROGRAM

## 2024-04-02 PROCEDURE — 84100 ASSAY OF PHOSPHORUS: CPT

## 2024-04-02 PROCEDURE — 3074F SYST BP LT 130 MM HG: CPT | Performed by: STUDENT IN AN ORGANIZED HEALTH CARE EDUCATION/TRAINING PROGRAM

## 2024-04-02 PROCEDURE — 3078F DIAST BP <80 MM HG: CPT | Performed by: STUDENT IN AN ORGANIZED HEALTH CARE EDUCATION/TRAINING PROGRAM

## 2024-04-02 PROCEDURE — 96413 CHEMO IV INFUSION 1 HR: CPT

## 2024-04-02 PROCEDURE — 96375 TX/PRO/DX INJ NEW DRUG ADDON: CPT

## 2024-04-02 PROCEDURE — 84443 ASSAY THYROID STIM HORMONE: CPT

## 2024-04-02 PROCEDURE — 96417 CHEMO IV INFUS EACH ADDL SEQ: CPT

## 2024-04-02 PROCEDURE — 80053 COMPREHEN METABOLIC PANEL: CPT

## 2024-04-02 PROCEDURE — 85025 COMPLETE CBC W/AUTO DIFF WBC: CPT

## 2024-04-02 PROCEDURE — 36415 COLL VENOUS BLD VENIPUNCTURE: CPT

## 2024-04-02 RX ORDER — FUROSEMIDE 20 MG/1
20 TABLET ORAL DAILY
Qty: 60 TABLET | Refills: 3 | Status: SHIPPED | OUTPATIENT
Start: 2024-04-02

## 2024-04-02 RX ORDER — MELATONIN 10 MG
10 CAPSULE ORAL NIGHTLY
Qty: 30 CAPSULE | Refills: 0 | Status: SHIPPED | OUTPATIENT
Start: 2024-04-02 | End: 2024-05-02

## 2024-04-02 RX ORDER — SODIUM CHLORIDE 0.9 % (FLUSH) 0.9 %
5-40 SYRINGE (ML) INJECTION PRN
Status: DISCONTINUED | OUTPATIENT
Start: 2024-04-02 | End: 2024-04-03 | Stop reason: HOSPADM

## 2024-04-02 RX ORDER — LIDOCAINE AND PRILOCAINE 25; 25 MG/G; MG/G
CREAM TOPICAL
Qty: 30 G | Refills: 2 | Status: SHIPPED | OUTPATIENT
Start: 2024-04-02

## 2024-04-02 RX ORDER — ONDANSETRON 2 MG/ML
8 INJECTION INTRAMUSCULAR; INTRAVENOUS ONCE
Status: COMPLETED | OUTPATIENT
Start: 2024-04-02 | End: 2024-04-02

## 2024-04-02 RX ORDER — SODIUM CHLORIDE 9 MG/ML
5-250 INJECTION, SOLUTION INTRAVENOUS PRN
Status: DISCONTINUED | OUTPATIENT
Start: 2024-04-02 | End: 2024-04-03 | Stop reason: HOSPADM

## 2024-04-02 RX ADMIN — SODIUM CHLORIDE, PRESERVATIVE FREE 10 ML: 5 INJECTION INTRAVENOUS at 14:05

## 2024-04-02 RX ADMIN — SODIUM CHLORIDE, PRESERVATIVE FREE 10 ML: 5 INJECTION INTRAVENOUS at 11:20

## 2024-04-02 RX ADMIN — ONDANSETRON 8 MG: 2 INJECTION INTRAMUSCULAR; INTRAVENOUS at 12:21

## 2024-04-02 RX ADMIN — SODIUM CHLORIDE 100 ML/HR: 9 INJECTION, SOLUTION INTRAVENOUS at 12:20

## 2024-04-02 RX ADMIN — SODIUM CHLORIDE 200 MG: 9 INJECTION, SOLUTION INTRAVENOUS at 13:30

## 2024-04-02 RX ADMIN — ENFORTUMAB VEDOTIN 60 MG: 20 INJECTION, POWDER, LYOPHILIZED, FOR SOLUTION INTRAVENOUS at 12:53

## 2024-04-02 ASSESSMENT — PAIN DESCRIPTION - ORIENTATION: ORIENTATION: RIGHT;LEFT

## 2024-04-02 ASSESSMENT — PAIN DESCRIPTION - DESCRIPTORS: DESCRIPTORS: ACHING

## 2024-04-02 ASSESSMENT — PAIN DESCRIPTION - LOCATION: LOCATION: BACK

## 2024-04-02 ASSESSMENT — PAIN SCALES - GENERAL: PAINLEVEL_OUTOF10: 6

## 2024-04-02 NOTE — PROGRESS NOTES
[facility] Visit Note    Pt arrived to [facility] in wheelchair in no acute distress at 1045 for Padcev/Keytruda C3.  Assessment unremarkable except dyspnea with exertion and edema in lower extremities.  New R chest port accessed without issue and positive blood return noted.  Labs obtained- CBC with diff, CMP, Phos, TSH, and Free T4.  Pt to MD office for follow-up appt.    Patient denied having any symptoms of COVID-19, i.e. SOB, coughing, fever, or generally not feeling well.      BP (!) 100/57   Pulse 91   Temp 99.1 °F (37.3 °C) (Temporal)   Resp 18   Ht 1.778 m (5' 10\")   Wt 51.1 kg (112 lb 9.6 oz)   SpO2 95%   BMI 16.16 kg/m²     Recent Results (from the past 12 hour(s))   CBC With Auto Differential    Collection Time: 04/02/24 10:56 AM   Result Value Ref Range    WBC 5.6 4.1 - 11.1 K/uL    RBC 3.07 (L) 4.10 - 5.70 M/uL    Hemoglobin 9.0 (L) 12.1 - 17.0 g/dL    Hematocrit 28.6 (L) 36.6 - 50.3 %    MCV 93.2 80.0 - 99.0 FL    MCH 29.3 26.0 - 34.0 PG    MCHC 31.5 30.0 - 36.5 g/dL    RDW 15.5 (H) 11.5 - 14.5 %    Platelets 399 150 - 400 K/uL    MPV 8.7 (L) 8.9 - 12.9 FL    Nucleated RBCs 0.0 0  WBC    nRBC 0.00 0.00 - 0.01 K/uL    Neutrophils % 72 32 - 75 %    Lymphocytes % 14 12 - 49 %    Monocytes % 10 5 - 13 %    Eosinophils % 3 0 - 7 %    Basophils % 1 0 - 1 %    Immature Granulocytes 0 0.0 - 0.5 %    Neutrophils Absolute 3.9 1.8 - 8.0 K/UL    Lymphocytes Absolute 0.8 0.8 - 3.5 K/UL    Monocytes Absolute 0.6 0.0 - 1.0 K/UL    Eosinophils Absolute 0.2 0.0 - 0.4 K/UL    Basophils Absolute 0.1 0.0 - 0.1 K/UL    Absolute Immature Granulocyte 0.0 0.00 - 0.04 K/UL    Differential Type SMEAR SCANNED      RBC Comment ANISOCYTOSIS  1+        RBC Comment TEARDROP CELLS  PRESENT       Comprehensive metabolic panel    Collection Time: 04/02/24 10:56 AM   Result Value Ref Range    Sodium 137 136 - 145 mmol/L    Potassium 3.6 3.5 - 5.1 mmol/L    Chloride 97 97 - 108 mmol/L    CO2 38  (H) 21 - 32 mmol/L    Anion Gap 2 (L) 5 - 15 mmol/L    Glucose 100 65 - 100 mg/dL    BUN 15 6 - 20 MG/DL    Creatinine 0.80 0.70 - 1.30 MG/DL    Bun/Cre Ratio 19 12 - 20      Est, Glom Filt Rate >90 >60 ml/min/1.73m2    Calcium 8.8 8.5 - 10.1 MG/DL    Total Bilirubin 0.3 0.2 - 1.0 MG/DL    ALT 16 12 - 78 U/L    AST 27 15 - 37 U/L    Alk Phosphatase 67 45 - 117 U/L    Total Protein 6.9 6.4 - 8.2 g/dL    Albumin 2.8 (L) 3.5 - 5.0 g/dL    Globulin 4.1 (H) 2.0 - 4.0 g/dL    Albumin/Globulin Ratio 0.7 (L) 1.1 - 2.2     Phosphorus    Collection Time: 04/02/24 10:56 AM   Result Value Ref Range    Phosphorus 3.0 2.6 - 4.7 MG/DL   TSH without Reflex    Collection Time: 04/02/24 10:56 AM   Result Value Ref Range    TSH, 3RD GENERATION 2.54 0.36 - 3.74 uIU/mL     Two nurses verified prior to administering:  Drug name  Drug dose  Infusion volume or drug volume when prepared in a syringe  Rate of administration  Route of administration  Expiration dates and/or times  Appearance and physical integrity of the drugs  Rate set on infusion pump, when used  Sequencing of drug administration     The following medications administered:  NS @ KVO  Zofran 8 mg IVP  Padcev 60 mg IV over 30 minutes  Keytruda 200 mg IV over 30 minutes    BP (!) 101/57   Pulse 82   Temp 99.1 °F (37.3 °C) (Temporal)   Resp 18   Ht 1.778 m (5' 10\")   Wt 51.1 kg (112 lb 9.6 oz)   SpO2 95%   BMI 16.16 kg/m²      Pt tolerated treatment well.  No adverse reaction noted.  Port flushed per policy and needle removed, 2x2 and paper tape placed.  Pt discharged ambulatory in no acute distress at 1406, accompanied by spouse.  Next appointment 4/9/24 @ 1100.

## 2024-04-02 NOTE — PROGRESS NOTES
[facility]  [address] [place] [address]  W: [phone] F: [phone]    Reason for Visit:   Jamel Hazel is a 68 y.o. male who is seen in consultation at the request of Dr. Odom for evaluation of Muscle Invasive Bladder Cancer.    Hematology / Oncology Treatment Information:     Hematological/Oncological Diagnosis: Muscle Invasive Bladder Cancer, stage pending    Date of Diagnosis: 1/2024    Oncology/Hematology Treatment Course:   Treatment course:   1) ED admit on 12/28/23 for severe hematuria, constipation.  Workup included cystoscopy showing \"FINDINGS:  The anterior urethra was widely patent without evidence of stricture disease.  His prostatic urethra is 2 cm in length with no obstruction from BPH.  Upon entering the bladder, the left side of the bladder demonstrates a smooth wall without trabeculation.  The left ureteral orifice is in normal position on the interureteric ridge.  There are a few old blood clots noted.  Starting right where the expected location of the right ureteral orifice is, there is a nodular mass that extends up the right lateral wall anteriorly including the dome.  There is an area of increased inflammatory changes in the midline likely secondary to Gonzalez catheter irritation. \" From 1/5/23 cystoscopy    Pathology and Molecular Testing:       Initial Presentation  (HPI):       68 year old with complicated medical history including esophageal cancer in 2012 treated with surgical resection only, non-ischemic cardiomyopathy with hx of CHF in past (last EF12/23/23 showed normal EF), GERD, OA, Depression, SHANNEN, who presents with new diagnosis of muscle invasive bladder cancer.  On chart review, the patient also recently had a colonoscopy after outpatient CT showed proctatitis, findings showed   12/11/23  Findings:   Technically difficult colonoscopy that required an EGD scope to navigate a

## 2024-04-02 NOTE — PROGRESS NOTES
Jamel Hazel is a 68 y.o. male here for treatment for bladder cancer.  -7 lbs since last visit.  Pt states he mouth feels better and is able to eat more   Needs Lidocaine cream. He never received any.  SOB with minimal exertion. Leg swelling.   Level 6 back, neck, and kidney pain.     1. Have you been to the ER, urgent care clinic since your last visit?  Hospitalized since your last visit? o    2. Have you seen or consulted any other health care providers outside of the [facility] since your last visit?  Include any pap smears or colon screening. no

## 2024-04-03 ENCOUNTER — OFFICE VISIT (OUTPATIENT)
Age: 69
End: 2024-04-03
Payer: MEDICARE

## 2024-04-03 VITALS
DIASTOLIC BLOOD PRESSURE: 65 MMHG | SYSTOLIC BLOOD PRESSURE: 102 MMHG | HEART RATE: 87 BPM | RESPIRATION RATE: 18 BRPM | BODY MASS INDEX: 16.04 KG/M2 | OXYGEN SATURATION: 97 % | WEIGHT: 111.8 LBS

## 2024-04-03 DIAGNOSIS — K59.03 CONSTIPATION DUE TO OPIOID THERAPY: ICD-10-CM

## 2024-04-03 DIAGNOSIS — J43.9 PULMONARY EMPHYSEMA, UNSPECIFIED EMPHYSEMA TYPE (HCC): ICD-10-CM

## 2024-04-03 DIAGNOSIS — G89.3 PAIN FROM BONE METASTASES (HCC): ICD-10-CM

## 2024-04-03 DIAGNOSIS — C79.51 PAIN FROM BONE METASTASES (HCC): ICD-10-CM

## 2024-04-03 DIAGNOSIS — R53.0 NEOPLASTIC (MALIGNANT) RELATED FATIGUE: ICD-10-CM

## 2024-04-03 DIAGNOSIS — T40.2X5A CONSTIPATION DUE TO OPIOID THERAPY: ICD-10-CM

## 2024-04-03 DIAGNOSIS — C67.9 STAGE IV BLADDER CANCER (HCC): Primary | ICD-10-CM

## 2024-04-03 PROCEDURE — 1123F ACP DISCUSS/DSCN MKR DOCD: CPT | Performed by: INTERNAL MEDICINE

## 2024-04-03 PROCEDURE — 3078F DIAST BP <80 MM HG: CPT | Performed by: INTERNAL MEDICINE

## 2024-04-03 PROCEDURE — 99215 OFFICE O/P EST HI 40 MIN: CPT | Performed by: INTERNAL MEDICINE

## 2024-04-03 PROCEDURE — 3074F SYST BP LT 130 MM HG: CPT | Performed by: INTERNAL MEDICINE

## 2024-04-03 RX ORDER — MORPHINE SULFATE 15 MG/1
15 TABLET, FILM COATED, EXTENDED RELEASE ORAL 2 TIMES DAILY
Qty: 60 TABLET | Refills: 0 | Status: SHIPPED | OUTPATIENT
Start: 2024-04-03 | End: 2024-05-03

## 2024-04-03 RX ORDER — OXYCODONE HYDROCHLORIDE 10 MG/1
10-20 TABLET ORAL EVERY 4 HOURS PRN
Qty: 120 TABLET | Refills: 0 | Status: SHIPPED | OUTPATIENT
Start: 2024-04-03 | End: 2024-05-03

## 2024-04-03 RX ORDER — OXYCODONE HYDROCHLORIDE 10 MG/1
10 TABLET ORAL EVERY 6 HOURS PRN
Qty: 120 TABLET | Refills: 0 | Status: SHIPPED | OUTPATIENT
Start: 2024-04-03 | End: 2024-05-03

## 2024-04-03 RX ORDER — NALOXONE HYDROCHLORIDE 4 MG/.1ML
1 SPRAY NASAL PRN
Qty: 2 EACH | Refills: 0 | Status: SHIPPED | OUTPATIENT
Start: 2024-04-03

## 2024-04-03 ASSESSMENT — PATIENT HEALTH QUESTIONNAIRE - PHQ9
2. FEELING DOWN, DEPRESSED OR HOPELESS: SEVERAL DAYS
SUM OF ALL RESPONSES TO PHQ QUESTIONS 1-9: 2
1. LITTLE INTEREST OR PLEASURE IN DOING THINGS: SEVERAL DAYS
SUM OF ALL RESPONSES TO PHQ9 QUESTIONS 1 & 2: 2
SUM OF ALL RESPONSES TO PHQ QUESTIONS 1-9: 2

## 2024-04-03 NOTE — PATIENT INSTRUCTIONS
Dear Jamel Hazel ,    It was a pleasure seeing you today    We will see you again in 6  weeks    If labs or imaging tests have been ordered for you today, please call the office  at [phone] 48 hours after completion to obtain the results.        This is the plan we talked about:    Chronic back pain and b/l nephrostomy tube pain  - You are taking Oxycodone 10 mg every 4-6 hours, about 4 tabs daily with sub optimal pain relief. Some days you take more.  - Let us start an extended release pain medicine to help with better pain control and sleep  - Start MS contin 15 mg two times a day    B/l leg edema  - Likely from low albumin. You get pulse dose lasix to help with severe swelling every now and then.   - We talked about elevating your legs, caridad hose, etc but the potential for return of the edema is very likely    Constipation  -Constipation is a common side effect of pain medications as they slow your bowels.   -Please start Pericolace 2 tabs daily and increase to 2 tabs two times a day if needed. This is necessary to keep your bowels soft.   - Add Miralax every other day as a laxative.  - Goal is to have soft bowel movements every day or every other day.   - Please call us if you do not have bowel movements for more than 3 days.    Disease understanding and support  - You and your family have good understanding of your medical condition. You are tolerating treatment well.   - We talked about how you and your wife are dealing with this. Validated your fears and feelings. Discussed what Hospice would look like in the future.  - Wife Emily is struggling with anticipatory grief. Discussed counseling sessions with our  HeatherMami Diaz is agreeable.  - You have good support through your wife and family.  - You signed a DDNR in the hospital    This is what you have shared with us about Advance Care Planning:    Primary Decision Maker: Ayleen Dior (daughter-in-law) - Daughter-in-Law - [phone]

## 2024-04-03 NOTE — TELEPHONE ENCOUNTER
The patient left a message on voicemail. He checked with [facility] and they have Oxycodone in stock.

## 2024-04-03 NOTE — TELEPHONE ENCOUNTER
The patient is at [facility] now. They advised he can not fill Oxycodone and Morphine until 4/10. Please give him a call back [phone].

## 2024-04-03 NOTE — PROGRESS NOTES
Palliative Medicine Outpatient Clinic  Nurse Check in Note  [phone])    Patient Name: Jamel Hazel  YOB: 1955      Date of Visit: 04/03/2024  Visit Location:  [facility]    Chief patient or family concern today:     Patient's Last Palliative Medicine Clinic Visit Date:  2/21/2024    Have you been to an ER or urgent care center since your last visit?  No  Have you been hospitalized since your last visit? No  Have you seen or consulted any health care providers outside of the [facility] system since your last visit?  No  If Yes, alert PSR to request appropriate records from non-[facility] offices    Medications:  Med reconciliation was performed with:  Patient    Requested refills:  Yes- pended for clinician    If prescribed an opioid, does patient have access to naloxone at home?:  YES  If No, pend naloxone nasal spray    Function and Symptoms:  Use of assist devices:  Cane, Walker, or Wheelchair as needed    Palliative Performance Status (PPS):   Palliative Performance Scale (PPS)  PPS: 60    ESAS:  Modified-[place] Symptom Assessment Scale (ESAS)  Tiredness Score: 6  Drowsiness Score: 7  Depression Score: 5  Pain Score: 8  Anxiety Score: 5  Nausea Score: 5  Appetite Score: 4  Dyspnea Score: 5  Constipation: Yes  Wellbeing Score: 5  Other Problem Score: Best possible response    Constipation?  Yes  Last BM: 4/27/2024    Advance Care Planning:  Currently listed healthcare agent:    Primary Decision Maker: Ayleen Dior (daughter-in-law) - Daughter-in-Law - [phone]    Secondary Decision Maker: Ed Hazel - Brother/Sister - [phone]    Secondary Decision Maker: Benny Hazel - Brother/Sister - [phone]    Is there an ACP Note within the past 12 months?  YES  If No, Alert Clinician and/or Social Work      Valeria Bowman LPN            
Rahat ALEGRE II, MD at [facility] MAIN OR    EGD BALLOON DILATION ESOPHAGUS <30 MM DIAM  4/26/2013         EGD BALLOON DILATION ESOPHAGUS <30 MM DIAM  3/4/2013         EGD BALLOON DILATION ESOPHAGUS <30 MM DIAM  7/31/2013         EGD BALLOON DILATION ESOPHAGUS <30 MM DIAM  9/25/2013         EGD BALLOON DILATION ESOPHAGUS <30 MM DIAM  6/20/2012         EGD BALLOON DILATION ESOPHAGUS <30 MM DIAM  7/5/2012         EGD BALLOON DILATION ESOPHAGUS <30 MM DIAM  7/24/2012         EGD BALLOON DILATION ESOPHAGUS <30 MM DIAM  8/28/2012         EGD BALLOON DILATION ESOPHAGUS <30 MM DIAM  9/24/2012         EGD BALLOON DILATION ESOPHAGUS <30 MM DIAM  11/6/2012         EGD TRANSORAL BIOPSY SINGLE/MULTIPLE  3/4/2013         EGD TRANSORAL BIOPSY SINGLE/MULTIPLE  4/30/2013         EGD TRANSORAL BIOPSY SINGLE/MULTIPLE  12/30/2011         EGD TRANSORAL BIOPSY SINGLE/MULTIPLE  8/28/2012         EGD TRANSORAL BIOPSY SINGLE/MULTIPLE  11/6/2012         GI  4/2/12    ESOPHAGOGASTRECTOMY    IR NEPHROSTOMY PERCUTANEOUS LEFT  12/31/2023    IR NEPHROSTOMY PERCUTANEOUS LEFT 12/31/2023 Leo Hernandez MD [facility] RAD ANGIO IR    IR NEPHROSTOMY PERCUTANEOUS LEFT  2/28/2024    IR NEPHROSTOMY PERCUTANEOUS LEFT 2/28/2024 Timothy Calzada MD [facility] RAD ANGIO IR    IR NEPHROSTOMY PERCUTANEOUS RIGHT  12/31/2023    IR NEPHROSTOMY PERCUTANEOUS RIGHT 12/31/2023 Leo Hernandez MD [facility] RAD ANGIO IR    IR PORT PLACEMENT EQUAL OR GREATER THAN 5 YEARS  3/29/2024    IR PORT PLACEMENT EQUAL OR GREATER THAN 5 YEARS 3/29/2024 Vikash Leach Jr., APRN - NP [facility] RAD ANGIO IR    OTHER SURGICAL HISTORY      surgery to remove nodule and barrets esophagus    UPPER GI ENDOSCOPY,BALL DIL,30MM  5/29/2015         UPPER GI ENDOSCOPY,BIOPSY  5/29/2015         UPPER GI ENDOSCOPY,BIOPSY  1/11/2017         UPPER GI ENDOSCOPY,STENT PLACEMENT  4/23/2013           Family History   Problem Relation Age of Onset    Heart Attack Mother     Asthma Mother     Cancer Mother         colon    Other Mother

## 2024-04-03 NOTE — TELEPHONE ENCOUNTER
Palliative Medicine Clinic   [place]ena [place]: [phone] ([phone])    Patient Name: Jamel Hazel  YOB: 1955    Medication Refill Request    Patient is scheduled for follow up:  [x]  YES  []   NO  Next [facility] Visit:     PDMP reviewed:  [x] YES   []  System down / Unable  []  NO- Patient fills out of state    Medication:oxyCODONE HCl (OXY-IR) 10 MG   Dose and directions:: Take 1-2 tablets by mouth every 4 hours    Number dispensed:120  Date filled (PDMP or Pharmacy):3/7/2024  # left:        Appropriate for refill:  [x]  YES  []  Early Request - Requires MD/NP Review      Other pertinent information for prescriber:

## 2024-04-03 NOTE — TELEPHONE ENCOUNTER
Palliative Medicine Clinic   [place]: [phone] ([phone])    Patient Name: Jamel Hazel  YOB: 1955    Medication Refill Request Triage    Requested by:    [x]  Patient  []  Support person:      Last Pall Med Clinic Visit:  2/21/2024    Next Pall Med Clinic Visit: 4/3/2024     Preferred Pharmacy: *[facility]  Backup Pharmacy:  *[facility] [address]    Medications requested:  *Oxycodone    Is patient completely out?  []   YES  [x]   NO  If NO, how many pills does patient have left?  _10_    Other pertinent information shared:   The patient stated that he has an appointment today with Dr. Campbell and would like to  his prescription today too.  I let the patient know that Palliative asks 5 business days to fill a script but that I would let the clinical team know about his refill request.  He acknowledged.  The patient's callback is [phone]

## 2024-04-09 ENCOUNTER — HOSPITAL ENCOUNTER (OUTPATIENT)
Facility: HOSPITAL | Age: 69
Setting detail: INFUSION SERIES
Discharge: HOME | End: 2024-04-09
Payer: MEDICARE

## 2024-04-09 VITALS
OXYGEN SATURATION: 96 % | WEIGHT: 113.4 LBS | HEIGHT: 70 IN | SYSTOLIC BLOOD PRESSURE: 95 MMHG | BODY MASS INDEX: 16.24 KG/M2 | TEMPERATURE: 98.2 F | HEART RATE: 82 BPM | RESPIRATION RATE: 16 BRPM | DIASTOLIC BLOOD PRESSURE: 60 MMHG

## 2024-04-09 DIAGNOSIS — C67.9 STAGE IV BLADDER CANCER (HCC): Primary | ICD-10-CM

## 2024-04-09 LAB
ANION GAP SERPL CALC-SCNC: 2 MMOL/L (ref 5–15)
BASO+EOS+MONOS # BLD AUTO: 0.6 K/UL (ref 0.2–1.2)
BASO+EOS+MONOS NFR BLD AUTO: 14 % (ref 3.2–16.9)
BUN SERPL-MCNC: 17 MG/DL (ref 6–20)
BUN/CREAT SERPL: 19 (ref 12–20)
CALCIUM SERPL-MCNC: 8.6 MG/DL (ref 8.5–10.1)
CHLORIDE SERPL-SCNC: 94 MMOL/L (ref 97–108)
CO2 SERPL-SCNC: 39 MMOL/L (ref 21–32)
CREAT SERPL-MCNC: 0.88 MG/DL (ref 0.7–1.3)
DIFFERENTIAL METHOD BLD: ABNORMAL
ERYTHROCYTE [DISTWIDTH] IN BLOOD BY AUTOMATED COUNT: 16.5 % (ref 11.8–15.8)
GLUCOSE SERPL-MCNC: 118 MG/DL (ref 65–100)
HCT VFR BLD AUTO: 30.2 % (ref 36.6–50.3)
HGB BLD-MCNC: 9.3 G/DL (ref 12.1–17)
LYMPHOCYTES # BLD: 0.9 K/UL (ref 0.8–3.5)
LYMPHOCYTES NFR BLD: 21 % (ref 12–49)
MCH RBC QN AUTO: 28.5 PG (ref 26–34)
MCHC RBC AUTO-ENTMCNC: 30.8 G/DL (ref 30–36.5)
MCV RBC AUTO: 92.6 FL (ref 80–99)
NEUTS SEG # BLD: 2.7 K/UL (ref 1.8–8)
NEUTS SEG NFR BLD: 65 % (ref 32–75)
PHOSPHATE SERPL-MCNC: 3.3 MG/DL (ref 2.6–4.7)
PLATELET # BLD AUTO: 361 K/UL (ref 150–400)
POTASSIUM SERPL-SCNC: 3.5 MMOL/L (ref 3.5–5.1)
RBC # BLD AUTO: 3.26 M/UL (ref 4.1–5.7)
SODIUM SERPL-SCNC: 135 MMOL/L (ref 136–145)
WBC # BLD AUTO: 4.2 K/UL (ref 4.1–11.1)

## 2024-04-09 PROCEDURE — 6360000002 HC RX W HCPCS: Performed by: STUDENT IN AN ORGANIZED HEALTH CARE EDUCATION/TRAINING PROGRAM

## 2024-04-09 PROCEDURE — 96413 CHEMO IV INFUSION 1 HR: CPT

## 2024-04-09 PROCEDURE — 2580000003 HC RX 258: Performed by: STUDENT IN AN ORGANIZED HEALTH CARE EDUCATION/TRAINING PROGRAM

## 2024-04-09 PROCEDURE — 84100 ASSAY OF PHOSPHORUS: CPT

## 2024-04-09 PROCEDURE — 85025 COMPLETE CBC W/AUTO DIFF WBC: CPT

## 2024-04-09 PROCEDURE — 36415 COLL VENOUS BLD VENIPUNCTURE: CPT

## 2024-04-09 PROCEDURE — 96375 TX/PRO/DX INJ NEW DRUG ADDON: CPT

## 2024-04-09 PROCEDURE — 80048 BASIC METABOLIC PNL TOTAL CA: CPT

## 2024-04-09 RX ORDER — SODIUM CHLORIDE 9 MG/ML
5-250 INJECTION, SOLUTION INTRAVENOUS PRN
Status: DISCONTINUED | OUTPATIENT
Start: 2024-04-09 | End: 2024-04-10 | Stop reason: HOSPADM

## 2024-04-09 RX ORDER — SODIUM CHLORIDE 0.9 % (FLUSH) 0.9 %
5-40 SYRINGE (ML) INJECTION PRN
Status: DISCONTINUED | OUTPATIENT
Start: 2024-04-09 | End: 2024-04-10 | Stop reason: HOSPADM

## 2024-04-09 RX ORDER — ONDANSETRON 2 MG/ML
8 INJECTION INTRAMUSCULAR; INTRAVENOUS ONCE
Status: COMPLETED | OUTPATIENT
Start: 2024-04-09 | End: 2024-04-09

## 2024-04-09 RX ADMIN — SODIUM CHLORIDE 25 ML/HR: 9 INJECTION, SOLUTION INTRAVENOUS at 12:11

## 2024-04-09 RX ADMIN — SODIUM CHLORIDE, PRESERVATIVE FREE 10 ML: 5 INJECTION INTRAVENOUS at 13:24

## 2024-04-09 RX ADMIN — ENFORTUMAB VEDOTIN 60 MG: 30 INJECTION, POWDER, LYOPHILIZED, FOR SOLUTION INTRAVENOUS at 12:44

## 2024-04-09 RX ADMIN — ONDANSETRON 8 MG: 2 INJECTION INTRAMUSCULAR; INTRAVENOUS at 12:11

## 2024-04-09 RX ADMIN — SODIUM CHLORIDE, PRESERVATIVE FREE 20 ML: 5 INJECTION INTRAVENOUS at 11:04

## 2024-04-09 ASSESSMENT — PAIN SCALES - GENERAL: PAINLEVEL_OUTOF10: 8

## 2024-04-09 ASSESSMENT — PAIN DESCRIPTION - DESCRIPTORS: DESCRIPTORS: ACHING

## 2024-04-09 ASSESSMENT — PAIN DESCRIPTION - ORIENTATION: ORIENTATION: RIGHT;LEFT

## 2024-04-09 ASSESSMENT — PAIN DESCRIPTION - LOCATION: LOCATION: BACK

## 2024-04-09 NOTE — PROGRESS NOTES
[facility] Progress Note    Date: 2024    Name: Jamel Hazel    MRN: [id number]         : 1955    Mr. Hazel arrived (ambulation) at 1100 and in no distress for cycle 3 day 8 of Padcev regimen.  Assessment was completed, no acute issues at this time, no new complaints voiced.      Covid Screening      1.Do you have any symptoms of COVID-19?  SOB, coughing, fever, or generally not feeling well ? No  2. Have you been exposed to COVID-19 recently? No  3. Have you had any recent contact with family/friend that has a pending COVID test? No    Single Lumen Implantable Port 24 Right Subclavian (Active)   Port A Cath Status Accessed 24 1115   Criteria for Appropriate Use Irritant/vesicant medication 24 1115   Site Assessment Clean, dry & intact 24 1115   Alcohol Cap Used Yes 24 1115   Date of Last Dressing Change 24 1100   Dressing Type Transparent 24 1115   Dressing Status New dressing applied 24 1115   Dressing Intervention New 24 1115   Date Accessed  24 1115   Access Attempts  1 24 1115   Access Needle Gauge 20 G 24 1115   Access Needle Length 0.75 inches 24 1115   Accessed By: KISHAN 24 1115   Single Lumen Status Brisk blood return;Flushed;Infusing 24 1115   De-Access Date 24 1115   De-Access Time 1325 24 1115   De-Accessed By AJ 24 1115      + blood return noted, labs drawn and sent.      Mr. Hazel's vitals were reviewed.  Patient Vitals for the past 24 hrs:   BP Temp Temp src Pulse Resp SpO2 Height Weight   24 1315 95/60 -- -- 82 16 -- -- --   24 1100 111/73 98.2 °F (36.8 °C) Temporal 91 18 96 % 1.778 m (5' 10\") 51.4 kg (113 lb 6.4 oz)       Lab results were obtained and reviewed.  Recent Results (from the past 12 hour(s))   Phosphorus    Collection Time: 24 11:06 AM   Result Value Ref Range    Phosphorus 3.3 2.6 - 4.7 MG/DL   Basic metabolic panel

## 2024-04-16 ENCOUNTER — APPOINTMENT (OUTPATIENT)
Facility: HOSPITAL | Age: 69
End: 2024-04-16
Payer: MEDICARE

## 2024-04-16 RX ORDER — DIPHENHYDRAMINE HYDROCHLORIDE 50 MG/ML
50 INJECTION INTRAMUSCULAR; INTRAVENOUS
Status: CANCELLED | OUTPATIENT
Start: 2024-04-23

## 2024-04-16 RX ORDER — SODIUM CHLORIDE 9 MG/ML
5-250 INJECTION, SOLUTION INTRAVENOUS PRN
Status: CANCELLED | OUTPATIENT
Start: 2024-04-23

## 2024-04-16 RX ORDER — LORAZEPAM 2 MG/ML
0.5 INJECTION INTRAMUSCULAR
Status: CANCELLED | OUTPATIENT
Start: 2024-04-23

## 2024-04-16 RX ORDER — SODIUM CHLORIDE 9 MG/ML
INJECTION, SOLUTION INTRAVENOUS CONTINUOUS
Status: CANCELLED | OUTPATIENT
Start: 2024-04-23

## 2024-04-16 RX ORDER — HEPARIN 100 UNIT/ML
500 SYRINGE INTRAVENOUS PRN
Status: CANCELLED | OUTPATIENT
Start: 2024-04-23

## 2024-04-16 RX ORDER — PROCHLORPERAZINE EDISYLATE 5 MG/ML
10 INJECTION INTRAMUSCULAR; INTRAVENOUS
Status: CANCELLED | OUTPATIENT
Start: 2024-04-23

## 2024-04-16 RX ORDER — MEPERIDINE HYDROCHLORIDE 25 MG/ML
12.5 INJECTION INTRAMUSCULAR; INTRAVENOUS; SUBCUTANEOUS PRN
Status: CANCELLED | OUTPATIENT
Start: 2024-04-23

## 2024-04-16 RX ORDER — ACETAMINOPHEN 325 MG/1
650 TABLET ORAL
Status: CANCELLED | OUTPATIENT
Start: 2024-04-23

## 2024-04-16 RX ORDER — ONDANSETRON 2 MG/ML
8 INJECTION INTRAMUSCULAR; INTRAVENOUS
Status: CANCELLED | OUTPATIENT
Start: 2024-04-23

## 2024-04-16 RX ORDER — ALBUTEROL SULFATE 90 UG/1
4 AEROSOL, METERED RESPIRATORY (INHALATION) PRN
Status: CANCELLED | OUTPATIENT
Start: 2024-04-23

## 2024-04-16 RX ORDER — EPINEPHRINE 1 MG/ML
0.3 INJECTION, SOLUTION INTRAMUSCULAR; SUBCUTANEOUS PRN
Status: CANCELLED | OUTPATIENT
Start: 2024-04-23

## 2024-04-17 ENCOUNTER — HOSPITAL ENCOUNTER (OUTPATIENT)
Facility: HOSPITAL | Age: 69
Discharge: HOME | End: 2024-04-20
Admitting: STUDENT IN AN ORGANIZED HEALTH CARE EDUCATION/TRAINING PROGRAM
Payer: MEDICARE

## 2024-04-17 VITALS
HEART RATE: 83 BPM | DIASTOLIC BLOOD PRESSURE: 67 MMHG | SYSTOLIC BLOOD PRESSURE: 93 MMHG | RESPIRATION RATE: 14 BRPM | OXYGEN SATURATION: 97 %

## 2024-04-17 DIAGNOSIS — C67.9 STAGE IV BLADDER CANCER (HCC): ICD-10-CM

## 2024-04-17 PROCEDURE — 2500000003 HC RX 250 WO HCPCS: Performed by: STUDENT IN AN ORGANIZED HEALTH CARE EDUCATION/TRAINING PROGRAM

## 2024-04-17 PROCEDURE — 50435 EXCHANGE NEPHROSTOMY CATH: CPT

## 2024-04-17 PROCEDURE — 6360000002 HC RX W HCPCS: Performed by: STUDENT IN AN ORGANIZED HEALTH CARE EDUCATION/TRAINING PROGRAM

## 2024-04-17 PROCEDURE — 6360000004 HC RX CONTRAST MEDICATION: Performed by: STUDENT IN AN ORGANIZED HEALTH CARE EDUCATION/TRAINING PROGRAM

## 2024-04-17 RX ORDER — LIDOCAINE HYDROCHLORIDE 20 MG/ML
20 INJECTION, SOLUTION INFILTRATION; PERINEURAL ONCE
Status: COMPLETED | OUTPATIENT
Start: 2024-04-17 | End: 2024-04-17

## 2024-04-17 RX ORDER — LEVOFLOXACIN 5 MG/ML
500 INJECTION, SOLUTION INTRAVENOUS ONCE
Status: COMPLETED | OUTPATIENT
Start: 2024-04-17 | End: 2024-04-17

## 2024-04-17 RX ORDER — MIDAZOLAM HYDROCHLORIDE 1 MG/ML
5 INJECTION INTRAMUSCULAR; INTRAVENOUS
Status: DISCONTINUED | OUTPATIENT
Start: 2024-04-17 | End: 2024-04-21 | Stop reason: HOSPADM

## 2024-04-17 RX ORDER — FENTANYL CITRATE 50 UG/ML
100 INJECTION, SOLUTION INTRAMUSCULAR; INTRAVENOUS
Status: DISCONTINUED | OUTPATIENT
Start: 2024-04-17 | End: 2024-04-21 | Stop reason: HOSPADM

## 2024-04-17 RX ORDER — HEPARIN SODIUM 200 [USP'U]/100ML
200 INJECTION, SOLUTION INTRAVENOUS ONCE
Status: COMPLETED | OUTPATIENT
Start: 2024-04-17 | End: 2024-04-17

## 2024-04-17 RX ADMIN — FENTANYL CITRATE 25 MCG: 50 INJECTION INTRAMUSCULAR; INTRAVENOUS at 10:06

## 2024-04-17 RX ADMIN — LIDOCAINE HYDROCHLORIDE 10 ML: 20 INJECTION, SOLUTION INFILTRATION; PERINEURAL at 10:30

## 2024-04-17 RX ADMIN — IOPAMIDOL 15 ML: 755 INJECTION, SOLUTION INTRAVENOUS at 10:30

## 2024-04-17 RX ADMIN — HEPARIN SODIUM IN SODIUM CHLORIDE 100 ML: 200 INJECTION INTRAVENOUS at 10:31

## 2024-04-17 RX ADMIN — LEVOFLOXACIN 500 MG: 5 INJECTION, SOLUTION INTRAVENOUS at 09:36

## 2024-04-17 RX ADMIN — MIDAZOLAM 2 MG: 1 INJECTION INTRAMUSCULAR; INTRAVENOUS at 10:04

## 2024-04-17 NOTE — PROGRESS NOTES
Back in the Angio holding area post procedure, A/O x 3 complaint free sitting up in bed drinking a pepsi.     Dressing(s) clean and dry, urine present in new nephrostomy tube bags.

## 2024-04-17 NOTE — PRE SEDATION
Sedation Pre-Procedure Note    Patient Name: Jamel Hazel   YOB: 1955  Room/Bed: Room/bed info not found  Medical Record Number: [id number]  Date: 4/17/2024   Time: 10:03 AM       Indication:  Nephrostomy tube changes and left ureteral stent removal.    Consent: I have discussed with the patient and/or the patient representative the indication, alternatives, and the possible risks and/or complications of the planned procedure and the anesthesia methods. The patient and/or patient representative appear to understand and agree to proceed.    Vital Signs:   There were no vitals filed for this visit.    Past Medical History:   has a past medical history of Arthritis, Asthma, Cancer (HCC), Cancer of esophagus (HCC), Chronic pain, Essential hypertension, GERD (gastroesophageal reflux disease), Hypertension, Nonischemic cardiomyopathy (HCC), Other ill-defined conditions(799.89), Other ill-defined conditions(799.89), Other ill-defined conditions(799.89), Psychiatric disorder, Seizures (HCC), Thyroid disease, and Unspecified sleep apnea.    Past Surgical History:   has a past surgical history that includes Cholecystectomy; egd balloon dilation esophagus <30 mm diam (4/26/2013); upper gi endoscopy,stent placement (4/23/2013); egd balloon dilation esophagus <30 mm diam (3/4/2013); egd transoral biopsy single/multiple (3/4/2013); egd balloon dilation esophagus <30 mm diam (7/31/2013); egd balloon dilation esophagus <30 mm diam (9/25/2013); upper gi endoscopy,biopsy (5/29/2015); upper gi endoscopy,ball dil,30mm (5/29/2015); other surgical history; upper gi endoscopy,biopsy (1/11/2017); colorectal scrn; hi risk ind (1/11/2017); colonoscopy,remv lesn,snare (1/11/2017); Colonoscopy (N/A, 1/11/2017); colonoscopy,flex,w/control, bleeding (1/13/2017); Colonoscopy (N/A, 1/13/2017); egd transoral biopsy single/multiple (4/30/2013); egd transoral biopsy single/multiple (12/30/2011); colsc flx w/rmvl of tumor polyp lesion

## 2024-04-17 NOTE — FLOWSHEET NOTE
Discharge instructions have been reviewed with patient and present family.   Copy given to patient.   Pt verbalized understand of instructions and was given  the opportunity to ask questions and receive answers prior to leaving.  Pt discharged with family and assisted out by RN in wheelchair.   In NAD at time of D/C.

## 2024-04-17 NOTE — DISCHARGE INSTRUCTIONS
[facility]  Special Procedures/Radiology Department      Radiologist: EVELYN Begum MD    Date: 04/17/2024    Percutaneous Nephrostomy Tube Discharge Instructions    Rest for the next 48 hours.    You may have a small amount of blood in your urine that will make your urine a pink color.  If you have any wild red blood or blood clots in your urine, call your urologist or come to the Emergency Department.    Other symptoms that require immediate medical attention are severe pain in your kidney area, sweatiness, clammy skin, vomiting or fever.      Because you received sedation, you are not to drive or sign any legal documents for the next 24 hours.    Resume your previous diet and follow your medication reconciliation list.    You may take Tylenol, as directed on the label, for pain.  Do not take Tylenol in addition to Percocet.  Avoid ibuprofen (Advil, Motrin) and aspirin as they may cause you to bleed. If your Doctor has given you prescription pain medication, you may take those instead.    Keep the drainage tube dressing clean and dry.  Do not get it wet.    If you have any questions or concerns, please call [phone] and ask to speak to the nurse on-call.

## 2024-04-17 NOTE — PROGRESS NOTES
Arrived into the Angio holding area via w/c, on 3L O2 via n/c, A/O x 4 complaint free. Here today for bilateral nephrostomy tube change and ureteral stent removal left.

## 2024-04-22 ENCOUNTER — HOSPITAL ENCOUNTER (OUTPATIENT)
Facility: HOSPITAL | Age: 69
Discharge: HOME | End: 2024-04-25
Attending: STUDENT IN AN ORGANIZED HEALTH CARE EDUCATION/TRAINING PROGRAM
Payer: MEDICARE

## 2024-04-22 DIAGNOSIS — C67.9 STAGE IV BLADDER CANCER (HCC): ICD-10-CM

## 2024-04-22 LAB — CREAT BLD-MCNC: 1 MG/DL (ref 0.6–1.3)

## 2024-04-22 PROCEDURE — 6360000004 HC RX CONTRAST MEDICATION: Performed by: STUDENT IN AN ORGANIZED HEALTH CARE EDUCATION/TRAINING PROGRAM

## 2024-04-22 PROCEDURE — 2500000003 HC RX 250 WO HCPCS: Performed by: STUDENT IN AN ORGANIZED HEALTH CARE EDUCATION/TRAINING PROGRAM

## 2024-04-22 PROCEDURE — 82565 ASSAY OF CREATININE: CPT

## 2024-04-22 PROCEDURE — 6360000002 HC RX W HCPCS: Performed by: STUDENT IN AN ORGANIZED HEALTH CARE EDUCATION/TRAINING PROGRAM

## 2024-04-22 PROCEDURE — 74177 CT ABD & PELVIS W/CONTRAST: CPT

## 2024-04-22 RX ORDER — ALBUTEROL SULFATE 90 UG/1
4 AEROSOL, METERED RESPIRATORY (INHALATION) PRN
Status: CANCELLED | OUTPATIENT
Start: 2024-04-30

## 2024-04-22 RX ORDER — ACETAMINOPHEN 325 MG/1
650 TABLET ORAL
Status: CANCELLED | OUTPATIENT
Start: 2024-04-30

## 2024-04-22 RX ORDER — MEPERIDINE HYDROCHLORIDE 25 MG/ML
12.5 INJECTION INTRAMUSCULAR; INTRAVENOUS; SUBCUTANEOUS PRN
Status: CANCELLED | OUTPATIENT
Start: 2024-04-30

## 2024-04-22 RX ORDER — SODIUM CHLORIDE 9 MG/ML
INJECTION, SOLUTION INTRAVENOUS CONTINUOUS
Status: CANCELLED | OUTPATIENT
Start: 2024-04-30

## 2024-04-22 RX ORDER — HEPARIN 100 UNIT/ML
500 SYRINGE INTRAVENOUS PRN
Status: CANCELLED | OUTPATIENT
Start: 2024-04-30

## 2024-04-22 RX ORDER — EPINEPHRINE 1 MG/ML
0.3 INJECTION, SOLUTION INTRAMUSCULAR; SUBCUTANEOUS PRN
Status: CANCELLED | OUTPATIENT
Start: 2024-04-30

## 2024-04-22 RX ORDER — DIPHENHYDRAMINE HYDROCHLORIDE 50 MG/ML
50 INJECTION INTRAMUSCULAR; INTRAVENOUS
Status: CANCELLED | OUTPATIENT
Start: 2024-04-30

## 2024-04-22 RX ORDER — LORAZEPAM 2 MG/ML
0.5 INJECTION INTRAMUSCULAR
Status: CANCELLED | OUTPATIENT
Start: 2024-04-30

## 2024-04-22 RX ORDER — HEPARIN SODIUM (PORCINE) LOCK FLUSH IV SOLN 100 UNIT/ML 100 UNIT/ML
100 SOLUTION INTRAVENOUS PRN
Status: DISCONTINUED | OUTPATIENT
Start: 2024-04-22 | End: 2024-04-26 | Stop reason: HOSPADM

## 2024-04-22 RX ORDER — ONDANSETRON 2 MG/ML
8 INJECTION INTRAMUSCULAR; INTRAVENOUS
Status: CANCELLED | OUTPATIENT
Start: 2024-04-30

## 2024-04-22 RX ORDER — PROCHLORPERAZINE EDISYLATE 5 MG/ML
10 INJECTION INTRAMUSCULAR; INTRAVENOUS
Status: CANCELLED | OUTPATIENT
Start: 2024-04-30

## 2024-04-22 RX ORDER — SODIUM CHLORIDE 9 MG/ML
5-250 INJECTION, SOLUTION INTRAVENOUS PRN
Status: CANCELLED | OUTPATIENT
Start: 2024-04-30

## 2024-04-22 RX ADMIN — Medication 100 UNITS: at 14:43

## 2024-04-22 RX ADMIN — BARIUM SULFATE 900 ML: 20 SUSPENSION ORAL at 14:42

## 2024-04-22 RX ADMIN — IOPAMIDOL 100 ML: 755 INJECTION, SOLUTION INTRAVENOUS at 14:42

## 2024-04-23 ENCOUNTER — HOSPITAL ENCOUNTER (OUTPATIENT)
Facility: HOSPITAL | Age: 69
Setting detail: INFUSION SERIES
Discharge: HOME | End: 2024-04-23
Payer: MEDICARE

## 2024-04-23 ENCOUNTER — OFFICE VISIT (OUTPATIENT)
Age: 69
End: 2024-04-23
Payer: MEDICARE

## 2024-04-23 VITALS
HEIGHT: 70 IN | BODY MASS INDEX: 16.03 KG/M2 | OXYGEN SATURATION: 97 % | TEMPERATURE: 98 F | HEART RATE: 82 BPM | SYSTOLIC BLOOD PRESSURE: 94 MMHG | DIASTOLIC BLOOD PRESSURE: 58 MMHG | WEIGHT: 112 LBS | RESPIRATION RATE: 16 BRPM

## 2024-04-23 VITALS
OXYGEN SATURATION: 96 % | SYSTOLIC BLOOD PRESSURE: 100 MMHG | RESPIRATION RATE: 16 BRPM | HEART RATE: 80 BPM | DIASTOLIC BLOOD PRESSURE: 54 MMHG | WEIGHT: 112.6 LBS | BODY MASS INDEX: 16.12 KG/M2 | HEIGHT: 70 IN | TEMPERATURE: 98.8 F

## 2024-04-23 DIAGNOSIS — C67.9 STAGE IV BLADDER CANCER (HCC): Primary | ICD-10-CM

## 2024-04-23 DIAGNOSIS — Z51.12 ENCOUNTER FOR ANTINEOPLASTIC IMMUNOTHERAPY: ICD-10-CM

## 2024-04-23 DIAGNOSIS — G89.3 CANCER ASSOCIATED PAIN: ICD-10-CM

## 2024-04-23 DIAGNOSIS — Z51.11 ENCOUNTER FOR ANTINEOPLASTIC CHEMOTHERAPY: ICD-10-CM

## 2024-04-23 DIAGNOSIS — K13.79 MOUTH PAIN: ICD-10-CM

## 2024-04-23 LAB
ALBUMIN SERPL-MCNC: 2.8 G/DL (ref 3.5–5)
ALBUMIN/GLOB SERPL: 0.7 (ref 1.1–2.2)
ALP SERPL-CCNC: 75 U/L (ref 45–117)
ALT SERPL-CCNC: 19 U/L (ref 12–78)
ANION GAP SERPL CALC-SCNC: 3 MMOL/L (ref 5–15)
AST SERPL-CCNC: 30 U/L (ref 15–37)
BASOPHILS # BLD: 0 K/UL (ref 0–0.1)
BASOPHILS NFR BLD: 1 % (ref 0–1)
BILIRUB SERPL-MCNC: 0.3 MG/DL (ref 0.2–1)
BUN SERPL-MCNC: 13 MG/DL (ref 6–20)
BUN/CREAT SERPL: 14 (ref 12–20)
CALCIUM SERPL-MCNC: 8.7 MG/DL (ref 8.5–10.1)
CHLORIDE SERPL-SCNC: 90 MMOL/L (ref 97–108)
CO2 SERPL-SCNC: 40 MMOL/L (ref 21–32)
CREAT SERPL-MCNC: 0.92 MG/DL (ref 0.7–1.3)
DIFFERENTIAL METHOD BLD: ABNORMAL
EOSINOPHIL # BLD: 0.2 K/UL (ref 0–0.4)
EOSINOPHIL NFR BLD: 3 % (ref 0–7)
ERYTHROCYTE [DISTWIDTH] IN BLOOD BY AUTOMATED COUNT: 15.5 % (ref 11.5–14.5)
GLOBULIN SER CALC-MCNC: 3.9 G/DL (ref 2–4)
GLUCOSE SERPL-MCNC: 167 MG/DL (ref 65–100)
HCT VFR BLD AUTO: 29 % (ref 36.6–50.3)
HGB BLD-MCNC: 9.2 G/DL (ref 12.1–17)
IMM GRANULOCYTES # BLD AUTO: 0 K/UL (ref 0–0.04)
IMM GRANULOCYTES NFR BLD AUTO: 0 % (ref 0–0.5)
LYMPHOCYTES # BLD: 0.8 K/UL (ref 0.8–3.5)
LYMPHOCYTES NFR BLD: 12 % (ref 12–49)
MCH RBC QN AUTO: 28.6 PG (ref 26–34)
MCHC RBC AUTO-ENTMCNC: 31.7 G/DL (ref 30–36.5)
MCV RBC AUTO: 90.1 FL (ref 80–99)
MONOCYTES # BLD: 0.5 K/UL (ref 0–1)
MONOCYTES NFR BLD: 8 % (ref 5–13)
NEUTS SEG # BLD: 5.1 K/UL (ref 1.8–8)
NEUTS SEG NFR BLD: 76 % (ref 32–75)
NRBC # BLD: 0 K/UL (ref 0–0.01)
NRBC BLD-RTO: 0 PER 100 WBC
PHOSPHATE SERPL-MCNC: 3.3 MG/DL (ref 2.6–4.7)
PLATELET # BLD AUTO: 320 K/UL (ref 150–400)
PMV BLD AUTO: 9.2 FL (ref 8.9–12.9)
POTASSIUM SERPL-SCNC: 3 MMOL/L (ref 3.5–5.1)
PROT SERPL-MCNC: 6.7 G/DL (ref 6.4–8.2)
RBC # BLD AUTO: 3.22 M/UL (ref 4.1–5.7)
SODIUM SERPL-SCNC: 133 MMOL/L (ref 136–145)
WBC # BLD AUTO: 6.7 K/UL (ref 4.1–11.1)

## 2024-04-23 PROCEDURE — 96413 CHEMO IV INFUSION 1 HR: CPT

## 2024-04-23 PROCEDURE — 85025 COMPLETE CBC W/AUTO DIFF WBC: CPT

## 2024-04-23 PROCEDURE — 1123F ACP DISCUSS/DSCN MKR DOCD: CPT | Performed by: NURSE PRACTITIONER

## 2024-04-23 PROCEDURE — 96375 TX/PRO/DX INJ NEW DRUG ADDON: CPT

## 2024-04-23 PROCEDURE — 3074F SYST BP LT 130 MM HG: CPT | Performed by: NURSE PRACTITIONER

## 2024-04-23 PROCEDURE — 36415 COLL VENOUS BLD VENIPUNCTURE: CPT

## 2024-04-23 PROCEDURE — 80053 COMPREHEN METABOLIC PANEL: CPT

## 2024-04-23 PROCEDURE — 99215 OFFICE O/P EST HI 40 MIN: CPT | Performed by: NURSE PRACTITIONER

## 2024-04-23 PROCEDURE — 2580000003 HC RX 258: Performed by: NURSE PRACTITIONER

## 2024-04-23 PROCEDURE — 3078F DIAST BP <80 MM HG: CPT | Performed by: NURSE PRACTITIONER

## 2024-04-23 PROCEDURE — 96361 HYDRATE IV INFUSION ADD-ON: CPT

## 2024-04-23 PROCEDURE — 96417 CHEMO IV INFUS EACH ADDL SEQ: CPT

## 2024-04-23 PROCEDURE — 6360000002 HC RX W HCPCS: Performed by: STUDENT IN AN ORGANIZED HEALTH CARE EDUCATION/TRAINING PROGRAM

## 2024-04-23 PROCEDURE — 2580000003 HC RX 258: Performed by: STUDENT IN AN ORGANIZED HEALTH CARE EDUCATION/TRAINING PROGRAM

## 2024-04-23 PROCEDURE — 96360 HYDRATION IV INFUSION INIT: CPT

## 2024-04-23 PROCEDURE — 84100 ASSAY OF PHOSPHORUS: CPT

## 2024-04-23 RX ORDER — ACETAMINOPHEN 325 MG/1
650 TABLET ORAL
OUTPATIENT
Start: 2024-05-21

## 2024-04-23 RX ORDER — LORAZEPAM 2 MG/ML
0.5 INJECTION INTRAMUSCULAR
OUTPATIENT
Start: 2024-05-21

## 2024-04-23 RX ORDER — SODIUM CHLORIDE 0.9 % (FLUSH) 0.9 %
5-40 SYRINGE (ML) INJECTION PRN
OUTPATIENT
Start: 2024-05-14

## 2024-04-23 RX ORDER — 0.9 % SODIUM CHLORIDE 0.9 %
1000 INTRAVENOUS SOLUTION INTRAVENOUS ONCE
Status: COMPLETED | OUTPATIENT
Start: 2024-04-23 | End: 2024-04-23

## 2024-04-23 RX ORDER — SODIUM CHLORIDE 9 MG/ML
5-250 INJECTION, SOLUTION INTRAVENOUS PRN
OUTPATIENT
Start: 2024-05-21

## 2024-04-23 RX ORDER — MEPERIDINE HYDROCHLORIDE 25 MG/ML
12.5 INJECTION INTRAMUSCULAR; INTRAVENOUS; SUBCUTANEOUS PRN
OUTPATIENT
Start: 2024-05-14

## 2024-04-23 RX ORDER — ALBUTEROL SULFATE 90 UG/1
4 AEROSOL, METERED RESPIRATORY (INHALATION) PRN
OUTPATIENT
Start: 2024-05-14

## 2024-04-23 RX ORDER — ONDANSETRON 2 MG/ML
8 INJECTION INTRAMUSCULAR; INTRAVENOUS ONCE
Status: COMPLETED | OUTPATIENT
Start: 2024-04-23 | End: 2024-04-23

## 2024-04-23 RX ORDER — MEPERIDINE HYDROCHLORIDE 25 MG/ML
12.5 INJECTION INTRAMUSCULAR; INTRAVENOUS; SUBCUTANEOUS PRN
OUTPATIENT
Start: 2024-05-21

## 2024-04-23 RX ORDER — SODIUM CHLORIDE 9 MG/ML
INJECTION, SOLUTION INTRAVENOUS CONTINUOUS
OUTPATIENT
Start: 2024-05-14

## 2024-04-23 RX ORDER — HEPARIN 100 UNIT/ML
500 SYRINGE INTRAVENOUS PRN
OUTPATIENT
Start: 2024-05-14

## 2024-04-23 RX ORDER — PROCHLORPERAZINE EDISYLATE 5 MG/ML
10 INJECTION INTRAMUSCULAR; INTRAVENOUS
OUTPATIENT
Start: 2024-05-14

## 2024-04-23 RX ORDER — ALBUTEROL SULFATE 90 UG/1
4 AEROSOL, METERED RESPIRATORY (INHALATION) PRN
OUTPATIENT
Start: 2024-05-21

## 2024-04-23 RX ORDER — EPINEPHRINE 1 MG/ML
0.3 INJECTION, SOLUTION, CONCENTRATE INTRAVENOUS PRN
OUTPATIENT
Start: 2024-05-14

## 2024-04-23 RX ORDER — SODIUM CHLORIDE 9 MG/ML
5-250 INJECTION, SOLUTION INTRAVENOUS PRN
OUTPATIENT
Start: 2024-05-14

## 2024-04-23 RX ORDER — ONDANSETRON 2 MG/ML
8 INJECTION INTRAMUSCULAR; INTRAVENOUS
OUTPATIENT
Start: 2024-05-21

## 2024-04-23 RX ORDER — HEPARIN 100 UNIT/ML
500 SYRINGE INTRAVENOUS PRN
OUTPATIENT
Start: 2024-05-21

## 2024-04-23 RX ORDER — SODIUM CHLORIDE 9 MG/ML
INJECTION, SOLUTION INTRAVENOUS CONTINUOUS
OUTPATIENT
Start: 2024-05-21

## 2024-04-23 RX ORDER — SODIUM CHLORIDE 0.9 % (FLUSH) 0.9 %
5-40 SYRINGE (ML) INJECTION PRN
Status: DISCONTINUED | OUTPATIENT
Start: 2024-04-23 | End: 2024-04-24 | Stop reason: HOSPADM

## 2024-04-23 RX ORDER — ONDANSETRON 2 MG/ML
8 INJECTION INTRAMUSCULAR; INTRAVENOUS
OUTPATIENT
Start: 2024-05-14

## 2024-04-23 RX ORDER — ACETAMINOPHEN 325 MG/1
650 TABLET ORAL
OUTPATIENT
Start: 2024-05-14

## 2024-04-23 RX ORDER — ONDANSETRON 2 MG/ML
8 INJECTION INTRAMUSCULAR; INTRAVENOUS ONCE
OUTPATIENT
Start: 2024-05-21 | End: 2024-05-21

## 2024-04-23 RX ORDER — EPINEPHRINE 1 MG/ML
0.3 INJECTION, SOLUTION, CONCENTRATE INTRAVENOUS PRN
OUTPATIENT
Start: 2024-05-21

## 2024-04-23 RX ORDER — SODIUM CHLORIDE 9 MG/ML
5-250 INJECTION, SOLUTION INTRAVENOUS PRN
Status: DISCONTINUED | OUTPATIENT
Start: 2024-04-23 | End: 2024-04-24 | Stop reason: HOSPADM

## 2024-04-23 RX ORDER — SODIUM CHLORIDE 0.9 % (FLUSH) 0.9 %
5-40 SYRINGE (ML) INJECTION PRN
OUTPATIENT
Start: 2024-05-21

## 2024-04-23 RX ORDER — ONDANSETRON 2 MG/ML
8 INJECTION INTRAMUSCULAR; INTRAVENOUS ONCE
OUTPATIENT
Start: 2024-05-14 | End: 2024-05-14

## 2024-04-23 RX ORDER — DIPHENHYDRAMINE HYDROCHLORIDE 50 MG/ML
50 INJECTION INTRAMUSCULAR; INTRAVENOUS
OUTPATIENT
Start: 2024-05-21

## 2024-04-23 RX ORDER — LORAZEPAM 2 MG/ML
0.5 INJECTION INTRAMUSCULAR
OUTPATIENT
Start: 2024-05-14

## 2024-04-23 RX ORDER — DIPHENHYDRAMINE HYDROCHLORIDE 50 MG/ML
50 INJECTION INTRAMUSCULAR; INTRAVENOUS
OUTPATIENT
Start: 2024-05-14

## 2024-04-23 RX ORDER — PROCHLORPERAZINE EDISYLATE 5 MG/ML
10 INJECTION INTRAMUSCULAR; INTRAVENOUS
OUTPATIENT
Start: 2024-05-21

## 2024-04-23 RX ADMIN — SODIUM CHLORIDE 25 ML/HR: 9 INJECTION, SOLUTION INTRAVENOUS at 13:25

## 2024-04-23 RX ADMIN — SODIUM CHLORIDE 200 MG: 9 INJECTION, SOLUTION INTRAVENOUS at 14:45

## 2024-04-23 RX ADMIN — ENFORTUMAB VEDOTIN 60 MG: 30 INJECTION, POWDER, LYOPHILIZED, FOR SOLUTION INTRAVENOUS at 15:23

## 2024-04-23 RX ADMIN — SODIUM CHLORIDE, PRESERVATIVE FREE 10 ML: 5 INJECTION INTRAVENOUS at 16:00

## 2024-04-23 RX ADMIN — ONDANSETRON 8 MG: 2 INJECTION INTRAMUSCULAR; INTRAVENOUS at 14:27

## 2024-04-23 RX ADMIN — SODIUM CHLORIDE 1000 ML: 9 INJECTION, SOLUTION INTRAVENOUS at 12:23

## 2024-04-23 NOTE — PROGRESS NOTES
Name: Jamel Hazel    MRN: [id number]         : 1955    Mr. Hazel arrived via wheelchair utilizing 3LNC via portable oxygen for C4D1 of Padcev/Keytruda Regimen. Assessment was completed. Pt complained of 7/10 mid-lower back and left abdominal pain, worsening blurry vision, worsening hoarseness, and systemic itching without a rash. Right chest wall port accessed without difficulty. Labs drawn & sent for processing. Port flushed and alcohol cap applied.    Patient proceeded to appointment with Dr. Dubose.    Mr. Hazel's vitals were reviewed.  Patient Vitals for the past 24 hrs:   BP Temp Temp src Pulse Resp SpO2 Height Weight   24 1557 (!) 100/54 -- -- 80 -- -- -- --   24 1110 (!) 86/54 98.8 °F (37.1 °C) Temporal 93 16 96 % 1.778 m (5' 10\") 51.1 kg (112 lb 9.6 oz)       Lab results were obtained and reviewed.  Recent Results (from the past 12 hour(s))   CBC With Auto Differential    Collection Time: 24 11:11 AM   Result Value Ref Range    WBC 6.7 4.1 - 11.1 K/uL    RBC 3.22 (L) 4.10 - 5.70 M/uL    Hemoglobin 9.2 (L) 12.1 - 17.0 g/dL    Hematocrit 29.0 (L) 36.6 - 50.3 %    MCV 90.1 80.0 - 99.0 FL    MCH 28.6 26.0 - 34.0 PG    MCHC 31.7 30.0 - 36.5 g/dL    RDW 15.5 (H) 11.5 - 14.5 %    Platelets 320 150 - 400 K/uL    MPV 9.2 8.9 - 12.9 FL    Nucleated RBCs 0.0 0  WBC    nRBC 0.00 0.00 - 0.01 K/uL    Neutrophils % 76 (H) 32 - 75 %    Lymphocytes % 12 12 - 49 %    Monocytes % 8 5 - 13 %    Eosinophils % 3 0 - 7 %    Basophils % 1 0 - 1 %    Immature Granulocytes % 0 0.0 - 0.5 %    Neutrophils Absolute 5.1 1.8 - 8.0 K/UL    Lymphocytes Absolute 0.8 0.8 - 3.5 K/UL    Monocytes Absolute 0.5 0.0 - 1.0 K/UL    Eosinophils Absolute 0.2 0.0 - 0.4 K/UL    Basophils Absolute 0.0 0.0 - 0.1 K/UL    Immature Granulocytes Absolute 0.0 0.00 - 0.04 K/UL    Differential Type AUTOMATED     Comprehensive metabolic panel    Collection Time: 24 11:11 AM   Result Value Ref Range    Sodium 133 (L) 136 -

## 2024-04-23 NOTE — PROGRESS NOTES
[facility]  [address]  [place] [address]  W: [phone]  F: [phone]    Hematology/Oncology Office Note    Reason for Visit:   Jamel Hazel is a 68 y.o. male who is seen today for follow-up for muscle invasive bladder cancer and chemotherapy.     Hematology/Oncology Treatment History:   Hematological/Oncological Diagnosis: Muscle Invasive Bladder Cancer, stage pending  Date of Diagnosis: 1/2024      Surgical Pathology  Bladder wall, right anterior, biopsy:        Invasive high-grade urothelial carcinoma with plasmacytoid features.        Muscularis propria is not present for evaluation.   The tumor is positive for pancytokeratin, GATA3, focal , and negative for NKX3.1.   This is consistent with urothelial carcinoma with plasmacytoid features.     History of Present Illness:   Pleasant 68 year old with complicated medical history including esophageal cancer in 2012 treated with surgical resection only, non-ischemic cardiomyopathy with hx of CHF in past (last EF12/23/23 showed normal EF), GERD, OA, Depression, SHANNEN, who presents with new diagnosis of muscle invasive bladder cancer.   Initially admitted to the hospital on 12/28/23 severe hematuria and constipation for which he underwent cystoscopy.     Recent CT of the C/A/P results are still pending, done yesterday.     INTERVAL HISTORY:  4/23/24 -   Port placed on 3/29/24  Nephrostomy tubes replaced bilaterally on 4/17/24, left ureteral stent removed     Presenting today with his wife. Feels like his voice is more hoarse and his vision has been blurred.   Has been taking mucinex for a non-productive cough which is not helping. No chest pain and shortness of breath.   States that his BP has been running low at home. Has been more active, no more fatigue than usual.   Also reporting 7/10 abdominal pain today after ureteral stent removal. Has good urine output, bowel movements are regular, < 5

## 2024-04-23 NOTE — PROGRESS NOTES
Jamel Hazel is a 68 y.o. male who presents for follow up of   Chief Complaint   Patient presents with    Follow-up     Stage IV bladder cancer       Mr. Hazel is here today for chemotherapy.  He reports a more hoarse voice and blurry vision. He reports non productive cough that he reports he is taking Mucinex but it's not helping. He reports the sob when taking the oxygen off and notice some sob even with oxygen while being more active. He continues to have some fatigue, mild chills, pain in abdomen 7/10, mouth sores, numbness and tingling, and a rash. He reports he need a refill on his magic mouthwash. He is currently 3 liters of oxygen.        No interval hospitalizations reported    No interval surgery or procedures reported    No reported new medication changes reported       Medications reviewed with the patient, and chart updated to reflect changes.

## 2024-04-29 ENCOUNTER — TELEPHONE (OUTPATIENT)
Age: 69
End: 2024-04-29

## 2024-04-29 DIAGNOSIS — C67.9 STAGE IV BLADDER CANCER (HCC): ICD-10-CM

## 2024-04-29 DIAGNOSIS — K13.79 MOUTH PAIN: ICD-10-CM

## 2024-04-29 DIAGNOSIS — G89.3 CANCER ASSOCIATED PAIN: ICD-10-CM

## 2024-04-29 RX ORDER — ONDANSETRON 4 MG/1
4 TABLET, ORALLY DISINTEGRATING ORAL EVERY 8 HOURS PRN
Qty: 21 TABLET | Refills: 0 | Status: SHIPPED | OUTPATIENT
Start: 2024-04-29

## 2024-04-29 RX ORDER — MORPHINE SULFATE 15 MG/1
15 TABLET, FILM COATED, EXTENDED RELEASE ORAL 2 TIMES DAILY
Qty: 60 TABLET | Refills: 0 | Status: SHIPPED | OUTPATIENT
Start: 2024-05-01 | End: 2024-05-31

## 2024-04-29 RX ORDER — OXYCODONE HYDROCHLORIDE 10 MG/1
10 TABLET ORAL EVERY 6 HOURS PRN
Qty: 120 TABLET | Refills: 0 | Status: SHIPPED | OUTPATIENT
Start: 2024-05-01 | End: 2024-05-31

## 2024-04-29 NOTE — TELEPHONE ENCOUNTER
Palliative Medicine Clinic   [place]: [phone] ([phone])    Patient Name: Jamel Hazel  YOB: 1955    Medication Refill Request Triage    Requested by:    [x]  Patient  []  Support person:      Last Pall Med Clinic Visit:  Visit date not found    Next Pall Med Clinic Visit: 5/15/2024     Preferred Pharmacy: [facility]  Backup Pharmacy:  *    Medications requested:  Morphine and Oxycodone     Is patient completely out?  []   YES  [x]   NO  If NO, how many pills does patient have left?  _morphine 6 Oxycodone 8_    Other pertinent information shared:

## 2024-04-29 NOTE — TELEPHONE ENCOUNTER
Palliative Medicine Clinic   [place]: [phone])    Patient Name: Jamel Hazel  YOB: 1955    Medication Refill Request    Patient is scheduled for follow up:  []  YES  []   NO  Next \[facility] [place] [facility]\"" Med Clinic Visit:     PDMP reviewed:  [] YES   []  System down / Unable  []  NO- Patient fills out of state    Medication:morphine (MS CONTIN) 15 MG   Dose and directions:Take 1 tablet by mouth 2 times daily   Number dispensed:60  Date filled (PDMP or Pharmacy):4/3/2024  # left:    Medication:oxyCODONE HCl (OXY-IR) 10 MG   Dose and directions:Take 1 tablet by mouth every 6 hours   Number dispensed:120  Date filled (PDMP or Pharmacy):  #left:    Appropriate for refill:  [x]  YES  []  Early Request - Requires MD/NP Review      Other pertinent information for prescriber:

## 2024-04-30 ENCOUNTER — HOSPITAL ENCOUNTER (OUTPATIENT)
Facility: HOSPITAL | Age: 69
Setting detail: INFUSION SERIES
Discharge: HOME | End: 2024-04-30
Payer: MEDICARE

## 2024-04-30 VITALS
TEMPERATURE: 97.5 F | WEIGHT: 111 LBS | BODY MASS INDEX: 15.89 KG/M2 | SYSTOLIC BLOOD PRESSURE: 104 MMHG | HEART RATE: 84 BPM | HEIGHT: 70 IN | DIASTOLIC BLOOD PRESSURE: 59 MMHG

## 2024-04-30 DIAGNOSIS — C67.9 STAGE IV BLADDER CANCER (HCC): Primary | ICD-10-CM

## 2024-04-30 LAB
ANION GAP SERPL CALC-SCNC: 3 MMOL/L (ref 5–15)
BASOPHILS # BLD: 0 K/UL (ref 0–0.1)
BASOPHILS NFR BLD: 1 % (ref 0–1)
BUN SERPL-MCNC: 13 MG/DL (ref 6–20)
BUN/CREAT SERPL: 15 (ref 12–20)
CALCIUM SERPL-MCNC: 8.8 MG/DL (ref 8.5–10.1)
CHLORIDE SERPL-SCNC: 93 MMOL/L (ref 97–108)
CO2 SERPL-SCNC: 39 MMOL/L (ref 21–32)
CREAT SERPL-MCNC: 0.89 MG/DL (ref 0.7–1.3)
DIFFERENTIAL METHOD BLD: ABNORMAL
EOSINOPHIL # BLD: 0.2 K/UL (ref 0–0.4)
EOSINOPHIL NFR BLD: 4 % (ref 0–7)
ERYTHROCYTE [DISTWIDTH] IN BLOOD BY AUTOMATED COUNT: 15.6 % (ref 11.5–14.5)
GLUCOSE SERPL-MCNC: 127 MG/DL (ref 65–100)
HCT VFR BLD AUTO: 28.6 % (ref 36.6–50.3)
HGB BLD-MCNC: 9.1 G/DL (ref 12.1–17)
IMM GRANULOCYTES # BLD AUTO: 0 K/UL (ref 0–0.04)
IMM GRANULOCYTES NFR BLD AUTO: 0 % (ref 0–0.5)
LYMPHOCYTES # BLD: 0.7 K/UL (ref 0.8–3.5)
LYMPHOCYTES NFR BLD: 16 % (ref 12–49)
MCH RBC QN AUTO: 28.3 PG (ref 26–34)
MCHC RBC AUTO-ENTMCNC: 31.8 G/DL (ref 30–36.5)
MCV RBC AUTO: 88.8 FL (ref 80–99)
MONOCYTES # BLD: 0.4 K/UL (ref 0–1)
MONOCYTES NFR BLD: 9 % (ref 5–13)
NEUTS SEG # BLD: 3.2 K/UL (ref 1.8–8)
NEUTS SEG NFR BLD: 70 % (ref 32–75)
NRBC # BLD: 0 K/UL (ref 0–0.01)
NRBC BLD-RTO: 0 PER 100 WBC
PHOSPHATE SERPL-MCNC: 2.8 MG/DL (ref 2.6–4.7)
PLATELET # BLD AUTO: 275 K/UL (ref 150–400)
PMV BLD AUTO: 8.9 FL (ref 8.9–12.9)
POTASSIUM SERPL-SCNC: 3.3 MMOL/L (ref 3.5–5.1)
RBC # BLD AUTO: 3.22 M/UL (ref 4.1–5.7)
RBC MORPH BLD: ABNORMAL
RBC MORPH BLD: ABNORMAL
SODIUM SERPL-SCNC: 135 MMOL/L (ref 136–145)
WBC # BLD AUTO: 4.5 K/UL (ref 4.1–11.1)

## 2024-04-30 PROCEDURE — 96413 CHEMO IV INFUSION 1 HR: CPT

## 2024-04-30 PROCEDURE — 36415 COLL VENOUS BLD VENIPUNCTURE: CPT

## 2024-04-30 PROCEDURE — 2580000003 HC RX 258: Performed by: STUDENT IN AN ORGANIZED HEALTH CARE EDUCATION/TRAINING PROGRAM

## 2024-04-30 PROCEDURE — 85025 COMPLETE CBC W/AUTO DIFF WBC: CPT

## 2024-04-30 PROCEDURE — 80048 BASIC METABOLIC PNL TOTAL CA: CPT

## 2024-04-30 PROCEDURE — 96375 TX/PRO/DX INJ NEW DRUG ADDON: CPT

## 2024-04-30 PROCEDURE — 84100 ASSAY OF PHOSPHORUS: CPT

## 2024-04-30 PROCEDURE — 6360000002 HC RX W HCPCS: Performed by: STUDENT IN AN ORGANIZED HEALTH CARE EDUCATION/TRAINING PROGRAM

## 2024-04-30 RX ORDER — ONDANSETRON 2 MG/ML
8 INJECTION INTRAMUSCULAR; INTRAVENOUS ONCE
Status: COMPLETED | OUTPATIENT
Start: 2024-04-30 | End: 2024-04-30

## 2024-04-30 RX ORDER — SODIUM CHLORIDE 9 MG/ML
5-250 INJECTION, SOLUTION INTRAVENOUS PRN
Status: DISCONTINUED | OUTPATIENT
Start: 2024-04-30 | End: 2024-05-01 | Stop reason: HOSPADM

## 2024-04-30 RX ORDER — SODIUM CHLORIDE 0.9 % (FLUSH) 0.9 %
5-40 SYRINGE (ML) INJECTION PRN
Status: DISCONTINUED | OUTPATIENT
Start: 2024-04-30 | End: 2024-05-01 | Stop reason: HOSPADM

## 2024-04-30 RX ADMIN — ENFORTUMAB VEDOTIN 60 MG: 20 INJECTION, POWDER, LYOPHILIZED, FOR SOLUTION INTRAVENOUS at 13:19

## 2024-04-30 RX ADMIN — ONDANSETRON 8 MG: 2 INJECTION INTRAMUSCULAR; INTRAVENOUS at 12:41

## 2024-04-30 NOTE — PROGRESS NOTES
[facility] Visit Note    Pt arrived to [facility] ambulatory in no acute distress for C4D1 PADCEV. Completed patient for initially assigned nurse.    Recent Results (from the past 12 hour(s))   Phosphorus    Collection Time: 04/30/24 10:56 AM   Result Value Ref Range    Phosphorus 2.8 2.6 - 4.7 MG/DL   Basic metabolic panel    Collection Time: 04/30/24 10:56 AM   Result Value Ref Range    Sodium 135 (L) 136 - 145 mmol/L    Potassium 3.3 (L) 3.5 - 5.1 mmol/L    Chloride 93 (L) 97 - 108 mmol/L    CO2 39 (H) 21 - 32 mmol/L    Anion Gap 3 (L) 5 - 15 mmol/L    Glucose 127 (H) 65 - 100 mg/dL    BUN 13 6 - 20 MG/DL    Creatinine 0.89 0.70 - 1.30 MG/DL    Bun/Cre Ratio 15 12 - 20      Est, Glom Filt Rate >90 >60 ml/min/1.73m2    Calcium 8.8 8.5 - 10.1 MG/DL   CBC With Auto Differential    Collection Time: 04/30/24 10:56 AM   Result Value Ref Range    WBC 4.5 4.1 - 11.1 K/uL    RBC 3.22 (L) 4.10 - 5.70 M/uL    Hemoglobin 9.1 (L) 12.1 - 17.0 g/dL    Hematocrit 28.6 (L) 36.6 - 50.3 %    MCV 88.8 80.0 - 99.0 FL    MCH 28.3 26.0 - 34.0 PG    MCHC 31.8 30.0 - 36.5 g/dL    RDW 15.6 (H) 11.5 - 14.5 %    Platelets 275 150 - 400 K/uL    MPV 8.9 8.9 - 12.9 FL    Nucleated RBCs 0.0 0  WBC    nRBC 0.00 0.00 - 0.01 K/uL    Neutrophils % 70 32 - 75 %    Lymphocytes % 16 12 - 49 %    Monocytes % 9 5 - 13 %    Eosinophils % 4 0 - 7 %    Basophils % 1 0 - 1 %    Immature Granulocytes % 0 0.0 - 0.5 %    Neutrophils Absolute 3.2 1.8 - 8.0 K/UL    Lymphocytes Absolute 0.7 (L) 0.8 - 3.5 K/UL    Monocytes Absolute 0.4 0.0 - 1.0 K/UL    Eosinophils Absolute 0.2 0.0 - 0.4 K/UL    Basophils Absolute 0.0 0.0 - 0.1 K/UL    Immature Granulocytes Absolute 0.0 0.00 - 0.04 K/UL    Differential Type SMEAR SCANNED      RBC Comment ANISOCYTOSIS  1+        RBC Comment HYPOCHROMIA  PRESENT           Two nurses verified prior to administering:  Drug name  Drug dose  Infusion volume or drug volume when prepared in a syringe  Rate

## 2024-04-30 NOTE — TELEPHONE ENCOUNTER
Patient is almost out of medications and has not heard back from the pharmacy. Adv in pending status. He would also like a call back. [phone]

## 2024-05-14 ENCOUNTER — OFFICE VISIT (OUTPATIENT)
Age: 69
End: 2024-05-14
Payer: MEDICARE

## 2024-05-14 ENCOUNTER — HOSPITAL ENCOUNTER (OUTPATIENT)
Facility: HOSPITAL | Age: 69
Setting detail: INFUSION SERIES
Discharge: HOME | End: 2024-05-14
Payer: MEDICARE

## 2024-05-14 VITALS
OXYGEN SATURATION: 94 % | DIASTOLIC BLOOD PRESSURE: 61 MMHG | TEMPERATURE: 98 F | RESPIRATION RATE: 18 BRPM | HEART RATE: 88 BPM | HEIGHT: 70 IN | BODY MASS INDEX: 16.26 KG/M2 | WEIGHT: 113.6 LBS | SYSTOLIC BLOOD PRESSURE: 103 MMHG

## 2024-05-14 VITALS
WEIGHT: 113.54 LBS | OXYGEN SATURATION: 99 % | HEIGHT: 70 IN | HEART RATE: 73 BPM | TEMPERATURE: 98 F | DIASTOLIC BLOOD PRESSURE: 56 MMHG | BODY MASS INDEX: 16.25 KG/M2 | SYSTOLIC BLOOD PRESSURE: 88 MMHG

## 2024-05-14 DIAGNOSIS — C67.9 STAGE IV BLADDER CANCER (HCC): Primary | ICD-10-CM

## 2024-05-14 DIAGNOSIS — Z51.12 ENCOUNTER FOR ANTINEOPLASTIC IMMUNOTHERAPY: ICD-10-CM

## 2024-05-14 DIAGNOSIS — Z51.11 ENCOUNTER FOR ANTINEOPLASTIC CHEMOTHERAPY: ICD-10-CM

## 2024-05-14 LAB
ALBUMIN SERPL-MCNC: 2.6 G/DL (ref 3.5–5)
ALBUMIN/GLOB SERPL: 0.7 (ref 1.1–2.2)
ALP SERPL-CCNC: 69 U/L (ref 45–117)
ALT SERPL-CCNC: 14 U/L (ref 12–78)
ANION GAP SERPL CALC-SCNC: 1 MMOL/L (ref 5–15)
AST SERPL-CCNC: 19 U/L (ref 15–37)
BASOPHILS # BLD: 0 K/UL (ref 0–0.1)
BASOPHILS NFR BLD: 1 % (ref 0–1)
BILIRUB SERPL-MCNC: 0.4 MG/DL (ref 0.2–1)
BUN SERPL-MCNC: 14 MG/DL (ref 6–20)
BUN/CREAT SERPL: 19 (ref 12–20)
CALCIUM SERPL-MCNC: 8.7 MG/DL (ref 8.5–10.1)
CHLORIDE SERPL-SCNC: 97 MMOL/L (ref 97–108)
CO2 SERPL-SCNC: 37 MMOL/L (ref 21–32)
CREAT SERPL-MCNC: 0.75 MG/DL (ref 0.7–1.3)
DIFFERENTIAL METHOD BLD: ABNORMAL
EOSINOPHIL # BLD: 0.3 K/UL (ref 0–0.4)
EOSINOPHIL NFR BLD: 6 % (ref 0–7)
ERYTHROCYTE [DISTWIDTH] IN BLOOD BY AUTOMATED COUNT: 16 % (ref 11.5–14.5)
GLOBULIN SER CALC-MCNC: 3.7 G/DL (ref 2–4)
GLUCOSE SERPL-MCNC: 113 MG/DL (ref 65–100)
HCT VFR BLD AUTO: 28.6 % (ref 36.6–50.3)
HGB BLD-MCNC: 9.1 G/DL (ref 12.1–17)
IMM GRANULOCYTES # BLD AUTO: 0 K/UL (ref 0–0.04)
IMM GRANULOCYTES NFR BLD AUTO: 1 % (ref 0–0.5)
LYMPHOCYTES # BLD: 0.7 K/UL (ref 0.8–3.5)
LYMPHOCYTES NFR BLD: 14 % (ref 12–49)
MCH RBC QN AUTO: 28.6 PG (ref 26–34)
MCHC RBC AUTO-ENTMCNC: 31.8 G/DL (ref 30–36.5)
MCV RBC AUTO: 89.9 FL (ref 80–99)
MONOCYTES # BLD: 0.4 K/UL (ref 0–1)
MONOCYTES NFR BLD: 9 % (ref 5–13)
NEUTS SEG # BLD: 3.3 K/UL (ref 1.8–8)
NEUTS SEG NFR BLD: 69 % (ref 32–75)
NRBC # BLD: 0 K/UL (ref 0–0.01)
NRBC BLD-RTO: 0 PER 100 WBC
PHOSPHATE SERPL-MCNC: 3.3 MG/DL (ref 2.6–4.7)
PLATELET # BLD AUTO: 302 K/UL (ref 150–400)
PMV BLD AUTO: 9 FL (ref 8.9–12.9)
POTASSIUM SERPL-SCNC: 3.3 MMOL/L (ref 3.5–5.1)
PROT SERPL-MCNC: 6.3 G/DL (ref 6.4–8.2)
RBC # BLD AUTO: 3.18 M/UL (ref 4.1–5.7)
RBC MORPH BLD: ABNORMAL
SODIUM SERPL-SCNC: 135 MMOL/L (ref 136–145)
T4 FREE SERPL-MCNC: 0.9 NG/DL (ref 0.8–1.5)
TSH SERPL DL<=0.05 MIU/L-ACNC: 5.1 UIU/ML (ref 0.36–3.74)
WBC # BLD AUTO: 4.7 K/UL (ref 4.1–11.1)

## 2024-05-14 PROCEDURE — 2580000003 HC RX 258: Performed by: NURSE PRACTITIONER

## 2024-05-14 PROCEDURE — 6360000002 HC RX W HCPCS: Performed by: NURSE PRACTITIONER

## 2024-05-14 PROCEDURE — 99215 OFFICE O/P EST HI 40 MIN: CPT | Performed by: STUDENT IN AN ORGANIZED HEALTH CARE EDUCATION/TRAINING PROGRAM

## 2024-05-14 PROCEDURE — 96417 CHEMO IV INFUS EACH ADDL SEQ: CPT

## 2024-05-14 PROCEDURE — 96375 TX/PRO/DX INJ NEW DRUG ADDON: CPT

## 2024-05-14 PROCEDURE — 84443 ASSAY THYROID STIM HORMONE: CPT

## 2024-05-14 PROCEDURE — 84439 ASSAY OF FREE THYROXINE: CPT

## 2024-05-14 PROCEDURE — 85025 COMPLETE CBC W/AUTO DIFF WBC: CPT

## 2024-05-14 PROCEDURE — 3078F DIAST BP <80 MM HG: CPT | Performed by: STUDENT IN AN ORGANIZED HEALTH CARE EDUCATION/TRAINING PROGRAM

## 2024-05-14 PROCEDURE — 96413 CHEMO IV INFUSION 1 HR: CPT

## 2024-05-14 PROCEDURE — 3074F SYST BP LT 130 MM HG: CPT | Performed by: STUDENT IN AN ORGANIZED HEALTH CARE EDUCATION/TRAINING PROGRAM

## 2024-05-14 PROCEDURE — 80053 COMPREHEN METABOLIC PANEL: CPT

## 2024-05-14 PROCEDURE — 36415 COLL VENOUS BLD VENIPUNCTURE: CPT

## 2024-05-14 PROCEDURE — 1123F ACP DISCUSS/DSCN MKR DOCD: CPT | Performed by: STUDENT IN AN ORGANIZED HEALTH CARE EDUCATION/TRAINING PROGRAM

## 2024-05-14 PROCEDURE — 84100 ASSAY OF PHOSPHORUS: CPT

## 2024-05-14 RX ORDER — SODIUM CHLORIDE 9 MG/ML
5-250 INJECTION, SOLUTION INTRAVENOUS PRN
Status: DISCONTINUED | OUTPATIENT
Start: 2024-05-14 | End: 2024-05-15 | Stop reason: HOSPADM

## 2024-05-14 RX ORDER — SODIUM CHLORIDE 0.9 % (FLUSH) 0.9 %
5-40 SYRINGE (ML) INJECTION PRN
Status: DISCONTINUED | OUTPATIENT
Start: 2024-05-14 | End: 2024-05-15 | Stop reason: HOSPADM

## 2024-05-14 RX ORDER — ONDANSETRON 2 MG/ML
8 INJECTION INTRAMUSCULAR; INTRAVENOUS ONCE
Status: COMPLETED | OUTPATIENT
Start: 2024-05-14 | End: 2024-05-14

## 2024-05-14 RX ADMIN — ONDANSETRON 8 MG: 2 INJECTION INTRAMUSCULAR; INTRAVENOUS at 12:51

## 2024-05-14 RX ADMIN — ENFORTUMAB VEDOTIN 60 MG: 30 INJECTION, POWDER, LYOPHILIZED, FOR SOLUTION INTRAVENOUS at 13:19

## 2024-05-14 RX ADMIN — SODIUM CHLORIDE 200 MG: 9 INJECTION, SOLUTION INTRAVENOUS at 14:03

## 2024-05-14 RX ADMIN — SODIUM CHLORIDE 25 ML/HR: 9 INJECTION, SOLUTION INTRAVENOUS at 12:44

## 2024-05-14 ASSESSMENT — PAIN DESCRIPTION - ORIENTATION: ORIENTATION: RIGHT;LEFT;LOWER

## 2024-05-14 ASSESSMENT — PAIN SCALES - GENERAL: PAINLEVEL_OUTOF10: 6

## 2024-05-14 ASSESSMENT — PAIN DESCRIPTION - LOCATION: LOCATION: BACK

## 2024-05-14 ASSESSMENT — PAIN DESCRIPTION - DESCRIPTORS: DESCRIPTORS: ACHING

## 2024-05-14 NOTE — PROGRESS NOTES
Name: Jamel Hazel    MRN: [id number]         : 1955    Mr. Hazel Arrived via wheelchair and in no distress for C5 D1 of Padcev/Keytruda Regimen.  Assessment was completed. Pt report generalized fatigue, and constant back pain. Continuous 02 @3L.  Left chest wall port accessed without difficulty, labs drawn & sent for processing.    Patient proceed to appointment with Dr. Dubose.    Mr. Hazel's vitals were reviewed.  Patient Vitals for the past 24 hrs:   BP Temp Temp src Pulse Resp SpO2 Height Weight   24 1433 103/61 -- -- 88 18 94 % -- --   24 1045 95/60 98 °F (36.7 °C) Temporal 79 18 96 % 1.778 m (5' 10\") 51.5 kg (113 lb 9.6 oz)     Lab results were obtained and reviewed.  Recent Results (from the past 12 hour(s))   Phosphorus    Collection Time: 24 10:56 AM   Result Value Ref Range    Phosphorus 3.3 2.6 - 4.7 MG/DL   Comprehensive metabolic panel    Collection Time: 24 10:56 AM   Result Value Ref Range    Sodium 135 (L) 136 - 145 mmol/L    Potassium 3.3 (L) 3.5 - 5.1 mmol/L    Chloride 97 97 - 108 mmol/L    CO2 37 (H) 21 - 32 mmol/L    Anion Gap 1 (L) 5 - 15 mmol/L    Glucose 113 (H) 65 - 100 mg/dL    BUN 14 6 - 20 MG/DL    Creatinine 0.75 0.70 - 1.30 MG/DL    Bun/Cre Ratio 19 12 - 20      Est, Glom Filt Rate >90 >60 ml/min/1.73m2    Calcium 8.7 8.5 - 10.1 MG/DL    Total Bilirubin 0.4 0.2 - 1.0 MG/DL    ALT 14 12 - 78 U/L    AST 19 15 - 37 U/L    Alk Phosphatase 69 45 - 117 U/L    Total Protein 6.3 (L) 6.4 - 8.2 g/dL    Albumin 2.6 (L) 3.5 - 5.0 g/dL    Globulin 3.7 2.0 - 4.0 g/dL    Albumin/Globulin Ratio 0.7 (L) 1.1 - 2.2     CBC With Auto Differential    Collection Time: 24 10:56 AM   Result Value Ref Range    WBC 4.7 4.1 - 11.1 K/uL    RBC 3.18 (L) 4.10 - 5.70 M/uL    Hemoglobin 9.1 (L) 12.1 - 17.0 g/dL    Hematocrit 28.6 (L) 36.6 - 50.3 %    MCV 89.9 80.0 - 99.0 FL    MCH 28.6 26.0 - 34.0 PG    MCHC 31.8 30.0 - 36.5 g/dL    RDW 16.0 (H) 11.5 - 14.5 %    Platelets 302  tolerated treatment well and was [facility] in stable condition at 1440.   Port de-accessed, flushed per protocol. He is to return on  05/21/2024 for his next appointment.    NOEL BENNETT RN  May 14, 2024

## 2024-05-14 NOTE — PROGRESS NOTES
[facility]  [address]  [place] [address]  W: [phone]  F: [phone]    Hematology/Oncology Office Note    Reason for Visit:   Jamel Hazel is a 68 y.o. male who is seen today for follow-up for muscle invasive bladder cancer and chemotherapy.     Hematology/Oncology Treatment History:   Hematological/Oncological Diagnosis: Muscle Invasive Bladder Cancer, stage pending  Date of Diagnosis: 1/2024      Surgical Pathology  Bladder wall, right anterior, biopsy:        Invasive high-grade urothelial carcinoma with plasmacytoid features.        Muscularis propria is not present for evaluation.   The tumor is positive for pancytokeratin, GATA3, focal , and negative for NKX3.1.   This is consistent with urothelial carcinoma with plasmacytoid features.     History of Present Illness:   Pleasant 68 year old with complicated medical history including esophageal cancer in 2012 treated with surgical resection only, non-ischemic cardiomyopathy with hx of CHF in past (last EF12/23/23 showed normal EF), GERD, OA, Depression, SHANNEN, who presents with new diagnosis of muscle invasive bladder cancer.   Initially admitted to the hospital on 12/28/23 severe hematuria and constipation for which he underwent cystoscopy.     Recent CT of the C/A/P results are still pending, done yesterday.   4/23/24 -   Port placed on 3/29/24  Nephrostomy tubes replaced bilaterally on 4/17/24, left ureteral stent removed     Presenting today with his wife. Feels like his voice is more hoarse and his vision has been blurred.   Has been taking mucinex for a non-productive cough which is not helping. No chest pain and shortness of breath.   States that his BP has been running low at home. Has been more active, no more fatigue than usual.   Also reporting 7/10 abdominal pain today after ureteral stent removal. Has good urine output, bowel movements are regular, < 5 stools/day.   Mouth sores

## 2024-05-14 NOTE — PROGRESS NOTES
Jamel Hazel is a 68 y.o. male here for treatment for bladder cancer.  Pt eyesight is getting worse. Having more blurred vision.   Itching is also getting worse. States he itches all over. His skin is very red and broken down in areas on his arms.   Level 7 back/kidney pain.     1. Have you been to the ER, urgent care clinic since your last visit?  Hospitalized since your last visit? no    2. Have you seen or consulted any other health care providers outside of the [facility] since your last visit?  Include any pap smears or colon screening.  no

## 2024-05-21 ENCOUNTER — HOSPITAL ENCOUNTER (OUTPATIENT)
Facility: HOSPITAL | Age: 69
Setting detail: INFUSION SERIES
Discharge: HOME | End: 2024-05-21
Payer: MEDICARE

## 2024-05-21 VITALS
BODY MASS INDEX: 16.33 KG/M2 | HEIGHT: 70 IN | RESPIRATION RATE: 18 BRPM | WEIGHT: 114.1 LBS | DIASTOLIC BLOOD PRESSURE: 66 MMHG | OXYGEN SATURATION: 94 % | SYSTOLIC BLOOD PRESSURE: 101 MMHG | HEART RATE: 73 BPM | TEMPERATURE: 97.7 F

## 2024-05-21 DIAGNOSIS — C67.9 STAGE IV BLADDER CANCER (HCC): Primary | ICD-10-CM

## 2024-05-21 LAB
ANION GAP SERPL CALC-SCNC: 4 MMOL/L (ref 5–15)
BASOPHILS # BLD: 0.1 K/UL (ref 0–0.1)
BASOPHILS NFR BLD: 1 % (ref 0–1)
BUN SERPL-MCNC: 15 MG/DL (ref 6–20)
BUN/CREAT SERPL: 19 (ref 12–20)
CALCIUM SERPL-MCNC: 9 MG/DL (ref 8.5–10.1)
CHLORIDE SERPL-SCNC: 98 MMOL/L (ref 97–108)
CO2 SERPL-SCNC: 35 MMOL/L (ref 21–32)
CREAT SERPL-MCNC: 0.78 MG/DL (ref 0.7–1.3)
DIFFERENTIAL METHOD BLD: ABNORMAL
EOSINOPHIL # BLD: 0.1 K/UL (ref 0–0.4)
EOSINOPHIL NFR BLD: 2 % (ref 0–7)
ERYTHROCYTE [DISTWIDTH] IN BLOOD BY AUTOMATED COUNT: 16.2 % (ref 11.5–14.5)
GLUCOSE SERPL-MCNC: 147 MG/DL (ref 65–100)
HCT VFR BLD AUTO: 29.1 % (ref 36.6–50.3)
HGB BLD-MCNC: 9.3 G/DL (ref 12.1–17)
IMM GRANULOCYTES # BLD AUTO: 0 K/UL (ref 0–0.04)
IMM GRANULOCYTES NFR BLD AUTO: 0 % (ref 0–0.5)
LYMPHOCYTES # BLD: 0.7 K/UL (ref 0.8–3.5)
LYMPHOCYTES NFR BLD: 12 % (ref 12–49)
MCH RBC QN AUTO: 28.6 PG (ref 26–34)
MCHC RBC AUTO-ENTMCNC: 32 G/DL (ref 30–36.5)
MCV RBC AUTO: 89.5 FL (ref 80–99)
MONOCYTES # BLD: 0.5 K/UL (ref 0–1)
MONOCYTES NFR BLD: 9 % (ref 5–13)
NEUTS SEG # BLD: 4.2 K/UL (ref 1.8–8)
NEUTS SEG NFR BLD: 76 % (ref 32–75)
NRBC # BLD: 0 K/UL (ref 0–0.01)
NRBC BLD-RTO: 0 PER 100 WBC
PHOSPHATE SERPL-MCNC: 2.8 MG/DL (ref 2.6–4.7)
PLATELET # BLD AUTO: 304 K/UL (ref 150–400)
PMV BLD AUTO: 8.8 FL (ref 8.9–12.9)
POTASSIUM SERPL-SCNC: 3.8 MMOL/L (ref 3.5–5.1)
RBC # BLD AUTO: 3.25 M/UL (ref 4.1–5.7)
RBC MORPH BLD: ABNORMAL
SODIUM SERPL-SCNC: 137 MMOL/L (ref 136–145)
WBC # BLD AUTO: 5.6 K/UL (ref 4.1–11.1)

## 2024-05-21 PROCEDURE — 6360000002 HC RX W HCPCS: Performed by: NURSE PRACTITIONER

## 2024-05-21 PROCEDURE — 80048 BASIC METABOLIC PNL TOTAL CA: CPT

## 2024-05-21 PROCEDURE — 96413 CHEMO IV INFUSION 1 HR: CPT

## 2024-05-21 PROCEDURE — 85025 COMPLETE CBC W/AUTO DIFF WBC: CPT

## 2024-05-21 PROCEDURE — 84100 ASSAY OF PHOSPHORUS: CPT

## 2024-05-21 PROCEDURE — 96375 TX/PRO/DX INJ NEW DRUG ADDON: CPT

## 2024-05-21 PROCEDURE — 36415 COLL VENOUS BLD VENIPUNCTURE: CPT

## 2024-05-21 PROCEDURE — 2580000003 HC RX 258: Performed by: NURSE PRACTITIONER

## 2024-05-21 RX ORDER — SODIUM CHLORIDE 0.9 % (FLUSH) 0.9 %
5-40 SYRINGE (ML) INJECTION PRN
Status: DISCONTINUED | OUTPATIENT
Start: 2024-05-21 | End: 2024-05-22 | Stop reason: HOSPADM

## 2024-05-21 RX ORDER — ONDANSETRON 2 MG/ML
8 INJECTION INTRAMUSCULAR; INTRAVENOUS ONCE
Status: COMPLETED | OUTPATIENT
Start: 2024-05-21 | End: 2024-05-21

## 2024-05-21 RX ORDER — SODIUM CHLORIDE 9 MG/ML
5-250 INJECTION, SOLUTION INTRAVENOUS PRN
Status: DISCONTINUED | OUTPATIENT
Start: 2024-05-21 | End: 2024-05-22 | Stop reason: HOSPADM

## 2024-05-21 RX ADMIN — ONDANSETRON 8 MG: 2 INJECTION INTRAMUSCULAR; INTRAVENOUS at 12:01

## 2024-05-21 RX ADMIN — SODIUM CHLORIDE 25 ML/HR: 9 INJECTION, SOLUTION INTRAVENOUS at 12:00

## 2024-05-21 RX ADMIN — ENFORTUMAB VEDOTIN 60 MG: 30 INJECTION, POWDER, LYOPHILIZED, FOR SOLUTION INTRAVENOUS at 12:50

## 2024-05-21 ASSESSMENT — PAIN DESCRIPTION - ORIENTATION: ORIENTATION: RIGHT;LEFT;LOWER

## 2024-05-21 ASSESSMENT — PAIN DESCRIPTION - DESCRIPTORS: DESCRIPTORS: ACHING

## 2024-05-21 ASSESSMENT — PAIN SCALES - GENERAL: PAINLEVEL_OUTOF10: 5

## 2024-05-21 ASSESSMENT — PAIN DESCRIPTION - LOCATION: LOCATION: BACK

## 2024-05-21 NOTE — PROGRESS NOTES
Name: Jamel Hazel    MRN: [id number]         : 1955    Mr. Hazel Arrived via wheelchair and in no distress for C5 D8 of Keytruda Regimen.  Assessment was completed. Pt reports fatigue and pain remain. Reports sleeping better this past week. Continuous 02 @3L.  Left chest wall port accessed without difficulty, labs drawn & sent for processing.    Mr. Hazel's vitals were reviewed.  Patient Vitals for the past 24 hrs:   BP Temp Temp src Pulse Resp SpO2 Height Weight   24 1334 101/66 -- -- 73 18 -- -- --   24 1051 97/61 97.7 °F (36.5 °C) Temporal 96 20 94 % -- --   24 1047 -- -- -- -- -- -- 1.778 m (5' 10\") 51.8 kg (114 lb 1.6 oz)     Lab results were obtained and reviewed.  Recent Results (from the past 12 hour(s))   Phosphorus    Collection Time: 24 10:52 AM   Result Value Ref Range    Phosphorus 2.8 2.6 - 4.7 MG/DL   Basic metabolic panel    Collection Time: 24 10:52 AM   Result Value Ref Range    Sodium 137 136 - 145 mmol/L    Potassium 3.8 3.5 - 5.1 mmol/L    Chloride 98 97 - 108 mmol/L    CO2 35 (H) 21 - 32 mmol/L    Anion Gap 4 (L) 5 - 15 mmol/L    Glucose 147 (H) 65 - 100 mg/dL    BUN 15 6 - 20 MG/DL    Creatinine 0.78 0.70 - 1.30 MG/DL    BUN/Creatinine Ratio 19 12 - 20      Est, Glom Filt Rate >90 >60 ml/min/1.73m2    Calcium 9.0 8.5 - 10.1 MG/DL   CBC With Auto Differential    Collection Time: 24 10:52 AM   Result Value Ref Range    WBC 5.6 4.1 - 11.1 K/uL    RBC 3.25 (L) 4.10 - 5.70 M/uL    Hemoglobin 9.3 (L) 12.1 - 17.0 g/dL    Hematocrit 29.1 (L) 36.6 - 50.3 %    MCV 89.5 80.0 - 99.0 FL    MCH 28.6 26.0 - 34.0 PG    MCHC 32.0 30.0 - 36.5 g/dL    RDW 16.2 (H) 11.5 - 14.5 %    Platelets 304 150 - 400 K/uL    MPV 8.8 (L) 8.9 - 12.9 FL    Nucleated RBCs 0.0 0  WBC    nRBC 0.00 0.00 - 0.01 K/uL    Neutrophils % 76 (H) 32 - 75 %    Lymphocytes % 12 12 - 49 %    Monocytes % 9 5 - 13 %    Eosinophils % 2 0 - 7 %    Basophils % 1 0 - 1 %    Immature

## 2024-05-24 ENCOUNTER — TELEPHONE (OUTPATIENT)
Age: 69
End: 2024-05-24

## 2024-05-24 NOTE — TELEPHONE ENCOUNTER
Called patient to advise/confirm upcoming appt with Dr. Campbell on 5/29/2024  at 3:30  at [facility] Office.  Spoke with patient and confirmed appointment.

## 2024-05-28 RX ORDER — EPINEPHRINE 1 MG/ML
0.3 INJECTION, SOLUTION INTRAMUSCULAR; SUBCUTANEOUS PRN
Status: CANCELLED | OUTPATIENT
Start: 2024-06-04

## 2024-05-28 RX ORDER — SODIUM CHLORIDE 9 MG/ML
INJECTION, SOLUTION INTRAVENOUS CONTINUOUS
Status: CANCELLED | OUTPATIENT
Start: 2024-06-11

## 2024-05-28 RX ORDER — ONDANSETRON 2 MG/ML
8 INJECTION INTRAMUSCULAR; INTRAVENOUS
Status: CANCELLED | OUTPATIENT
Start: 2024-06-11

## 2024-05-28 RX ORDER — DIPHENHYDRAMINE HYDROCHLORIDE 50 MG/ML
50 INJECTION INTRAMUSCULAR; INTRAVENOUS
Status: CANCELLED | OUTPATIENT
Start: 2024-06-11

## 2024-05-28 RX ORDER — LORAZEPAM 2 MG/ML
0.5 INJECTION INTRAMUSCULAR
Status: CANCELLED | OUTPATIENT
Start: 2024-06-11

## 2024-05-28 RX ORDER — EPINEPHRINE 1 MG/ML
0.3 INJECTION, SOLUTION INTRAMUSCULAR; SUBCUTANEOUS PRN
Status: CANCELLED | OUTPATIENT
Start: 2024-06-11

## 2024-05-28 RX ORDER — ACETAMINOPHEN 325 MG/1
650 TABLET ORAL
Status: CANCELLED | OUTPATIENT
Start: 2024-06-04

## 2024-05-28 RX ORDER — SODIUM CHLORIDE 0.9 % (FLUSH) 0.9 %
5-40 SYRINGE (ML) INJECTION PRN
Status: CANCELLED | OUTPATIENT
Start: 2024-06-11

## 2024-05-28 RX ORDER — PROCHLORPERAZINE EDISYLATE 5 MG/ML
10 INJECTION INTRAMUSCULAR; INTRAVENOUS
Status: CANCELLED | OUTPATIENT
Start: 2024-06-11

## 2024-05-28 RX ORDER — SODIUM CHLORIDE 9 MG/ML
5-250 INJECTION, SOLUTION INTRAVENOUS PRN
Status: CANCELLED | OUTPATIENT
Start: 2024-06-11

## 2024-05-28 RX ORDER — SODIUM CHLORIDE 9 MG/ML
INJECTION, SOLUTION INTRAVENOUS CONTINUOUS
Status: CANCELLED | OUTPATIENT
Start: 2024-06-04

## 2024-05-28 RX ORDER — ACETAMINOPHEN 325 MG/1
650 TABLET ORAL
Status: CANCELLED | OUTPATIENT
Start: 2024-06-11

## 2024-05-28 RX ORDER — PROCHLORPERAZINE EDISYLATE 5 MG/ML
10 INJECTION INTRAMUSCULAR; INTRAVENOUS
Status: CANCELLED | OUTPATIENT
Start: 2024-06-04

## 2024-05-28 RX ORDER — ONDANSETRON 2 MG/ML
8 INJECTION INTRAMUSCULAR; INTRAVENOUS
Status: CANCELLED | OUTPATIENT
Start: 2024-06-04

## 2024-05-28 RX ORDER — DIPHENHYDRAMINE HYDROCHLORIDE 50 MG/ML
50 INJECTION INTRAMUSCULAR; INTRAVENOUS
Status: CANCELLED | OUTPATIENT
Start: 2024-06-04

## 2024-05-28 RX ORDER — HEPARIN 100 UNIT/ML
500 SYRINGE INTRAVENOUS PRN
Status: CANCELLED | OUTPATIENT
Start: 2024-06-11

## 2024-05-28 RX ORDER — MEPERIDINE HYDROCHLORIDE 25 MG/ML
12.5 INJECTION INTRAMUSCULAR; INTRAVENOUS; SUBCUTANEOUS PRN
Status: CANCELLED | OUTPATIENT
Start: 2024-06-11

## 2024-05-28 RX ORDER — ALBUTEROL SULFATE 90 UG/1
4 AEROSOL, METERED RESPIRATORY (INHALATION) PRN
Status: CANCELLED | OUTPATIENT
Start: 2024-06-11

## 2024-05-28 RX ORDER — LORAZEPAM 2 MG/ML
0.5 INJECTION INTRAMUSCULAR
Status: CANCELLED | OUTPATIENT
Start: 2024-06-04

## 2024-05-28 RX ORDER — SODIUM CHLORIDE 9 MG/ML
5-250 INJECTION, SOLUTION INTRAVENOUS PRN
Status: CANCELLED | OUTPATIENT
Start: 2024-06-04

## 2024-05-28 RX ORDER — ALBUTEROL SULFATE 90 UG/1
4 AEROSOL, METERED RESPIRATORY (INHALATION) PRN
Status: CANCELLED | OUTPATIENT
Start: 2024-06-04

## 2024-05-28 RX ORDER — ONDANSETRON 2 MG/ML
8 INJECTION INTRAMUSCULAR; INTRAVENOUS ONCE
Status: CANCELLED | OUTPATIENT
Start: 2024-06-11 | End: 2024-06-11

## 2024-05-28 RX ORDER — MEPERIDINE HYDROCHLORIDE 25 MG/ML
12.5 INJECTION INTRAMUSCULAR; INTRAVENOUS; SUBCUTANEOUS PRN
Status: CANCELLED | OUTPATIENT
Start: 2024-06-04

## 2024-05-28 RX ORDER — HEPARIN 100 UNIT/ML
500 SYRINGE INTRAVENOUS PRN
Status: CANCELLED | OUTPATIENT
Start: 2024-06-04

## 2024-05-29 ENCOUNTER — OFFICE VISIT (OUTPATIENT)
Age: 69
End: 2024-05-29
Payer: MEDICARE

## 2024-05-29 DIAGNOSIS — C67.9 STAGE IV BLADDER CANCER (HCC): ICD-10-CM

## 2024-05-29 DIAGNOSIS — K59.03 CONSTIPATION DUE TO OPIOID THERAPY: ICD-10-CM

## 2024-05-29 DIAGNOSIS — T40.2X5A CONSTIPATION DUE TO OPIOID THERAPY: ICD-10-CM

## 2024-05-29 DIAGNOSIS — J43.9 PULMONARY EMPHYSEMA, UNSPECIFIED EMPHYSEMA TYPE (HCC): Primary | ICD-10-CM

## 2024-05-29 DIAGNOSIS — R53.0 NEOPLASTIC (MALIGNANT) RELATED FATIGUE: ICD-10-CM

## 2024-05-29 PROCEDURE — 1123F ACP DISCUSS/DSCN MKR DOCD: CPT | Performed by: INTERNAL MEDICINE

## 2024-05-29 PROCEDURE — 99215 OFFICE O/P EST HI 40 MIN: CPT | Performed by: INTERNAL MEDICINE

## 2024-05-29 RX ORDER — MORPHINE SULFATE 30 MG/1
30 TABLET, FILM COATED, EXTENDED RELEASE ORAL 2 TIMES DAILY
Qty: 60 TABLET | Refills: 0 | Status: SHIPPED | OUTPATIENT
Start: 2024-05-29 | End: 2024-06-28

## 2024-05-29 RX ORDER — OXYCODONE HYDROCHLORIDE 10 MG/1
10 TABLET ORAL EVERY 6 HOURS PRN
Qty: 120 TABLET | Refills: 0 | Status: SHIPPED | OUTPATIENT
Start: 2024-05-29 | End: 2024-06-28

## 2024-05-30 NOTE — PROGRESS NOTES
deliver care in the state where the patient is located as indicated above. If you are not or unsure, please re-schedule the visit: Yes, I confirm.      Total time spent for this encounter:  40 mins    --Carole Campbell MD on 5/30/2024 at 9:05 AM    An electronic signature was used to authenticate this note.'

## 2024-05-30 NOTE — PATIENT INSTRUCTIONS
Dear Jamel Hazel ,    It was a pleasure seeing you today    We will see you again in 6  weeks    If labs or imaging tests have been ordered for you today, please call the office  at [phone] 48 hours after completion to obtain the results.        This is the plan we talked about:    Chronic back pain and b/l nephrostomy tube pain  - You are taking Oxycodone 10 mg every 4-6 hours, about 4 tabs daily with sub optimal pain relief. Some days you take more.  -Started you on MS Contin 15 mg 2 times a day and this is still not helpful.  You still find yourself in pain most of the time.  -Increase MS Contin to 30 mg 2 times a day, continue oxycodone 10 mg every 4 hours as needed.    B/l leg edema  - Likely from low albumin. You get pulse dose lasix to help with severe swelling every now and then.   - We talked about elevating your legs, caridad hose, etc but the potential for return of the edema is very likely    Constipation  -Constipation is a common side effect of pain medications as they slow your bowels.   -Please start Pericolace 2 tabs daily and increase to 2 tabs two times a day if needed. This is necessary to keep your bowels soft.   - Add Miralax every other day as a laxative.  - Goal is to have soft bowel movements every day or every other day.   - Please call us if you do not have bowel movements for more than 3 days.    Disease understanding and support  - You and your family have good understanding of your medical condition. You are tolerating treatment well.   - We talked about how you and your wife are dealing with this. Validated your fears and feelings. Discussed what Hospice would look like in the future.  - Wife Emily is struggling with anticipatory grief. Discussed counseling sessions with our  HeatherMami Diaz is agreeable.  - You have good support through your wife and family.  - You signed a DDNR in the hospital    This is what you have shared with us about Advance Care Planning:

## 2024-06-04 ENCOUNTER — HOSPITAL ENCOUNTER (OUTPATIENT)
Facility: HOSPITAL | Age: 69
Setting detail: INFUSION SERIES
Discharge: HOME | End: 2024-06-04
Payer: MEDICARE

## 2024-06-04 ENCOUNTER — OFFICE VISIT (OUTPATIENT)
Age: 69
End: 2024-06-04
Payer: MEDICARE

## 2024-06-04 VITALS
SYSTOLIC BLOOD PRESSURE: 106 MMHG | HEIGHT: 70 IN | HEART RATE: 84 BPM | RESPIRATION RATE: 18 BRPM | OXYGEN SATURATION: 94 % | BODY MASS INDEX: 16.65 KG/M2 | TEMPERATURE: 98.3 F | DIASTOLIC BLOOD PRESSURE: 64 MMHG | WEIGHT: 116.3 LBS

## 2024-06-04 VITALS
SYSTOLIC BLOOD PRESSURE: 96 MMHG | WEIGHT: 116 LBS | DIASTOLIC BLOOD PRESSURE: 64 MMHG | BODY MASS INDEX: 16.61 KG/M2 | TEMPERATURE: 98.5 F | HEART RATE: 80 BPM | OXYGEN SATURATION: 96 % | HEIGHT: 70 IN | RESPIRATION RATE: 17 BRPM

## 2024-06-04 DIAGNOSIS — C67.9 STAGE IV BLADDER CANCER (HCC): Primary | ICD-10-CM

## 2024-06-04 DIAGNOSIS — Z51.12 ENCOUNTER FOR ANTINEOPLASTIC IMMUNOTHERAPY: ICD-10-CM

## 2024-06-04 DIAGNOSIS — G89.3 CANCER ASSOCIATED PAIN: ICD-10-CM

## 2024-06-04 DIAGNOSIS — Z51.11 ENCOUNTER FOR ANTINEOPLASTIC CHEMOTHERAPY: ICD-10-CM

## 2024-06-04 LAB
ALBUMIN SERPL-MCNC: 2.8 G/DL (ref 3.5–5)
ALBUMIN/GLOB SERPL: 0.8 (ref 1.1–2.2)
ALP SERPL-CCNC: 74 U/L (ref 45–117)
ALT SERPL-CCNC: 17 U/L (ref 12–78)
ANION GAP SERPL CALC-SCNC: 3 MMOL/L (ref 5–15)
AST SERPL-CCNC: 22 U/L (ref 15–37)
BASOPHILS # BLD: 0 K/UL (ref 0–0.1)
BASOPHILS NFR BLD: 1 % (ref 0–1)
BILIRUB SERPL-MCNC: 0.3 MG/DL (ref 0.2–1)
BUN SERPL-MCNC: 18 MG/DL (ref 6–20)
BUN/CREAT SERPL: 20 (ref 12–20)
CALCIUM SERPL-MCNC: 8.4 MG/DL (ref 8.5–10.1)
CHLORIDE SERPL-SCNC: 95 MMOL/L (ref 97–108)
CO2 SERPL-SCNC: 39 MMOL/L (ref 21–32)
CREAT SERPL-MCNC: 0.88 MG/DL (ref 0.7–1.3)
DIFFERENTIAL METHOD BLD: ABNORMAL
EOSINOPHIL # BLD: 0.2 K/UL (ref 0–0.4)
EOSINOPHIL NFR BLD: 4 % (ref 0–7)
ERYTHROCYTE [DISTWIDTH] IN BLOOD BY AUTOMATED COUNT: 17.2 % (ref 11.5–14.5)
GLOBULIN SER CALC-MCNC: 3.6 G/DL (ref 2–4)
GLUCOSE SERPL-MCNC: 165 MG/DL (ref 65–100)
HCT VFR BLD AUTO: 28.1 % (ref 36.6–50.3)
HGB BLD-MCNC: 9 G/DL (ref 12.1–17)
IMM GRANULOCYTES # BLD AUTO: 0 K/UL (ref 0–0.04)
IMM GRANULOCYTES NFR BLD AUTO: 0 % (ref 0–0.5)
LYMPHOCYTES # BLD: 0.7 K/UL (ref 0.8–3.5)
LYMPHOCYTES NFR BLD: 12 % (ref 12–49)
MCH RBC QN AUTO: 29.2 PG (ref 26–34)
MCHC RBC AUTO-ENTMCNC: 32 G/DL (ref 30–36.5)
MCV RBC AUTO: 91.2 FL (ref 80–99)
MONOCYTES # BLD: 0.5 K/UL (ref 0–1)
MONOCYTES NFR BLD: 8 % (ref 5–13)
NEUTS SEG # BLD: 4.3 K/UL (ref 1.8–8)
NEUTS SEG NFR BLD: 75 % (ref 32–75)
NRBC # BLD: 0 K/UL (ref 0–0.01)
NRBC BLD-RTO: 0 PER 100 WBC
PHOSPHATE SERPL-MCNC: 3.4 MG/DL (ref 2.6–4.7)
PLATELET # BLD AUTO: 272 K/UL (ref 150–400)
PMV BLD AUTO: 9.2 FL (ref 8.9–12.9)
POTASSIUM SERPL-SCNC: 3.4 MMOL/L (ref 3.5–5.1)
PROT SERPL-MCNC: 6.4 G/DL (ref 6.4–8.2)
RBC # BLD AUTO: 3.08 M/UL (ref 4.1–5.7)
SODIUM SERPL-SCNC: 137 MMOL/L (ref 136–145)
WBC # BLD AUTO: 5.8 K/UL (ref 4.1–11.1)

## 2024-06-04 PROCEDURE — 96417 CHEMO IV INFUS EACH ADDL SEQ: CPT

## 2024-06-04 PROCEDURE — 99215 OFFICE O/P EST HI 40 MIN: CPT | Performed by: STUDENT IN AN ORGANIZED HEALTH CARE EDUCATION/TRAINING PROGRAM

## 2024-06-04 PROCEDURE — 80053 COMPREHEN METABOLIC PANEL: CPT

## 2024-06-04 PROCEDURE — 6360000002 HC RX W HCPCS: Performed by: STUDENT IN AN ORGANIZED HEALTH CARE EDUCATION/TRAINING PROGRAM

## 2024-06-04 PROCEDURE — 3074F SYST BP LT 130 MM HG: CPT | Performed by: STUDENT IN AN ORGANIZED HEALTH CARE EDUCATION/TRAINING PROGRAM

## 2024-06-04 PROCEDURE — 96375 TX/PRO/DX INJ NEW DRUG ADDON: CPT

## 2024-06-04 PROCEDURE — 2580000003 HC RX 258: Performed by: STUDENT IN AN ORGANIZED HEALTH CARE EDUCATION/TRAINING PROGRAM

## 2024-06-04 PROCEDURE — 36415 COLL VENOUS BLD VENIPUNCTURE: CPT

## 2024-06-04 PROCEDURE — 85025 COMPLETE CBC W/AUTO DIFF WBC: CPT

## 2024-06-04 PROCEDURE — 96413 CHEMO IV INFUSION 1 HR: CPT

## 2024-06-04 PROCEDURE — 84100 ASSAY OF PHOSPHORUS: CPT

## 2024-06-04 PROCEDURE — 1123F ACP DISCUSS/DSCN MKR DOCD: CPT | Performed by: STUDENT IN AN ORGANIZED HEALTH CARE EDUCATION/TRAINING PROGRAM

## 2024-06-04 PROCEDURE — 3078F DIAST BP <80 MM HG: CPT | Performed by: STUDENT IN AN ORGANIZED HEALTH CARE EDUCATION/TRAINING PROGRAM

## 2024-06-04 PROCEDURE — 6370000000 HC RX 637 (ALT 250 FOR IP): Performed by: NURSE PRACTITIONER

## 2024-06-04 RX ORDER — SODIUM CHLORIDE 9 MG/ML
5-250 INJECTION, SOLUTION INTRAVENOUS PRN
Status: DISCONTINUED | OUTPATIENT
Start: 2024-06-04 | End: 2024-06-05 | Stop reason: HOSPADM

## 2024-06-04 RX ORDER — POTASSIUM CHLORIDE 750 MG/1
40 TABLET, FILM COATED, EXTENDED RELEASE ORAL ONCE
Status: COMPLETED | OUTPATIENT
Start: 2024-06-04 | End: 2024-06-04

## 2024-06-04 RX ORDER — SODIUM CHLORIDE 0.9 % (FLUSH) 0.9 %
5-40 SYRINGE (ML) INJECTION PRN
Status: DISCONTINUED | OUTPATIENT
Start: 2024-06-04 | End: 2024-06-05 | Stop reason: HOSPADM

## 2024-06-04 RX ORDER — ONDANSETRON 2 MG/ML
8 INJECTION INTRAMUSCULAR; INTRAVENOUS ONCE
Status: COMPLETED | OUTPATIENT
Start: 2024-06-04 | End: 2024-06-04

## 2024-06-04 RX ADMIN — ENFORTUMAB VEDOTIN 60 MG: 20 INJECTION, POWDER, LYOPHILIZED, FOR SOLUTION INTRAVENOUS at 13:57

## 2024-06-04 RX ADMIN — SODIUM CHLORIDE 200 MG: 9 INJECTION, SOLUTION INTRAVENOUS at 14:38

## 2024-06-04 RX ADMIN — POTASSIUM CHLORIDE 40 MEQ: 750 TABLET, EXTENDED RELEASE ORAL at 14:41

## 2024-06-04 RX ADMIN — ONDANSETRON 8 MG: 2 INJECTION INTRAMUSCULAR; INTRAVENOUS at 12:28

## 2024-06-04 RX ADMIN — SODIUM CHLORIDE 25 ML/HR: 9 INJECTION, SOLUTION INTRAVENOUS at 12:28

## 2024-06-04 ASSESSMENT — PAIN SCALES - GENERAL: PAINLEVEL_OUTOF10: 6

## 2024-06-04 ASSESSMENT — PAIN DESCRIPTION - DESCRIPTORS: DESCRIPTORS: ACHING

## 2024-06-04 ASSESSMENT — PAIN DESCRIPTION - ORIENTATION: ORIENTATION: RIGHT;LEFT;LOWER

## 2024-06-04 ASSESSMENT — PAIN DESCRIPTION - LOCATION: LOCATION: BACK

## 2024-06-04 NOTE — PROGRESS NOTES
[facility] Visit Note    Pt arrived to [facility] ambulatory in no acute distress for Padcev/Keytruda C6D1.  Assessment completed.  R chest port accessed without issue and positive blood return noted.  Labs obtained: CBC, CMP and phosphorus.    Recent Results (from the past 12 hour(s))   Phosphorus    Collection Time: 06/04/24 11:26 AM   Result Value Ref Range    Phosphorus 3.4 2.6 - 4.7 MG/DL   Comprehensive metabolic panel    Collection Time: 06/04/24 11:26 AM   Result Value Ref Range    Sodium 137 136 - 145 mmol/L    Potassium 3.4 (L) 3.5 - 5.1 mmol/L    Chloride 95 (L) 97 - 108 mmol/L    CO2 39 (H) 21 - 32 mmol/L    Anion Gap 3 (L) 5 - 15 mmol/L    Glucose 165 (H) 65 - 100 mg/dL    BUN 18 6 - 20 MG/DL    Creatinine 0.88 0.70 - 1.30 MG/DL    BUN/Creatinine Ratio 20 12 - 20      Est, Glom Filt Rate >90 >60 ml/min/1.73m2    Calcium 8.4 (L) 8.5 - 10.1 MG/DL    Total Bilirubin 0.3 0.2 - 1.0 MG/DL    ALT 17 12 - 78 U/L    AST 22 15 - 37 U/L    Alk Phosphatase 74 45 - 117 U/L    Total Protein 6.4 6.4 - 8.2 g/dL    Albumin 2.8 (L) 3.5 - 5.0 g/dL    Globulin 3.6 2.0 - 4.0 g/dL    Albumin/Globulin Ratio 0.8 (L) 1.1 - 2.2     CBC With Auto Differential    Collection Time: 06/04/24 11:26 AM   Result Value Ref Range    WBC 5.8 4.1 - 11.1 K/uL    RBC 3.08 (L) 4.10 - 5.70 M/uL    Hemoglobin 9.0 (L) 12.1 - 17.0 g/dL    Hematocrit 28.1 (L) 36.6 - 50.3 %    MCV 91.2 80.0 - 99.0 FL    MCH 29.2 26.0 - 34.0 PG    MCHC 32.0 30.0 - 36.5 g/dL    RDW 17.2 (H) 11.5 - 14.5 %    Platelets 272 150 - 400 K/uL    MPV 9.2 8.9 - 12.9 FL    Nucleated RBCs 0.0 0  WBC    nRBC 0.00 0.00 - 0.01 K/uL    Neutrophils % 75 32 - 75 %    Lymphocytes % 12 12 - 49 %    Monocytes % 8 5 - 13 %    Eosinophils % 4 0 - 7 %    Basophils % 1 0 - 1 %    Immature Granulocytes % 0 0.0 - 0.5 %    Neutrophils Absolute 4.3 1.8 - 8.0 K/UL    Lymphocytes Absolute 0.7 (L) 0.8 - 3.5 K/UL    Monocytes Absolute 0.5 0.0 - 1.0 K/UL

## 2024-06-04 NOTE — PROGRESS NOTES
Jamel Hazel is a 68 y.o. male who presents for follow up of   Chief Complaint   Patient presents with    Follow-up     Bladder Cancer       The patient  reports more hoarseness.He reports fatigue,constipation, nausea, insomnia, decreased concentration, pain in back 6/10. He reports some mouth sores,numbness and tingling, and swelling in feet. He is currently on 3 liters of oxygen.       No interval hospitalizations reported    No interval surgery or procedures reported    No reported new medication changes reported       Medications reviewed with the patient, and chart updated to reflect changes.      
ELLIPTA) 62.5-25 MCG/ACT inhaler Inhale 1 puff into the lungs daily      melatonin 10 MG CAPS capsule Take 1 capsule by mouth nightly 30 capsule 0    predniSONE (DELTASONE) 10 MG tablet Take 1 tablet by mouth daily (Patient not taking: Reported on 4/3/2024)      amoxicillin-clavulanate (AUGMENTIN) 875-125 MG per tablet Take 1 tablet by mouth 2 times daily (Patient not taking: Reported on 4/3/2024)      alfuzosin (UROXATRAL) 10 MG extended release tablet Take 1 tablet by mouth daily (Patient not taking: Reported on 2/21/2024)      diclofenac sodium (VOLTAREN) 1 % GEL Apply 2 g topically 4 times daily (Patient not taking: Reported on 2/21/2024)      gabapentin (NEURONTIN) 100 MG capsule Take 2 capsules by mouth 3 times daily for 90 doses. Max Daily Amount: 600 mg 90 capsule 0    trospium (SANCTURA) 20 MG tablet Take 1 tablet by mouth daily (Patient not taking: Reported on 2/21/2024) 30 tablet 0    ranolazine (RANEXA) 500 MG extended release tablet Take 1 tablet by mouth 2 times daily (Patient not taking: Reported on 2/21/2024) 60 tablet 3    simethicone (MYLICON) 80 MG chewable tablet Take 1 tablet by mouth every 6 hours as needed for Flatulence (Patient not taking: Reported on 1/22/2024) 180 tablet 0    furosemide (LASIX) 20 MG tablet Take 20 mg by mouth daily as needed (Patient not taking: Reported on 1/22/2024)       No current facility-administered medications for this visit.     Facility-Administered Medications Ordered in Other Visits   Medication Dose Route Frequency Provider Last Rate Last Admin    0.9 % sodium chloride infusion  5-250 mL/hr IntraVENous PRN Myrtle Dubose MD 25 mL/hr at 06/04/24 1228 25 mL/hr at 06/04/24 1228    enfortumab vedotin-ejfv (PADCEV) 60 mg in sodium chloride 0.9 % 100 mL chemo IVPB  1.125 mg/kg (Order-Specific) IntraVENous Once Myrtle Dubose MD   Stopped at 06/04/24 1427    pembrolizumab (KEYTRUDA) 200 mg in sodium chloride 0.9 % 100 mL IVPB  200 mg IntraVENous Once Myrtle Dubose,

## 2024-06-11 ENCOUNTER — HOSPITAL ENCOUNTER (OUTPATIENT)
Facility: HOSPITAL | Age: 69
Setting detail: INFUSION SERIES
Discharge: HOME | End: 2024-06-11
Payer: MEDICARE

## 2024-06-11 VITALS
DIASTOLIC BLOOD PRESSURE: 75 MMHG | HEART RATE: 77 BPM | WEIGHT: 110 LBS | SYSTOLIC BLOOD PRESSURE: 119 MMHG | BODY MASS INDEX: 15.75 KG/M2 | HEIGHT: 70 IN | RESPIRATION RATE: 16 BRPM | TEMPERATURE: 97.9 F | OXYGEN SATURATION: 97 %

## 2024-06-11 DIAGNOSIS — C67.9 STAGE IV BLADDER CANCER (HCC): Primary | ICD-10-CM

## 2024-06-11 DIAGNOSIS — T80.212A LOCAL INFECTION DUE TO PORT-A-CATH, INITIAL ENCOUNTER: Primary | ICD-10-CM

## 2024-06-11 LAB
ANION GAP SERPL CALC-SCNC: 3 MMOL/L (ref 5–15)
BASOPHILS # BLD: 0 K/UL (ref 0–0.1)
BASOPHILS NFR BLD: 1 % (ref 0–1)
BUN SERPL-MCNC: 18 MG/DL (ref 6–20)
BUN/CREAT SERPL: 25 (ref 12–20)
CALCIUM SERPL-MCNC: 8.9 MG/DL (ref 8.5–10.1)
CHLORIDE SERPL-SCNC: 97 MMOL/L (ref 97–108)
CO2 SERPL-SCNC: 38 MMOL/L (ref 21–32)
CREAT SERPL-MCNC: 0.73 MG/DL (ref 0.7–1.3)
DIFFERENTIAL METHOD BLD: ABNORMAL
EOSINOPHIL # BLD: 0.1 K/UL (ref 0–0.4)
EOSINOPHIL NFR BLD: 3 % (ref 0–7)
ERYTHROCYTE [DISTWIDTH] IN BLOOD BY AUTOMATED COUNT: 17.2 % (ref 11.5–14.5)
GLUCOSE SERPL-MCNC: 121 MG/DL (ref 65–100)
HCT VFR BLD AUTO: 30.9 % (ref 36.6–50.3)
HGB BLD-MCNC: 9.7 G/DL (ref 12.1–17)
IMM GRANULOCYTES # BLD AUTO: 0 K/UL (ref 0–0.04)
IMM GRANULOCYTES NFR BLD AUTO: 0 % (ref 0–0.5)
LYMPHOCYTES # BLD: 0.9 K/UL (ref 0.8–3.5)
LYMPHOCYTES NFR BLD: 19 % (ref 12–49)
MCH RBC QN AUTO: 28.6 PG (ref 26–34)
MCHC RBC AUTO-ENTMCNC: 31.4 G/DL (ref 30–36.5)
MCV RBC AUTO: 91.2 FL (ref 80–99)
MONOCYTES # BLD: 0.5 K/UL (ref 0–1)
MONOCYTES NFR BLD: 11 % (ref 5–13)
NEUTS SEG # BLD: 3.3 K/UL (ref 1.8–8)
NEUTS SEG NFR BLD: 66 % (ref 32–75)
NRBC # BLD: 0 K/UL (ref 0–0.01)
NRBC BLD-RTO: 0 PER 100 WBC
PHOSPHATE SERPL-MCNC: 3.1 MG/DL (ref 2.6–4.7)
PLATELET # BLD AUTO: 296 K/UL (ref 150–400)
PMV BLD AUTO: 8.7 FL (ref 8.9–12.9)
POTASSIUM SERPL-SCNC: 3.7 MMOL/L (ref 3.5–5.1)
RBC # BLD AUTO: 3.39 M/UL (ref 4.1–5.7)
SODIUM SERPL-SCNC: 138 MMOL/L (ref 136–145)
WBC # BLD AUTO: 4.9 K/UL (ref 4.1–11.1)

## 2024-06-11 PROCEDURE — 84100 ASSAY OF PHOSPHORUS: CPT

## 2024-06-11 PROCEDURE — 85025 COMPLETE CBC W/AUTO DIFF WBC: CPT

## 2024-06-11 PROCEDURE — 2580000003 HC RX 258: Performed by: STUDENT IN AN ORGANIZED HEALTH CARE EDUCATION/TRAINING PROGRAM

## 2024-06-11 PROCEDURE — 96413 CHEMO IV INFUSION 1 HR: CPT

## 2024-06-11 PROCEDURE — 6360000002 HC RX W HCPCS: Performed by: STUDENT IN AN ORGANIZED HEALTH CARE EDUCATION/TRAINING PROGRAM

## 2024-06-11 PROCEDURE — 96375 TX/PRO/DX INJ NEW DRUG ADDON: CPT

## 2024-06-11 PROCEDURE — 36415 COLL VENOUS BLD VENIPUNCTURE: CPT

## 2024-06-11 PROCEDURE — 80048 BASIC METABOLIC PNL TOTAL CA: CPT

## 2024-06-11 RX ORDER — CEPHALEXIN 500 MG/1
500 CAPSULE ORAL 4 TIMES DAILY
Qty: 28 CAPSULE | Refills: 0 | Status: SHIPPED | OUTPATIENT
Start: 2024-06-11 | End: 2024-06-18

## 2024-06-11 RX ORDER — ONDANSETRON 2 MG/ML
8 INJECTION INTRAMUSCULAR; INTRAVENOUS ONCE
Status: COMPLETED | OUTPATIENT
Start: 2024-06-11 | End: 2024-06-11

## 2024-06-11 RX ORDER — SODIUM CHLORIDE 0.9 % (FLUSH) 0.9 %
5-40 SYRINGE (ML) INJECTION PRN
Status: DISCONTINUED | OUTPATIENT
Start: 2024-06-11 | End: 2024-06-12 | Stop reason: HOSPADM

## 2024-06-11 RX ORDER — SODIUM CHLORIDE 9 MG/ML
5-250 INJECTION, SOLUTION INTRAVENOUS PRN
Status: DISCONTINUED | OUTPATIENT
Start: 2024-06-11 | End: 2024-06-12 | Stop reason: HOSPADM

## 2024-06-11 RX ADMIN — ENFORTUMAB VEDOTIN 60 MG: 20 INJECTION, POWDER, LYOPHILIZED, FOR SOLUTION INTRAVENOUS at 13:32

## 2024-06-11 RX ADMIN — SODIUM CHLORIDE 25 ML/HR: 9 INJECTION, SOLUTION INTRAVENOUS at 12:47

## 2024-06-11 RX ADMIN — ONDANSETRON 8 MG: 2 INJECTION INTRAMUSCULAR; INTRAVENOUS at 12:47

## 2024-06-11 NOTE — PROGRESS NOTES
Name: Jamel Hazel    MRN: [id number]         : 1955    Mr. Hazel Arrived ambulatory and in no distress for C6 D8 of Regimen. Assessment was completed, patient's port is red, mildly swollen, and the top two prongs of the port are protruding through the skin. MD notified. Patient voiced some blurry vision, SOB on exertion, he also has +1 pitting edema around his ankles and feet. Other than that, no other issues noted. 24 G PIV established in right AC, labs drawn & sent for processing.     Patient proceed to appointment with Kandace OMALLEY - plan - 5 days of antibiotics for port.- Not using port today for treatment.     Patient Vitals for the past 24 hrs:   BP Temp Temp src Pulse Resp SpO2 Height Weight   24 1402 119/75 -- -- 77 -- 97 % -- --   24 1110 98/64 97.9 °F (36.6 °C) Temporal 82 16 98 % 1.778 m (5' 10\") 49.9 kg (110 lb)        Lab results were obtained and reviewed.  Recent Results (from the past 12 hour(s))   Phosphorus    Collection Time: 24 11:08 AM   Result Value Ref Range    Phosphorus 3.1 2.6 - 4.7 MG/DL   Basic metabolic panel    Collection Time: 24 11:08 AM   Result Value Ref Range    Sodium 138 136 - 145 mmol/L    Potassium 3.7 3.5 - 5.1 mmol/L    Chloride 97 97 - 108 mmol/L    CO2 38 (H) 21 - 32 mmol/L    Anion Gap 3 (L) 5 - 15 mmol/L    Glucose 121 (H) 65 - 100 mg/dL    BUN 18 6 - 20 MG/DL    Creatinine 0.73 0.70 - 1.30 MG/DL    BUN/Creatinine Ratio 25 (H) 12 - 20      Est, Glom Filt Rate >90 >60 ml/min/1.73m2    Calcium 8.9 8.5 - 10.1 MG/DL   CBC With Auto Differential    Collection Time: 24 11:08 AM   Result Value Ref Range    WBC 4.9 4.1 - 11.1 K/uL    RBC 3.39 (L) 4.10 - 5.70 M/uL    Hemoglobin 9.7 (L) 12.1 - 17.0 g/dL    Hematocrit 30.9 (L) 36.6 - 50.3 %    MCV 91.2 80.0 - 99.0 FL    MCH 28.6 26.0 - 34.0 PG    MCHC 31.4 30.0 - 36.5 g/dL    RDW 17.2 (H) 11.5 - 14.5 %    Platelets 296 150 - 400 K/uL    MPV 8.7 (L) 8.9 - 12.9 FL    Nucleated RBCs 0.0 0 PER

## 2024-06-11 NOTE — PROGRESS NOTES
Patient came over for possible port infection. Erythema noted around port, RN reports purulent drainage when attempting to access. No reported fever.   Assessed with Dr. Dubose. Plan to start keflex, prescribed Q6 hours. Patient understands that if he cannot tolerate taking keflex 4 times daily, that he can take it TID. Recommend monitoring and notifying clinic for any worsening symptoms. If not improving in the next 5 days then will consider port removal.     Keflex sent to local pharmacy, denies any known allergy.

## 2024-06-12 RX ORDER — ONDANSETRON 4 MG/1
TABLET, ORALLY DISINTEGRATING ORAL
Qty: 21 TABLET | Refills: 0 | Status: SHIPPED | OUTPATIENT
Start: 2024-06-12

## 2024-06-17 RX ORDER — DIPHENHYDRAMINE HYDROCHLORIDE 50 MG/ML
50 INJECTION INTRAMUSCULAR; INTRAVENOUS
Status: CANCELLED | OUTPATIENT
Start: 2024-07-02

## 2024-06-17 RX ORDER — ONDANSETRON 2 MG/ML
8 INJECTION INTRAMUSCULAR; INTRAVENOUS ONCE
Status: CANCELLED | OUTPATIENT
Start: 2024-07-02 | End: 2024-07-02

## 2024-06-17 RX ORDER — SODIUM CHLORIDE 9 MG/ML
5-250 INJECTION, SOLUTION INTRAVENOUS PRN
Status: CANCELLED | OUTPATIENT
Start: 2024-06-25

## 2024-06-17 RX ORDER — EPINEPHRINE 1 MG/ML
0.3 INJECTION, SOLUTION INTRAMUSCULAR; SUBCUTANEOUS PRN
Status: CANCELLED | OUTPATIENT
Start: 2024-07-02

## 2024-06-17 RX ORDER — HEPARIN 100 UNIT/ML
500 SYRINGE INTRAVENOUS PRN
Status: CANCELLED | OUTPATIENT
Start: 2024-07-02

## 2024-06-17 RX ORDER — ONDANSETRON 2 MG/ML
8 INJECTION INTRAMUSCULAR; INTRAVENOUS
Status: CANCELLED | OUTPATIENT
Start: 2024-06-25

## 2024-06-17 RX ORDER — DIPHENHYDRAMINE HYDROCHLORIDE 50 MG/ML
50 INJECTION INTRAMUSCULAR; INTRAVENOUS
Status: CANCELLED | OUTPATIENT
Start: 2024-06-25

## 2024-06-17 RX ORDER — PROCHLORPERAZINE EDISYLATE 5 MG/ML
10 INJECTION INTRAMUSCULAR; INTRAVENOUS
Status: CANCELLED | OUTPATIENT
Start: 2024-06-25

## 2024-06-17 RX ORDER — PROCHLORPERAZINE EDISYLATE 5 MG/ML
10 INJECTION INTRAMUSCULAR; INTRAVENOUS
Status: CANCELLED | OUTPATIENT
Start: 2024-07-02

## 2024-06-17 RX ORDER — ALBUTEROL SULFATE 90 UG/1
4 AEROSOL, METERED RESPIRATORY (INHALATION) PRN
Status: CANCELLED | OUTPATIENT
Start: 2024-06-25

## 2024-06-17 RX ORDER — HEPARIN 100 UNIT/ML
500 SYRINGE INTRAVENOUS PRN
Status: CANCELLED | OUTPATIENT
Start: 2024-06-25

## 2024-06-17 RX ORDER — SODIUM CHLORIDE 9 MG/ML
INJECTION, SOLUTION INTRAVENOUS CONTINUOUS
Status: CANCELLED | OUTPATIENT
Start: 2024-07-02

## 2024-06-17 RX ORDER — EPINEPHRINE 1 MG/ML
0.3 INJECTION, SOLUTION INTRAMUSCULAR; SUBCUTANEOUS PRN
Status: CANCELLED | OUTPATIENT
Start: 2024-06-25

## 2024-06-17 RX ORDER — SODIUM CHLORIDE 9 MG/ML
INJECTION, SOLUTION INTRAVENOUS CONTINUOUS
Status: CANCELLED | OUTPATIENT
Start: 2024-06-25

## 2024-06-17 RX ORDER — SODIUM CHLORIDE 0.9 % (FLUSH) 0.9 %
5-40 SYRINGE (ML) INJECTION PRN
Status: CANCELLED | OUTPATIENT
Start: 2024-07-02

## 2024-06-17 RX ORDER — ACETAMINOPHEN 325 MG/1
650 TABLET ORAL
Status: CANCELLED | OUTPATIENT
Start: 2024-07-02

## 2024-06-17 RX ORDER — ONDANSETRON 2 MG/ML
8 INJECTION INTRAMUSCULAR; INTRAVENOUS
Status: CANCELLED | OUTPATIENT
Start: 2024-07-02

## 2024-06-17 RX ORDER — ACETAMINOPHEN 325 MG/1
650 TABLET ORAL
Status: CANCELLED | OUTPATIENT
Start: 2024-06-25

## 2024-06-17 RX ORDER — SODIUM CHLORIDE 9 MG/ML
5-250 INJECTION, SOLUTION INTRAVENOUS PRN
Status: CANCELLED | OUTPATIENT
Start: 2024-07-02

## 2024-06-17 RX ORDER — ALBUTEROL SULFATE 90 UG/1
4 AEROSOL, METERED RESPIRATORY (INHALATION) PRN
Status: CANCELLED | OUTPATIENT
Start: 2024-07-02

## 2024-06-21 ENCOUNTER — APPOINTMENT (OUTPATIENT)
Facility: HOSPITAL | Age: 69
End: 2024-06-21
Payer: MEDICARE

## 2024-06-21 ENCOUNTER — HOSPITAL ENCOUNTER (EMERGENCY)
Facility: HOSPITAL | Age: 69
Discharge: HOME | End: 2024-06-21
Payer: MEDICARE

## 2024-06-21 VITALS
SYSTOLIC BLOOD PRESSURE: 90 MMHG | WEIGHT: 102.07 LBS | OXYGEN SATURATION: 100 % | HEIGHT: 70 IN | BODY MASS INDEX: 14.61 KG/M2 | HEART RATE: 62 BPM | RESPIRATION RATE: 18 BRPM | DIASTOLIC BLOOD PRESSURE: 67 MMHG | TEMPERATURE: 98.4 F

## 2024-06-21 DIAGNOSIS — T83.022A NEPHROSTOMY TUBE DISPLACED (HCC): Primary | ICD-10-CM

## 2024-06-21 DIAGNOSIS — L03.115 CELLULITIS OF RIGHT LOWER LEG: ICD-10-CM

## 2024-06-21 PROCEDURE — 96375 TX/PRO/DX INJ NEW DRUG ADDON: CPT

## 2024-06-21 PROCEDURE — 2580000003 HC RX 258: Performed by: STUDENT IN AN ORGANIZED HEALTH CARE EDUCATION/TRAINING PROGRAM

## 2024-06-21 PROCEDURE — 2500000003 HC RX 250 WO HCPCS: Performed by: STUDENT IN AN ORGANIZED HEALTH CARE EDUCATION/TRAINING PROGRAM

## 2024-06-21 PROCEDURE — 50435 EXCHANGE NEPHROSTOMY CATH: CPT

## 2024-06-21 PROCEDURE — 96365 THER/PROPH/DIAG IV INF INIT: CPT

## 2024-06-21 PROCEDURE — C1769 GUIDE WIRE: HCPCS

## 2024-06-21 PROCEDURE — 6360000002 HC RX W HCPCS: Performed by: STUDENT IN AN ORGANIZED HEALTH CARE EDUCATION/TRAINING PROGRAM

## 2024-06-21 PROCEDURE — 99285 EMERGENCY DEPT VISIT HI MDM: CPT

## 2024-06-21 PROCEDURE — 6360000004 HC RX CONTRAST MEDICATION: Performed by: STUDENT IN AN ORGANIZED HEALTH CARE EDUCATION/TRAINING PROGRAM

## 2024-06-21 RX ORDER — CEPHALEXIN 500 MG/1
500 CAPSULE ORAL 4 TIMES DAILY
Qty: 28 CAPSULE | Refills: 0 | Status: SHIPPED | OUTPATIENT
Start: 2024-06-21 | End: 2024-06-28

## 2024-06-21 RX ORDER — FENTANYL CITRATE 50 UG/ML
100 INJECTION, SOLUTION INTRAMUSCULAR; INTRAVENOUS
Status: DISCONTINUED | OUTPATIENT
Start: 2024-06-21 | End: 2024-06-21 | Stop reason: HOSPADM

## 2024-06-21 RX ORDER — CEPHALEXIN 500 MG/1
500 CAPSULE ORAL 4 TIMES DAILY
Qty: 28 CAPSULE | Refills: 0 | Status: SHIPPED | OUTPATIENT
Start: 2024-06-21 | End: 2024-06-21

## 2024-06-21 RX ORDER — FENTANYL CITRATE 50 UG/ML
INJECTION, SOLUTION INTRAMUSCULAR; INTRAVENOUS PRN
Status: COMPLETED | OUTPATIENT
Start: 2024-06-21 | End: 2024-06-21

## 2024-06-21 RX ORDER — MIDAZOLAM HYDROCHLORIDE 5 MG/5ML
INJECTION, SOLUTION INTRAMUSCULAR; INTRAVENOUS PRN
Status: COMPLETED | OUTPATIENT
Start: 2024-06-21 | End: 2024-06-21

## 2024-06-21 RX ORDER — LIDOCAINE HYDROCHLORIDE 20 MG/ML
20 INJECTION, SOLUTION INFILTRATION; PERINEURAL ONCE
Status: COMPLETED | OUTPATIENT
Start: 2024-06-21 | End: 2024-06-21

## 2024-06-21 RX ORDER — MIDAZOLAM HYDROCHLORIDE 5 MG/5ML
5 INJECTION, SOLUTION INTRAMUSCULAR; INTRAVENOUS
Status: DISCONTINUED | OUTPATIENT
Start: 2024-06-21 | End: 2024-06-21 | Stop reason: HOSPADM

## 2024-06-21 RX ORDER — HEPARIN SODIUM 200 [USP'U]/100ML
200 INJECTION, SOLUTION INTRAVENOUS ONCE
Status: DISCONTINUED | OUTPATIENT
Start: 2024-06-21 | End: 2024-06-21 | Stop reason: HOSPADM

## 2024-06-21 RX ADMIN — MIDAZOLAM HYDROCHLORIDE 1 MG: 1 INJECTION, SOLUTION INTRAMUSCULAR; INTRAVENOUS at 12:10

## 2024-06-21 RX ADMIN — FENTANYL CITRATE 25 MCG: 50 INJECTION, SOLUTION INTRAMUSCULAR; INTRAVENOUS at 12:14

## 2024-06-21 RX ADMIN — IOPAMIDOL 20 ML: 755 INJECTION, SOLUTION INTRAVENOUS at 12:21

## 2024-06-21 RX ADMIN — HEPARIN SODIUM 1000 UNITS: 200 INJECTION, SOLUTION INTRAVENOUS at 12:22

## 2024-06-21 RX ADMIN — SODIUM CHLORIDE 1000 MG: 900 INJECTION INTRAVENOUS at 10:51

## 2024-06-21 RX ADMIN — FENTANYL CITRATE 25 MCG: 50 INJECTION, SOLUTION INTRAMUSCULAR; INTRAVENOUS at 12:12

## 2024-06-21 RX ADMIN — LIDOCAINE HYDROCHLORIDE 20 ML: 20 INJECTION, SOLUTION INFILTRATION; PERINEURAL at 12:22

## 2024-06-21 NOTE — DISCHARGE INSTRUCTIONS
Thank You!    It was a pleasure taking care of you in our Emergency Department today. We know that when you come to our Emergency Department, you are entrusting us with your health, comfort, and safety. Our physicians and nurses honor that trust, and truly appreciate the opportunity to care for you and your loved ones.      We also value your feedback. If you receive a survey about your Emergency Department experience today, please fill it out.  We care about our patients' feedback, and we listen to what you have to say.  Thank you.    Russel Monsalve PA-C      ________________________________________________________________________  I have included a copy of your lab results and/or radiologic studies from today's visit so you can have them easily available at your follow-up visit. We hope you feel better and please do not hesitate to contact the ED if you have any questions at all!    No results found for this or any previous visit (from the past 12 hour(s)).    IR GUIDED NEPHROSTOMY CATH PLACEMENT RIGHT    (Results Pending)   IR GUIDED NEPHROSTOMY CATH EXCHANGE    (Results Pending)     [unfilled]  The exam and treatment you received in the Emergency Department were for an urgent problem and are not intended as complete care. It is important that you follow up with a doctor, nurse practitioner, or physician assistant for ongoing care. If your symptoms become worse or you do not improve as expected and you are unable to reach your usual health care provider, you should return to the Emergency Department. We are available 24 hours a day.    Please take your discharge instructions with you when you go to your follow-up appointment.     If a prescription has been provided, please have it filled as soon as possible to prevent a delay in treatment. Read the entire medication instruction sheet provided to you by the pharmacy. If you have any questions or reservations about taking the medication due to side effects or

## 2024-06-21 NOTE — ED NOTES
Pt discharged by Sriram VAUGHN. Discharge instructions discussed and pt given opportunity to ask questions. Pt taken to waiting room by wheelchair.

## 2024-06-21 NOTE — CONSULTS
Consulted received for dislodged PCN tube   Pt presented to ER with complaints of dislodged PCN since last night (apparently dislodged when he got into bed). Recommend IR consult for placement of new PCN. FU with [facility] as previously planned. OK to DC once exchanged from urology standpoint.       Call if needed.        Known to [facility] by Dr. Beaver, seen last in May of this year. Full note from that visit as below:  \"Randolph Hazel is a 68 year old male who presents today for \"Nephrostomy tube and stent placement f/u\". Patient with bilateral nephrostomy tubes.  These were exchanged in February and then again in mid April.  Interventional radiology was able to remove his left ureteral stent at that nephrostomy tube exchange.  Doing well without his Gonzalez catheter.  Positioning is much more comfortable for him now..  Has a left ureteral stent placed by Dr. Alexander at time of bladder biopsy from January 5.  Pathology returned as invasive high-grade urothelial carcinoma with plasmacytoid features.  Followed by medical oncology.  He is thought to have metastatic disease encasing the colon.  Had a positive biopsy in the rectal mucosa related to that.  He has started chemotherapy and immunotherapy.  He does remain oxygen dependent.  KUB reviewed today.  Bilateral nephrostomy tubes noted.  Left ureteral stent has been removed.  No encrustation noted.    Recent oncology note indicates stability of disease on multiple therapeutic medications.  Patient was having some leakage from his nephrostomy sites.  Still has some urine per bladder.    PAST MEDICAL HISTORY:    Allergies: NAPROXEN SODIUM (NAPROXEN SODIUM) (Critical)  SULFA (SULFADIAZINE) (Severe)  DENIES: Latex, Shellfish, X-Ray Dye, Iodine.     Medications: morphine ER 15 mg tablet,extended release (morphine)   * magic mouthwash   oxybutynin chloride ER 5 mg tablet,extended release 24 hr (oxybutynin chloride) Take 1 tablet by mouth once a day  oxycodone-acetaminophen 5 mg-325 mg

## 2024-06-21 NOTE — ED PROVIDER NOTES
GUIDED NEPHROSTOMY CATH PLACEMENT RIGHT    Result Date: 6/21/2024  Clinical Data: 68-year-old patient presents for replacement of right-sided nephrostomy tube, which fell out, and routine exchange of the left-sided tube Date:  6/21/2024 Operators: Leo Hernandez M.D. Estimated Blood Loss:Minimal Specimens Removed: Indwelling bilateral nephrostomy tubes removed, intact Complications:None Procedure: 1. IV Conscious sedation. 2. Left Pyelogram via the indwelling percutaneous nephrostomy tube. 3. Exchange of the left percutaneous nephrostomy tube over wire for a new 8 F percutaneous nephrostomy tube. 4. Post procedural left pyelogram. 5. Recanalization of right-sided nephrostomy access with an angled-tip catheter, with right-sided pyelogram. 6. Placement of the right percutaneous nephrostomy tube over wire for a new 8 F percutaneous nephrostomy tube. 7. Post procedural right pyelogram. Moderate intravenous conscious sedation was supervised by Dr. Hernandez. The patient was independently monitored by a registered nurse assigned to the Department of Radiology using automated blood pressure, EKG, and pulse oximetry. The detail conscious sedation record is stored in the hospital information system. Medication: Versed: 1 mg Fentanyl: 50 mcg Intraprocedure time: 25 minutes FLUOROSCOPY TIME: 1.3 min FLUOROSCOPY DOSE (air kerma): 5.9 mGy Technique: Informed verbal and written consent was obtained from the patient after discussion of risks, benefits, and alternatives to this procedure. The patient was placed in a prone position on the fluoroscopy table. The area around and including the indwelling percutaneous nephrostomy tubes was prepped and draped in the usual sterile fashion. Contrast injection via the left indwelling tube confirms to patency and shows that the tube is positioned in the renal pelvis. The tube was removed over an Amplatz wire. A new 8 [place] nephrostomy tube was advanced over the wire, and the pigtail formed

## 2024-06-25 ENCOUNTER — OFFICE VISIT (OUTPATIENT)
Age: 69
End: 2024-06-25
Payer: MEDICARE

## 2024-06-25 ENCOUNTER — HOSPITAL ENCOUNTER (OUTPATIENT)
Facility: HOSPITAL | Age: 69
Setting detail: INFUSION SERIES
Discharge: HOME | End: 2024-06-25
Payer: MEDICARE

## 2024-06-25 VITALS
HEART RATE: 96 BPM | OXYGEN SATURATION: 97 % | HEIGHT: 70 IN | BODY MASS INDEX: 14.75 KG/M2 | SYSTOLIC BLOOD PRESSURE: 96 MMHG | TEMPERATURE: 97.1 F | DIASTOLIC BLOOD PRESSURE: 61 MMHG | WEIGHT: 103 LBS

## 2024-06-25 VITALS
TEMPERATURE: 97.1 F | OXYGEN SATURATION: 94 % | SYSTOLIC BLOOD PRESSURE: 99 MMHG | BODY MASS INDEX: 14.75 KG/M2 | HEART RATE: 90 BPM | RESPIRATION RATE: 18 BRPM | WEIGHT: 103 LBS | HEIGHT: 70 IN | DIASTOLIC BLOOD PRESSURE: 62 MMHG

## 2024-06-25 DIAGNOSIS — Z51.12 ENCOUNTER FOR ANTINEOPLASTIC IMMUNOTHERAPY: ICD-10-CM

## 2024-06-25 DIAGNOSIS — C67.9 STAGE IV BLADDER CANCER (HCC): Primary | ICD-10-CM

## 2024-06-25 DIAGNOSIS — Z51.11 ENCOUNTER FOR ANTINEOPLASTIC CHEMOTHERAPY: ICD-10-CM

## 2024-06-25 LAB
ALBUMIN SERPL-MCNC: 3.3 G/DL (ref 3.5–5)
ALBUMIN/GLOB SERPL: 0.8 (ref 1.1–2.2)
ALP SERPL-CCNC: 74 U/L (ref 45–117)
ALT SERPL-CCNC: 21 U/L (ref 12–78)
ANION GAP SERPL CALC-SCNC: 2 MMOL/L (ref 5–15)
AST SERPL-CCNC: 26 U/L (ref 15–37)
BASOPHILS # BLD: 0.1 K/UL (ref 0–0.1)
BASOPHILS NFR BLD: 1 % (ref 0–1)
BILIRUB SERPL-MCNC: 0.6 MG/DL (ref 0.2–1)
BUN SERPL-MCNC: 15 MG/DL (ref 6–20)
BUN/CREAT SERPL: 19 (ref 12–20)
CALCIUM SERPL-MCNC: 9.6 MG/DL (ref 8.5–10.1)
CHLORIDE SERPL-SCNC: 89 MMOL/L (ref 97–108)
CO2 SERPL-SCNC: 43 MMOL/L (ref 21–32)
COMMENT:: NORMAL
CREAT SERPL-MCNC: 0.79 MG/DL (ref 0.7–1.3)
DIFFERENTIAL METHOD BLD: ABNORMAL
EOSINOPHIL # BLD: 0.1 K/UL (ref 0–0.4)
EOSINOPHIL NFR BLD: 2 % (ref 0–7)
ERYTHROCYTE [DISTWIDTH] IN BLOOD BY AUTOMATED COUNT: 17 % (ref 11.5–14.5)
GLOBULIN SER CALC-MCNC: 3.9 G/DL (ref 2–4)
GLUCOSE SERPL-MCNC: 160 MG/DL (ref 65–100)
HCT VFR BLD AUTO: 33.7 % (ref 36.6–50.3)
HGB BLD-MCNC: 10.7 G/DL (ref 12.1–17)
IMM GRANULOCYTES # BLD AUTO: 0 K/UL (ref 0–0.04)
IMM GRANULOCYTES NFR BLD AUTO: 0 % (ref 0–0.5)
LYMPHOCYTES # BLD: 0.8 K/UL (ref 0.8–3.5)
LYMPHOCYTES NFR BLD: 15 % (ref 12–49)
MCH RBC QN AUTO: 29.3 PG (ref 26–34)
MCHC RBC AUTO-ENTMCNC: 31.8 G/DL (ref 30–36.5)
MCV RBC AUTO: 92.3 FL (ref 80–99)
MONOCYTES # BLD: 0.4 K/UL (ref 0–1)
MONOCYTES NFR BLD: 8 % (ref 5–13)
NEUTS SEG # BLD: 3.7 K/UL (ref 1.8–8)
NEUTS SEG NFR BLD: 74 % (ref 32–75)
NRBC # BLD: 0 K/UL (ref 0–0.01)
NRBC BLD-RTO: 0 PER 100 WBC
PHOSPHATE SERPL-MCNC: 2.9 MG/DL (ref 2.6–4.7)
PLATELET # BLD AUTO: 307 K/UL (ref 150–400)
PMV BLD AUTO: 9.4 FL (ref 8.9–12.9)
POTASSIUM SERPL-SCNC: 3.7 MMOL/L (ref 3.5–5.1)
PROT SERPL-MCNC: 7.2 G/DL (ref 6.4–8.2)
RBC # BLD AUTO: 3.65 M/UL (ref 4.1–5.7)
RBC MORPH BLD: ABNORMAL
SODIUM SERPL-SCNC: 134 MMOL/L (ref 136–145)
SPECIMEN HOLD: NORMAL
T4 FREE SERPL-MCNC: 1.1 NG/DL (ref 0.8–1.5)
TSH SERPL DL<=0.05 MIU/L-ACNC: 2.86 UIU/ML (ref 0.36–3.74)
WBC # BLD AUTO: 5.1 K/UL (ref 4.1–11.1)

## 2024-06-25 PROCEDURE — 3078F DIAST BP <80 MM HG: CPT | Performed by: STUDENT IN AN ORGANIZED HEALTH CARE EDUCATION/TRAINING PROGRAM

## 2024-06-25 PROCEDURE — 6360000002 HC RX W HCPCS: Performed by: NURSE PRACTITIONER

## 2024-06-25 PROCEDURE — 84439 ASSAY OF FREE THYROXINE: CPT

## 2024-06-25 PROCEDURE — 96413 CHEMO IV INFUSION 1 HR: CPT

## 2024-06-25 PROCEDURE — 6360000002 HC RX W HCPCS: Performed by: STUDENT IN AN ORGANIZED HEALTH CARE EDUCATION/TRAINING PROGRAM

## 2024-06-25 PROCEDURE — 99215 OFFICE O/P EST HI 40 MIN: CPT | Performed by: STUDENT IN AN ORGANIZED HEALTH CARE EDUCATION/TRAINING PROGRAM

## 2024-06-25 PROCEDURE — 36415 COLL VENOUS BLD VENIPUNCTURE: CPT

## 2024-06-25 PROCEDURE — 1123F ACP DISCUSS/DSCN MKR DOCD: CPT | Performed by: STUDENT IN AN ORGANIZED HEALTH CARE EDUCATION/TRAINING PROGRAM

## 2024-06-25 PROCEDURE — 84100 ASSAY OF PHOSPHORUS: CPT

## 2024-06-25 PROCEDURE — 96417 CHEMO IV INFUS EACH ADDL SEQ: CPT

## 2024-06-25 PROCEDURE — 3074F SYST BP LT 130 MM HG: CPT | Performed by: STUDENT IN AN ORGANIZED HEALTH CARE EDUCATION/TRAINING PROGRAM

## 2024-06-25 PROCEDURE — 2580000003 HC RX 258: Performed by: STUDENT IN AN ORGANIZED HEALTH CARE EDUCATION/TRAINING PROGRAM

## 2024-06-25 PROCEDURE — 80053 COMPREHEN METABOLIC PANEL: CPT

## 2024-06-25 PROCEDURE — 2580000003 HC RX 258: Performed by: NURSE PRACTITIONER

## 2024-06-25 PROCEDURE — 96375 TX/PRO/DX INJ NEW DRUG ADDON: CPT

## 2024-06-25 PROCEDURE — 84443 ASSAY THYROID STIM HORMONE: CPT

## 2024-06-25 PROCEDURE — 85025 COMPLETE CBC W/AUTO DIFF WBC: CPT

## 2024-06-25 RX ORDER — ONDANSETRON 2 MG/ML
8 INJECTION INTRAMUSCULAR; INTRAVENOUS ONCE
Status: COMPLETED | OUTPATIENT
Start: 2024-06-25 | End: 2024-06-25

## 2024-06-25 RX ORDER — SODIUM CHLORIDE 9 MG/ML
5-250 INJECTION, SOLUTION INTRAVENOUS PRN
Status: DISCONTINUED | OUTPATIENT
Start: 2024-06-25 | End: 2024-06-26 | Stop reason: HOSPADM

## 2024-06-25 RX ORDER — SODIUM CHLORIDE 0.9 % (FLUSH) 0.9 %
5-40 SYRINGE (ML) INJECTION PRN
Status: DISCONTINUED | OUTPATIENT
Start: 2024-06-25 | End: 2024-06-26 | Stop reason: HOSPADM

## 2024-06-25 RX ADMIN — SODIUM CHLORIDE 200 MG: 9 INJECTION, SOLUTION INTRAVENOUS at 15:41

## 2024-06-25 RX ADMIN — SODIUM CHLORIDE 25 ML/HR: 9 INJECTION, SOLUTION INTRAVENOUS at 14:00

## 2024-06-25 RX ADMIN — ONDANSETRON 8 MG: 2 INJECTION INTRAMUSCULAR; INTRAVENOUS at 14:05

## 2024-06-25 RX ADMIN — ENFORTUMAB VEDOTIN 53 MG: 30 INJECTION, POWDER, LYOPHILIZED, FOR SOLUTION INTRAVENOUS at 15:02

## 2024-06-25 ASSESSMENT — PAIN DESCRIPTION - LOCATION: LOCATION: BACK

## 2024-06-25 ASSESSMENT — PAIN SCALES - GENERAL: PAINLEVEL_OUTOF10: 7

## 2024-06-25 NOTE — PROGRESS NOTES
Jamel Hazel is a 68 y.o. male here for treatment for bladder cancer.  Pt has been having right foot/ankle swelling.     1. Have you been to the ER, urgent care clinic since your last visit?  Hospitalized since your last visit?  [id number] nephrostomy tube came out    2. Have you seen or consulted any other health care providers outside of the [facility] since your last visit?  Include any pap smears or colon screening.  no  
comorbidities   - Bladder wall, right anterior, biopsy:        Invasive high-grade urothelial carcinoma with plasmacytoid features.        Muscularis propria is not present for evaluation.     - Rectal wall biopsy is positive for metastasis.   - Uptake also seen on PET scan over neck lymph node, rectum and sigmoid colon. Increased uptake in para-aortic lymph node also constitutes metastatic disease.   -  Poor surgical candidate due to medical co-morbidities  - Given medical comorbidities, severe emphysema etc., he would be a poor candidate for cisplatin-based therapies    - CT of the Chest/Abdomen/Pelvis done 4/23/24, results show improvement in lymphadenopathy, no clear evidence of active metastatic disease on CT.  PET scan may help in future scans.     2. Hypotension  - Initial BP in office 86/54, repeat 94/58  - Plan for hydration today   - Monitor at home     3. Cachexia  - Stable for now, continue to monitor closely with nutritional status     4. Oxygen Dependent  - On stable home O2  - Followed by pulmonology for emphysema     5. Lung Lesion  - 1.5 cm noted in the RUL on last PET  - 1.3 cm on CT scan from 4/22/24    6. Blurring of vision  - may be side effect of padcev - mild G1.  Continue treatment    7. Port infection   - resolved    The patient is currently receiving antineoplastic chemotherapy and/or immunotherapy. Labs reviewed, WBC count, ANC, hemoglobin, platelet counts reviewed. Creatinine and liver function reviewed, okay to proceed with antineoplastic chemotherapy/immunotherapy today. Graded toxicities from treatment detailed above.     The patient will continue to be seen long-term as part of ongoing care related to his serious or complex medical condition.    Plan:     Treat with C7D1 Enfortumab vedotin (Padcev) 1.25mg/kg (max dose 125mg) day 1, 8 of  every 21 days in combination with Pembrolizumab (Keytruda) every 21 days until disease progression or unacceptable toxicity  CBC and CMP on day 1,

## 2024-06-25 NOTE — PROGRESS NOTES
Name: Jamel Hazel    MRN: [id number]         : 1955    Mr. Hazel arrived via wheelchair with portable oxygen and in no distress for C7D1 of Padcev/Keytruda Regimen. Assessment was completed. Pt experiencing blurred vision, hoarse voice, BLE edema, oozing fluid-filled blisters on top of both feet, dyspnea at rest, weakness and fatigue. Right chest wall port site edematous and red with scabs over prong locations. 24G PIV initiated in Right A/C. Labs drawn & sent for processing. PIV flushed and alcohol cap applied.    Patient proceeded to appointment with Dr. Dubose.    Mr. Hazel's vitals were reviewed.  Patient Vitals for the past 24 hrs:   BP Temp Temp src Pulse Resp SpO2 Height Weight   24 1615 99/62 -- -- 90 18 -- -- --   24 1110 99/61 97.1 °F (36.2 °C) Temporal 85 18 94 % 1.778 m (5' 10\") 46.7 kg (103 lb)       Peripheral IV 24 Proximal;Right;Ventral Forearm (Active)   Site Assessment Clean, dry & intact 24 1130   Line Status Brisk blood return;Specimen collected;Flushed;Capped 24 1130   Phlebitis Assessment No symptoms 24 1130   Infiltration Assessment 0 24 1130   Alcohol Cap Used Yes 24 1130   Dressing Status New dressing applied 24 1130   Dressing Type Transparent 24 1130   Dressing Intervention New 24 1130       Lab results were obtained and reviewed.  Recent Results (from the past 12 hour(s))   T4, Free    Collection Time: 24 11:17 AM   Result Value Ref Range    T4 Free 1.1 0.8 - 1.5 NG/DL   CBC With Auto Differential    Collection Time: 24 11:17 AM   Result Value Ref Range    WBC 5.1 4.1 - 11.1 K/uL    RBC 3.65 (L) 4.10 - 5.70 M/uL    Hemoglobin 10.7 (L) 12.1 - 17.0 g/dL    Hematocrit 33.7 (L) 36.6 - 50.3 %    MCV 92.3 80.0 - 99.0 FL    MCH 29.3 26.0 - 34.0 PG    MCHC 31.8 30.0 - 36.5 g/dL    RDW 17.0 (H) 11.5 - 14.5 %    Platelets 307 150 - 400 K/uL    MPV 9.4 8.9 - 12.9 FL    Nucleated RBCs 0.0 0  WBC    nRBC

## 2024-07-02 ENCOUNTER — HOSPITAL ENCOUNTER (OUTPATIENT)
Facility: HOSPITAL | Age: 69
Setting detail: INFUSION SERIES
Discharge: HOME | End: 2024-07-02
Payer: MEDICARE

## 2024-07-02 VITALS
HEART RATE: 81 BPM | WEIGHT: 98.2 LBS | TEMPERATURE: 98.7 F | SYSTOLIC BLOOD PRESSURE: 96 MMHG | RESPIRATION RATE: 18 BRPM | BODY MASS INDEX: 14.06 KG/M2 | DIASTOLIC BLOOD PRESSURE: 55 MMHG | HEIGHT: 70 IN

## 2024-07-02 DIAGNOSIS — C67.9 STAGE IV BLADDER CANCER (HCC): Primary | ICD-10-CM

## 2024-07-02 LAB
ANION GAP SERPL CALC-SCNC: 4 MMOL/L (ref 5–15)
BASOPHILS # BLD: 0 K/UL (ref 0–0.1)
BASOPHILS NFR BLD: 1 % (ref 0–1)
BUN SERPL-MCNC: 18 MG/DL (ref 6–20)
BUN/CREAT SERPL: 24 (ref 12–20)
CALCIUM SERPL-MCNC: 9.5 MG/DL (ref 8.5–10.1)
CHLORIDE SERPL-SCNC: 88 MMOL/L (ref 97–108)
CO2 SERPL-SCNC: 44 MMOL/L (ref 21–32)
CREAT SERPL-MCNC: 0.76 MG/DL (ref 0.7–1.3)
DIFFERENTIAL METHOD BLD: ABNORMAL
EOSINOPHIL # BLD: 0.1 K/UL (ref 0–0.4)
EOSINOPHIL NFR BLD: 2 % (ref 0–7)
ERYTHROCYTE [DISTWIDTH] IN BLOOD BY AUTOMATED COUNT: 16.9 % (ref 11.5–14.5)
GLUCOSE SERPL-MCNC: 115 MG/DL (ref 65–100)
HCT VFR BLD AUTO: 34.5 % (ref 36.6–50.3)
HGB BLD-MCNC: 10.9 G/DL (ref 12.1–17)
IMM GRANULOCYTES # BLD AUTO: 0 K/UL (ref 0–0.04)
IMM GRANULOCYTES NFR BLD AUTO: 0 % (ref 0–0.5)
LYMPHOCYTES # BLD: 0.8 K/UL (ref 0.8–3.5)
LYMPHOCYTES NFR BLD: 18 % (ref 12–49)
MCH RBC QN AUTO: 29.1 PG (ref 26–34)
MCHC RBC AUTO-ENTMCNC: 31.6 G/DL (ref 30–36.5)
MCV RBC AUTO: 92 FL (ref 80–99)
MONOCYTES # BLD: 0.5 K/UL (ref 0–1)
MONOCYTES NFR BLD: 10 % (ref 5–13)
NEUTS SEG # BLD: 3.2 K/UL (ref 1.8–8)
NEUTS SEG NFR BLD: 69 % (ref 32–75)
NRBC # BLD: 0 K/UL (ref 0–0.01)
NRBC BLD-RTO: 0 PER 100 WBC
PHOSPHATE SERPL-MCNC: 3 MG/DL (ref 2.6–4.7)
PLATELET # BLD AUTO: 292 K/UL (ref 150–400)
PMV BLD AUTO: 9.3 FL (ref 8.9–12.9)
POTASSIUM SERPL-SCNC: 2.9 MMOL/L (ref 3.5–5.1)
RBC # BLD AUTO: 3.75 M/UL (ref 4.1–5.7)
SODIUM SERPL-SCNC: 136 MMOL/L (ref 136–145)
WBC # BLD AUTO: 4.6 K/UL (ref 4.1–11.1)

## 2024-07-02 PROCEDURE — 85025 COMPLETE CBC W/AUTO DIFF WBC: CPT

## 2024-07-02 PROCEDURE — 36415 COLL VENOUS BLD VENIPUNCTURE: CPT

## 2024-07-02 PROCEDURE — 96375 TX/PRO/DX INJ NEW DRUG ADDON: CPT

## 2024-07-02 PROCEDURE — 80048 BASIC METABOLIC PNL TOTAL CA: CPT

## 2024-07-02 PROCEDURE — 84100 ASSAY OF PHOSPHORUS: CPT

## 2024-07-02 PROCEDURE — 96413 CHEMO IV INFUSION 1 HR: CPT

## 2024-07-02 PROCEDURE — 2580000003 HC RX 258: Performed by: NURSE PRACTITIONER

## 2024-07-02 PROCEDURE — 6360000002 HC RX W HCPCS: Performed by: NURSE PRACTITIONER

## 2024-07-02 PROCEDURE — 6360000002 HC RX W HCPCS: Performed by: STUDENT IN AN ORGANIZED HEALTH CARE EDUCATION/TRAINING PROGRAM

## 2024-07-02 PROCEDURE — 2580000003 HC RX 258: Performed by: STUDENT IN AN ORGANIZED HEALTH CARE EDUCATION/TRAINING PROGRAM

## 2024-07-02 RX ORDER — SODIUM CHLORIDE 0.9 % (FLUSH) 0.9 %
5-40 SYRINGE (ML) INJECTION PRN
Status: DISCONTINUED | OUTPATIENT
Start: 2024-07-02 | End: 2024-07-03 | Stop reason: HOSPADM

## 2024-07-02 RX ORDER — SODIUM CHLORIDE 9 MG/ML
5-250 INJECTION, SOLUTION INTRAVENOUS PRN
Status: DISCONTINUED | OUTPATIENT
Start: 2024-07-02 | End: 2024-07-03 | Stop reason: HOSPADM

## 2024-07-02 RX ORDER — ONDANSETRON 2 MG/ML
8 INJECTION INTRAMUSCULAR; INTRAVENOUS ONCE
Status: COMPLETED | OUTPATIENT
Start: 2024-07-02 | End: 2024-07-02

## 2024-07-02 RX ADMIN — ONDANSETRON 8 MG: 2 INJECTION INTRAMUSCULAR; INTRAVENOUS at 12:44

## 2024-07-02 RX ADMIN — SODIUM CHLORIDE 25 ML/HR: 9 INJECTION, SOLUTION INTRAVENOUS at 12:39

## 2024-07-02 RX ADMIN — ENFORTUMAB VEDOTIN 53 MG: 20 INJECTION, POWDER, LYOPHILIZED, FOR SOLUTION INTRAVENOUS at 13:58

## 2024-07-02 NOTE — PROGRESS NOTES
Range    WBC 4.6 4.1 - 11.1 K/uL    RBC 3.75 (L) 4.10 - 5.70 M/uL    Hemoglobin 10.9 (L) 12.1 - 17.0 g/dL    Hematocrit 34.5 (L) 36.6 - 50.3 %    MCV 92.0 80.0 - 99.0 FL    MCH 29.1 26.0 - 34.0 PG    MCHC 31.6 30.0 - 36.5 g/dL    RDW 16.9 (H) 11.5 - 14.5 %    Platelets 292 150 - 400 K/uL    MPV 9.3 8.9 - 12.9 FL    Nucleated RBCs 0.0 0  WBC    nRBC 0.00 0.00 - 0.01 K/uL    Neutrophils % 69 32 - 75 %    Lymphocytes % 18 12 - 49 %    Monocytes % 10 5 - 13 %    Eosinophils % 2 0 - 7 %    Basophils % 1 0 - 1 %    Immature Granulocytes % 0 0.0 - 0.5 %    Neutrophils Absolute 3.2 1.8 - 8.0 K/UL    Lymphocytes Absolute 0.8 0.8 - 3.5 K/UL    Monocytes Absolute 0.5 0.0 - 1.0 K/UL    Eosinophils Absolute 0.1 0.0 - 0.4 K/UL    Basophils Absolute 0.0 0.0 - 0.1 K/UL    Immature Granulocytes Absolute 0.0 0.00 - 0.04 K/UL    Differential Type AUTOMATED       Critical CO2 44. Provider notified. Advised pt to urgently follow-up with PCP or pulmonologist. Also advised to proceed to ER if breathing becomes worse.    Medications:  Medications Administered         0.9 % sodium chloride infusion Admin Date  07/02/2024 Action  New Bag Dose  25 mL/hr Rate  25 mL/hr Route  IntraVENous Administered By  Jana Jenkins RN        enfortumab vedotin-ejfv (PADCEV) 53 mg in sodium chloride 0.9 % 100 mL chemo IVPB Admin Date  07/02/2024 Action  New Bag Dose  53 mg Rate  230.6 mL/hr Route  IntraVENous Administered By  Jana Jenkins RN        ondansetron (ZOFRAN) injection 8 mg Admin Date  07/02/2024 Action  Given Dose  8 mg Rate   Route  IntraVENous Administered By  Jana Jenkins RN          Two nurses verified prior to administering: Drug name, drug dose, infusion volume or drug volume when prepared in a syringe, rate of administration, route of administration,  expiration dates and/or times, appearance and physical integrity of the drugs, rate set on infusion pump, when used sequencing of drug administration.

## 2024-07-05 ENCOUNTER — TELEPHONE (OUTPATIENT)
Age: 69
End: 2024-07-05

## 2024-07-05 DIAGNOSIS — E87.6 HYPOKALEMIA: Primary | ICD-10-CM

## 2024-07-05 RX ORDER — POTASSIUM CHLORIDE 20 MEQ/1
20 TABLET, EXTENDED RELEASE ORAL DAILY
Qty: 10 TABLET | Refills: 0 | Status: SHIPPED | OUTPATIENT
Start: 2024-07-05 | End: 2024-07-15

## 2024-07-05 NOTE — TELEPHONE ENCOUNTER
Return call placed to pt. HIPAA verified by two patient identifiers.     Pt has not gone to ER. Pt currently taking Lasix.    Pt advised to take Lasix every other day.    Pt advised we are sending potassium to his pharmacy for him to take. Pt verbalized understanding. Pharmacy verified.

## 2024-07-05 NOTE — TELEPHONE ENCOUNTER
----- Message from FRITZ Nicole - CNP sent at 7/5/2024  9:05 AM EDT -----  Potassium 2.9, can we see if patient is taking any potassium or if he went to the ER last week?     Also please see if he is taking the Lasix daily     ----- Message -----  From: Glendy Cabral Incoming Lab For Copath Pdfs  Sent: 7/2/2024  11:39 AM EDT  To: Myrtle Dubose MD

## 2024-07-08 RX ORDER — DIPHENHYDRAMINE HYDROCHLORIDE 50 MG/ML
50 INJECTION INTRAMUSCULAR; INTRAVENOUS
Status: CANCELLED | OUTPATIENT
Start: 2024-07-23

## 2024-07-08 RX ORDER — HEPARIN 100 UNIT/ML
500 SYRINGE INTRAVENOUS PRN
Status: CANCELLED | OUTPATIENT
Start: 2024-07-23

## 2024-07-08 RX ORDER — ONDANSETRON 2 MG/ML
8 INJECTION INTRAMUSCULAR; INTRAVENOUS ONCE
Status: CANCELLED | OUTPATIENT
Start: 2024-07-16 | End: 2024-07-16

## 2024-07-08 RX ORDER — SODIUM CHLORIDE 9 MG/ML
5-250 INJECTION, SOLUTION INTRAVENOUS PRN
Status: CANCELLED | OUTPATIENT
Start: 2024-07-23

## 2024-07-08 RX ORDER — ACETAMINOPHEN 325 MG/1
650 TABLET ORAL
Status: CANCELLED | OUTPATIENT
Start: 2024-07-23

## 2024-07-08 RX ORDER — SODIUM CHLORIDE 9 MG/ML
5-250 INJECTION, SOLUTION INTRAVENOUS PRN
Status: CANCELLED | OUTPATIENT
Start: 2024-07-16

## 2024-07-08 RX ORDER — EPINEPHRINE 1 MG/ML
0.3 INJECTION, SOLUTION INTRAMUSCULAR; SUBCUTANEOUS PRN
Status: CANCELLED | OUTPATIENT
Start: 2024-07-23

## 2024-07-08 RX ORDER — SODIUM CHLORIDE 0.9 % (FLUSH) 0.9 %
5-40 SYRINGE (ML) INJECTION PRN
Status: CANCELLED | OUTPATIENT
Start: 2024-07-23

## 2024-07-08 RX ORDER — ALBUTEROL SULFATE 90 UG/1
4 AEROSOL, METERED RESPIRATORY (INHALATION) PRN
Status: CANCELLED | OUTPATIENT
Start: 2024-07-23

## 2024-07-08 RX ORDER — SODIUM CHLORIDE 9 MG/ML
INJECTION, SOLUTION INTRAVENOUS CONTINUOUS
Status: CANCELLED | OUTPATIENT
Start: 2024-07-23

## 2024-07-08 RX ORDER — PROCHLORPERAZINE EDISYLATE 5 MG/ML
10 INJECTION INTRAMUSCULAR; INTRAVENOUS
Status: CANCELLED | OUTPATIENT
Start: 2024-07-23

## 2024-07-08 RX ORDER — SODIUM CHLORIDE 9 MG/ML
INJECTION, SOLUTION INTRAVENOUS CONTINUOUS
Status: CANCELLED | OUTPATIENT
Start: 2024-07-16

## 2024-07-08 RX ORDER — PROCHLORPERAZINE EDISYLATE 5 MG/ML
10 INJECTION INTRAMUSCULAR; INTRAVENOUS
Status: CANCELLED | OUTPATIENT
Start: 2024-07-16

## 2024-07-08 RX ORDER — ONDANSETRON 2 MG/ML
8 INJECTION INTRAMUSCULAR; INTRAVENOUS
Status: CANCELLED | OUTPATIENT
Start: 2024-07-16

## 2024-07-08 RX ORDER — ACETAMINOPHEN 325 MG/1
650 TABLET ORAL
Status: CANCELLED | OUTPATIENT
Start: 2024-07-16

## 2024-07-08 RX ORDER — EPINEPHRINE 1 MG/ML
0.3 INJECTION, SOLUTION INTRAMUSCULAR; SUBCUTANEOUS PRN
Status: CANCELLED | OUTPATIENT
Start: 2024-07-16

## 2024-07-08 RX ORDER — DIPHENHYDRAMINE HYDROCHLORIDE 50 MG/ML
50 INJECTION INTRAMUSCULAR; INTRAVENOUS
Status: CANCELLED | OUTPATIENT
Start: 2024-07-16

## 2024-07-08 RX ORDER — ONDANSETRON 2 MG/ML
8 INJECTION INTRAMUSCULAR; INTRAVENOUS
Status: CANCELLED | OUTPATIENT
Start: 2024-07-23

## 2024-07-08 RX ORDER — ALBUTEROL SULFATE 90 UG/1
4 AEROSOL, METERED RESPIRATORY (INHALATION) PRN
Status: CANCELLED | OUTPATIENT
Start: 2024-07-16

## 2024-07-08 RX ORDER — ONDANSETRON 2 MG/ML
8 INJECTION INTRAMUSCULAR; INTRAVENOUS ONCE
Status: CANCELLED | OUTPATIENT
Start: 2024-07-23 | End: 2024-07-23

## 2024-07-08 RX ORDER — HEPARIN 100 UNIT/ML
500 SYRINGE INTRAVENOUS PRN
Status: CANCELLED | OUTPATIENT
Start: 2024-07-16

## 2024-07-09 ENCOUNTER — HOSPITAL ENCOUNTER (OUTPATIENT)
Facility: HOSPITAL | Age: 69
Discharge: HOME | End: 2024-07-12
Attending: STUDENT IN AN ORGANIZED HEALTH CARE EDUCATION/TRAINING PROGRAM
Payer: MEDICARE

## 2024-07-09 DIAGNOSIS — C67.9 STAGE IV BLADDER CANCER (HCC): ICD-10-CM

## 2024-07-09 LAB
GLUCOSE BLD STRIP.AUTO-MCNC: 101 MG/DL (ref 65–117)
SERVICE CMNT-IMP: NORMAL

## 2024-07-09 PROCEDURE — 3430000000 HC RX DIAGNOSTIC RADIOPHARMACEUTICAL: Performed by: STUDENT IN AN ORGANIZED HEALTH CARE EDUCATION/TRAINING PROGRAM

## 2024-07-09 PROCEDURE — 82962 GLUCOSE BLOOD TEST: CPT

## 2024-07-09 PROCEDURE — A9609 HC RX DIAGNOSTIC RADIOPHARMACEUTICAL: HCPCS | Performed by: STUDENT IN AN ORGANIZED HEALTH CARE EDUCATION/TRAINING PROGRAM

## 2024-07-09 PROCEDURE — 78815 PET IMAGE W/CT SKULL-THIGH: CPT

## 2024-07-09 RX ORDER — FLUDEOXYGLUCOSE F-18 500 MCI/ML
10 INJECTION INTRAVENOUS
Status: COMPLETED | OUTPATIENT
Start: 2024-07-09 | End: 2024-07-09

## 2024-07-09 RX ADMIN — FLUDEOXYGLUCOSE F-18 10 MILLICURIE: 500 INJECTION INTRAVENOUS at 09:03

## 2024-07-10 DIAGNOSIS — E87.6 HYPOKALEMIA: ICD-10-CM

## 2024-07-12 DIAGNOSIS — C67.9 STAGE IV BLADDER CANCER (HCC): ICD-10-CM

## 2024-07-12 RX ORDER — OXYCODONE HYDROCHLORIDE 10 MG/1
10 TABLET ORAL EVERY 6 HOURS PRN
Qty: 120 TABLET | Refills: 0 | Status: SHIPPED | OUTPATIENT
Start: 2024-07-12 | End: 2024-08-11

## 2024-07-12 RX ORDER — MORPHINE SULFATE 30 MG/1
30 TABLET, FILM COATED, EXTENDED RELEASE ORAL 2 TIMES DAILY
Qty: 60 TABLET | Refills: 0 | Status: SHIPPED | OUTPATIENT
Start: 2024-07-12 | End: 2024-08-11

## 2024-07-12 NOTE — TELEPHONE ENCOUNTER
Palliative Medicine Clinic   [place]: [phone] ([phone])    Patient Name: Jamel Hazel  YOB: 1955    Medication Refill Request    Patient is scheduled for follow up:  [x]  YES  []   NO  Next [facility] Visit:     PDMP reviewed:  [x] YES   []  System down / Unable  []  NO- Patient fills out of state    Medication:    oxyCODONE HCl (OXY-IR) 10 MG      Dose and directions:Take 1 tablet by mouth every 6 hours   Number dispensed:120  Date filled (PDMP or Pharmacy):  # left:    Medication:morphine (MS CONTIN) 30 MG   Dose and directions:Take 1 tablet by mouth 2 times   Number dispensed:60  Date filled (PDMP or Pharmacy):5/29/2024  #left:    Appropriate for refill:  [x]  YES  []  Early Request - Requires MD/NP Review      Other pertinent information for prescriber:

## 2024-07-12 NOTE — TELEPHONE ENCOUNTER
Palliative Medicine Clinic   [place]: [phone])    Patient Name: Jamel Hazel  YOB: 1955    Medication Refill Request Triage    Requested by:    [x]  Patient  []  Support person:      Last Pall Med Clinic Visit:  5/29/2024    Next Pall Med Clinic Visit: 8/5/2024     Preferred Pharmacy: [facility]  Backup Pharmacy:  *    Medications requested:  Morphine and Oxycodone     Is patient completely out?  []   YES  [x]   NO  If NO, how many pills does patient have left?  _Morphine: 6 Oxycodone 23_    Other pertinent information shared:

## 2024-07-16 ENCOUNTER — HOSPITAL ENCOUNTER (OUTPATIENT)
Facility: HOSPITAL | Age: 69
Setting detail: INFUSION SERIES
Discharge: HOME | End: 2024-07-16
Payer: MEDICARE

## 2024-07-16 ENCOUNTER — OFFICE VISIT (OUTPATIENT)
Age: 69
End: 2024-07-16
Payer: MEDICARE

## 2024-07-16 VITALS
HEART RATE: 90 BPM | DIASTOLIC BLOOD PRESSURE: 72 MMHG | SYSTOLIC BLOOD PRESSURE: 100 MMHG | BODY MASS INDEX: 14.46 KG/M2 | OXYGEN SATURATION: 97 % | RESPIRATION RATE: 17 BRPM | TEMPERATURE: 98.1 F | WEIGHT: 101 LBS | HEIGHT: 70 IN

## 2024-07-16 VITALS
WEIGHT: 101 LBS | BODY MASS INDEX: 14.46 KG/M2 | HEART RATE: 87 BPM | TEMPERATURE: 98.1 F | SYSTOLIC BLOOD PRESSURE: 89 MMHG | OXYGEN SATURATION: 96 % | HEIGHT: 70 IN | RESPIRATION RATE: 16 BRPM | DIASTOLIC BLOOD PRESSURE: 57 MMHG

## 2024-07-16 DIAGNOSIS — E43 SEVERE PROTEIN-CALORIE MALNUTRITION (HCC): ICD-10-CM

## 2024-07-16 DIAGNOSIS — Z51.12 ENCOUNTER FOR ANTINEOPLASTIC IMMUNOTHERAPY: ICD-10-CM

## 2024-07-16 DIAGNOSIS — C67.9 STAGE IV BLADDER CANCER (HCC): Primary | ICD-10-CM

## 2024-07-16 DIAGNOSIS — Z51.11 ENCOUNTER FOR ANTINEOPLASTIC CHEMOTHERAPY: ICD-10-CM

## 2024-07-16 LAB
ALBUMIN SERPL-MCNC: 3.2 G/DL (ref 3.5–5)
ALBUMIN/GLOB SERPL: 0.9 (ref 1.1–2.2)
ALP SERPL-CCNC: 62 U/L (ref 45–117)
ALT SERPL-CCNC: 24 U/L (ref 12–78)
ANION GAP SERPL CALC-SCNC: 0 MMOL/L (ref 5–15)
AST SERPL-CCNC: 33 U/L (ref 15–37)
BASOPHILS # BLD: 0 K/UL (ref 0–0.1)
BASOPHILS NFR BLD: 1 % (ref 0–1)
BILIRUB SERPL-MCNC: 0.4 MG/DL (ref 0.2–1)
BUN SERPL-MCNC: 22 MG/DL (ref 6–20)
BUN/CREAT SERPL: 31 (ref 12–20)
CALCIUM SERPL-MCNC: 8.9 MG/DL (ref 8.5–10.1)
CHLORIDE SERPL-SCNC: 97 MMOL/L (ref 97–108)
CO2 SERPL-SCNC: 39 MMOL/L (ref 21–32)
COMMENT:: NORMAL
CREAT SERPL-MCNC: 0.72 MG/DL (ref 0.7–1.3)
DIFFERENTIAL METHOD BLD: ABNORMAL
EOSINOPHIL # BLD: 0.1 K/UL (ref 0–0.4)
EOSINOPHIL NFR BLD: 2 % (ref 0–7)
ERYTHROCYTE [DISTWIDTH] IN BLOOD BY AUTOMATED COUNT: 16.7 % (ref 11.5–14.5)
GLOBULIN SER CALC-MCNC: 3.6 G/DL (ref 2–4)
GLUCOSE SERPL-MCNC: 113 MG/DL (ref 65–100)
HCT VFR BLD AUTO: 31.2 % (ref 36.6–50.3)
HGB BLD-MCNC: 10.2 G/DL (ref 12.1–17)
IMM GRANULOCYTES # BLD AUTO: 0 K/UL (ref 0–0.04)
IMM GRANULOCYTES NFR BLD AUTO: 1 % (ref 0–0.5)
LYMPHOCYTES # BLD: 1.1 K/UL (ref 0.8–3.5)
LYMPHOCYTES NFR BLD: 25 % (ref 12–49)
MCH RBC QN AUTO: 30 PG (ref 26–34)
MCHC RBC AUTO-ENTMCNC: 32.7 G/DL (ref 30–36.5)
MCV RBC AUTO: 91.8 FL (ref 80–99)
MONOCYTES # BLD: 0.4 K/UL (ref 0–1)
MONOCYTES NFR BLD: 9 % (ref 5–13)
NEUTS SEG # BLD: 2.7 K/UL (ref 1.8–8)
NEUTS SEG NFR BLD: 62 % (ref 32–75)
NRBC # BLD: 0 K/UL (ref 0–0.01)
NRBC BLD-RTO: 0 PER 100 WBC
PHOSPHATE SERPL-MCNC: 2.8 MG/DL (ref 2.6–4.7)
PLATELET # BLD AUTO: 236 K/UL (ref 150–400)
PMV BLD AUTO: 9.5 FL (ref 8.9–12.9)
POTASSIUM SERPL-SCNC: 3.8 MMOL/L (ref 3.5–5.1)
PROT SERPL-MCNC: 6.8 G/DL (ref 6.4–8.2)
RBC # BLD AUTO: 3.4 M/UL (ref 4.1–5.7)
RBC MORPH BLD: ABNORMAL
SODIUM SERPL-SCNC: 136 MMOL/L (ref 136–145)
SPECIMEN HOLD: NORMAL
WBC # BLD AUTO: 4.3 K/UL (ref 4.1–11.1)

## 2024-07-16 PROCEDURE — 1123F ACP DISCUSS/DSCN MKR DOCD: CPT | Performed by: STUDENT IN AN ORGANIZED HEALTH CARE EDUCATION/TRAINING PROGRAM

## 2024-07-16 PROCEDURE — 96413 CHEMO IV INFUSION 1 HR: CPT

## 2024-07-16 PROCEDURE — 99215 OFFICE O/P EST HI 40 MIN: CPT | Performed by: STUDENT IN AN ORGANIZED HEALTH CARE EDUCATION/TRAINING PROGRAM

## 2024-07-16 PROCEDURE — 85025 COMPLETE CBC W/AUTO DIFF WBC: CPT

## 2024-07-16 PROCEDURE — 80053 COMPREHEN METABOLIC PANEL: CPT

## 2024-07-16 PROCEDURE — 84100 ASSAY OF PHOSPHORUS: CPT

## 2024-07-16 PROCEDURE — 2580000003 HC RX 258: Performed by: STUDENT IN AN ORGANIZED HEALTH CARE EDUCATION/TRAINING PROGRAM

## 2024-07-16 PROCEDURE — 3074F SYST BP LT 130 MM HG: CPT | Performed by: STUDENT IN AN ORGANIZED HEALTH CARE EDUCATION/TRAINING PROGRAM

## 2024-07-16 PROCEDURE — 3078F DIAST BP <80 MM HG: CPT | Performed by: STUDENT IN AN ORGANIZED HEALTH CARE EDUCATION/TRAINING PROGRAM

## 2024-07-16 PROCEDURE — 36415 COLL VENOUS BLD VENIPUNCTURE: CPT

## 2024-07-16 PROCEDURE — 6360000002 HC RX W HCPCS: Performed by: STUDENT IN AN ORGANIZED HEALTH CARE EDUCATION/TRAINING PROGRAM

## 2024-07-16 RX ORDER — SODIUM CHLORIDE 9 MG/ML
5-250 INJECTION, SOLUTION INTRAVENOUS PRN
Status: DISCONTINUED | OUTPATIENT
Start: 2024-07-16 | End: 2024-07-17 | Stop reason: HOSPADM

## 2024-07-16 RX ORDER — SODIUM CHLORIDE 0.9 % (FLUSH) 0.9 %
5-40 SYRINGE (ML) INJECTION PRN
Status: DISCONTINUED | OUTPATIENT
Start: 2024-07-16 | End: 2024-07-17 | Stop reason: HOSPADM

## 2024-07-16 RX ADMIN — SODIUM CHLORIDE 25 ML/HR: 9 INJECTION, SOLUTION INTRAVENOUS at 12:40

## 2024-07-16 RX ADMIN — SODIUM CHLORIDE 200 MG: 9 INJECTION, SOLUTION INTRAVENOUS at 12:42

## 2024-07-16 RX ADMIN — SODIUM CHLORIDE, PRESERVATIVE FREE 10 ML: 5 INJECTION INTRAVENOUS at 13:15

## 2024-07-16 RX ADMIN — SODIUM CHLORIDE, PRESERVATIVE FREE 10 ML: 5 INJECTION INTRAVENOUS at 10:50

## 2024-07-16 NOTE — PROGRESS NOTES
Jamel Hazel is a 68 y.o. male who presents for follow up of   Chief Complaint   Patient presents with    Follow-up     Stage IV bladder cancer       The patient reports no new clinical symptoms or new complaints since last clinic evaluation. He reports a hard time swallowing Pt is in 3 liters of oxygen.       No interval hospitalizations reported    No interval surgery or procedures reported    No reported new medication changes reported       Medications reviewed with the patient, and chart updated to reflect changes.

## 2024-07-16 NOTE — PROGRESS NOTES
[facility] VISIT NOTE    1045  Pt arrived at [facility] in a WC and in no distress for C8D1 Padcev + Keytruda.  Assessment completed, pt states he has no pain today and is gaining weight.  His energy level is down and he sleeps most of the day.    Patient Vitals for the past 24 hrs:   BP Temp Temp src Pulse Resp SpO2 Height Weight   07/16/24 1324 (!) 89/57 -- -- 87 16 -- -- --   07/16/24 1045 100/67 98.1 °F (36.7 °C) Temporal 92 16 96 % 1.778 m (5' 10\") 45.8 kg (101 lb)     PIV placed in right forearm.  Positive blood return noted and labs drawn.  Patient proceeded to MD clinic visit.  MD ordered to hold Padcev day 1 and 8 and give Keytruda today.    Lab results are within treatment parameters.    Medications received:  Medications Administered         0.9 % sodium chloride infusion Admin Date  07/16/2024 Action  New Bag Dose  25 mL/hr Rate  25 mL/hr Route  IntraVENous Administered By  Imtiaz Tony RN        pembrolizumab (KEYTRUDA) 200 mg in sodium chloride 0.9 % 100 mL IVPB Admin Date  07/16/2024 Action  New Bag Dose  200 mg Rate  236 mL/hr Route  IntraVENous Administered By  Imtiaz Tony RN        sodium chloride flush 0.9 % injection 5-40 mL Admin Date  07/16/2024 Action  Given Dose  10 mL Rate   Route  IntraVENous Administered By  Imtiaz Tony RN        sodium chloride flush 0.9 % injection 5-40 mL Admin Date  07/16/2024 Action  Given Dose  10 mL Rate   Route  IntraVENous Administered By  Imtiaz Tony RN          Two nurses verified prior to administering:  Drug name  Drug dose  Infusion volume or drug volume when prepared in a syringe  Rate of administration  Route of administration  Expiration dates and/or times  Appearance and physical integrity of the drugs  Rate set on infusion pump, when used  Sequencing of drug administration    Tolerated treatment well, no adverse reaction noted. PIV flushed and removed.    1325  D/C'd from [facility] in a WC and in no distress accompanied by his wife. Next

## 2024-07-16 NOTE — PROGRESS NOTES
Outpatient Medications   Medication Sig Dispense Refill    oxyCODONE HCl (OXY-IR) 10 MG immediate release tablet Take 1 tablet by mouth every 6 hours as needed for Pain for up to 30 days. Intended supply: 30 days Max Daily Amount: 40 mg 120 tablet 0    morphine (MS CONTIN) 30 MG extended release tablet Take 1 tablet by mouth 2 times daily for 30 days. Max Daily Amount: 60 mg 60 tablet 0    ondansetron (ZOFRAN-ODT) 4 MG disintegrating tablet DISSOLVE 1 TABLET ON THE TONGUE EVERY 8 HOURS AS NEEDED FOR NAUSEA OR VOMITING 21 tablet 0    Magic Mouthwash (MIRACLE MOUTHWASH) Swish and spit 15 mLs 4 times daily as needed for Irritation Swish and spit 500 mL 1    naloxone 4 MG/0.1ML LIQD nasal spray 1 spray by Nasal route as needed for Opioid Reversal 2 each 0    lidocaine-prilocaine (EMLA) 2.5-2.5 % cream Apply topically as needed. 30 g 2    oxyBUTYnin (DITROPAN) 5 MG tablet Take 1 tablet by mouth daily      finasteride (PROSCAR) 5 MG tablet Take 1 tablet by mouth daily      albuterol sulfate HFA (PROVENTIL;VENTOLIN;PROAIR) 108 (90 Base) MCG/ACT inhaler Inhale 2 puffs into the lungs every 6 hours as needed      budesonide-formoterol (SYMBICORT) 160-4.5 MCG/ACT AERO Inhale 2 puffs into the lungs 2 times daily      montelukast (SINGULAIR) 10 MG tablet Take 1 tablet by mouth      omeprazole (PRILOSEC) 20 MG delayed release capsule Take 1 capsule by mouth daily      umeclidinium-vilanterol (ANORO ELLIPTA) 62.5-25 MCG/ACT inhaler Inhale 1 puff into the lungs daily      potassium chloride (KLOR-CON M) 20 MEQ extended release tablet Take 1 tablet by mouth daily for 10 days 10 tablet 0    melatonin 10 MG CAPS capsule Take 1 capsule by mouth nightly 30 capsule 0    furosemide (LASIX) 20 MG tablet Take 1 tablet by mouth daily Take 2 tablets on M,W,F, otherwise take 1 tablet (Patient not taking: Reported on 7/16/2024) 60 tablet 3    predniSONE (DELTASONE) 10 MG tablet Take 1 tablet by mouth daily (Patient not taking: Reported on

## 2024-07-19 ENCOUNTER — TELEPHONE (OUTPATIENT)
Age: 69
End: 2024-07-19

## 2024-07-19 NOTE — TELEPHONE ENCOUNTER
----- Message from Henri Bey sent at 7/19/2024 11:07 AM EDT -----  Regarding: RE: Port Removal  pt is scheduled 7/23 outpatient facility    ----- Message -----  From: Sylvia Cardenas RN  Sent: 7/18/2024  10:47 AM EDT  To: Sylvia PARKER RN; Henri Bey  Subject: Port Removal                                     [facility].    Can you please call pt to remove port? Port removal order is in.

## 2024-07-23 ENCOUNTER — HOSPITAL ENCOUNTER (OUTPATIENT)
Facility: HOSPITAL | Age: 69
Setting detail: INFUSION SERIES
End: 2024-07-23

## 2024-07-30 RX ORDER — ACETAMINOPHEN 325 MG/1
650 TABLET ORAL
Status: CANCELLED | OUTPATIENT
Start: 2024-08-13

## 2024-07-30 RX ORDER — SODIUM CHLORIDE 9 MG/ML
5-250 INJECTION, SOLUTION INTRAVENOUS PRN
Status: CANCELLED | OUTPATIENT
Start: 2024-08-06

## 2024-07-30 RX ORDER — ALBUTEROL SULFATE 90 UG/1
4 AEROSOL, METERED RESPIRATORY (INHALATION) PRN
Status: CANCELLED | OUTPATIENT
Start: 2024-08-13

## 2024-07-30 RX ORDER — HEPARIN 100 UNIT/ML
500 SYRINGE INTRAVENOUS PRN
Status: CANCELLED | OUTPATIENT
Start: 2024-08-13

## 2024-07-30 RX ORDER — PROCHLORPERAZINE EDISYLATE 5 MG/ML
10 INJECTION INTRAMUSCULAR; INTRAVENOUS
Status: CANCELLED | OUTPATIENT
Start: 2024-08-06

## 2024-07-30 RX ORDER — ACETAMINOPHEN 325 MG/1
650 TABLET ORAL
Status: CANCELLED | OUTPATIENT
Start: 2024-08-06

## 2024-07-30 RX ORDER — SODIUM CHLORIDE 9 MG/ML
INJECTION, SOLUTION INTRAVENOUS CONTINUOUS
Status: CANCELLED | OUTPATIENT
Start: 2024-08-06

## 2024-07-30 RX ORDER — SODIUM CHLORIDE 0.9 % (FLUSH) 0.9 %
5-40 SYRINGE (ML) INJECTION PRN
Status: CANCELLED | OUTPATIENT
Start: 2024-08-13

## 2024-07-30 RX ORDER — SODIUM CHLORIDE 9 MG/ML
5-250 INJECTION, SOLUTION INTRAVENOUS PRN
Status: CANCELLED | OUTPATIENT
Start: 2024-08-13

## 2024-07-30 RX ORDER — SODIUM CHLORIDE 9 MG/ML
INJECTION, SOLUTION INTRAVENOUS CONTINUOUS
Status: CANCELLED | OUTPATIENT
Start: 2024-08-13

## 2024-07-30 RX ORDER — PROCHLORPERAZINE EDISYLATE 5 MG/ML
10 INJECTION INTRAMUSCULAR; INTRAVENOUS
Status: CANCELLED | OUTPATIENT
Start: 2024-08-13

## 2024-07-30 RX ORDER — HEPARIN 100 UNIT/ML
500 SYRINGE INTRAVENOUS PRN
Status: CANCELLED | OUTPATIENT
Start: 2024-08-06

## 2024-07-30 RX ORDER — ONDANSETRON 2 MG/ML
8 INJECTION INTRAMUSCULAR; INTRAVENOUS ONCE
Status: CANCELLED | OUTPATIENT
Start: 2024-08-13 | End: 2024-08-13

## 2024-07-30 RX ORDER — ONDANSETRON 2 MG/ML
8 INJECTION INTRAMUSCULAR; INTRAVENOUS
Status: CANCELLED | OUTPATIENT
Start: 2024-08-06

## 2024-07-30 RX ORDER — ONDANSETRON 2 MG/ML
8 INJECTION INTRAMUSCULAR; INTRAVENOUS
Status: CANCELLED | OUTPATIENT
Start: 2024-08-13

## 2024-07-30 RX ORDER — DIPHENHYDRAMINE HYDROCHLORIDE 50 MG/ML
50 INJECTION INTRAMUSCULAR; INTRAVENOUS
Status: CANCELLED | OUTPATIENT
Start: 2024-08-13

## 2024-07-30 RX ORDER — ONDANSETRON 2 MG/ML
8 INJECTION INTRAMUSCULAR; INTRAVENOUS ONCE
Status: CANCELLED | OUTPATIENT
Start: 2024-08-06 | End: 2024-08-06

## 2024-07-30 RX ORDER — EPINEPHRINE 1 MG/ML
0.3 INJECTION, SOLUTION INTRAMUSCULAR; SUBCUTANEOUS PRN
Status: CANCELLED | OUTPATIENT
Start: 2024-08-06

## 2024-07-30 RX ORDER — DIPHENHYDRAMINE HYDROCHLORIDE 50 MG/ML
50 INJECTION INTRAMUSCULAR; INTRAVENOUS
Status: CANCELLED | OUTPATIENT
Start: 2024-08-06

## 2024-07-30 RX ORDER — ALBUTEROL SULFATE 90 UG/1
4 AEROSOL, METERED RESPIRATORY (INHALATION) PRN
Status: CANCELLED | OUTPATIENT
Start: 2024-08-06

## 2024-07-30 RX ORDER — EPINEPHRINE 1 MG/ML
0.3 INJECTION, SOLUTION INTRAMUSCULAR; SUBCUTANEOUS PRN
Status: CANCELLED | OUTPATIENT
Start: 2024-08-13

## 2024-08-06 ENCOUNTER — HOSPITAL ENCOUNTER (OUTPATIENT)
Facility: HOSPITAL | Age: 69
Setting detail: INFUSION SERIES
Discharge: HOME | End: 2024-08-06
Payer: MEDICARE

## 2024-08-06 ENCOUNTER — OFFICE VISIT (OUTPATIENT)
Age: 69
End: 2024-08-06
Payer: MEDICARE

## 2024-08-06 VITALS
HEIGHT: 70 IN | WEIGHT: 92.15 LBS | TEMPERATURE: 97.5 F | BODY MASS INDEX: 13.19 KG/M2 | SYSTOLIC BLOOD PRESSURE: 95 MMHG | HEART RATE: 73 BPM | DIASTOLIC BLOOD PRESSURE: 60 MMHG

## 2024-08-06 VITALS
HEIGHT: 70 IN | DIASTOLIC BLOOD PRESSURE: 71 MMHG | HEART RATE: 79 BPM | RESPIRATION RATE: 16 BRPM | SYSTOLIC BLOOD PRESSURE: 107 MMHG | WEIGHT: 92.1 LBS | BODY MASS INDEX: 13.19 KG/M2

## 2024-08-06 DIAGNOSIS — K13.79 MOUTH PAIN: ICD-10-CM

## 2024-08-06 DIAGNOSIS — G89.3 CANCER ASSOCIATED PAIN: ICD-10-CM

## 2024-08-06 DIAGNOSIS — Z51.12 ENCOUNTER FOR ANTINEOPLASTIC IMMUNOTHERAPY: ICD-10-CM

## 2024-08-06 DIAGNOSIS — C67.9 STAGE IV BLADDER CANCER (HCC): Primary | ICD-10-CM

## 2024-08-06 DIAGNOSIS — E43 SEVERE PROTEIN-CALORIE MALNUTRITION (HCC): ICD-10-CM

## 2024-08-06 LAB
ALBUMIN SERPL-MCNC: 2.9 G/DL (ref 3.5–5)
ALBUMIN/GLOB SERPL: 0.7 (ref 1.1–2.2)
ALP SERPL-CCNC: 75 U/L (ref 45–117)
ALT SERPL-CCNC: 51 U/L (ref 12–78)
ANION GAP SERPL CALC-SCNC: 2 MMOL/L (ref 5–15)
AST SERPL-CCNC: 31 U/L (ref 15–37)
BASOPHILS # BLD: 0 K/UL (ref 0–0.1)
BASOPHILS NFR BLD: 0 % (ref 0–1)
BILIRUB SERPL-MCNC: 0.4 MG/DL (ref 0.2–1)
BUN SERPL-MCNC: 35 MG/DL (ref 6–20)
BUN/CREAT SERPL: 53 (ref 12–20)
CALCIUM SERPL-MCNC: 8.4 MG/DL (ref 8.5–10.1)
CHLORIDE SERPL-SCNC: 97 MMOL/L (ref 97–108)
CO2 SERPL-SCNC: 38 MMOL/L (ref 21–32)
CREAT SERPL-MCNC: 0.66 MG/DL (ref 0.7–1.3)
DIFFERENTIAL METHOD BLD: ABNORMAL
EOSINOPHIL # BLD: 0.1 K/UL (ref 0–0.4)
EOSINOPHIL NFR BLD: 1 % (ref 0–7)
ERYTHROCYTE [DISTWIDTH] IN BLOOD BY AUTOMATED COUNT: 16.4 % (ref 11.5–14.5)
GLOBULIN SER CALC-MCNC: 3.9 G/DL (ref 2–4)
GLUCOSE SERPL-MCNC: 119 MG/DL (ref 65–100)
HCT VFR BLD AUTO: 34.7 % (ref 36.6–50.3)
HGB BLD-MCNC: 11.2 G/DL (ref 12.1–17)
IMM GRANULOCYTES # BLD AUTO: 0 K/UL (ref 0–0.04)
IMM GRANULOCYTES NFR BLD AUTO: 0 % (ref 0–0.5)
LYMPHOCYTES # BLD: 0.7 K/UL (ref 0.8–3.5)
LYMPHOCYTES NFR BLD: 12 % (ref 12–49)
MCH RBC QN AUTO: 29.6 PG (ref 26–34)
MCHC RBC AUTO-ENTMCNC: 32.3 G/DL (ref 30–36.5)
MCV RBC AUTO: 91.6 FL (ref 80–99)
MONOCYTES # BLD: 0.4 K/UL (ref 0–1)
MONOCYTES NFR BLD: 6 % (ref 5–13)
NEUTS SEG # BLD: 5 K/UL (ref 1.8–8)
NEUTS SEG NFR BLD: 81 % (ref 32–75)
NRBC # BLD: 0 K/UL (ref 0–0.01)
NRBC BLD-RTO: 0 PER 100 WBC
PHOSPHATE SERPL-MCNC: 2 MG/DL (ref 2.6–4.7)
PLATELET # BLD AUTO: 254 K/UL (ref 150–400)
PMV BLD AUTO: 9.2 FL (ref 8.9–12.9)
POTASSIUM SERPL-SCNC: 4 MMOL/L (ref 3.5–5.1)
PROT SERPL-MCNC: 6.8 G/DL (ref 6.4–8.2)
RBC # BLD AUTO: 3.79 M/UL (ref 4.1–5.7)
RBC MORPH BLD: ABNORMAL
SODIUM SERPL-SCNC: 137 MMOL/L (ref 136–145)
T4 FREE SERPL-MCNC: 0.9 NG/DL (ref 0.8–1.5)
TSH SERPL DL<=0.05 MIU/L-ACNC: 8.99 UIU/ML (ref 0.36–3.74)
WBC # BLD AUTO: 6.2 K/UL (ref 4.1–11.1)

## 2024-08-06 PROCEDURE — 80053 COMPREHEN METABOLIC PANEL: CPT

## 2024-08-06 PROCEDURE — 84443 ASSAY THYROID STIM HORMONE: CPT

## 2024-08-06 PROCEDURE — 3078F DIAST BP <80 MM HG: CPT | Performed by: STUDENT IN AN ORGANIZED HEALTH CARE EDUCATION/TRAINING PROGRAM

## 2024-08-06 PROCEDURE — 6360000002 HC RX W HCPCS: Performed by: STUDENT IN AN ORGANIZED HEALTH CARE EDUCATION/TRAINING PROGRAM

## 2024-08-06 PROCEDURE — 96361 HYDRATE IV INFUSION ADD-ON: CPT

## 2024-08-06 PROCEDURE — 36415 COLL VENOUS BLD VENIPUNCTURE: CPT

## 2024-08-06 PROCEDURE — 1123F ACP DISCUSS/DSCN MKR DOCD: CPT | Performed by: STUDENT IN AN ORGANIZED HEALTH CARE EDUCATION/TRAINING PROGRAM

## 2024-08-06 PROCEDURE — 3074F SYST BP LT 130 MM HG: CPT | Performed by: STUDENT IN AN ORGANIZED HEALTH CARE EDUCATION/TRAINING PROGRAM

## 2024-08-06 PROCEDURE — 2580000003 HC RX 258: Performed by: NURSE PRACTITIONER

## 2024-08-06 PROCEDURE — 2580000003 HC RX 258: Performed by: STUDENT IN AN ORGANIZED HEALTH CARE EDUCATION/TRAINING PROGRAM

## 2024-08-06 PROCEDURE — 99215 OFFICE O/P EST HI 40 MIN: CPT | Performed by: STUDENT IN AN ORGANIZED HEALTH CARE EDUCATION/TRAINING PROGRAM

## 2024-08-06 PROCEDURE — 96413 CHEMO IV INFUSION 1 HR: CPT

## 2024-08-06 PROCEDURE — 84100 ASSAY OF PHOSPHORUS: CPT

## 2024-08-06 PROCEDURE — 85025 COMPLETE CBC W/AUTO DIFF WBC: CPT

## 2024-08-06 PROCEDURE — 84439 ASSAY OF FREE THYROXINE: CPT

## 2024-08-06 RX ORDER — SODIUM CHLORIDE 9 MG/ML
5-250 INJECTION, SOLUTION INTRAVENOUS PRN
Status: DISCONTINUED | OUTPATIENT
Start: 2024-08-06 | End: 2024-08-07 | Stop reason: HOSPADM

## 2024-08-06 RX ORDER — 0.9 % SODIUM CHLORIDE 0.9 %
1000 INTRAVENOUS SOLUTION INTRAVENOUS ONCE
Status: COMPLETED | OUTPATIENT
Start: 2024-08-06 | End: 2024-08-06

## 2024-08-06 RX ORDER — SODIUM CHLORIDE 0.9 % (FLUSH) 0.9 %
5-40 SYRINGE (ML) INJECTION PRN
Status: DISCONTINUED | OUTPATIENT
Start: 2024-08-06 | End: 2024-08-07 | Stop reason: HOSPADM

## 2024-08-06 RX ADMIN — SODIUM CHLORIDE 1000 ML: 9 INJECTION, SOLUTION INTRAVENOUS at 12:39

## 2024-08-06 RX ADMIN — SODIUM CHLORIDE 200 MG: 9 INJECTION, SOLUTION INTRAVENOUS at 14:03

## 2024-08-06 ASSESSMENT — PAIN SCALES - GENERAL: PAINLEVEL_OUTOF10: 4

## 2024-08-06 ASSESSMENT — PAIN DESCRIPTION - LOCATION: LOCATION: BACK

## 2024-08-06 ASSESSMENT — PAIN DESCRIPTION - ORIENTATION: ORIENTATION: RIGHT;LEFT;LOWER

## 2024-08-06 ASSESSMENT — PAIN DESCRIPTION - DESCRIPTORS: DESCRIPTORS: ACHING

## 2024-08-06 NOTE — PROGRESS NOTES
Name: Jamel Hazel    MRN: [id number]         : 1955    Mr. Hazel Arrived ambulatory and in no distress for C9D1 of Padcev/Keytruda Regimen (Padcev held).  Assessment was completed. Pt reports generalized weakness and concerns regarding weight loss. Pt requires assistance with all ADL's.  24 gauge IV started in left arm, labs drawn & sent for processing.    Patient proceed to appointment with Dr. Dubose.    Mr. Hazel's vitals were reviewed.  Patient Vitals for the past 24 hrs:   BP Pulse Resp Height Weight   24 1430 107/71 79 16 -- --   24 1048 -- -- -- 1.778 m (5' 10\") 41.8 kg (92 lb 1.6 oz)     Lab results were obtained and reviewed.  Recent Results (from the past 12 hour(s))   Phosphorus    Collection Time: 24 10:52 AM   Result Value Ref Range    Phosphorus 2.0 (L) 2.6 - 4.7 MG/DL   Comprehensive metabolic panel    Collection Time: 24 10:52 AM   Result Value Ref Range    Sodium 137 136 - 145 mmol/L    Potassium 4.0 3.5 - 5.1 mmol/L    Chloride 97 97 - 108 mmol/L    CO2 38 (H) 21 - 32 mmol/L    Anion Gap 2 (L) 5 - 15 mmol/L    Glucose 119 (H) 65 - 100 mg/dL    BUN 35 (H) 6 - 20 MG/DL    Creatinine 0.66 (L) 0.70 - 1.30 MG/DL    BUN/Creatinine Ratio 53 (H) 12 - 20      Est, Glom Filt Rate >90 >60 ml/min/1.73m2    Calcium 8.4 (L) 8.5 - 10.1 MG/DL    Total Bilirubin 0.4 0.2 - 1.0 MG/DL    ALT 51 12 - 78 U/L    AST 31 15 - 37 U/L    Alk Phosphatase 75 45 - 117 U/L    Total Protein 6.8 6.4 - 8.2 g/dL    Albumin 2.9 (L) 3.5 - 5.0 g/dL    Globulin 3.9 2.0 - 4.0 g/dL    Albumin/Globulin Ratio 0.7 (L) 1.1 - 2.2     CBC With Auto Differential    Collection Time: 08/06/24 10:52 AM   Result Value Ref Range    WBC 6.2 4.1 - 11.1 K/uL    RBC 3.79 (L) 4.10 - 5.70 M/uL    Hemoglobin 11.2 (L) 12.1 - 17.0 g/dL    Hematocrit 34.7 (L) 36.6 - 50.3 %    MCV 91.6 80.0 - 99.0 FL    MCH 29.6 26.0 - 34.0 PG    MCHC 32.3 30.0 - 36.5 g/dL    RDW 16.4 (H) 11.5 - 14.5 %    Platelets 254 150 - 400 K/uL    MPV

## 2024-08-06 NOTE — PROGRESS NOTES
Jamel Hazel is a 68 y.o. male here for treatment for bladder cancer.  -9 lbs since last visit. He does have issues swallowing.   He has had a few falls since last visit. Hit hand and knees. Did not get checked out.  Level 7 lower back pain.   His has painful mouth ulcers. SOB with minimal exertion.   He had port removed.     1. Have you been to the ER, urgent care clinic since your last visit?  Hospitalized since your last visit? no    2. Have you seen or consulted any other health care providers outside of the [facility] since your last visit?  Include any pap smears or colon screening.  no        
para-aortic lymph node also constitutes metastatic disease.   -  Poor surgical candidate due to medical co-morbidities  - Given medical comorbidities, severe emphysema etc., he would be a poor candidate for cisplatin-based therapies    - CT of the Chest/Abdomen/Pelvis done 4/23/24, results show improvement in lymphadenopathy, no clear evidence of active metastatic disease on CT.  PET scan may help in future scans.      2/13/2024 2/27/2024   Oncology Flowsheet     Day, Cycle Day 1, Cycle 1  Day 8, Cycle 1    enfortumab vedotin-ejfv (PADCEV) IV 1.25 mg/kg = 68 mg  1.25 mg/kg = 68 mg    pembrolizumab (KEYTRUDA)  mg        3/12/2024 3/19/2024   Oncology Flowsheet     Day, Cycle Day 1, Cycle 2  Day 8, Cycle 2    enfortumab vedotin-ejfv (PADCEV) IV 1.125 mg/kg = 60 mg  1.125 mg/kg = 60 mg    pembrolizumab (KEYTRUDA)  mg        4/2/2024 4/9/2024   Oncology Flowsheet     Day, Cycle Day 1, Cycle 3  Day 8, Cycle 3    enfortumab vedotin-ejfv (PADCEV) IV 1.125 mg/kg = 60 mg  1.125 mg/kg = 60 mg    pembrolizumab (KEYTRUDA)  mg        4/23/2024 4/30/2024   Oncology Flowsheet     Day, Cycle Day 1, Cycle 4  Day 8, Cycle 4    enfortumab vedotin-ejfv (PADCEV) IV 1.125 mg/kg = 60 mg  1.125 mg/kg = 60 mg    pembrolizumab (KEYTRUDA)  mg        5/14/2024 5/21/2024   Oncology Flowsheet     Day, Cycle Day 1, Cycle 5  Day 8, Cycle 5    enfortumab vedotin-ejfv (PADCEV) IV 1.125 mg/kg = 60 mg  1.125 mg/kg = 60 mg    pembrolizumab (KEYTRUDA)  mg        6/4/2024 6/11/2024   Oncology Flowsheet     Day, Cycle Day 1, Cycle 6  Day 8, Cycle 6    enfortumab vedotin-ejfv (PADCEV) IV 1.125 mg/kg = 60 mg  1.125 mg/kg = 60 mg    pembrolizumab (KEYTRUDA)  mg        6/25/2024 7/2/2024   Oncology Flowsheet     Day, Cycle Day 1, Cycle 7  Day 8, Cycle 7    enfortumab vedotin-ejfv (PADCEV) IV 1.125 mg/kg = 53 mg  1.125 mg/kg = 53 mg    pembrolizumab (KEYTRUDA)  mg        7/16/2024 8/6/2024   Oncology Flowsheet     Day,

## 2024-08-07 ENCOUNTER — TELEPHONE (OUTPATIENT)
Age: 69
End: 2024-08-07

## 2024-08-07 NOTE — TELEPHONE ENCOUNTER
[facility] [place] [facility]   [address] [place] [address]   W: [phone]  F: [phone]      Medical Nutrition Therapy  Nutrition Encounter:      Referral received from Dr Dubose regarding ongoing weight loss. Patient with muscle invasive bladder cancer on chemo.  Office notes reflect that patient is fatigued but is hunger w/ a good appetite but continues to lose weight.    Called patient. No answer.  Left a message, explaining that RD is available as a resource for any nutrition related concerns.         Ht Readings from Last 1 Encounters:   08/06/24 1.778 m (5' 10\")       Wt Readings from Last 10 Encounters:   08/06/24 41.8 kg (92 lb 1.6 oz)   08/06/24 41.8 kg (92 lb 2.4 oz)   07/16/24 45.8 kg (101 lb)   07/16/24 45.8 kg (101 lb)   07/02/24 44.5 kg (98 lb 3.2 oz)   06/25/24 46.7 kg (103 lb)   06/25/24 46.7 kg (103 lb)   06/21/24 46.3 kg (102 lb 1.2 oz)   06/11/24 49.9 kg (110 lb)   06/04/24 52.8 kg (116 lb 4.8 oz)     Weight loss of 6# in the past month.      Plan:  Increase intake of nutrient-dense foods    Drink nutrition supplements   Take advantage of times when feeling good.   Eat meals and snacks in a pleasant atmosphere.   Keep snacks readily available and in eye sight to promote/stimulate appetite.    Enlist the help of family and caregivers to assist with food procurement and preparation.   Eat an extra bedtime snack.  This will give you extra calories but won't affect your appetite for the next meals.        Signed By: PILAR TRAN RD

## 2024-08-13 ENCOUNTER — HOSPITAL ENCOUNTER (OUTPATIENT)
Facility: HOSPITAL | Age: 69
Setting detail: INFUSION SERIES
End: 2024-08-13

## 2024-08-19 ENCOUNTER — TELEPHONE (OUTPATIENT)
Age: 69
End: 2024-08-19

## 2024-08-19 NOTE — TELEPHONE ENCOUNTER
Pt called to cancel his appt with Dr. Campbell on 8/22/2024 at 10:30. States he is unable to do a virtual due to poor internet connection and unable to reschedule to come into the office, states that he is very weak to leave the home.

## 2024-08-26 ENCOUNTER — HOSPITAL ENCOUNTER (INPATIENT)
Facility: HOSPITAL | Age: 69
LOS: 1 days | Discharge: HOSPICE | DRG: 069 | End: 2024-08-28
Attending: STUDENT IN AN ORGANIZED HEALTH CARE EDUCATION/TRAINING PROGRAM | Admitting: STUDENT IN AN ORGANIZED HEALTH CARE EDUCATION/TRAINING PROGRAM
Payer: MEDICARE

## 2024-08-26 ENCOUNTER — APPOINTMENT (OUTPATIENT)
Facility: HOSPITAL | Age: 69
DRG: 069 | End: 2024-08-26
Payer: MEDICARE

## 2024-08-26 DIAGNOSIS — H53.2 DIPLOPIA: ICD-10-CM

## 2024-08-26 DIAGNOSIS — I63.9 CEREBROVASCULAR ACCIDENT (CVA), UNSPECIFIED MECHANISM (HCC): ICD-10-CM

## 2024-08-26 DIAGNOSIS — R42 DIZZINESS: Primary | ICD-10-CM

## 2024-08-26 LAB
ALBUMIN SERPL-MCNC: 2.7 G/DL (ref 3.5–5)
ALBUMIN/GLOB SERPL: 0.8 (ref 1.1–2.2)
ALP SERPL-CCNC: 80 U/L (ref 45–117)
ALT SERPL-CCNC: 28 U/L (ref 12–78)
ANION GAP SERPL CALC-SCNC: 4 MMOL/L (ref 5–15)
AST SERPL-CCNC: 28 U/L (ref 15–37)
BASOPHILS # BLD: 0 K/UL (ref 0–0.1)
BASOPHILS NFR BLD: 0 % (ref 0–1)
BILIRUB SERPL-MCNC: 0.2 MG/DL (ref 0.2–1)
BUN SERPL-MCNC: 21 MG/DL (ref 6–20)
BUN/CREAT SERPL: 48 (ref 12–20)
CALCIUM SERPL-MCNC: 8.5 MG/DL (ref 8.5–10.1)
CHLORIDE SERPL-SCNC: 93 MMOL/L (ref 97–108)
CO2 SERPL-SCNC: 39 MMOL/L (ref 21–32)
CREAT SERPL-MCNC: 0.44 MG/DL (ref 0.7–1.3)
DIFFERENTIAL METHOD BLD: ABNORMAL
EOSINOPHIL # BLD: 0 K/UL (ref 0–0.4)
EOSINOPHIL NFR BLD: 1 % (ref 0–7)
ERYTHROCYTE [DISTWIDTH] IN BLOOD BY AUTOMATED COUNT: 16.3 % (ref 11.5–14.5)
GLOBULIN SER CALC-MCNC: 3.2 G/DL (ref 2–4)
GLUCOSE BLD STRIP.AUTO-MCNC: 144 MG/DL (ref 65–117)
GLUCOSE SERPL-MCNC: 109 MG/DL (ref 65–100)
HCT VFR BLD AUTO: 30.6 % (ref 36.6–50.3)
HGB BLD-MCNC: 9.9 G/DL (ref 12.1–17)
IMM GRANULOCYTES # BLD AUTO: 0 K/UL (ref 0–0.04)
IMM GRANULOCYTES NFR BLD AUTO: 0 % (ref 0–0.5)
LYMPHOCYTES # BLD: 1 K/UL (ref 0.8–3.5)
LYMPHOCYTES NFR BLD: 15 % (ref 12–49)
MAGNESIUM SERPL-MCNC: 1.8 MG/DL (ref 1.6–2.4)
MCH RBC QN AUTO: 29.6 PG (ref 26–34)
MCHC RBC AUTO-ENTMCNC: 32.4 G/DL (ref 30–36.5)
MCV RBC AUTO: 91.6 FL (ref 80–99)
MONOCYTES # BLD: 0.5 K/UL (ref 0–1)
MONOCYTES NFR BLD: 7 % (ref 5–13)
NEUTS SEG # BLD: 5.4 K/UL (ref 1.8–8)
NEUTS SEG NFR BLD: 77 % (ref 32–75)
NRBC # BLD: 0 K/UL (ref 0–0.01)
NRBC BLD-RTO: 0 PER 100 WBC
PLATELET # BLD AUTO: 293 K/UL (ref 150–400)
PMV BLD AUTO: 8.5 FL (ref 8.9–12.9)
POTASSIUM SERPL-SCNC: 4 MMOL/L (ref 3.5–5.1)
PROT SERPL-MCNC: 5.9 G/DL (ref 6.4–8.2)
RBC # BLD AUTO: 3.34 M/UL (ref 4.1–5.7)
SERVICE CMNT-IMP: ABNORMAL
SODIUM SERPL-SCNC: 136 MMOL/L (ref 136–145)
TROPONIN I SERPL HS-MCNC: 12 NG/L (ref 0–76)
TSH SERPL DL<=0.05 MIU/L-ACNC: 4.82 UIU/ML (ref 0.36–3.74)
WBC # BLD AUTO: 7 K/UL (ref 4.1–11.1)

## 2024-08-26 PROCEDURE — 6370000000 HC RX 637 (ALT 250 FOR IP): Performed by: STUDENT IN AN ORGANIZED HEALTH CARE EDUCATION/TRAINING PROGRAM

## 2024-08-26 PROCEDURE — 84443 ASSAY THYROID STIM HORMONE: CPT

## 2024-08-26 PROCEDURE — 85025 COMPLETE CBC W/AUTO DIFF WBC: CPT

## 2024-08-26 PROCEDURE — 80053 COMPREHEN METABOLIC PANEL: CPT

## 2024-08-26 PROCEDURE — 93005 ELECTROCARDIOGRAM TRACING: CPT | Performed by: STUDENT IN AN ORGANIZED HEALTH CARE EDUCATION/TRAINING PROGRAM

## 2024-08-26 PROCEDURE — 83735 ASSAY OF MAGNESIUM: CPT

## 2024-08-26 PROCEDURE — 84484 ASSAY OF TROPONIN QUANT: CPT

## 2024-08-26 PROCEDURE — 82962 GLUCOSE BLOOD TEST: CPT

## 2024-08-26 PROCEDURE — 36415 COLL VENOUS BLD VENIPUNCTURE: CPT

## 2024-08-26 PROCEDURE — 99285 EMERGENCY DEPT VISIT HI MDM: CPT

## 2024-08-26 PROCEDURE — 70450 CT HEAD/BRAIN W/O DYE: CPT

## 2024-08-26 RX ORDER — ACETAMINOPHEN 325 MG/1
650 TABLET ORAL EVERY 4 HOURS PRN
Status: DISCONTINUED | OUTPATIENT
Start: 2024-08-26 | End: 2024-08-27 | Stop reason: SDUPTHER

## 2024-08-26 RX ORDER — MECLIZINE HCL 12.5 MG 12.5 MG/1
25 TABLET ORAL ONCE
Status: COMPLETED | OUTPATIENT
Start: 2024-08-26 | End: 2024-08-26

## 2024-08-26 RX ORDER — ONDANSETRON 2 MG/ML
4 INJECTION INTRAMUSCULAR; INTRAVENOUS EVERY 4 HOURS PRN
Status: DISCONTINUED | OUTPATIENT
Start: 2024-08-26 | End: 2024-08-27 | Stop reason: SDUPTHER

## 2024-08-26 RX ADMIN — MECLIZINE 25 MG: 12.5 TABLET ORAL at 19:34

## 2024-08-26 ASSESSMENT — PAIN - FUNCTIONAL ASSESSMENT: PAIN_FUNCTIONAL_ASSESSMENT: 0-10

## 2024-08-26 ASSESSMENT — PAIN SCALES - GENERAL: PAINLEVEL_OUTOF10: 0

## 2024-08-26 NOTE — ED PROVIDER NOTES
resolved after surgery/lost weight and no sleep apnea       Past Surgical History:  Past Surgical History:   Procedure Laterality Date    CARDIAC CATHETERIZATION      CHOLECYSTECTOMY      COLONOSCOPY N/A 1/11/2017    COLONOSCOPY performed by Kev Light Jr., MD at [facility] ENDOSCOPY    COLONOSCOPY N/A 1/13/2017    COLONOSCOPY er 26 performed by Kev Light Jr., MD at [facility] ENDOSCOPY    COLONOSCOPY N/A 12/11/2023    COLONOSCOPY DIAGNOSTIC performed by Aj Parks MD at [facility] ENDOSCOPY    COLONOSCOPY,FLEX,W/CONTROL, BLEEDING  1/13/2017         COLONOSCOPY,REMV LESN,SNARE  1/11/2017         COLORECTAL SCRN; HI RISK IND  1/11/2017         COLSC FLX W/RMVL OF TUMOR POLYP LESION SNARE TQ  12/30/2011    colon x 3     CYSTOSCOPY N/A 1/5/2024    CYSTOSCOPY, LEFT RETROGRADE PYELOGRAM WITH STENT, BLADDER  BIOPSY AND FULGURATION performed by Rahat Alexander II, MD at [facility] MAIN OR    EGD BALLOON DILATION ESOPHAGUS <30 MM DIAM  4/26/2013         EGD BALLOON DILATION ESOPHAGUS <30 MM DIAM  3/4/2013         EGD BALLOON DILATION ESOPHAGUS <30 MM DIAM  7/31/2013         EGD BALLOON DILATION ESOPHAGUS <30 MM DIAM  9/25/2013         EGD BALLOON DILATION ESOPHAGUS <30 MM DIAM  6/20/2012         EGD BALLOON DILATION ESOPHAGUS <30 MM DIAM  7/5/2012         EGD BALLOON DILATION ESOPHAGUS <30 MM DIAM  7/24/2012         EGD BALLOON DILATION ESOPHAGUS <30 MM DIAM  8/28/2012         EGD BALLOON DILATION ESOPHAGUS <30 MM DIAM  9/24/2012         EGD BALLOON DILATION ESOPHAGUS <30 MM DIAM  11/6/2012         EGD TRANSORAL BIOPSY SINGLE/MULTIPLE  3/4/2013         EGD TRANSORAL BIOPSY SINGLE/MULTIPLE  4/30/2013         EGD TRANSORAL BIOPSY SINGLE/MULTIPLE  12/30/2011         EGD TRANSORAL BIOPSY SINGLE/MULTIPLE  8/28/2012         EGD TRANSORAL BIOPSY SINGLE/MULTIPLE  11/6/2012         GI  4/2/12    ESOPHAGOGASTRECTOMY    IR NEPHROSTOMY PERCUTANEOUS LEFT  12/31/2023    IR NEPHROSTOMY PERCUTANEOUS LEFT 12/31/2023 Mary  little bit better but not completely.  I discussed the case with teleneurology and right Chow who has seen the patient.  Concern for CVA especially given he has diplopia which could have been a stroke many weeks ago.  As such we will admit for MRI.  Again he is not a TNK or thrombectomy candidate. [JS]      ED Course User Index  [JS] Kong Curiel MD         FINAL IMPRESSION     1. Dizziness    2. Diplopia          DISPOSITION/PLAN   Jamel Hazel's  results have been reviewed with him.      CLINICAL IMPRESSION    Admit Note: Pt is being admitted by Dr. Haskins. The results of their tests and reason(s) for their admission have been discussed with pt and/or available family. They convey agreement and understanding for the need to be admitted and for the admission diagnosis.     PATIENT REFERRED TO:  No follow-up provider specified.     DISCHARGE MEDICATIONS:     Medication List        ASK your doctor about these medications      albuterol sulfate  (90 Base) MCG/ACT inhaler  Commonly known as: PROVENTIL;VENTOLIN;PROAIR     alfuzosin 10 MG extended release tablet  Commonly known as: UROXATRAL     amoxicillin-clavulanate 875-125 MG per tablet  Commonly known as: AUGMENTIN     diclofenac sodium 1 % Gel  Commonly known as: VOLTAREN     finasteride 5 MG tablet  Commonly known as: PROSCAR     * furosemide 20 MG tablet  Commonly known as: LASIX     * furosemide 20 MG tablet  Commonly known as: Lasix  Take 1 tablet by mouth daily Take 2 tablets on M,W,F, otherwise take 1 tablet     gabapentin 100 MG capsule  Commonly known as: NEURONTIN  Take 2 capsules by mouth 3 times daily for 90 doses. Max Daily Amount: 600 mg     lidocaine-prilocaine 2.5-2.5 % cream  Commonly known as: EMLA  Apply topically as needed.     Magic Mouthwash  Commonly known as: Miracle Mouthwash  Swish and spit 15 mLs 4 times daily as needed for Irritation Swish and spit     melatonin 10 MG Caps capsule  Take 1 capsule by mouth nightly

## 2024-08-27 ENCOUNTER — APPOINTMENT (OUTPATIENT)
Facility: HOSPITAL | Age: 69
DRG: 069 | End: 2024-08-27
Payer: MEDICARE

## 2024-08-27 ENCOUNTER — TELEPHONE (OUTPATIENT)
Age: 69
End: 2024-08-27

## 2024-08-27 ENCOUNTER — TELEPHONE (OUTPATIENT)
Facility: HOSPITAL | Age: 69
End: 2024-08-27

## 2024-08-27 DIAGNOSIS — G89.3 CANCER ASSOCIATED PAIN: Primary | ICD-10-CM

## 2024-08-27 DIAGNOSIS — C67.9 STAGE IV BLADDER CANCER (HCC): ICD-10-CM

## 2024-08-27 PROBLEM — I63.9 CEREBROVASCULAR ACCIDENT (CVA), UNSPECIFIED MECHANISM (HCC): Status: ACTIVE | Noted: 2024-08-27

## 2024-08-27 LAB
EKG ATRIAL RATE: 71 BPM
EKG DIAGNOSIS: NORMAL
EKG P AXIS: 85 DEGREES
EKG P-R INTERVAL: 132 MS
EKG Q-T INTERVAL: 428 MS
EKG QRS DURATION: 120 MS
EKG QTC CALCULATION (BAZETT): 465 MS
EKG R AXIS: -86 DEGREES
EKG T AXIS: 91 DEGREES
EKG VENTRICULAR RATE: 71 BPM

## 2024-08-27 PROCEDURE — A9579 GAD-BASE MR CONTRAST NOS,1ML: HCPCS | Performed by: STUDENT IN AN ORGANIZED HEALTH CARE EDUCATION/TRAINING PROGRAM

## 2024-08-27 PROCEDURE — 70553 MRI BRAIN STEM W/O & W/DYE: CPT

## 2024-08-27 PROCEDURE — 94760 N-INVAS EAR/PLS OXIMETRY 1: CPT

## 2024-08-27 PROCEDURE — 2700000000 HC OXYGEN THERAPY PER DAY

## 2024-08-27 PROCEDURE — 6360000004 HC RX CONTRAST MEDICATION: Performed by: STUDENT IN AN ORGANIZED HEALTH CARE EDUCATION/TRAINING PROGRAM

## 2024-08-27 PROCEDURE — 2580000003 HC RX 258: Performed by: STUDENT IN AN ORGANIZED HEALTH CARE EDUCATION/TRAINING PROGRAM

## 2024-08-27 PROCEDURE — 6370000000 HC RX 637 (ALT 250 FOR IP): Performed by: INTERNAL MEDICINE

## 2024-08-27 PROCEDURE — 6370000000 HC RX 637 (ALT 250 FOR IP): Performed by: STUDENT IN AN ORGANIZED HEALTH CARE EDUCATION/TRAINING PROGRAM

## 2024-08-27 PROCEDURE — 99222 1ST HOSP IP/OBS MODERATE 55: CPT | Performed by: INTERNAL MEDICINE

## 2024-08-27 PROCEDURE — 1100000003 HC PRIVATE W/ TELEMETRY

## 2024-08-27 RX ORDER — ONDANSETRON 4 MG/1
4 TABLET, ORALLY DISINTEGRATING ORAL EVERY 8 HOURS PRN
Status: DISCONTINUED | OUTPATIENT
Start: 2024-08-27 | End: 2024-08-28 | Stop reason: HOSPADM

## 2024-08-27 RX ORDER — PHENOL 1.4 %
10 AEROSOL, SPRAY (ML) MUCOUS MEMBRANE PRN
COMMUNITY

## 2024-08-27 RX ORDER — ROSUVASTATIN CALCIUM 40 MG/1
40 TABLET, COATED ORAL NIGHTLY
Status: DISCONTINUED | OUTPATIENT
Start: 2024-08-27 | End: 2024-08-28 | Stop reason: HOSPADM

## 2024-08-27 RX ORDER — 0.9 % SODIUM CHLORIDE 0.9 %
500 INTRAVENOUS SOLUTION INTRAVENOUS ONCE
Status: COMPLETED | OUTPATIENT
Start: 2024-08-27 | End: 2024-08-27

## 2024-08-27 RX ORDER — MONTELUKAST SODIUM 10 MG/1
10 TABLET ORAL NIGHTLY
Status: DISCONTINUED | OUTPATIENT
Start: 2024-08-27 | End: 2024-08-28 | Stop reason: HOSPADM

## 2024-08-27 RX ORDER — 0.9 % SODIUM CHLORIDE 0.9 %
1000 INTRAVENOUS SOLUTION INTRAVENOUS ONCE
Status: CANCELLED | OUTPATIENT
Start: 2024-08-27 | End: 2024-08-27

## 2024-08-27 RX ORDER — ARFORMOTEROL TARTRATE 15 UG/2ML
15 SOLUTION RESPIRATORY (INHALATION)
Status: DISCONTINUED | OUTPATIENT
Start: 2024-08-27 | End: 2024-08-27

## 2024-08-27 RX ORDER — LANOLIN ALCOHOL/MO/W.PET/CERES
10.5 CREAM (GRAM) TOPICAL NIGHTLY
Status: DISCONTINUED | OUTPATIENT
Start: 2024-08-27 | End: 2024-08-28 | Stop reason: HOSPADM

## 2024-08-27 RX ORDER — MIDODRINE HYDROCHLORIDE 5 MG/1
5 TABLET ORAL EVERY 8 HOURS PRN
Status: DISCONTINUED | OUTPATIENT
Start: 2024-08-27 | End: 2024-08-28 | Stop reason: HOSPADM

## 2024-08-27 RX ORDER — SODIUM CHLORIDE 0.9 % (FLUSH) 0.9 %
5-40 SYRINGE (ML) INJECTION EVERY 12 HOURS SCHEDULED
Status: DISCONTINUED | OUTPATIENT
Start: 2024-08-27 | End: 2024-08-28 | Stop reason: HOSPADM

## 2024-08-27 RX ORDER — ENOXAPARIN SODIUM 100 MG/ML
30 INJECTION SUBCUTANEOUS DAILY
Status: DISCONTINUED | OUTPATIENT
Start: 2024-08-27 | End: 2024-08-28 | Stop reason: HOSPADM

## 2024-08-27 RX ORDER — FINASTERIDE 5 MG/1
5 TABLET, FILM COATED ORAL DAILY
Status: DISCONTINUED | OUTPATIENT
Start: 2024-08-27 | End: 2024-08-28 | Stop reason: HOSPADM

## 2024-08-27 RX ORDER — SODIUM CHLORIDE 9 MG/ML
INJECTION, SOLUTION INTRAVENOUS PRN
Status: DISCONTINUED | OUTPATIENT
Start: 2024-08-27 | End: 2024-08-28 | Stop reason: HOSPADM

## 2024-08-27 RX ORDER — OXYCODONE HYDROCHLORIDE 5 MG/1
10 TABLET ORAL EVERY 6 HOURS PRN
Status: DISCONTINUED | OUTPATIENT
Start: 2024-08-27 | End: 2024-08-28 | Stop reason: HOSPADM

## 2024-08-27 RX ORDER — HYDROMORPHONE HYDROCHLORIDE 1 MG/ML
1 INJECTION, SOLUTION INTRAMUSCULAR; INTRAVENOUS; SUBCUTANEOUS EVERY 4 HOURS PRN
Status: DISCONTINUED | OUTPATIENT
Start: 2024-08-27 | End: 2024-08-28 | Stop reason: HOSPADM

## 2024-08-27 RX ORDER — POLYETHYLENE GLYCOL 3350 17 G/17G
17 POWDER, FOR SOLUTION ORAL DAILY PRN
Status: DISCONTINUED | OUTPATIENT
Start: 2024-08-27 | End: 2024-08-28 | Stop reason: HOSPADM

## 2024-08-27 RX ORDER — ACETAMINOPHEN 650 MG/1
650 SUPPOSITORY RECTAL EVERY 4 HOURS PRN
Status: DISCONTINUED | OUTPATIENT
Start: 2024-08-27 | End: 2024-08-28 | Stop reason: HOSPADM

## 2024-08-27 RX ORDER — GABAPENTIN 100 MG/1
200 CAPSULE ORAL 3 TIMES DAILY
Status: DISCONTINUED | OUTPATIENT
Start: 2024-08-27 | End: 2024-08-28 | Stop reason: HOSPADM

## 2024-08-27 RX ORDER — ONDANSETRON 2 MG/ML
4 INJECTION INTRAMUSCULAR; INTRAVENOUS EVERY 6 HOURS PRN
Status: DISCONTINUED | OUTPATIENT
Start: 2024-08-27 | End: 2024-08-28 | Stop reason: HOSPADM

## 2024-08-27 RX ORDER — SODIUM CHLORIDE 0.9 % (FLUSH) 0.9 %
5-40 SYRINGE (ML) INJECTION PRN
Status: DISCONTINUED | OUTPATIENT
Start: 2024-08-27 | End: 2024-08-28 | Stop reason: HOSPADM

## 2024-08-27 RX ORDER — CASTOR OIL AND BALSAM, PERU 788; 87 MG/G; MG/G
OINTMENT TOPICAL 2 TIMES DAILY
Status: DISCONTINUED | OUTPATIENT
Start: 2024-08-27 | End: 2024-08-28 | Stop reason: HOSPADM

## 2024-08-27 RX ORDER — GUAIFENESIN 600 MG/1
600 TABLET, EXTENDED RELEASE ORAL 2 TIMES DAILY
COMMUNITY

## 2024-08-27 RX ORDER — MORPHINE SULFATE 30 MG/1
30 TABLET, FILM COATED, EXTENDED RELEASE ORAL 2 TIMES DAILY
COMMUNITY

## 2024-08-27 RX ORDER — ACETAMINOPHEN 325 MG/1
650 TABLET ORAL EVERY 4 HOURS PRN
Status: DISCONTINUED | OUTPATIENT
Start: 2024-08-27 | End: 2024-08-28 | Stop reason: HOSPADM

## 2024-08-27 RX ORDER — PANTOPRAZOLE SODIUM 40 MG/1
40 TABLET, DELAYED RELEASE ORAL
Status: DISCONTINUED | OUTPATIENT
Start: 2024-08-27 | End: 2024-08-28 | Stop reason: HOSPADM

## 2024-08-27 RX ORDER — OXYBUTYNIN CHLORIDE 5 MG/1
5 TABLET ORAL DAILY
Status: DISCONTINUED | OUTPATIENT
Start: 2024-08-27 | End: 2024-08-28 | Stop reason: HOSPADM

## 2024-08-27 RX ORDER — BUDESONIDE 0.5 MG/2ML
0.5 INHALANT ORAL
Status: DISCONTINUED | OUTPATIENT
Start: 2024-08-27 | End: 2024-08-27

## 2024-08-27 RX ORDER — ALBUTEROL SULFATE 0.83 MG/ML
2.5 SOLUTION RESPIRATORY (INHALATION) EVERY 4 HOURS PRN
Status: DISCONTINUED | OUTPATIENT
Start: 2024-08-27 | End: 2024-08-28 | Stop reason: HOSPADM

## 2024-08-27 RX ORDER — MORPHINE SULFATE 15 MG/1
30 TABLET, FILM COATED, EXTENDED RELEASE ORAL 2 TIMES DAILY
Status: DISCONTINUED | OUTPATIENT
Start: 2024-08-27 | End: 2024-08-28 | Stop reason: HOSPADM

## 2024-08-27 RX ADMIN — GADOTERIDOL 8 ML: 279.3 INJECTION, SOLUTION INTRAVENOUS at 08:55

## 2024-08-27 RX ADMIN — MORPHINE SULFATE 30 MG: 15 TABLET, FILM COATED, EXTENDED RELEASE ORAL at 01:01

## 2024-08-27 RX ADMIN — MONTELUKAST 10 MG: 10 TABLET, FILM COATED ORAL at 20:23

## 2024-08-27 RX ADMIN — MELATONIN 10.5 MG: at 01:00

## 2024-08-27 RX ADMIN — ROSUVASTATIN CALCIUM 40 MG: 40 TABLET, FILM COATED ORAL at 20:16

## 2024-08-27 RX ADMIN — MORPHINE SULFATE 30 MG: 15 TABLET, FILM COATED, EXTENDED RELEASE ORAL at 20:16

## 2024-08-27 RX ADMIN — ROSUVASTATIN CALCIUM 40 MG: 40 TABLET, FILM COATED ORAL at 01:00

## 2024-08-27 RX ADMIN — GABAPENTIN 200 MG: 100 CAPSULE ORAL at 10:40

## 2024-08-27 RX ADMIN — MORPHINE SULFATE 30 MG: 15 TABLET, FILM COATED, EXTENDED RELEASE ORAL at 10:40

## 2024-08-27 RX ADMIN — SODIUM CHLORIDE, PRESERVATIVE FREE 10 ML: 5 INJECTION INTRAVENOUS at 20:23

## 2024-08-27 RX ADMIN — SODIUM CHLORIDE 500 ML: 9 INJECTION, SOLUTION INTRAVENOUS at 05:28

## 2024-08-27 RX ADMIN — MIDODRINE HYDROCHLORIDE 5 MG: 5 TABLET ORAL at 07:55

## 2024-08-27 RX ADMIN — OXYBUTYNIN CHLORIDE 5 MG: 5 TABLET ORAL at 10:40

## 2024-08-27 RX ADMIN — GABAPENTIN 200 MG: 100 CAPSULE ORAL at 20:22

## 2024-08-27 RX ADMIN — FINASTERIDE 5 MG: 5 TABLET, FILM COATED ORAL at 10:47

## 2024-08-27 RX ADMIN — MONTELUKAST 10 MG: 10 TABLET, FILM COATED ORAL at 01:55

## 2024-08-27 RX ADMIN — PANTOPRAZOLE SODIUM 40 MG: 40 TABLET, DELAYED RELEASE ORAL at 08:02

## 2024-08-27 RX ADMIN — Medication: at 20:25

## 2024-08-27 RX ADMIN — MELATONIN 10.5 MG: at 20:20

## 2024-08-27 ASSESSMENT — PAIN DESCRIPTION - ONSET
ONSET: ON-GOING
ONSET: GRADUAL

## 2024-08-27 ASSESSMENT — PAIN - FUNCTIONAL ASSESSMENT
PAIN_FUNCTIONAL_ASSESSMENT: PREVENTS OR INTERFERES SOME ACTIVE ACTIVITIES AND ADLS
PAIN_FUNCTIONAL_ASSESSMENT: PREVENTS OR INTERFERES SOME ACTIVE ACTIVITIES AND ADLS

## 2024-08-27 ASSESSMENT — PAIN DESCRIPTION - ORIENTATION
ORIENTATION: POSTERIOR

## 2024-08-27 ASSESSMENT — PAIN SCALES - GENERAL
PAINLEVEL_OUTOF10: 0
PAINLEVEL_OUTOF10: 6
PAINLEVEL_OUTOF10: 8
PAINLEVEL_OUTOF10: 4
PAINLEVEL_OUTOF10: 4
PAINLEVEL_OUTOF10: 9
PAINLEVEL_OUTOF10: 5
PAINLEVEL_OUTOF10: 5
PAINLEVEL_OUTOF10: 3

## 2024-08-27 ASSESSMENT — PAIN DESCRIPTION - DESCRIPTORS
DESCRIPTORS_2: ACHING
DESCRIPTORS: ACHING
DESCRIPTORS: ACHING;SHARP
DESCRIPTORS: ACHING

## 2024-08-27 ASSESSMENT — PAIN DESCRIPTION - LOCATION
LOCATION: BACK
LOCATION: BACK;SACRUM
LOCATION: BACK;SACRUM
LOCATION_2: BACK
LOCATION: BACK
LOCATION: BUTTOCKS;SACRUM

## 2024-08-27 ASSESSMENT — PAIN DESCRIPTION - FREQUENCY
FREQUENCY: CONTINUOUS
FREQUENCY: CONTINUOUS

## 2024-08-27 ASSESSMENT — PAIN DESCRIPTION - INTENSITY: RATING_2: 5

## 2024-08-27 ASSESSMENT — PAIN DESCRIPTION - PAIN TYPE
TYPE: CHRONIC PAIN

## 2024-08-27 ASSESSMENT — LIFESTYLE VARIABLES
HOW OFTEN DO YOU HAVE A DRINK CONTAINING ALCOHOL: NEVER
HOW MANY STANDARD DRINKS CONTAINING ALCOHOL DO YOU HAVE ON A TYPICAL DAY: PATIENT DOES NOT DRINK

## 2024-08-27 NOTE — PROGRESS NOTES
Attempted to see pt for OT services.  Pt was sidelying on stretcher in the ED.  After therapist introduced herself he immediately asked therapist to leave.  Pt stated that he only wants hospice and isn't getting up out of bed during this admit.  Pt reports that he was being followed for palliative care OP.  Will respect pts wishes, but follow up at least one more time, as pt is being ruled out for CVA.  Plan of care isn't yet established.  Spoke with CM in regards to pt wishes.  Will sign off if pt declines services next attempt.

## 2024-08-27 NOTE — PROGRESS NOTES
MRI ON HOLD - TELEMETRY ORDERS AND SCREENING SHEET    Please complete screening and sign electronically or fax to [phone].    Telemetry order must be changed to allow the patient to leave the unit without telemetry.    Telemetry box cannot come to MRI with the patient.    Please call MRI at [phone] when this has been done.    If this patient needs monitored while off the unit, please call MRI at [phone] to coordinate a time for an RN to accompany the patient to MRI.

## 2024-08-27 NOTE — CARE COORDINATION
Care Management Initial Assessment       RUR: 21% (high)  Readmission? No  1st IM letter given? Yes, 8/27/24  1st  letter given: N/A, not /[facility]      1325 - CM spoke with pt for 2nd choice, [facility]. 1330 - [facility] accepted and will be reaching out to family (JENNIFER is 8/28/24).    1230 - [facility] declined (outside coverage area).     1040 - Palliative and Oncology completed talking w/pt - Pt is choosing hospice. Pt has chosen [facility], consult placed by Palliative and sent request via [facility]. [facility] declined because pt is outside of coverage area.     Initial Assessment: Chart reviewed. CM met with pt at bedside, introduced role and confirmed demographic information in the chart - reviewed DNR code status and Confirmed ACP: DIL, Ayleen Barby ([phone]), brothers: Kar Hazel ([phone]) and Benny Hazel ([phone]) both as second decision maker. Emergency contact: wife: Emily Hazel [phone] and [phone]. Pt declined wife as his decision maker and request to follow ACP - she is caring for daughter with special needs child.   Pt and wife reside in a single story home with 2 CODIE at back door. Pt needs 2 person assist for ADLs with rolling walker (pt able to stand and pivot only). Pt reports he cannot use toilet or shower chair without assistance from grandson. Pt asked for CM to hold water cup because its to heavy but pt able to feed self with utensils.   Pt will need medical transport at discharge.  PCP: Dr. Loya (past 3 months), Oncologist: Dr. Dubose.  Pharmacy of choice: [facility] [address] [phone].  History of HH with [facility] (would not use again per pt and wife). No history SNF/IPR.   DME:rolling walker, tor WC, shower chair, hand held shower, home oxygen ([employer])  CM will continue to follow for discharge planning. Full assessment below:     08/27/24 5041   Service  requesting to go home and not a facility)   Living Arrangements Spouse/Significant Other   Current Services Prior To Admission Oxygen Therapy  ([employer] for home oxygen)   Potential Assistance Needed Other (Comment)  (consult w/palliative vs hospice - continue to follow for needs)   DME Ordered? No   Potential Assistance Purchasing Medications No   Type of Home Care Services None   Patient expects to be discharged to: House  (pt request home)   One/Two Story Residence One story   History of falls? 1     Advance Care Planning     General Advance Care Planning (ACP) Conversation    Date of Conversation: 8/27/2024  Conducted with: Patient with Decision Making Capacity and Healthcare Decision Maker  Other persons present: None    Healthcare Decision Maker:   Primary Decision Maker: Ayleen Dior (daughter-in-law) - Daughter-in-Law - [phone]    Secondary Decision Maker: Ed Hazel - Brother/Sister - [phone]    Secondary Decision Maker: Benny Hazel - Brother/Sister - [phone]     Today we documented Decision Maker(s) consistent with ACP documents on file.  Content/Action Overview:  Has ACP document(s) on file - reflects the patient's care preferences  Reviewed DNR/DNI and patient confirms current DNR status - completed forms on file (place new order if needed)    Length of Voluntary ACP Conversation in minutes:  <16 minutes (Non-Billable)    Brenda J Throckmorton, RN

## 2024-08-27 NOTE — CONSULTS
Date of Consultation:  August 27, 2024    Referring Physician: MD Zheng     Reason for Consultation:  dizziness, diplopia     Chief Complaint   Patient presents with    Fatigue    Dizziness     Pt BIBEMS with cc of weakness and dizziness since 1300 today.   per EMS. BEFAST negative PTA. Pt currently on 3L NC.         History of Present Illness:   Jamel Hazel is a 68 y.o. male who presents with diplopia, disconjugate gaze and dizziness for the last month with history of high-grade urothelial carcinoma stage IV receiving palliative immunotherapy with Keytruda. He has HTN, cardiomyopathy, history of esophageal CA, thyroid disease.     He reports that for the last month he has had double vision. It seems to be only in horizontal gaze and in primary position the double vision appears vertical and stacked on top.  He has a difficult time describing this for me. He closes either eye and it improves. He also describes dizziness but denies room spinning sensation. States it is present whether he is standing or lying down. He denies any headache, speech or language issues, vision loss, eye pain, denies focal weakness/numbness.     States that he does not take any antiplatelet therapy. Reports no allergy to asa.     Past Medical History:   Diagnosis Date    Arthritis     osteoarthritis    Asthma     Cancer (HCC)     esophagus    Cancer of esophagus (HCC) 4/2012    adenocarcinoma; Dr. Murillo    Chronic pain     lower back, has crushed vertebrae    Essential hypertension     GERD (gastroesophageal reflux disease)     Hypertension     not on medication    Nonischemic cardiomyopathy (HCC)     2003 EF 35-50%; negative cath, 2/2012 EF 55%; Dr. Reid    Other ill-defined conditions(799.89)     Shea's esophagus    Other ill-defined conditions(799.89)     pneumonia multiple times    Other ill-defined conditions(799.89)     esophageal nodule - pre-cancerous    Psychiatric disorder     depression    Seizures (HCC)      negative  Musculoskeletal: negative  Skin and integumentary: negative  Psychiatric: negative  Endocrine: negative  Neurological: negative, except for HPI  Hematologic/lymphatic: negative  Allergy/immunology: negative    []Unable to obtain  ROS due to  []mental status change  []sedated   []intubated      PHYSICAL EXAMINATION:   BP 98/60   Pulse 94   Temp 98.1 °F (36.7 °C) (Oral)   Resp 19   Ht 1.753 m (5' 9\")   Wt 41.8 kg (92 lb 1.6 oz)   SpO2 98%   BMI 13.60 kg/m²     Physical Exam:  General:  no acute distress  Neck: supple no mass   Lungs: Comfortable on room air  Heart: Well-perfused  Lower extremity: no edema    Neurological exam:  Mental Status: Awake, alert, oriented to person, place and time  Attention and Concentration: able to state the days of the week backwards   Speech and Language: No dysarthria. Able to name, repeat and follow commands   Fund of knowledge was preserved    Cranial nerves: II-XII:  Pupils equal and reactive, visual fields intact by finger counting.   Minimal cooperation with eye exam testing. Dysconjugate gaze with poor adduction of the left eye, and contralateral nystagmus present during right lateral gaze, horizontal with fast phase to right, concerning for a left FEDERICO.  No ptosis.  Mild skew deviation of the left eye, slightly elevated in primary position.   Facial sensation intact to light touch and pp   Facial movements symmetric; no facial droop  Hearing intact to soft rub bilaterally   Shoulder shrug symmetric and strong,  Tongue protrusion full and midline    Motor:   Normal tone   Drift: No evidence of pronator drift   Finger tapping equal and symmetric      Strength testing:   deltoid triceps biceps Wrist ext. Wrist flex. intrinsics   Right 5 5 5 5 5 5   Left 5 5 5 5 5 5     Patient does not cooperate with lower ext exam. Only lifts his right leg antigravity.     Sensory:  Intact to light touch and pp throughout     Reflexes: patient refuses this portion of exam.

## 2024-08-27 NOTE — PROGRESS NOTES
Pharmacy Medication Reconciliation     The patient was interviewed regarding current PTA medication list, use and drug allergies. The patient was questioned regarding use of any other inhalers, topical products, over the counter medications, herbal medications, vitamin products or ophthalmic/nasal/otic medication use.     Allergy Update: Naproxen, Naproxen sodium, and Sulfa antibiotics    Recommendations/Findings:   The following amendments were made to the patient's active medication list on file at [facility]:   1) Additions:   + amoxicillin-clavulanate 875/125 bid (in case of active infection)   + guaifenesin 600mg bid     2) Deletions:   - alfuzosin ER 10mg   - diclofenac gel   - furosemide 20mg daily (patient was instructed to take 2 tablets MWF, 1 tablet ROW prior to admission)  - gabapentin 100mg   - lidocaine-prilocaine (EMLA) 2.5/2.5%  - potassium chloride 20meq  - ranolazine 500mg   - simethicone 80mg     3) Changes:   Oxycodone 10mg: OLD 1 tablet Q6H PRN -> NEW 1 tablet 4 times daily   Prednisone 10mg: OLD 1 tablet daily -> NEW taper regimen (in case of respiratory symptoms)      Pertinent Findings:   - Patient has trouble swallowing pills, please consider alternative formulation that's either liquid or crushable/dissolvable.   - Patient takes both Symbicort (budesonide-formoterol) and Anoro (umeclidinium-vilanterol) which presents potential duplication of therapy.   - Patient has amoxicillin-clavulanate and prednisone on hand in case of active infection or respiratory symptoms respectively, but is not taking them at this moment.  - Patient's preferred pharmacy is [email or web address].    Clarified PTA med list with Mr. Hazel (self). PTA medication list was corrected to the following:     Prior to Admission Medications   Prescriptions Last Dose Informant   Magic Mouthwash (MIRACLE MOUTHWASH) Not Taking Self   Sig: Swish and spit 15 mLs 4 times daily as needed for Irritation Swish and spit   Patient  not taking: Reported on 2024   Melatonin 10 MG TABS Past Week Self   Sig: Take 10 mg by mouth as needed (sleep)   albuterol sulfate HFA (PROVENTIL;VENTOLIN;PROAIR) 108 (90 Base) MCG/ACT inhaler 2024 Self   Sig: Inhale 2 puffs into the lungs every 6 hours as needed   amoxicillin-clavulanate (AUGMENTIN) 875-125 MG per tablet Not Taking    Sig: Take 1 tablet by mouth 2 times daily   Patient not taking: Reported on 2024   budesonide-formoterol (SYMBICORT) 160-4.5 MCG/ACT AERO 2024 Self   Sig: Inhale 2 puffs into the lungs 2 times daily   finasteride (PROSCAR) 5 MG tablet 2024 Self   Sig: Take 1 tablet by mouth daily   furosemide (LASIX) 20 MG tablet 2024 Self   Sig: Take 1 tablet by mouth daily Take 2 tablets on M,W,F, otherwise take 1 tablet   guaiFENesin (MUCINEX) 600 MG extended release tablet 2024 Self   Sig: Take 1 tablet by mouth 2 times daily   montelukast (SINGULAIR) 10 MG tablet 2024 Self   Sig: Take 1 tablet by mouth nightly   morphine (MS CONTIN) 30 MG extended release tablet 2024 Self   Sig: Take 1 tablet by mouth 2 times daily.   naloxone 4 MG/0.1ML LIQD nasal spray Past Month Self   Si spray by Nasal route as needed for Opioid Reversal   omeprazole (PRILOSEC) 20 MG delayed release capsule 2024 Self   Sig: Take 1 capsule by mouth daily   ondansetron (ZOFRAN-ODT) 4 MG disintegrating tablet Past Week Self   Sig: DISSOLVE 1 TABLET ON THE TONGUE EVERY 8 HOURS AS NEEDED FOR NAUSEA OR VOMITING   oxyBUTYnin (DITROPAN) 5 MG tablet 2024 Self   Sig: Take 1 tablet by mouth daily   oxyCODONE HCl (OXY-IR) 10 MG immediate release tablet 2024 Self   Sig: Take 1 tablet by mouth every 6 hours as needed for Pain for up to 30 days. Intended supply: 30 days Max Daily Amount: 40 mg   Patient taking differently: Take 1 tablet by mouth in the morning, at noon, in the evening, and at bedtime. Intended supply: 30 days   predniSONE (DELTASONE) 10 MG tablet Not Taking

## 2024-08-27 NOTE — PROGRESS NOTES
Note that plan has been established for Hospice at discharge.  Will respect pts wished and sign off of OT services.

## 2024-08-27 NOTE — PROGRESS NOTES
Speech pathology  Orders received, chart reviewed. Note patient in with dizziness and diplopia for the last month with history of high-grade urothelial carcinoma state IV. Patient has been receiving palliative immunotherapy. Also note history of esophageal cancer.     Brain MRI completed and negative. Patient passed the [place] swallow screen and is on a regular diet/ thin liquids. Reviewed PT/OT notes indicating patient refused evaluation and requested therapists to leave and expressing his desire for Hospice. Note Palliative and Hospice have been consulted.     Given negative MRI, patient on a diet and patient wishes quite clear, speech therapy services not indicated. Will complete orders.     Please re-consult should any changes arise.  Thanks!  Amanda Broderick M.S. CCC-SLP

## 2024-08-27 NOTE — ED NOTES
Patient received from JOHANA MAST   Vitals stable, blood pressure is leaning on 80/50s   Medication given thru apple sauce, perfect serve MD lying comfortably in bed   Complaining of being cold, place in different room warm blankets provided.   Comfort measures provided. Needs attended

## 2024-08-27 NOTE — CONSULTS
Palliative Medicine  Patient Name: Jamel Hazel  YOB: 1955  MRN: [id number]  Age: 68 y.o.  Gender: male    Date of Initial Consult: 8/27/2024  Date of Service: 8/27/2024  Time: 11:10 AM  Provider: FRITZ Monzon NP  Hospital Day: 2  Admit Date: 8/26/2024  Referring Provider: Anibal Colón DO       Reasons for Consultation:  Goals of Care    HISTORY OF PRESENT ILLNESS (HPI):   Jamel Hazel is a 68 y.o. male with a past medical history of newly diagnosed  with stage 4 bladder cancer, rectal wall mets,  multiple colonic polyps, COPD, chronic respiratory failure on home 3L NC, GERD, h/o esophageal cancer in 2012 s/p resection, chronic dysphagia since cancer resection , Patient has catheter in place. Patient has received antibiotics and diflucan. Patient is s/p b/l nephrostomy tube on 12/31. He is s/p Cystoscopy, left Retrograde Pyelography and Stent Placement, Bladder Biopsy and Fulguration on 1/5. Biopsy results demonstrated invasive high grade bladder cancer on 1/10, who was admitted on 8/26/2024 from home with a diagnosis of dizziness, diplopia.    8/26:  BIBS with c/o dizziness and fatigue, \"feels like room is spinning on a boat, drunk, all the above.\"  Has baseline double vision.   Head CT negative for acute pathology.    8/27:  bMRI: 1. No acute intracranial abnormality.   2. Generalized parenchymal volume loss and mild chronic microvascular ischemic disease. Chronic infarct in the right temporoparietal lobe, and multiple additional small chronic infarcts as above.     Pt has not been able to get to his outpatient appts due to physical debility.  Only able to stand at bedside to void, grandson carries him if he needs to go further.  Pt is requesting more assistance at home.       Psychosocial: Patient has been  to wife Emily for 35 years. He has three step-children. He does not have any biological children. Emily's 40 yr old daughter with Down Syndrome lives with them,  41.8 kg (92 lb 2.4 oz)   07/16/24 45.8 kg (101 lb)   07/16/24 45.8 kg (101 lb)   07/02/24 44.5 kg (98 lb 3.2 oz)   06/25/24 46.7 kg (103 lb)   06/25/24 46.7 kg (103 lb)   06/21/24 46.3 kg (102 lb 1.2 oz)   06/11/24 49.9 kg (110 lb)   06/04/24 52.8 kg (116 lb 4.8 oz)   06/04/24 52.6 kg (116 lb)   05/21/24 51.8 kg (114 lb 1.6 oz)   05/14/24 51.5 kg (113 lb 9.6 oz)   05/14/24 51.5 kg (113 lb 8.6 oz)        Current Diet: ADULT DIET; Regular       PSYCHOSOCIAL/SPIRITUAL SCREENING:   Palliative IDT has assessed this patient for cultural preferences / practices and a referral made as appropriate to needs (Cultural Services, Patient Advocacy, Ethics, etc.)    Spiritual Affiliation: Sikhism    Any spiritual / Scientology concerns:  [] Yes /  [x] No   If \"Yes\" to discuss with pastoral care during IDT     Does caregiver feel burdened by caring for their loved one:   [] Yes /  [x] No /  [] No Caregiver Present/Available [] No Caregiver [] Pt Lives at Facility  If \"Yes\" to discuss with social work during IDT    Anticipatory grief assessment:   [x] Normal  / [] Maladaptive     If \"Maladaptive\" to discuss with social work during IDT    ESAS Anxiety: Anxiety Score: Not anxious    ESAS Depression: Depression Score: 2        LAB AND IMAGING FINDINGS:   Objective data reviewed:  labs, images, records, medication use, vitals, and chart     FINAL COMMENTS   Thank you for allowing Palliative Medicine to participate in the care of Jamel Hazel.    Only check if applicable and billing time based rather than MDM  [] The total encounter time on this service date was ____ minutes which was spent performing a face-to-face encounter and personally completing the provider-level activities documented in the note. This includes time spent prior to the visit and after the visit in direct care of the patient. This time does not include time spent in any separately reportable services.    Electronically signed by   FRITZ Monzon

## 2024-08-27 NOTE — H&P
Hospitalist Admission Note    NAME:  Jamel Hazel   :  1955   MRN:  [id number]     Date/Time:  2024 12:43 AM    Patient PCP: Aj Loya, DO    ______________________________________________________________________  Given the patient's current clinical presentation, I have a high level of concern for decompensation if discharged from the emergency department.  Complex decision making was performed, which includes reviewing the patient's available past medical records, laboratory results, and x-ray films.       My assessment of this patient's clinical condition and my plan of care is as follows.    Assessment / Plan:    Active Problems:  CVA evaluation-disconjugate gaze, diplopia, dysphagia, vertigo  High-grade urothelial carcinoma stage IV receiving palliative immunotherapy  History of esophageal cancer status post resection  Chronic cancer related pain with opioid dependence  Severe protein calorie malnutrition with cachexia-BMI 13.6   heart failure with recovered ejection fraction  COPD  GERD    Plan:  CVA evaluation-disconjugate gaze, diplopia, dysphagia, vertigo  Heart failure with recovered ejection fraction  Admit to telemetry monitoring  Neurology consulted, greatly appreciate their expertise  Obtain TTE  Attain MRI brain with and without contrast  PT/OT/SLP evaluation  TIA/CVA evaluation  Hold aspirin given documented anaphylaxis to NSAIDs  -Defer Plavix initiation to neurology recommendations  Continue PTA rosuvastatin    High-grade urothelial carcinoma stage IV receiving palliative immunotherapy  History of esophageal cancer status post resection  Chronic cancer related pain with opioid dependence  Severe protein calorie malnutrition with cachexia-BMI 13.6  Palliative care consulted, greatly appreciate their expertise  -Recommend hospice consideration  Confirmed DNR status  Continue PTA extended release morphine  Continue PTA oxycodone  PDMP reviewed  Continue PTA oxybutynin and  to reach out to myself or assigned provider on-call via [employer] system with questions or concerns.     Procedures: see electronic medical records for all procedures/Xrays and details which were not copied into this note but were reviewed prior to creation of Plan.

## 2024-08-27 NOTE — PROGRESS NOTES
BP 98/60   Pulse 94   Temp 98.1 °F (36.7 °C) (Oral)   Resp 19   Ht 1.753 m (5' 9\")   Wt 41.8 kg (92 lb 1.6 oz)   SpO2 98%   BMI 13.60 kg/m²     Nurse called me that patient has had some soft asymptomatic low blood pressure readings.  Patient has been hide dose of morphine.  Will put holding parameters.  Patient will be given normal saline bolus and will be started on as needed midodrine.  After an hour from initial call, patient's vital signs are as follows:    /63   Pulse 75   Temp 98.1 °F (36.7 °C) (Oral)   Resp 18   Ht 1.753 m (5' 9\")   Wt 41.8 kg (92 lb 1.6 oz)   SpO2 100%   BMI 13.60 kg/m²

## 2024-08-27 NOTE — TELEPHONE ENCOUNTER
Palliative Medicine Clinic   [place]: [phone] ([phone])    Patient Name: Jamel Hazel  YOB: 1955    Medication Refill Request    Patient is scheduled for follow up:  []  YES  []   NO  Next [facility] Visit:     PDMP reviewed:  [] YES   []  System down / Unable  []  NO- Patient fills out of state    Medication:morphine (MS CONTIN) 30 MG   Dose and directions:30 mg, Oral, 2 TIMES DAILY   Number dispensed:  Date filled (PDMP or Pharmacy):  # left:    Medication:oxyCODONE HCl (OXY-IR) 10 MG i   Dose and directions:Take 1 tablet by mouth every 6 hours   Number dispensed:120  Date filled (PDMP or Pharmacy):  #left:    Appropriate for refill:  [x]  YES  []  Early Request - Requires MD/NP Review      Other pertinent information for prescriber:

## 2024-08-27 NOTE — PROGRESS NOTES
Speech LAnguage Pathology EVALUATION/DISCHARGE    Patient: Jamel Hazel (68 y.o. male)  Date: 8/27/2024  Primary Diagnosis: Diplopia [H53.2]  Dizziness [R42]  Cerebrovascular accident (CVA), unspecified mechanism (HCC) [I63.9]       Precautions:                    ASSESSMENT :  Slp re-consulted due to dysphagia and patient reports trouble with liquids. Patient with history of esophageal cancer with chronic dysphagia (esophageal in nature). No CXR completed on admission and WBC wfl.   Observed patient with cup sips of thin and solid. Patient with prolonged mastication of solid given minimal dentition. Patient with difficulty chewing foods from regular consistency diet and therefore will benefit from softer solids. He reports that at home, he has asked if his wife would put certain foods into the . He does not want all of his foods pureed.    Patient with effortful swallows and belching with nearly all swallows; however, no overt s/s aspiration. Given frequent belching and his significant GI history, suspect swallow difficulty again is esophageal in nature. Patient also reports occasional globus sensation with solids. Given overall goal for comfort/Hospice, would not recommend further diet modification.  Low suspicion for prandial aspiration.     Patient will benefit from skilled intervention to address the above impairments.     PLAN :  Recommendations and Planned Interventions:  Diet: Soft and bite sized and thin liquids. Small cup sips  If diet remains too hard to chew, consider minced/moist for ease of mastication  Alternate liquid/solid  Meds 1 at a time as tolerated. May need in applesauce or crushed  Fully upright positioning with po and keep head elevated after meals to reduce post-prandial aspiration risk         Acute SLP Services: No, patient will be discharged from acute skilled speech-language pathology at this time.    Discharge Recommendations: No, additional SLP treatment not indicated at

## 2024-08-27 NOTE — ED NOTES
Patients blood pressure consistently low latest one being @ 77/55 MAP 64,started patient on 500mls of normal saline bolus.

## 2024-08-27 NOTE — PROGRESS NOTES
End of Shift Note    Bedside shift change report given to  Anna RN  (oncoming nurse) by Theo Newsome RN .        Shift worked:  Days   Shift summary and any significant changes:     New admission. No significant changes. Turned Q2. Nephrostomy tubes flushed.        Concerns for physician to address:  None   Zone phone for oncoming shift:   [phone]     Patient Information  Jamel Hazel  68 y.o.  8/26/2024  5:32 PM by Anibal Colón DO. Jamel Hazel was admitted from [facility]    Problem List  Patient Active Problem List    Diagnosis Date Noted    Shea's esophagus 02/08/2012    Cerebrovascular accident (CVA), unspecified mechanism (HCC) 08/27/2024    DNR (do not resuscitate) discussion 01/11/2024    Palliative care by specialist 01/10/2024    Bladder spasms 01/10/2024    Cancer related pain 01/10/2024    Decreased appetite 01/10/2024    Constipation 01/10/2024    Physical debility 01/10/2024    Need for emotional support 01/10/2024    Counseling regarding advance care planning and goals of care 01/10/2024    Stage IV bladder cancer (HCC) 01/10/2024    Normocytic anemia 01/10/2024    Iron deficiency anemia due to chronic blood loss 01/10/2024    Severe protein-calorie malnutrition (HCC) 12/30/2023    Hematuria 12/08/2023    Respiratory failure (HCC) 05/06/2018    COPD (chronic obstructive pulmonary disease) (HCC) 01/13/2017    GI bleed 01/13/2017    HTN (hypertension) 01/13/2017    Weight loss 04/25/2013    Esophageal obstruction 04/25/2013    Dysphagia 04/25/2013    Esophageal cancer (HCC) 04/06/2012     Past Medical History:   Diagnosis Date    Arthritis     osteoarthritis    Asthma     Cancer (HCC)     esophagus    Cancer of esophagus (HCC) 4/2012    adenocarcinoma; Dr. Murillo    Chronic pain     lower back, has crushed vertebrae    Essential hypertension     GERD (gastroesophageal reflux disease)     Hypertension     not on medication    Nonischemic cardiomyopathy (HCC)     2003 EF 35-50%;  negative cath, 2/2012 EF 55%; Dr. 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    Thyroid disease     Unspecified sleep apnea     c-pap-doesnt wear now/apnea resolved after surgery/lost weight and no sleep apnea       Core Measures:  CVA: yes  CHF: no  PNA: no    Activity:     Number times ambulated in hallways past shift: 0  Number of times OOB to chair past shift: 0    Cardiac:   Cardiac Monitoring: yes, SB    Access:   Current line(s): PIV , nephrostomy tubes    Respiratory:   O2 Device: Nasal cannula    GI:  Last BM (including prior to admit): 08/26/24  Current diet:  ADULT DIET; Dysphagia - Soft and Bite Sized; please add extra gravy and sauces to all food to ensure moistened. patient likes oatmeal, pudding, mashed potatoes  ADULT ORAL NUTRITION SUPPLEMENT; Breakfast, Lunch, Dinner; Standard High Calorie/High Protein Oral Supplement  ADULT ORAL NUTRITION SUPPLEMENT; Breakfast, Lunch, Dinner; Standard High Calorie/High Protein Oral Supplement  Tolerating current diet: Yes    Pain Management:   Patient states pain is manageable on current regimen: yes    Skin:  Josef Scale Score: 13  Interventions: turn q2  Pressure injury: yes - stage 2 sacrum    Patient Safety:  Fall Score: Tejada Total Score: 25  Interventions: bed alarm  Self-release roll belt: No    DVT prophylaxis:  DVT prophylaxis: meds    Active Consults:  IP CONSULT TO TELE-NEUROLOGY  IP CONSULT TO PALLIATIVE CARE  IP CONSULT TO NEUROLOGY  IP CONSULT TO HOSPICE  IP CONSULT TO UROLOGY  IP CONSULT TO HOSPICE  IP WOUND CARE NURSE CONSULT TO EVAL    Length of Stay:  Expected LOS: 3  Actual LOS: 0    Theo Newsome RN

## 2024-08-27 NOTE — CONSULTS
Urology consulted for nephrostomy tube recommendations in setting of patient going to hospice.  He has metastatic stage IV bladder cancer with end-stage COPD and history of esophageal cancer.  He has a Gonzalez in place and bilateral nephrostomy tubes for chronic obstruction of his cancer.  He has had increased physical debility and pending discharge home on hospice.  Reportedly patient stating that his nephrostomy tubes are a significant source of his pain.  He is requesting removal of the tubes while transitioning to hospice.    Recommend continuing PCNT to remain through hospice treatment to prevent renal colic and significant MARIA ELENA with known chronic bilateral obstruction. If nuha PCNT has significant decrease in output or continued site pain can have IR check drains to confirm patency. Nursing to flush perc drains     Call with issues

## 2024-08-27 NOTE — PROGRESS NOTES
Physical Therapy    Received PT eval order. Chart reviewed. Pt cleared for therapy. Introduced self and therapy services to pt. Pt stated \"Don't talk to me and get out\". Stated he wants to leave. When explained rationale of PT/OT to evaluate mobility and any needs for d/c, pt stated he wanted us to leave. When asked how he would manage at home, pt stated he was going to go home and call hospice. Noted that pt was being seen by palliative. Pt stated he doesn't want to see them any more and wants hospice. CM outside room. Relayed to her and will defer. If this continues to be pt's decision, will sign off. Will check back tomorrow on status.    Jordana Rogers PT    1137: Noted that pt is choosing to go home with hospice. Will sign off at this time.    Jordana Rogers PT

## 2024-08-27 NOTE — PROGRESS NOTES
Spiritual Health Assessment/Progress Note  [facility]    Initial Encounter,  , End of Life,      Name: Jamel Hazel MRN: [id number]    Age: 68 y.o.     Sex: male   Language: English   Presybeterian: Mandaeism   Cerebrovascular accident (CVA), unspecified mechanism (HCC)     Date: 8/27/2024            Total Time Calculated: 29 min              Spiritual Assessment began in [facility] EMERGENCY DEPT        Referral/Consult From: Multi-disciplinary team   Encounter Overview/Reason: Initial Encounter  Service Provided For: Patient    Mya, Belief, Meaning:   Patient is connected with a mya tradition or spiritual practice and has beliefs or practices that help with coping during difficult times  Family/Friends No family/friends present      Importance and Influence:  Patient has spiritual/personal beliefs that influence decisions regarding their health  Family/Friends no family/friends present    Community:  Patient is connected with a spiritual community and feels well-supported. Support system includes: Spouse/Partner, Children, Mya Community, and Extended family  Family/Friends are connected with a spiritual community: and feel well-supported. Support system includes: Spouse/Partner, Children, Mya Community, Friends, and Extended family    Assessment and Plan of Care:     Patient Interventions include: Facilitated expression of thoughts and feelings, Explored spiritual coping/struggle/distress and theological reflection, Affirmed coping skills/support systems, and Engaged in life review and/or legacy  Family/Friends Interventions include: Other:      Patient Plan of Care: Spiritual Care available upon further referral  Family/Friends Plan of Care: Spiritual Care available upon further referral    Electronically signed by RAAD Evans on 8/27/2024 at 11:17 AM

## 2024-08-27 NOTE — PROGRESS NOTES
[facility] [place] [facility]  [address] [address]  W: [phone] F: [phone]    Patient is well-known to the outpatient clinic as a patient of Dr. Alvarado he is receiving treatment for metastatic colorectal cancer.  Was called by palliative care NP and met with palliative care at patient's bedside.  Patient is severely cachectic and fatigued.  He gave detailed accounting of inability to maintain outpatient appointments or even perform ADLs without significant help from his wife.  His wife was on speaker phone at time of visit.  His goals are to stay home and remain comfortable, in agreement with plan to transition to hospice.  He is no longer a candidate for any systemic treatment at this time due to his severe functional debility.  Hospice consult has already been placed.    Please call with questions or concerns.

## 2024-08-27 NOTE — TELEPHONE ENCOUNTER
Palliative Medicine Clinic   [place]: [phone] ([phone])    Patient Name: Jamel Hazel  YOB: 1955    Medication Refill Request Triage    Requested by:    [x]  Patient  []  Support person:      Last Pall Med Clinic Visit:  Visit date not found    Next Pall Med Clinic Visit:     Preferred Pharmacy: [facility]  Backup Pharmacy:  *    Medications requested:  Morphine and Oxycodone    Is patient completely out?  []   YES  [x]   NO  If NO, how many pills does patient have left?  _morphine; 6 oxycodone ;10_    Other pertinent information shared:

## 2024-08-28 ENCOUNTER — APPOINTMENT (OUTPATIENT)
Facility: HOSPITAL | Age: 69
DRG: 069 | End: 2024-08-28
Attending: STUDENT IN AN ORGANIZED HEALTH CARE EDUCATION/TRAINING PROGRAM
Payer: MEDICARE

## 2024-08-28 VITALS
SYSTOLIC BLOOD PRESSURE: 100 MMHG | BODY MASS INDEX: 13.65 KG/M2 | HEIGHT: 69 IN | OXYGEN SATURATION: 100 % | RESPIRATION RATE: 16 BRPM | DIASTOLIC BLOOD PRESSURE: 69 MMHG | TEMPERATURE: 97.3 F | HEART RATE: 55 BPM | WEIGHT: 92.15 LBS

## 2024-08-28 LAB
ANION GAP SERPL CALC-SCNC: 1 MMOL/L (ref 5–15)
BUN SERPL-MCNC: 26 MG/DL (ref 6–20)
BUN/CREAT SERPL: 48 (ref 12–20)
CALCIUM SERPL-MCNC: 8.4 MG/DL (ref 8.5–10.1)
CHLORIDE SERPL-SCNC: 95 MMOL/L (ref 97–108)
CHOLEST SERPL-MCNC: 129 MG/DL
CO2 SERPL-SCNC: 36 MMOL/L (ref 21–32)
CREAT SERPL-MCNC: 0.54 MG/DL (ref 0.7–1.3)
ERYTHROCYTE [DISTWIDTH] IN BLOOD BY AUTOMATED COUNT: 16.2 % (ref 11.5–14.5)
EST. AVERAGE GLUCOSE BLD GHB EST-MCNC: 97 MG/DL
GLUCOSE SERPL-MCNC: 85 MG/DL (ref 65–100)
HBA1C MFR BLD: 5 % (ref 4–5.6)
HCT VFR BLD AUTO: 29.7 % (ref 36.6–50.3)
HDLC SERPL-MCNC: 68 MG/DL
HDLC SERPL: 1.9 (ref 0–5)
HGB BLD-MCNC: 9.7 G/DL (ref 12.1–17)
LDLC SERPL CALC-MCNC: 51.2 MG/DL (ref 0–100)
MCH RBC QN AUTO: 29.7 PG (ref 26–34)
MCHC RBC AUTO-ENTMCNC: 32.7 G/DL (ref 30–36.5)
MCV RBC AUTO: 90.8 FL (ref 80–99)
NRBC # BLD: 0 K/UL (ref 0–0.01)
NRBC BLD-RTO: 0 PER 100 WBC
PLATELET # BLD AUTO: 250 K/UL (ref 150–400)
PMV BLD AUTO: 8.6 FL (ref 8.9–12.9)
POTASSIUM SERPL-SCNC: 4.6 MMOL/L (ref 3.5–5.1)
RBC # BLD AUTO: 3.27 M/UL (ref 4.1–5.7)
SODIUM SERPL-SCNC: 132 MMOL/L (ref 136–145)
TRIGL SERPL-MCNC: 49 MG/DL
VLDLC SERPL CALC-MCNC: 9.8 MG/DL
WBC # BLD AUTO: 5.5 K/UL (ref 4.1–11.1)

## 2024-08-28 PROCEDURE — 2580000003 HC RX 258: Performed by: STUDENT IN AN ORGANIZED HEALTH CARE EDUCATION/TRAINING PROGRAM

## 2024-08-28 PROCEDURE — 85027 COMPLETE CBC AUTOMATED: CPT

## 2024-08-28 PROCEDURE — 93308 TTE F-UP OR LMTD: CPT

## 2024-08-28 PROCEDURE — 80061 LIPID PANEL: CPT

## 2024-08-28 PROCEDURE — 80048 BASIC METABOLIC PNL TOTAL CA: CPT

## 2024-08-28 PROCEDURE — 36415 COLL VENOUS BLD VENIPUNCTURE: CPT

## 2024-08-28 PROCEDURE — 94760 N-INVAS EAR/PLS OXIMETRY 1: CPT

## 2024-08-28 PROCEDURE — 83036 HEMOGLOBIN GLYCOSYLATED A1C: CPT

## 2024-08-28 PROCEDURE — 6370000000 HC RX 637 (ALT 250 FOR IP): Performed by: STUDENT IN AN ORGANIZED HEALTH CARE EDUCATION/TRAINING PROGRAM

## 2024-08-28 PROCEDURE — 2700000000 HC OXYGEN THERAPY PER DAY

## 2024-08-28 PROCEDURE — 6360000002 HC RX W HCPCS: Performed by: STUDENT IN AN ORGANIZED HEALTH CARE EDUCATION/TRAINING PROGRAM

## 2024-08-28 RX ORDER — MORPHINE SULFATE 30 MG/1
30 TABLET, FILM COATED, EXTENDED RELEASE ORAL 2 TIMES DAILY
Qty: 60 TABLET | Refills: 0 | Status: CANCELLED | OUTPATIENT
Start: 2024-08-28 | End: 2024-09-27

## 2024-08-28 RX ORDER — CLOPIDOGREL BISULFATE 75 MG/1
75 TABLET ORAL DAILY
Qty: 30 TABLET | Refills: 3 | Status: SHIPPED | OUTPATIENT
Start: 2024-08-28

## 2024-08-28 RX ORDER — OXYCODONE HYDROCHLORIDE 10 MG/1
10 TABLET ORAL EVERY 6 HOURS PRN
Qty: 60 TABLET | Refills: 0 | Status: CANCELLED | OUTPATIENT
Start: 2024-08-28 | End: 2024-09-12

## 2024-08-28 RX ADMIN — PANTOPRAZOLE SODIUM 40 MG: 40 TABLET, DELAYED RELEASE ORAL at 05:46

## 2024-08-28 RX ADMIN — SODIUM CHLORIDE, PRESERVATIVE FREE 10 ML: 5 INJECTION INTRAVENOUS at 10:10

## 2024-08-28 RX ADMIN — ENOXAPARIN SODIUM 30 MG: 100 INJECTION SUBCUTANEOUS at 10:01

## 2024-08-28 RX ADMIN — GABAPENTIN 200 MG: 100 CAPSULE ORAL at 10:03

## 2024-08-28 RX ADMIN — OXYBUTYNIN CHLORIDE 5 MG: 5 TABLET ORAL at 09:59

## 2024-08-28 RX ADMIN — FINASTERIDE 5 MG: 5 TABLET, FILM COATED ORAL at 10:04

## 2024-08-28 RX ADMIN — Medication: at 10:09

## 2024-08-28 ASSESSMENT — PAIN - FUNCTIONAL ASSESSMENT: PAIN_FUNCTIONAL_ASSESSMENT: PREVENTS OR INTERFERES SOME ACTIVE ACTIVITIES AND ADLS

## 2024-08-28 ASSESSMENT — PAIN DESCRIPTION - LOCATION: LOCATION: SACRUM

## 2024-08-28 ASSESSMENT — PAIN DESCRIPTION - DESCRIPTORS: DESCRIPTORS: ACHING

## 2024-08-28 ASSESSMENT — PAIN SCALES - GENERAL: PAINLEVEL_OUTOF10: 3

## 2024-08-28 ASSESSMENT — PAIN DESCRIPTION - FREQUENCY: FREQUENCY: CONTINUOUS

## 2024-08-28 ASSESSMENT — PAIN DESCRIPTION - ORIENTATION: ORIENTATION: POSTERIOR

## 2024-08-28 ASSESSMENT — PAIN DESCRIPTION - PAIN TYPE: TYPE: CHRONIC PAIN

## 2024-08-28 NOTE — CARE COORDINATION
Home with hospice, H2H @1515.   Clear from CM.     Transition of Care Plan:    RUR: 22% (high RUR)   Prior Level of Functioning: Needing assistance   [facility]   If SNF or IPR: Date FOC offered: Hosp offered 08/27  Date FOC received: 08/27  Accepting facility: [facility]   Date authorization started with reference number:   Date authorization received and expires:   Follow up appointments: defer to hospice  DME needed: defer to hospice  Transportation at discharge: H2H @ 1515  IM/IMM Medicare/ letter given: Last given 08/27  Is patient a [place] and connected with [facility]? N/a   If yes, was [place] transfer form completed and [facility] notified? N/a  Caregiver Contact: Ayleen DU ([phone])  Discharge Caregiver contacted prior to discharge? Pt to contact  Care Conference needed? Not at this time  Barriers to discharge: Hospice set-up    0858 - Assumed transitions of care planning form CM B. Throckmorton. Chart reviewed. Noted consult to hospice, [facility] following. CM requested updates from them once admission time is arranged with pt and family. Pt will need stretcher at d/c. CM continuing to follow.     1203 - Received call from Ashley with hospice [facility] ([phone]). They are ready to admit today, DME has been delivered. CM made provider aware, requested DC order. CM to arrange transport once MD hercules.     1416 - [facility] approved 1515. Hospice agency aware. MD aware.      08/28/24 1205   Services At/After Discharge   Transition of Care Consult (CM Consult) Hospice   Internal Hospice No   Services At/After Discharge Hospice   [place] Resource Information Provided? No  (N/a)   Mode of Transport at Discharge BLS   Confirm Follow Up Transport Wheelchair Van   Condition of Participation: Discharge Planning   The Plan for Transition of Care is related to the following treatment goals: Home with hospice to continue care and comfort goals   The Patient and/or Patient

## 2024-08-28 NOTE — DISCHARGE SUMMARY
Hospitalist Discharge Note    NAME:   Jamel Hazel   : 1955   MRN: [id number]     Admit date: 2024    Discharge date: 24    PCP: Aj Loya,     Discharge Diagnoses:    High-grade urothelial carcinoma stage IV receiving palliative immunotherapy POA transition to home hospice    History of esophageal cancer status post resection    Chronic cancer related pain with opioid dependence    Disconjugate gaze, diplopia, dysphagia, vertigo ? TIA, no evidence of CVA    Heart failure with recovered ejection fraction     COPD     GERD    Severe protein calorie malnutrition POA Body mass index is 13.6 kg/m²., 20 Lb weight loss over months, alb 2.7, cachexia    Code Status: DNR-paperwork on file    Discharge Medications:  Current Discharge Medication List        START taking these medications    Details   clopidogrel (PLAVIX) 75 MG tablet Take 1 tablet by mouth daily  Qty: 30 tablet, Refills: 3           CONTINUE these medications which have NOT CHANGED    Details   morphine (MS CONTIN) 30 MG extended release tablet Take 1 tablet by mouth 2 times daily.      Melatonin 10 MG TABS Take 10 mg by mouth as needed (sleep)      guaiFENesin (MUCINEX) 600 MG extended release tablet Take 1 tablet by mouth 2 times daily      oxyCODONE HCl (OXY-IR) 10 MG immediate release tablet Take 1 tablet by mouth every 6 hours as needed for Pain for up to 30 days. Intended supply: 30 days Max Daily Amount: 40 mg  Qty: 120 tablet, Refills: 0    Comments: Reduce doses taken as pain becomes manageable  Associated Diagnoses: Stage IV bladder cancer (HCC)      ondansetron (ZOFRAN-ODT) 4 MG disintegrating tablet DISSOLVE 1 TABLET ON THE TONGUE EVERY 8 HOURS AS NEEDED FOR NAUSEA OR VOMITING  Qty: 21 tablet, Refills: 0    Associated Diagnoses: Stage IV bladder cancer (HCC)      naloxone 4 MG/0.1ML LIQD nasal spray 1 spray by Nasal route as needed for Opioid Reversal  Qty: 2 each, Refills: 0      furosemide (LASIX) 20 MG

## 2024-08-28 NOTE — WOUND CARE
Wound care nurse consult for POA sacral wound.    Chart reviewed and patient assessed    69 y/o male admitted for CVA. Patient is cachectic looking and weighs only 92 lbs. Patient has chosen hospice and will be discharged with hospice tomorrow.   Past Medical History:   Diagnosis Date    Arthritis     osteoarthritis    Asthma     Cancer (HCC)     esophagus    Cancer of esophagus (HCC) 4/2012    adenocarcinoma; Dr. Murillo    Chronic pain     lower back, has crushed vertebrae    Essential hypertension     GERD (gastroesophageal reflux disease)     Hypertension     not on medication    Nonischemic cardiomyopathy (HCC)     2003 EF 35-50%; negative cath, 2/2012 EF 55%; Dr. 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    Thyroid disease     Unspecified sleep apnea     c-pap-doesnt wear now/apnea resolved after surgery/lost weight and no sleep apnea     Patient has metastatic stage IV bladder cancer.  Body mass index is 13.6 kg/m².    Unstageable coccyx/sacral PI measuring 2 x 2 cm's. 90% covered with slough surrounded by pale pink tissue, scant drainage is yellow, no odor.    Recommend palliative wound care to control drainage and odor as follows:    Sacral Unstageable pressure injury: MWF, cleanse with normal slaine moist 4x4, cover wound with a piece Therahoney HD Sheet (honey alginate) or apply a smear of Therahoney gel onto wound, cover with a piece of plain alginate (Maxorb II) and secure with a small sacral foam dressing.    MINA DELAROSA RN, CWON

## 2024-08-28 NOTE — PROGRESS NOTES
End of Shift Note    Bedside shift change report given to  JANIYA RN  (oncoming nurse) by CARLOS EDUARDO PRADO, JOHANA .        Shift worked: 7P-7A   Shift summary and any significant changes:     No significant changes. Turned Q2. Nephrostomy tubes flushed and drained.       Concerns for physician to address:  None   Zone phone for oncoming shift:   [phone]     Patient Information  Jamel Hazel  68 y.o.  8/26/2024  5:32 PM by Anibal Colón DO. Jamel Hazel was admitted from [facility]    Problem List  Patient Active Problem List    Diagnosis Date Noted    Shea's esophagus 02/08/2012    Cerebrovascular accident (CVA), unspecified mechanism (HCC) 08/27/2024    DNR (do not resuscitate) discussion 01/11/2024    Palliative care by specialist 01/10/2024    Bladder spasms 01/10/2024    Cancer related pain 01/10/2024    Decreased appetite 01/10/2024    Constipation 01/10/2024    Physical debility 01/10/2024    Need for emotional support 01/10/2024    Counseling regarding advance care planning and goals of care 01/10/2024    Stage IV bladder cancer (HCC) 01/10/2024    Normocytic anemia 01/10/2024    Iron deficiency anemia due to chronic blood loss 01/10/2024    Severe protein-calorie malnutrition (HCC) 12/30/2023    Hematuria 12/08/2023    Respiratory failure (HCC) 05/06/2018    COPD (chronic obstructive pulmonary disease) (HCC) 01/13/2017    GI bleed 01/13/2017    HTN (hypertension) 01/13/2017    Weight loss 04/25/2013    Esophageal obstruction 04/25/2013    Dysphagia 04/25/2013    Esophageal cancer (HCC) 04/06/2012     Past Medical History:   Diagnosis Date    Arthritis     osteoarthritis    Asthma     Cancer (HCC)     esophagus    Cancer of esophagus (HCC) 4/2012    adenocarcinoma; Dr. Murillo    Chronic pain     lower back, has crushed vertebrae    Essential hypertension     GERD (gastroesophageal reflux disease)     Hypertension     not on medication    Nonischemic cardiomyopathy (HCC)     2003 EF 35-50%; negative cath,

## 2024-08-28 NOTE — PLAN OF CARE
Problem: ABCDS Injury Assessment  Goal: Absence of physical injury  8/27/2024 2248 by Sabrina Turner RN  Outcome: Progressing  8/27/2024 1535 by Theo Newsome RN  Outcome: Progressing     Problem: Skin/Tissue Integrity  Goal: Absence of new skin breakdown  Description: 1.  Monitor for areas of redness and/or skin breakdown  2.  Assess vascular access sites hourly  3.  Every 4-6 hours minimum:  Change oxygen saturation probe site  4.  Every 4-6 hours:  If on nasal continuous positive airway pressure, respiratory therapy assess nares and determine need for appliance change or resting period.  8/27/2024 2248 by Sabrina Turner RN  Outcome: Progressing  8/27/2024 1535 by Theo Newsome RN  Outcome: Progressing     Problem: Discharge Planning  Goal: Discharge to home or other facility with appropriate resources  8/27/2024 2248 by Sabrina Turner RN  Outcome: Progressing  8/27/2024 1535 by Teho Newsome RN  Outcome: Progressing     Problem: Safety - Adult  Goal: Free from fall injury  8/27/2024 2248 by Sabrina Turner RN  Outcome: Progressing  8/27/2024 1535 by Theo Newsome RN  Outcome: Progressing     Problem: Neurosensory - Adult  Goal: Achieves stable or improved neurological status  8/27/2024 2248 by Sabrina Turner RN  Outcome: Progressing  8/27/2024 1535 by Theo Newsome RN  Outcome: Progressing  Goal: Absence of seizures  8/27/2024 2248 by Sabrina Turner RN  Outcome: Progressing  8/27/2024 1535 by Theo Newsome RN  Outcome: Progressing  Goal: Remains free of injury related to seizures activity  8/27/2024 2248 by Sabrina Turner RN  Outcome: Progressing  8/27/2024 1535 by Theo Newsome RN  Outcome: Progressing  Goal: Achieves maximal functionality and self care  8/27/2024 2248 by Sabrina Turner RN  Outcome: Progressing  8/27/2024 1535 by Theo Newsome RN  Outcome: Progressing     Problem: Skin/Tissue Integrity - Adult  Goal: Skin integrity remains

## 2024-08-28 NOTE — TELEPHONE ENCOUNTER
Palliative [facility]   [place]: [phone] ([phone])    Patient Name: Jamel Hazel  YOB: 1955     Medication Refill Request      Chart reviewed- per CM note 8/27/24, patient has chosen [facility] who will be reaching out to family.    Patient is scheduled for follow up:  [x]  YES  []   NO  Next [facility] Visit: 9/3/24    PDMP reviewed:  [x] YES       Medication: Oxy IR 10 mg  Dose and directions: 1 every 6 prn  Number dispensed:120 for 30 day supply  Date filled (PDMP or Pharmacy): 7/12/24  # left:    Medication: MS Contin 30 mg  Dose and directions: 1 every 12 hours  Number dispensed: 60 for 30 days  Date filled (PDMP or Pharmacy): 7/12/24  #left:    Appropriate for refill:  [x]  YES  Pended for Dr. Campbell approval

## 2024-08-29 ENCOUNTER — TELEPHONE (OUTPATIENT)
Age: 69
End: 2024-08-29

## 2024-08-29 NOTE — TELEPHONE ENCOUNTER
Called patient to advise/confirm upcoming appt with Dr. Campbell on 9/3/2024  at 10:30  at [facility].  Spoke with pt and cancelled appointment. Pt has been admitted to [facility]

## 2024-08-29 NOTE — TELEPHONE ENCOUNTER
Message noted that patient has been admitted to [facility].     Elyssa Francis RN  Palliative Medicine

## 2024-08-29 NOTE — TELEPHONE ENCOUNTER
Per palliative call, patient was already admitted to [facility] services.     Elyssa Francis RN  Palliative Medicine

## 2024-08-30 LAB
ECHO AO ROOT DIAM: 4.3 CM
ECHO AO ROOT INDEX: 2.89 CM/M2
ECHO AV PEAK GRADIENT: 4 MMHG
ECHO AV PEAK VELOCITY: 1 M/S
ECHO BSA: 1.43 M2
ECHO LA DIAMETER INDEX: 2.35 CM/M2
ECHO LA DIAMETER: 3.5 CM
ECHO LA TO AORTIC ROOT RATIO: 0.81
ECHO LV EDV A4C: 154 ML
ECHO LV EDV INDEX A4C: 103 ML/M2
ECHO LV EJECTION FRACTION A4C: 40 %
ECHO LV EJECTION FRACTION BIPLANE: 45 % (ref 55–100)
ECHO LV ESV A4C: 92 ML
ECHO LV ESV INDEX A4C: 62 ML/M2
ECHO LV FRACTIONAL SHORTENING: 24 % (ref 28–44)
ECHO LV INTERNAL DIMENSION DIASTOLE INDEX: 3.69 CM/M2
ECHO LV INTERNAL DIMENSION DIASTOLIC: 5.5 CM (ref 4.2–5.9)
ECHO LV INTERNAL DIMENSION SYSTOLIC INDEX: 2.82 CM/M2
ECHO LV INTERNAL DIMENSION SYSTOLIC: 4.2 CM
ECHO LV IVSD: 0.8 CM (ref 0.6–1)
ECHO LV MASS 2D: 160 G (ref 88–224)
ECHO LV MASS INDEX 2D: 107.4 G/M2 (ref 49–115)
ECHO LV POSTERIOR WALL DIASTOLIC: 0.8 CM (ref 0.6–1)
ECHO LV RELATIVE WALL THICKNESS RATIO: 0.29
ECHO LVOT AREA: 5.3 CM2
ECHO LVOT DIAM: 2.6 CM
ECHO PV MAX VELOCITY: 0.8 M/S
ECHO PV PEAK GRADIENT: 3 MMHG
ECHO TV REGURGITANT MAX VELOCITY: 2.63 M/S
ECHO TV REGURGITANT PEAK GRADIENT: 28 MMHG

## 2024-10-16 ENCOUNTER — CLINICAL DOCUMENTATION (OUTPATIENT)
Age: 69
End: 2024-10-16

## 2024-10-16 NOTE — PROGRESS NOTES
Worked with patient's wife to get the [facility] FAP approved for remainder of patient financial responsibilites.   Approved 8.1.24 through 3.31.25  Approval letter is in media.    Spoke with billing and at this time patient has a $72 dollar credit and some outstanding bills awaiting insurance to pay. Patient was approved for partial assistance so if insurance pays partial amount she will have $72 dollars applied and whatever % is still due. If insurance pays full amount she will have a credit of $72. We will follow up in 30 days to see where th balance stands and request refund if applicable.

## 2025-03-11 NOTE — PROGRESS NOTES
Patient brought ot angio recovery via stretcher. Patient changed into gown and IV placed in the right AC. Patient c   
Quality 130: Documentation Of Current Medications In The Medical Record: Current Medications Documented
Quality 431: Preventive Care And Screening: Unhealthy Alcohol Use - Screening: Patient not identified as an unhealthy alcohol user when screened for unhealthy alcohol use using a systematic screening method
Quality 226: Preventive Care And Screening: Tobacco Use: Screening And Cessation Intervention: Patient screened for tobacco use and is an ex/non-smoker
Detail Level: Detailed

## 2025-07-03 NOTE — H&P
Radiology History and Physical    Patient: Jamel Hazel 68 y.o. male       Chief Complaint: urinary nephrostomy tube has pulled out      History of Present Illness: 68 year old male with bilateral nephrostomy tubes, the right tube fell out overnight. Presents for replacement    History:    Past Medical History:   Diagnosis Date    Arthritis     osteoarthritis    Asthma     Cancer (HCC)     esophagus    Cancer of esophagus (HCC) 4/2012    adenocarcinoma; Dr. Murillo    Chronic pain     lower back, has crushed vertebrae    Essential hypertension     GERD (gastroesophageal reflux disease)     Hypertension     not on medication    Nonischemic cardiomyopathy (HCC)     2003 EF 35-50%; negative cath, 2/2012 EF 55%; Dr. 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    Thyroid disease     Unspecified sleep apnea     c-pap-doesnt wear now/apnea resolved after surgery/lost weight and no sleep apnea     Family History   Problem Relation Age of Onset    Heart Attack Mother     Asthma Mother     Cancer Mother         colon    Other Mother         food allergies/Forrest's disease/Shea's esophagus    Colon Cancer Mother     COPD Mother     Heart Disease Mother     Other Father         abestos/food allergies/degernative disc disease    Colon Cancer Brother     Colon Cancer Maternal Grandmother      Social History     Socioeconomic History    Marital status:      Spouse name: Not on file    Number of children: Not on file    Years of education: Not on file    Highest education level: Not on file   Occupational History    Not on file   Tobacco Use    Smoking status: Former     Current packs/day: 0.00     Types: Cigarettes     Quit date: 1975      Take meds as prescribed  Fu with PCP  Go to ED if symptoms worsen

## (undated) DEVICE — SET ADMIN 16ML TBNG L100IN 2 Y INJ SITE IV PIGGY BK DISP

## (undated) DEVICE — TIP SUCT TRNSPAR RIB SURF STD BLB RIG NVENT W/ 5IN1 CONN DYND50138] MEDLINE INDUSTRIES INC]

## (undated) DEVICE — CUFF BP SOFT LG ADLT 1-TUBE HP --

## (undated) DEVICE — SOLIDIFIER MEDC 1200ML -- CONVERT TO 356117

## (undated) DEVICE — OPEN-END URETERAL CATHETER: Brand: COOK

## (undated) DEVICE — SYR 3ML LL TIP 1/10ML GRAD --

## (undated) DEVICE — SET GRAV CK VLV NEEDLESS ST 3 GANGED 4WAY STPCOCK HI FLO 10

## (undated) DEVICE — CATH IV AUTOGRD BC BLU 22GA 25 -- INSYTE

## (undated) DEVICE — STRAINER URIN CALC RNL MSH -- CONVERT TO ITEM 357634

## (undated) DEVICE — PAD,NON-ADHERENT,2X3,STERILE,LF,1/PK: Brand: MEDLINE

## (undated) DEVICE — MEDI-VAC YANK SUCT HNDL W/TPRD BULBOUS TIP: Brand: CARDINAL HEALTH

## (undated) DEVICE — CLIP INT L235CM WRK CHAN DIA2.8MM OPN 11MM LCK MECHANISM MR

## (undated) DEVICE — SOLUTION IRRIG 3000ML 0.9% SOD CHL USP UROMATIC PLAS CONT

## (undated) DEVICE — MARKER,SKIN,WI/RULER AND LABELS: Brand: MEDLINE

## (undated) DEVICE — NEEDLE HYPO 18GA L1.5IN PNK S STL HUB POLYPR SHLD REG BVL

## (undated) DEVICE — TUBING IRRIG L77IN DIA0.241IN L BOR FOR CYSTO W/ NVENT

## (undated) DEVICE — ENDOSCOPIC MARKER

## (undated) DEVICE — SNARE ENDOSCP M L240CM W27MM SHTH DIA2.4MM CHN 2.8MM OVL

## (undated) DEVICE — TRAP SUC MUCOUS 70ML -- MEDICHOICE MEDLINE

## (undated) DEVICE — BASIN EMESIS 500CC ROSE 250/CS 60/PLT: Brand: MEDEGEN MEDICAL PRODUCTS, LLC

## (undated) DEVICE — ENDOSCOPIC KIT COMPLIANCE ENDOKIT

## (undated) DEVICE — GLOVE SURG 7.5 PF POLYMER WHT STRL SIGN LTX ESSENTIAL LTX

## (undated) DEVICE — (D)SYR 10ML 1/5ML GRAD NSAF -- PKGING CHANGE USE ITEM 338027

## (undated) DEVICE — BLOCK BITE ENDOSCP AD 21 MM W/ DIL BLU LF DISP

## (undated) DEVICE — CANISTER, RIGID, 3000CC: Brand: MEDLINE INDUSTRIES, INC.

## (undated) DEVICE — SPONGE GZ W4XL4IN COT 12 PLY TYP VII WVN C FLD DSGN STERILE

## (undated) DEVICE — SYRINGE 50ML E/T

## (undated) DEVICE — FORCEPS BX L160CM DIA8MM GRSP DISECT CUP TIP NONLOCKING ROT

## (undated) DEVICE — Device

## (undated) DEVICE — CYSTO MRMC: Brand: MEDLINE INDUSTRIES, INC.

## (undated) DEVICE — GUIDEWIRE ENDOSCP L150CM DIA0.035IN TIP 3CM PTFE NIT

## (undated) DEVICE — 1200 GUARD II KIT W/5MM TUBE W/O VAC TUBE: Brand: GUARDIAN

## (undated) DEVICE — TUBING, SUCTION, 1/4" X 10', STRAIGHT: Brand: MEDLINE

## (undated) DEVICE — SOLUTION IRRIG 1000ML STRL H2O USP PLAS POUR BTL

## (undated) DEVICE — Device: Brand: SPOT EX ENDOSCOPIC TATTOO

## (undated) DEVICE — SYRINGE,TOOMEY,IRRIGATION,70CC,STERILE: Brand: MEDLINE

## (undated) DEVICE — NON-REM POLYHESIVE PATIENT RETURN ELECTRODE: Brand: VALLEYLAB

## (undated) DEVICE — SOLUTION IRRIGATION STRL H2O 1000 ML UROMATIC CONTAINER

## (undated) DEVICE — Device: Brand: MEDEX

## (undated) DEVICE — GUIDEWIRE ENDOSCP L150CM DIA0.035IN TIP L15CM BENT PTFE

## (undated) DEVICE — TOWEL 4 PLY TISS 19X30 SUE WHT

## (undated) DEVICE — KENDALL RADIOLUCENT FOAM MONITORING ELECTRODE RECTANGULAR SHAPE: Brand: KENDALL

## (undated) DEVICE — NEONATAL-ADULT SPO2 SENSOR: Brand: NELLCOR

## (undated) DEVICE — BAG,DRAINAGE,4L,A/R TOWER,LL,SLIDE TAP: Brand: MEDLINE

## (undated) DEVICE — BAG SPEC BIOHZRD 10 X 10 IN --

## (undated) DEVICE — CUFF BLD PRSS AD CLTH SGL TB W/ BAYNT CONN ROUNDED CORNER

## (undated) DEVICE — Z DISCONTINUED PER MEDLINE LINE GAS SAMPLING O2/CO2 LNG AD 13 FT NSL W/ TBNG FILTERLINE

## (undated) DEVICE — SNARE ENDOSCP AD L240CM LOOP W10MM SHTH DIA2.4MM RND INSUL

## (undated) DEVICE — CONTAINER SPEC 20 ML LID NEUT BUFF FORMALIN 10 % POLYPR STS

## (undated) DEVICE — CATHETER URETH 20FR BLLN 30CC F 3 W SPEC M RND TIP 2

## (undated) DEVICE — FORCEPS BX L240CM JAW DIA2.4MM ORNG L CAP W/ NDL DISP RAD